# Patient Record
Sex: FEMALE | Race: WHITE | Employment: OTHER | URBAN - METROPOLITAN AREA
[De-identification: names, ages, dates, MRNs, and addresses within clinical notes are randomized per-mention and may not be internally consistent; named-entity substitution may affect disease eponyms.]

---

## 2017-04-21 ENCOUNTER — HOSPITAL ENCOUNTER (OUTPATIENT)
Dept: MRI IMAGING | Age: 67
Discharge: HOME | End: 2017-04-21

## 2017-04-21 DIAGNOSIS — M47.22 CERVICAL SPONDYLOSIS WITH RADICULOPATHY: ICD-10-CM

## 2017-04-25 ENCOUNTER — HOSPITAL ENCOUNTER (OUTPATIENT)
Dept: MRI IMAGING | Age: 67
Discharge: HOME | End: 2017-04-25
Payer: MEDICARE

## 2017-04-25 PROCEDURE — 72141 MRI NECK SPINE W/O DYE: CPT

## 2017-08-15 RX ORDER — METOPROLOL SUCCINATE 25 MG/1
TABLET, EXTENDED RELEASE ORAL
Qty: 90 TAB | Refills: 0 | Status: SHIPPED | OUTPATIENT
Start: 2017-08-15 | End: 2018-01-24 | Stop reason: SDUPTHER

## 2018-02-14 ENCOUNTER — HOSPITAL ENCOUNTER (OUTPATIENT)
Dept: MRI IMAGING | Age: 68
Discharge: HOME | End: 2018-02-14
Payer: MEDICARE

## 2018-02-14 DIAGNOSIS — M46.1 SACROILIITIS, NOT ELSEWHERE CLASSIFIED (HCC): ICD-10-CM

## 2018-02-14 PROCEDURE — 72148 MRI LUMBAR SPINE W/O DYE: CPT

## 2018-02-27 ENCOUNTER — OFFICE VISIT (OUTPATIENT)
Dept: NEUROLOGY | Age: 68
End: 2018-02-27

## 2018-02-27 VITALS
DIASTOLIC BLOOD PRESSURE: 76 MMHG | HEIGHT: 67 IN | BODY MASS INDEX: 33.13 KG/M2 | SYSTOLIC BLOOD PRESSURE: 136 MMHG | RESPIRATION RATE: 18 BRPM | TEMPERATURE: 98.4 F | HEART RATE: 69 BPM | WEIGHT: 211.1 LBS | OXYGEN SATURATION: 98 %

## 2018-02-27 DIAGNOSIS — M48.061 SPINAL STENOSIS OF LUMBAR REGION WITHOUT NEUROGENIC CLAUDICATION: Primary | ICD-10-CM

## 2018-02-27 DIAGNOSIS — R53.82 CHRONIC FATIGUE: ICD-10-CM

## 2018-02-27 DIAGNOSIS — M79.7 FIBROMYALGIA: ICD-10-CM

## 2018-02-27 DIAGNOSIS — R79.89 LOW VITAMIN D LEVEL: ICD-10-CM

## 2018-02-27 RX ORDER — ROSUVASTATIN CALCIUM 20 MG/1
20 TABLET, COATED ORAL
COMMUNITY
End: 2020-08-20 | Stop reason: SDUPTHER

## 2018-02-27 RX ORDER — METFORMIN HYDROCHLORIDE 500 MG/1
500 TABLET, FILM COATED, EXTENDED RELEASE ORAL 2 TIMES DAILY
COMMUNITY

## 2018-02-27 RX ORDER — PREGABALIN 150 MG/1
150 CAPSULE ORAL 2 TIMES DAILY
Qty: 60 CAP | Refills: 5 | Status: SHIPPED | OUTPATIENT
Start: 2018-02-27 | End: 2018-09-11 | Stop reason: SDUPTHER

## 2018-02-27 RX ORDER — PREGABALIN 75 MG/1
75 CAPSULE ORAL 2 TIMES DAILY
Qty: 14 CAP | Refills: 0 | Status: SHIPPED | COMMUNITY
Start: 2018-02-27 | End: 2018-09-27

## 2018-02-27 NOTE — PATIENT INSTRUCTIONS
10 [facility]   Statement to Patients  April 1, 2014      In an effort to ensure the large volume of patient prescription refills is processed in the most efficient and expeditious manner, we are asking our patients to assist us by calling your Pharmacy for all prescription refills, this will include also your  Mail Order Pharmacy. The pharmacy will contact our office electronically to continue the refill process. Please do not wait until the last minute to call your pharmacy. We need at least 48 hours (2days) to fill prescriptions. We also encourage you to call your pharmacy before going to  your prescription to make sure it is ready. With regard to controlled substance prescription refill requests (narcotic refills) that need to be picked up at our office, we ask your cooperation by providing us with at least 72 hours (3days) notice that you will need a refill. We will not refill narcotic prescription refill requests after 4:00pm on any weekday, Monday through Thursday, or after 2:00pm on Fridays, or on the weekends. We encourage everyone to explore another way of getting your prescription refill request processed using Tapstream, our patient web portal through our electronic medical record system. Tapstream is an efficient and effective way to communicate your medication request directly to the office and  downloadable as an tavares on your smart phone . Tapstream also features a review functionality that allows you to view your medication list as well as leave messages for your physician. Are you ready to get connected? If so please review the attatched instructions or speak to any of our staff to get you set up right away! Thank you so much for your cooperation. Should you have any questions please contact our Practice Administrator.     The Physicians and Staff,  [facility] [address]  What is a living will?    A living will is a legal form you use to write down the kind of care you want at the end of your life. It is used by the health professionals who will treat you if you aren't able to decide for yourself. If you put your wishes in writing, your loved ones and others will know what kind of care you want. They won't need to guess. This can ease your mind and be helpful to others. A living will is not the same as an estate or property will. An estate will explains what you want to happen with your money and property after you die. Is a living will a legal document? A living will is a legal document. Each state has its own laws about living arteaga. If you move to another state, make sure that your living will is legal in the state where you now live. Or you might use a universal form that has been approved by many states. This kind of form can sometimes be completed and stored online. Your electronic copy will then be available wherever you have a connection to the Internet. In most cases, doctors will respect your wishes even if you have a form from a different state. · You don't need an  to complete a living will. But legal advice can be helpful if your state's laws are unclear, your health history is complicated, or your family can't agree on what should be in your living will. · You can change your living will at any time. Some people find that their wishes about end-of-life care change as their health changes. · In addition to making a living will, think about completing a medical power of  form. This form lets you name the person you want to make end-of-life treatment decisions for you (your \"health care agent\") if you're not able to. Many hospitals and nursing homes will give you the forms you need to complete a living will and a medical power of . · Your living will is used only if you can't make or communicate decisions for yourself anymore.  If you become able to make decisions again, you can accept or refuse any treatment, no matter what you wrote in your living will. · Your state may offer an online registry. This is a place where you can store your living will online so the doctors and nurses who need to treat you can find it right away. What should you think about when creating a living will? Talk about your end-of-life wishes with your family members and your doctor. Let them know what you want. That way the people making decisions for you won't be surprised by your choices. Think about these questions as you make your living will:  · Do you know enough about life support methods that might be used? If not, talk to your doctor so you know what might be done if you can't breathe on your own, your heart stops, or you're unable to swallow. · What things would you still want to be able to do after you receive life-support methods? Would you want to be able to walk? To speak? To eat on your own? To live without the help of machines? · If you have a choice, where do you want to be cared for? In your home? At a hospital or nursing home? · Do you want certain Sikhism practices performed if you become very ill? · If you have a choice at the end of your life, where would you prefer to die? At home? In a hospital or nursing home? Somewhere else? · Would you prefer to be buried or cremated? · Do you want your organs to be donated after you die? What should you do with your living will? · Make sure that your family members and your health care agent have copies of your living will. · Give your doctor a copy of your living will to keep in your medical record. If you have more than one doctor, make sure that each one has a copy. · You may want to put a copy of your living will where it can be easily found. Where can you learn more? Go to http://adarsh-catrachita.info/. Enter E590 in the search box to learn more about \"Learning About Living Jyoti Mates. \"  Current as of: September 24, 2016  Content Version: 11.4  © 6996-7041 EPS. Care instructions adapted under license by [employer] (which disclaims liability or warranty for this information). If you have questions about a medical condition or this instruction, always ask your healthcare professional. [address] any warranty or liability for your use of this information. Patient Instructions/Plans: For Lyrica 75mg tabs: Take 1 tab twice a day for one week then    Then Start Lyrica 150mg tabs: Take 1 tab twice a day     Pregabalin (Lyrica) - (By mouth)   Why this medicine is used:   Treats nerve and muscle pain, including fibromyalgia. Also treats seizures. Contact a nurse or doctor right away if you have:  · Thoughts of hurting yourself  · Muscle pain, tenderness, weakness     Common side effects:  · Rapid weight gain; swelling in your hands, ankles, or feet  · Confusion, trouble concentrating, tiredness  · Constipation, dry mouth  · Headache  © 2017 [address] Information is for End User's use only and may not be sold, redistributed or otherwise used for commercial purposes.

## 2018-02-27 NOTE — MR AVS SNAPSHOT
Shailesh Beyer 
 
 
 [place] [address] [place] [address] [phone] Patient: Devan Liu MRN:  [id number] Visit Information Date & Time Provider Department Dept. Phone Encounter #  
 2018  1:00 PM MD [facility] [phone] [id number] Your Appointments 3/1/2018 11:15 AM  
ESTABLISHED PATIENT with [facility] Appt Note: shannon'd w/Dr HIRAL padilla [facility]  
 [address] [facility]  
[phone] [address] [facility]  
  
    
 2018  1:40 PM  
Follow Up with Ruy Mueller MD  
[facility]) Appt Note: follow up [place] [address] [place] [id number] [phone]  
  
   
 [place] [address] [facility] [address] [id number] [address] [place] Upcoming Health Maintenance Date Due Hepatitis C Screening 1948 DTaP/Tdap/Td series (1 - Tdap) 1969 BREAST CANCER SCRN MAMMOGRAM 1998 FOBT Q 1 YEAR AGE 50-75 1998 ZOSTER VACCINE AGE 60> 2008 GLAUCOMA SCREENING Q2Y 2013 OSTEOPOROSIS SCREENING (DEXA) 2013 Pneumococcal 65+ Low/Medium Risk (1 of 2 - PCV13) 2013 MEDICARE YEARLY EXAM 2013 Influenza Age 5 to Adult 2017 Allergies as of 2018  Review Complete On: 2018 By: Herminio Roche LPN No Known Allergies Current Immunizations  Never Reviewed No immunizations on file. Not reviewed this visit You Were Diagnosed With   
  
 Codes Comments Spinal stenosis of lumbar region without neurogenic claudication    -  Primary ICD-10-CM: M48.061 
ICD-9-CM: 724.02 Chronic fatigue     ICD-10-CM: R53.82 
ICD-9-CM: 780.79 Fibromyalgia     ICD-10-CM: M79.7 ICD-9-CM: 729.1  Low vitamin D level     ICD-10-CM: E55.9 ICD-9-CM: 268.9 Vitals BP Pulse Temp Resp Height(growth percentile) Weight(growth percentile) 136/76 69 98.4 °F (36.9 °C) (Oral) 18 5' 7\" (1.702 m) 211 lb 1.6 oz (95.8 kg) SpO2 BMI OB Status Smoking Status 98% 33.06 kg/m2 Menopause Former Smoker Vitals History BMI and BSA Data Body Mass Index Body Surface Area 33.06 kg/m 2 2.13 m 2 Preferred Pharmacy Pharmacy Name Phone [facility] [address] [place], [place] [address], [facility] [address] [phone] Your Updated Medication List  
  
   
This list is accurate as of 2/27/18  2:16 PM.  Always use your most recent med list.  
  
  
  
  
 aspirin, buffered 81 mg Tab Take  by mouth. cholecalciferol (VITAMIN D3) 5,000 unit Tab tablet Commonly known as:  VITAMIN D3 Take  by mouth daily. cpap machine kit  
by Does Not Apply route. CRESTOR 20 mg tablet Generic drug:  rosuvastatin Take 20 mg by mouth nightly. cyclobenzaprine 10 mg tablet Commonly known as:  FLEXERIL Take 10 mg by mouth as needed. diazePAM 10 mg tablet Commonly known as:  VALIUM Take 10 mg by mouth as needed. diclofenac EC 75 mg EC tablet Commonly known as:  VOLTAREN Take 75 mg by mouth as needed. DULoxetine 60 mg capsule Commonly known as:  CYMBALTA  
  
 metFORMIN 500 mg Tg24 24 hour tablet Commonly known asPryor Mercury ER Take  by mouth.  
  
 metoprolol succinate 25 mg XL tablet Commonly known as:  TOPROL-XL Take 1 Tab by mouth daily. omeprazole 20 mg capsule Commonly known as:  PRILOSEC Take 1 Cap by mouth two (2) times a day. Indications: EROSIVE ESOPHAGITIS * pregabalin 150 mg capsule Commonly known as:  Tiki Daft Take 1 Cap by mouth two (2) times a day. Max Daily Amount: 300 mg. Indications: FIBROMYALGIA * pregabalin 75 mg capsule Commonly known as:  Tiki Daft Take 1 Cap by mouth two (2) times a day. Max Daily Amount: 150 mg. ROSANGELA-E PO Take  by mouth.  
  
 zolpidem 10 mg tablet Commonly known as:  AMBIEN Take  by mouth nightly as needed for Sleep. * Notice: This list has 2 medication(s) that are the same as other medications prescribed for you. Read the directions carefully, and ask your doctor or other care provider to review them with you. Prescriptions Printed Refills  
 pregabalin (LYRICA) 150 mg capsule 5 Sig: Take 1 Cap by mouth two (2) times a day. Max Daily Amount: 300 mg. Indications: FIBROMYALGIA Class: Print Route: Oral  
  
We Performed the Following TSH 3RD GENERATION [28222 CPT(R)] VITAMIN B12 & FOLATE [67323 CPT(R)] To-Do List   
 02/28/2018 Lab:  VITAMIN D, 1, 25 DIHYDROXY Patient Instructions PRESCRIPTION REFILL POLICY [facility] Statement to Patients April 1, 2014 In an effort to ensure the large volume of patient prescription refills is processed in the most efficient and expeditious manner, we are asking our patients to assist us by calling your Pharmacy for all prescription refills, this will include also your  Mail Order Pharmacy. The pharmacy will contact our office electronically to continue the refill process. Please do not wait until the last minute to call your pharmacy. We need at least 48 hours (2days) to fill prescriptions. We also encourage you to call your pharmacy before going to  your prescription to make sure it is ready. With regard to controlled substance prescription refill requests (narcotic refills) that need to be picked up at our office, we ask your cooperation by providing us with at least 72 hours (3days) notice that you will need a refill. We will not refill narcotic prescription refill requests after 4:00pm on any weekday, Monday through Thursday, or after 2:00pm on Fridays, or on the weekends.   
  
We encourage everyone to explore another way of getting your prescription refill request processed using [employer], our patient web portal through our electronic medical record system. [employer] is an efficient and effective way to communicate your medication request directly to the office and  downloadable as an tavares on your smart phone . [employer] also features a review functionality that allows you to view your medication list as well as leave messages for your physician. Are you ready to get connected? If so please review the attatched instructions or speak to any of our staff to get you set up right away! Thank you so much for your cooperation. Should you have any questions please contact our Practice Administrator. The Physicians and Staff,  [facility] [address] What is a living will? A living will is a legal form you use to write down the kind of care you want at the end of your life. It is used by the health professionals who will treat you if you aren't able to decide for yourself. If you put your wishes in writing, your loved ones and others will know what kind of care you want. They won't need to guess. This can ease your mind and be helpful to others. A living will is not the same as an estate or property will. An estate will explains what you want to happen with your money and property after you die. Is a living will a legal document? A living will is a legal document. Each state has its own laws about living arteaga. If you move to another state, make sure that your living will is legal in the state where you now live. Or you might use a universal form that has been approved by many states. This kind of form can sometimes be completed and stored online. Your electronic copy will then be available wherever you have a connection to the Internet. In most cases, doctors will respect your wishes even if you have a form from a different state.  
· You don't need an  to complete a living will. But legal advice can be helpful if your state's laws are unclear, your health history is complicated, or your family can't agree on what should be in your living will. · You can change your living will at any time. Some people find that their wishes about end-of-life care change as their health changes. · In addition to making a living will, think about completing a medical power of  form. This form lets you name the person you want to make end-of-life treatment decisions for you (your \"health care agent\") if you're not able to. Many hospitals and nursing homes will give you the forms you need to complete a living will and a medical power of . · Your living will is used only if you can't make or communicate decisions for yourself anymore. If you become able to make decisions again, you can accept or refuse any treatment, no matter what you wrote in your living will. · Your state may offer an online registry. This is a place where you can store your living will online so the doctors and nurses who need to treat you can find it right away. What should you think about when creating a living will? Talk about your end-of-life wishes with your family members and your doctor. Let them know what you want. That way the people making decisions for you won't be surprised by your choices. Think about these questions as you make your living will: · Do you know enough about life support methods that might be used? If not, talk to your doctor so you know what might be done if you can't breathe on your own, your heart stops, or you're unable to swallow. · What things would you still want to be able to do after you receive life-support methods? Would you want to be able to walk? To speak? To eat on your own? To live without the help of machines? · If you have a choice, where do you want to be cared for? In your home? At a hospital or nursing home?  
· Do you want certain Mandaeism practices performed if you become very ill? · If you have a choice at the end of your life, where would you prefer to die? At home? In a hospital or nursing home? Somewhere else? · Would you prefer to be buried or cremated? · Do you want your organs to be donated after you die? What should you do with your living will? · Make sure that your family members and your health care agent have copies of your living will. · Give your doctor a copy of your living will to keep in your medical record. If you have more than one doctor, make sure that each one has a copy. · You may want to put a copy of your living will where it can be easily found. Where can you learn more? Go to http://adarsh-catrachita.info/. Enter N489 in the search box to learn more about \"Learning About Living Perroy. \" Current as of: September 24, 2016 Content Version: 11.4 © 2949-6201 Rezee. Care instructions adapted under license by [employer] (which disclaims liability or warranty for this information). If you have questions about a medical condition or this instruction, always ask your healthcare professional. [place] [address] any warranty or liability for your use of this information. Patient Instructions/Plans: For Lyrica 75mg tabs: Take 1 tab twice a day for one week then Then Start Lyrica 150mg tabs: Take 1 tab twice a day Pregabalin (Lyrica) - (By mouth) Why this medicine is used:  
Treats nerve and muscle pain, including fibromyalgia. Also treats seizures. Contact a nurse or doctor right away if you have: · Thoughts of hurting yourself · Muscle pain, tenderness, weakness Common side effects: 
· Rapid weight gain; swelling in your hands, ankles, or feet · Confusion, trouble concentrating, tiredness · Constipation, dry mouth 
· Headache © 2017 [address] Information is for End User's use only and may not be sold, redistributed or otherwise used for commercial purposes. Introducing [facility]! Andi Hsu introduces Brandfolder patient portal. Now you can access parts of your medical record, email your doctor's office, and request medication refills online. 1. In your internet browser, go to [email or web address] 2. Click on the First Time User? Click Here link in the Sign In box. You will see the New Member Sign Up page. 3. Enter your Brandfolder Access Code exactly as it appears below. You will not need to use this code after youve completed the sign-up process. If you do not sign up before the expiration date, you must request a new code. · Brandfolder Access Code: [email or web address] Expires: 5/28/2018 12:42 PM 
 
4. Enter the last four digits of your Social Security Number (xxxx) and Date of Birth (mm/dd/yyyy) as indicated and click Submit. You will be taken to the next sign-up page. 5. Create a Brandfolder ID. This will be your Brandfolder login ID and cannot be changed, so think of one that is secure and easy to remember. 6. Create a Brandfolder password. You can change your password at any time. 7. Enter your Password Reset Question and Answer. This can be used at a later time if you forget your password. 8. Enter your e-mail address. You will receive e-mail notification when new information is available in [address] 9. Click Sign Up. You can now view and download portions of your medical record. 10. Click the Download Summary menu link to download a portable copy of your medical information. If you have questions, please visit the Frequently Asked Questions section of the Brandfolder website. Remember, Brandfolder is NOT to be used for urgent needs. For medical emergencies, dial 911. Now available from your iPhone and Android! Please provide this summary of care documentation to your next provider. Your primary care clinician is listed as Cleve Dinh.  If you have any questions after today's visit, please call [phone].

## 2018-02-27 NOTE — PROGRESS NOTES
NEUROLOGY NEW PATIENT CONSULTATION    REFERRED BY:  Beronica Delcid MD    CHIEF COMPLAINT:  Back pain    HISTORY OF PRESENT ILLNESS    HISTORY PROVIDED BY:  Patient      Sarah Pemberton is a 69 y.o. female who I am asked to see in consultation for chronic back pain, fatigue, and fibromyalgia. Patient reports that she has had chronic back pain, muscle spasms, joint pain and fatigue for many years. Chronic fatigue started over 15 years ago. Patient follows with sleep medicine for her fatigue. She is on a CPAP machine which is currently being adjusted. She does have neck pain and low back pain. She has had cervical stenosis. She did have an injection in her cervical spine and physical therapy. She also has had carpal tunnel surgery bilaterally. She was having some numbness on her left arm but this is improved. She now follows with Dr. Sharon Vizcaino for her low back pain. She has sharp pain radiating down her buttocks. She had an injection her sacroiliac joint recently about a month ago and this does seem to help. She also do stretching exercises. She does have an MRI of the lumbar spine done on 2/14 which showed a disc at L4/5. She follows up with Dr. Sharon Vizcaino in a week for further injections for this. She has never seen neurosurgery. She did follow with Dr. Timothy Smith with [facility] in the past but does not any longer. Patient also feels like she has some depression and lack of motivation. She is to take Adderall for this but no longer takes this. She does feel like the Cymbalta she is taking at 60 mg is helping her. She also has diazepam to take as needed from her OB gynecologist.  She does take zolpidem or Unisom or Benadryl or herbal medications at night to help with her sleep. She reports that her memory is okay. She has previously tried gabapentin and amitriptyline in the past that she did not do well with. She has never been on Lyrica. Currently she is taking Cymbalta 60 mg daily.       PMH  Past Medical History:   Diagnosis Date    Anxiety     Arrhythmia     CAD (coronary artery disease)     irregular heartrate    Depression     Diabetes (HCC)     Fibromyalgia     Hypercholesterolemia     Musculoskeletal disorder     Other ill-defined conditions(799.89)     high cholesterol    Psychiatric disorder     depression    Sleep disorder        SH  Social History     Social History    Marital status:      Spouse name: N/A    Number of children: N/A    Years of education: N/A     Social History Main Topics    Smoking status: Former Smoker     Packs/day: 0.50     Years: 25.00     Types: Cigarettes     Quit date: 12/29/2011    Smokeless tobacco: Never Used    Alcohol use 5.0 oz/week     10 Shots of liquor per week      Comment: 7 drinks a week    Drug use: No    Sexual activity: Not on file     Other Topics Concern    Not on file     Social History Narrative       FH  Family History   Problem Relation Age of Onset    Hypertension Father     Diabetes Father     Cancer Father     Heart Disease Mother     Parkinson's Disease Mother        ALLERGIES  No Known Allergies    CURRENT MEDS  Current Outpatient Prescriptions   Medication Sig Dispense Refill    metFORMIN (GLUMETZA ER) 500 mg TG24 24 hour tablet Take  by mouth.  rosuvastatin (CRESTOR) 20 mg tablet Take 20 mg by mouth nightly.  cpap machine kit by Does Not Apply route.  metoprolol succinate (TOPROL-XL) 25 mg XL tablet Take 1 Tab by mouth daily. 60 Tab 0    DULoxetine (CYMBALTA) 60 mg capsule       diazepam (VALIUM) 10 mg tablet Take 10 mg by mouth as needed.  omeprazole (PRILOSEC) 20 mg capsule Take 1 Cap by mouth two (2) times a day. Indications: EROSIVE ESOPHAGITIS (Patient taking differently: Take 20 mg by mouth daily. Indications: EROSIVE ESOPHAGITIS) 60 Cap 5    Aspirin, Buffered 81 mg tab Take  by mouth.  zolpidem (AMBIEN) 10 mg tablet Take  by mouth nightly as needed for Sleep.       diclofenac EC (VOLTAREN) 75 mg EC tablet Take 75 mg by mouth as needed.  cyclobenzaprine (FLEXERIL) 10 mg tablet Take 10 mg by mouth as needed.  S-ADENOSYLMETHIONINE SUL TOSYL (ROSANGELA-E PO) Take  by mouth.  cholecalciferol, VITAMIN D3, (VITAMIN D3) 5,000 unit tab tablet Take  by mouth daily. REVIEW OF SYSTEMS:     Y  N       Y  N  Y  N   Y  N  [] [x] AIDS          [] [x] Falls  [] [x] Memory Loss  [] [x]  Shortness of breath  [x] [x] Anxiety          [x] [] Fatigue [x] [] Muscle Pain        [x] []  Skipped beats  [] [x] Chest Pain   [] [x] Frequent HA [] [x] Ms Weakness     [x] []  Snoring  [x] [] Constipation [] [x]Hearing loss [] [x] Nause/Vomiting  [x] []  Stomach Pain  [] [x] Cough          [] [x]Hepatitis [] [x] Neuropathy         [] [x]  Swallowing difficulty  [x] [] Depression  [] [x]Incontinence [] [x] Poor appetite      [] [x]  Vertigo  [] [x] Diarrhea       [x] [] Joint Pain [] [x] Rash                   [] [x]  Visual disturbances  [] [x] Fainting        [] [x] Leg Swelling [] [x] Ringing ears       [] [x]  Weight changes      []Unable to obtain  ROS due to  []mental status change  []sedated   []intubated          PREVIOUS WORKUP  IMAGING: MRI C spine with degenerative disc disease    MRI L spine: Multilevel degenerative disc disease and degenerative changes. Central/left  paracentral disc protrusion at L4-L5 effacing the left lateral recess. Multilevel neuroforaminal narrowing ranging from mild to severe, worst on the  left at L4-L5. Severe spinal canal stenosis also noted at L4-L5. Please see  above report.   (I personally reviewed these images in PACS and this is my impression)    LABS  Results for orders placed or performed during the hospital encounter of 10/23/15   CBC WITH AUTOMATED DIFF   Result Value Ref Range    WBC 7.6 3.6 - 11.0 K/uL    RBC 4.22 3.80 - 5.20 M/uL    HGB 13.4 11.5 - 16.0 g/dL    HCT 42.4 35.0 - 47.0 %    .5 (H) 80.0 - 99.0 FL    MCH 31.8 26.0 - 34.0 PG    MCHC 31.6 30.0 - 36.5 g/dL    RDW 13.8 11.5 - 14.5 %    PLATELET 065 807 - 490 K/uL    NEUTROPHILS 72 32 - 75 %    LYMPHOCYTES 21 12 - 49 %    MONOCYTES 6 5 - 13 %    EOSINOPHILS 1 0 - 7 %    BASOPHILS 0 0 - 1 %    ABS. NEUTROPHILS 5.4 1.8 - 8.0 K/UL    ABS. LYMPHOCYTES 1.6 0.8 - 3.5 K/UL    ABS. MONOCYTES 0.5 0.0 - 1.0 K/UL    ABS. EOSINOPHILS 0.1 0.0 - 0.4 K/UL    ABS. BASOPHILS 0.0 0.0 - 0.1 K/UL   METABOLIC PANEL, COMPREHENSIVE   Result Value Ref Range    Sodium 142 136 - 145 mmol/L    Potassium 4.2 3.5 - 5.1 mmol/L    Chloride 108 97 - 108 mmol/L    CO2 29 21 - 32 mmol/L    Anion gap 5 5 - 15 mmol/L    Glucose 109 (H) 65 - 100 mg/dL    BUN 8 6 - 20 MG/DL    Creatinine 0.69 0.55 - 1.02 MG/DL    BUN/Creatinine ratio 12 12 - 20      GFR est AA >60 >60 ml/min/1.73m2    GFR est non-AA >60 >60 ml/min/1.73m2    Calcium 8.4 (L) 8.5 - 10.1 MG/DL    Bilirubin, total 0.5 0.2 - 1.0 MG/DL    ALT (SGPT) 23 12 - 78 U/L    AST (SGOT) 12 (L) 15 - 37 U/L    Alk.  phosphatase 60 45 - 117 U/L    Protein, total 7.0 6.4 - 8.2 g/dL    Albumin 3.8 3.5 - 5.0 g/dL    Globulin 3.2 2.0 - 4.0 g/dL    A-G Ratio 1.2 1.1 - 2.2     CK W/ CKMB & INDEX   Result Value Ref Range    CK 41 26 - 192 U/L    CK - MB 0.7 0.5 - 3.6 NG/ML    CK-MB Index 1.7 0 - 2.5     TROPONIN I   Result Value Ref Range    Troponin-I, Qt. <0.04 <0.05 ng/mL   TROPONIN I   Result Value Ref Range    Troponin-I, Qt. <0.04 <0.05 ng/mL   CK W/ REFLX CKMB   Result Value Ref Range    CK 41 26 - 192 U/L   POC CREATININE   Result Value Ref Range    Creatinine (POC) 0.7 0.6 - 1.3 MG/DL    GFRAA, POC >60 >60 ml/min/1.73m2    GFRNA, POC >60 >60 ml/min/1.73m2   EKG, 12 LEAD, INITIAL   Result Value Ref Range    Ventricular Rate 70 BPM    Atrial Rate 70 BPM    P-R Interval 122 ms    QRS Duration 72 ms    Q-T Interval 388 ms    QTC Calculation (Bezet) 419 ms    Calculated P Axis 53 degrees    Calculated R Axis 15 degrees    Calculated T Axis 43 degrees    Diagnosis       Sinus rhythm with premature supraventricular complexes  Possible Left atrial enlargement  Septal infarct , age undetermined  Abnormal ECG  No previous ECGs available  Confirmed by Kat Meierpool ([id number]) on 10/23/2015 5:11:46 PM         PHYSICAL EXAM  Visit Vitals    /76    Pulse 69    Temp 98.4 °F (36.9 °C) (Oral)    Resp 18    Ht 5' 7\" (1.702 m)    Wt 95.8 kg (211 lb 1.6 oz)    SpO2 98%    BMI 33.06 kg/m2     General:  Alert, cooperative, no distress. Head:  Normocephalic, without obvious abnormality, atraumatic. Eyes:  Conjunctivae/corneas clear. Pupils equal, round, reactive to light. Extraocular movements intact, VFF, NO papilledema   Lungs:  Heart:   Non labored breathing  Regular rate and rhythm, no carotid bruits   Abdomen:   Soft, non-distended   Extremities: Extremities normal, atraumatic, no cyanosis or edema. Pulses: 2+ and symmetric all extremities. Skin: Skin color, texture, turgor normal. No rashes or lesions.    Neurologic:  Gen: Attention normal             Language: naming, repetition, fluency normal             Memory: intact recent and remote memory  Cranial Nerves:  I: smell Not tested   II: visual fields Full to confrontation   II: pupils Equal, round, reactive to light   II: optic disc No papilledema   III,VII: ptosis none   III,IV,VI: extraocular muscles  Full ROM   V: mastication normal   V: facial light touch sensation  normal   VII: facial muscle function   symmetric   VIII: hearing symmetric   IX: soft palate elevation  normal   XI: trapezius strength  5/5   XI: sternocleidomastoid strength 5/5   XI: neck flexion strength  5/5   XII: tongue  midline     Motor: normal bulk and tone, no tremor              Strength: 5/5 all four extremities  Sensory:dec PP patchy distribution on left arm  Coordination: FTN intact, Rhomberg negative  Gait: normal gait including tandem   Reflexes: 2+ RUE, 1+ otherwise       IMPRESSION  Abdullahi James is a 69 y.o. female who presents for evaluation of chronic back pain and fibromyalgia. Patient is currently on Cymbalta but still having issues. She has a large disc at L4-L5. She does not have any neurogenic claudication secondary to this. I agree with trying spinal injections prior to any type of surgical referral.  We will also try patient on Lyrica to see if this helps with some of her symptoms. RECOMMENDATIONS  1. Start Lyrica 75 mg twice daily. Side effects discussed. Information given to patient  2. We will do a prescription for Lyrica at 150 mg twice daily  3. Continue Cymbalta 60 mg daily  4. Continue with Dr. Jayla Irizarry for spinal injections  5. May need EMG/NCS  6.  May need referral to neurosurgery in the future  7. Encourage patient if she feels like she has anxiety/depression/needs Adderall that she should establish with psychiatry    FU 3 months    Gopi Powers MD    CC: Amy Scott MD  Fax: [phone]    This note was created using voice recognition software. Despite editing, there may be syntax errors. This note will not be viewable in [address]

## 2018-02-27 NOTE — LETTER
Dear Marlee Donovan MD, Thank you for allowing me to see your patient, Yue Holbrook for a neurological consultation. Please see my impression and recommendations as outlined in my note. Sincerely, Linda Vaughan MD 
[facility] at [address] [place] [address] BY: 
Marlee Donovan MD 
 
CHIEF COMPLAINT: 
Back pain HISTORY OF PRESENT ILLNESS HISTORY PROVIDED BY: 
Patient Yue Holbrook is a 69 y.o. female who I am asked to see in consultation for chronic back pain, fatigue, and fibromyalgia. Patient reports that she has had chronic back pain, muscle spasms, joint pain and fatigue for many years. Chronic fatigue started over 15 years ago. Patient follows with sleep medicine for her fatigue. She is on a CPAP machine which is currently being adjusted. She does have neck pain and low back pain. She has had cervical stenosis. She did have an injection in her cervical spine and physical therapy. She also has had carpal tunnel surgery bilaterally. She was having some numbness on her left arm but this is improved. She now follows with Dr. Fernando Tavarez for her low back pain. She has sharp pain radiating down her buttocks. She had an injection her sacroiliac joint recently about a month ago and this does seem to help. She also do stretching exercises. She does have an MRI of the lumbar spine done on 2/14 which showed a disc at L4/5. She follows up with Dr. Fernando Tavarez in a week for further injections for this. She has never seen neurosurgery. She did follow with Dr. Kami Bell with [facility] in the past but does not any longer. Patient also feels like she has some depression and lack of motivation. She is to take Adderall for this but no longer takes this. She does feel like the Cymbalta she is taking at 60 mg is helping her.   She also has diazepam to take as needed from her OB gynecologist. 
She does take zolpidem or Unisom or Benadryl or herbal medications at night to help with her sleep. She reports that her memory is okay. She has previously tried gabapentin and amitriptyline in the past that she did not do well with. She has never been on Lyrica. Currently she is taking Cymbalta 60 mg daily. PMH Past Medical History:  
Diagnosis Date  Anxiety  Arrhythmia  CAD (coronary artery disease) irregular heartrate  Depression  Diabetes ([address]  Fibromyalgia  Hypercholesterolemia  Musculoskeletal disorder  Other ill-defined conditions(799.89)   
 high cholesterol  Psychiatric disorder   
 depression  Sleep disorder [address] Social History Social History  Marital status:  Spouse name: N/A  
 Number of children: N/A  
 Years of education: N/A Social History Main Topics  Smoking status: Former Smoker Packs/day: 0.50 Years: 25.00 Types: Cigarettes Quit date: 12/29/2011  Smokeless tobacco: Never Used  Alcohol use 5.0 oz/week 10 Shots of liquor per week Comment: 7 drinks a week  Drug use: No  
 Sexual activity: Not on file Other Topics Concern  Not on file Social History Narrative [facility] Family History Problem Relation Age of Onset  Hypertension Father  Diabetes Father  Cancer Father  Heart Disease Mother  Parkinson's Disease Mother ALLERGIES No Known Allergies CURRENT MEDS Current Outpatient Prescriptions Medication Sig Dispense Refill  metFORMIN (GLUMETZA ER) 500 mg TG24 24 hour tablet Take  by mouth.  rosuvastatin (CRESTOR) 20 mg tablet Take 20 mg by mouth nightly.  cpap machine kit by Does Not Apply route.  metoprolol succinate (TOPROL-XL) 25 mg XL tablet Take 1 Tab by mouth daily. 60 Tab 0  
 DULoxetine (CYMBALTA) 60 mg capsule  diazepam (VALIUM) 10 mg tablet Take 10 mg by mouth as needed.  omeprazole (PRILOSEC) 20 mg capsule Take 1 Cap by mouth two (2) times a day. Indications: EROSIVE ESOPHAGITIS (Patient taking differently: Take 20 mg by mouth daily. Indications: EROSIVE ESOPHAGITIS) 60 Cap 5  Aspirin, Buffered 81 mg tab Take  by mouth.  zolpidem (AMBIEN) 10 mg tablet Take  by mouth nightly as needed for Sleep.  diclofenac EC (VOLTAREN) 75 mg EC tablet Take 75 mg by mouth as needed.  cyclobenzaprine (FLEXERIL) 10 mg tablet Take 10 mg by mouth as needed.  S-ADENOSYLMETHIONINE SUL TOSYL (ROSANGELA-E PO) Take  by mouth.  cholecalciferol, VITAMIN D3, (VITAMIN D3) 5,000 unit tab tablet Take  by mouth daily. REVIEW OF SYSTEMS:  
 
Y  N       Y  N  Y  N   Y  N 
[] [x] AIDS          [] [x] Falls  [] [x] Memory Loss  [] [x]  Shortness of breath [x] [x] Anxiety          [x] [] Fatigue [x] [] Muscle Pain        [x] []  Skipped beats 
[] [x] Chest Pain   [] [x] Frequent HA [] [x] Ms Weakness     [x] []  Snoring 
[x] [] Constipation [] [x]Hearing loss [] [x] Nause/Vomiting  [x] []  Stomach Pain 
[] [x] Cough          [] [x]Hepatitis [] [x] Neuropathy         [] [x]  Swallowing difficulty 
[x] [] Depression  [] [x]Incontinence [] [x] Poor appetite      [] [x]  Vertigo 
[] [x] Diarrhea       [x] [] Joint Pain [] [x] Rash                   [] [x]  Visual disturbances 
[] [x] Fainting        [] [x] Leg Swelling [] [x] Ringing ears       [] [x]  Weight changes []Unable to obtain  ROS due to  []mental status change  []sedated   []intubated PREVIOUS WORKUP IMAGING: MRI C spine with degenerative disc disease MRI L spine: Multilevel degenerative disc disease and degenerative changes. Central/left 
paracentral disc protrusion at L4-L5 effacing the left lateral recess. Multilevel neuroforaminal narrowing ranging from mild to severe, worst on the 
left at L4-L5. Severe spinal canal stenosis also noted at L4-L5. Please see 
above report. (I personally reviewed these images in PACS and this is my impression) LABS Results for orders placed or performed during the hospital encounter of 10/23/15 CBC WITH AUTOMATED DIFF Result Value Ref Range WBC 7.6 3.6 - 11.0 K/uL  
 RBC 4.22 3.80 - 5.20 M/uL  
 HGB 13.4 11.5 - 16.0 g/dL HCT 42.4 35.0 - 47.0 % .5 (H) 80.0 - 99.0 FL  
 MCH 31.8 26.0 - 34.0 PG  
 MCHC 31.6 30.0 - 36.5 g/dL  
 RDW 13.8 11.5 - 14.5 % PLATELET 183 640 - 096 K/uL NEUTROPHILS 72 32 - 75 % LYMPHOCYTES 21 12 - 49 % MONOCYTES 6 5 - 13 % EOSINOPHILS 1 0 - 7 % BASOPHILS 0 0 - 1 %  
 ABS. NEUTROPHILS 5.4 1.8 - 8.0 K/UL  
 ABS. LYMPHOCYTES 1.6 0.8 - 3.5 K/UL  
 ABS. MONOCYTES 0.5 0.0 - 1.0 K/UL  
 ABS. EOSINOPHILS 0.1 0.0 - 0.4 K/UL  
 ABS. BASOPHILS 0.0 0.0 - 0.1 K/UL METABOLIC PANEL, COMPREHENSIVE Result Value Ref Range Sodium 142 136 - 145 mmol/L Potassium 4.2 3.5 - 5.1 mmol/L Chloride 108 97 - 108 mmol/L  
 CO2 29 21 - 32 mmol/L Anion gap 5 5 - 15 mmol/L Glucose 109 (H) 65 - 100 mg/dL BUN 8 6 - 20 MG/DL Creatinine 0.69 0.55 - 1.02 MG/DL  
 BUN/Creatinine ratio 12 12 - 20 GFR est AA >60 >60 ml/min/1.73m2 GFR est non-AA >60 >60 ml/min/1.73m2 Calcium 8.4 (L) 8.5 - 10.1 MG/DL Bilirubin, total 0.5 0.2 - 1.0 MG/DL  
 ALT (SGPT) 23 12 - 78 U/L  
 AST (SGOT) 12 (L) 15 - 37 U/L Alk. phosphatase 60 45 - 117 U/L Protein, total 7.0 6.4 - 8.2 g/dL Albumin 3.8 3.5 - 5.0 g/dL Globulin 3.2 2.0 - 4.0 g/dL A-G Ratio 1.2 1.1 - 2.2 CK W/ CKMB & INDEX Result Value Ref Range CK 41 26 - 192 U/L  
 CK - MB 0.7 0.5 - 3.6 NG/ML  
 CK-MB Index 1.7 0 - 2.5    
TROPONIN I Result Value Ref Range Troponin-I, Qt. <0.04 <0.05 ng/mL TROPONIN I Result Value Ref Range Troponin-I, Qt. <0.04 <0.05 ng/mL CK W/ REFLX CKMB Result Value Ref Range CK 41 26 - 192 U/L  
POC CREATININE Result Value Ref Range Creatinine (POC) 0.7 0.6 - 1.3 MG/DL  
 GFRAA, POC >60 >60 ml/min/1.73m2 GFRNA, POC >60 >60 ml/min/1.73m2 EKG, 12 LEAD, INITIAL Result Value Ref Range Ventricular Rate 70 BPM  
 Atrial Rate 70 BPM  
 P-R Interval 122 ms QRS Duration 72 ms Q-T Interval 388 ms QTC Calculation (Bezet) 419 ms Calculated P Axis 53 degrees Calculated R Axis 15 degrees Calculated T Axis 43 degrees Diagnosis Sinus rhythm with premature supraventricular complexes Possible Left atrial enlargement Septal infarct , age undetermined Abnormal ECG No previous ECGs available Confirmed by Almeda Councilman ([id number]) on 10/23/2015 5:11:46 PM 
  
 
 
PHYSICAL EXAM 
Visit Vitals  /76  Pulse 69  Temp 98.4 °F (36.9 °C) (Oral)  Resp 18  Ht 5' 7\" (1.702 m)  Wt 95.8 kg (211 lb 1.6 oz)  SpO2 98%  BMI 33.06 kg/m2 General:  Alert, cooperative, no distress. Head:  Normocephalic, without obvious abnormality, atraumatic. Eyes:  Conjunctivae/corneas clear. Pupils equal, round, reactive to light. Extraocular movements intact, VFF, NO papilledema Lungs: 
Heart:   Non labored breathing Regular rate and rhythm, no carotid bruits Abdomen:   Soft, non-distended Extremities: Extremities normal, atraumatic, no cyanosis or edema. Pulses: 2+ and symmetric all extremities. Skin: Skin color, texture, turgor normal. No rashes or lesions. Neurologic:  Gen: Attention normal 
           Language: naming, repetition, fluency normal 
           Memory: intact recent and remote memory Cranial Nerves: 
I: smell Not tested II: visual fields Full to confrontation II: pupils Equal, round, reactive to light II: optic disc No papilledema III,VII: ptosis none III,IV,VI: extraocular muscles  Full ROM V: mastication normal  
V: facial light touch sensation  normal  
VII: facial muscle function   symmetric VIII: hearing symmetric IX: soft palate elevation  normal  
XI: trapezius strength  5/5 XI: sternocleidomastoid strength 5/5 XI: neck flexion strength  5/5 XII: tongue  midline Motor: normal bulk and tone, no tremor Strength: 5/5 all four extremities Sensory:dec PP patchy distribution on left arm Coordination: FTN intact, Rhomberg negative Gait: normal gait including tandem Reflexes: 2+ RUE, 1+ otherwise IMPRESSION Theresa Gibbons is a 69 y.o. female who presents for evaluation of chronic back pain and fibromyalgia. Patient is currently on Cymbalta but still having issues. She has a large disc at L4-L5. She does not have any neurogenic claudication secondary to this. I agree with trying spinal injections prior to any type of surgical referral.  We will also try patient on Lyrica to see if this helps with some of her symptoms. RECOMMENDATIONS 1. Start Lyrica 75 mg twice daily. Side effects discussed. Information given to patient 2. We will do a prescription for Lyrica at 150 mg twice daily 3. Continue Cymbalta 60 mg daily 4. Continue with Dr. Alena Servin for spinal injections 5. May need EMG/NCS 6.  May need referral to neurosurgery in the future 7. Encourage patient if she feels like she has anxiety/depression/needs Adderall that she should establish with psychiatry FU 3 months Tracey Reyes MD 
 
CC: Alla White MD 
Fax: [phone] This note was created using voice recognition software. Despite editing, there may be syntax errors. This note will not be viewable in [address] Reviewed record in preparation for visit and have necessary documentation Pt did not bring medication to office visit for review Information was given to pt on Advanced Directives, Living Will 
opportunity was given for questions

## 2018-02-28 DIAGNOSIS — R79.89 LOW VITAMIN D LEVEL: ICD-10-CM

## 2018-02-28 LAB
1,25(OH)2D3 SERPL-MCNC: 63.1 PG/ML (ref 19.9–79.3)
FOLATE SERPL-MCNC: 15.1 NG/ML
TSH SERPL DL<=0.005 MIU/L-ACNC: 2.26 UIU/ML (ref 0.45–4.5)
VIT B12 SERPL-MCNC: 590 PG/ML (ref 232–1245)

## 2018-03-01 ENCOUNTER — OFFICE VISIT (OUTPATIENT)
Dept: CARDIOLOGY CLINIC | Age: 68
End: 2018-03-01

## 2018-03-01 VITALS
SYSTOLIC BLOOD PRESSURE: 128 MMHG | HEIGHT: 67 IN | RESPIRATION RATE: 20 BRPM | WEIGHT: 211 LBS | BODY MASS INDEX: 33.12 KG/M2 | OXYGEN SATURATION: 97 % | DIASTOLIC BLOOD PRESSURE: 88 MMHG | HEART RATE: 70 BPM

## 2018-03-01 DIAGNOSIS — E78.2 MIXED HYPERLIPIDEMIA: ICD-10-CM

## 2018-03-01 DIAGNOSIS — I25.10 ASHD (ARTERIOSCLEROTIC HEART DISEASE): Primary | ICD-10-CM

## 2018-03-01 DIAGNOSIS — R00.2 PALPITATIONS: ICD-10-CM

## 2018-03-01 RX ORDER — METOPROLOL SUCCINATE 25 MG/1
25 TABLET, EXTENDED RELEASE ORAL DAILY
Qty: 90 TAB | Refills: 3 | Status: SHIPPED | OUTPATIENT
Start: 2018-03-01 | End: 2019-03-25

## 2018-03-01 NOTE — PROGRESS NOTES
1. Have you been to the ER, urgent care clinic since your last visit? Hospitalized since your last visit? NO 
 
2. Have you seen or consulted any other health care providers outside of the [address] [place] [address] since your last visit? Include any pap smears or colon screening. YES, PAIN DOCTOR, PCP. [facility]. ANNUAL. NO CARDIAC C/O.

## 2018-03-01 NOTE — PROGRESS NOTES
Ryanne Morel DNP, ANP-BC Subjective/HPI:  
 
Theresa Gibbons is a 69 y.o. female is here for routine f/u. The patient denies chest pain/ shortness of breath, orthopnea, PND, LE edema, palpitations, syncope, presyncope or fatigue. Yearly follow-up, history of coronary calcification on CT, negative stress test.  Has been  tolerating generic Crestor 20 mg daily prescribed by primary care, patient  reports her LDL is now less than 100. She denies exertional chest pain dyspnea on exertion. She reports intermittent palpitations or elevations in heart rate particularly after eating. PCP Provider Alla White MD 
Past Medical History:  
Diagnosis Date  Anxiety  Arrhythmia  CAD (coronary artery disease) irregular heartrate  Depression  Diabetes ([address]  Fibromyalgia  Hypercholesterolemia  Musculoskeletal disorder  Other ill-defined conditions(799.89)   
 high cholesterol  Psychiatric disorder   
 depression  Sleep disorder Past Surgical History:  
Procedure Laterality Date  ABDOMEN SURGERY PROC UNLISTED    
 gastric banding  HX CARPAL TUNNEL RELEASE Bilateral   
 HX CHOLECYSTECTOMY  HX GI  Jan 2008 Lap Band  HX MENISCUS REPAIR Left  [place] COLSC FLX W/RMVL OF TUMOR POLYP LESION SNARE TQ  12/22/2010  [place] EGD TRANSORAL BIOPSY SINGLE/MULTIPLE  12/22/2010 No Known Allergies Family History Problem Relation Age of Onset  Hypertension Father  Diabetes Father  Cancer Father  Heart Disease Mother  Parkinson's Disease Mother Current Outpatient Prescriptions Medication Sig  GLYCERIN/PROPYLENE GLYCOL (LUBRICANT EYE DROPS, GLYC-PG, OP) Apply 1 Drop to eye as needed.  PHENYLEPHRINE HCL (NASAL DECONGESTANT, PE, NA) 1 [place] by Nasal route as needed.  metoprolol succinate (TOPROL-XL) 25 mg XL tablet Take 1 Tab by mouth daily.  metFORMIN (GLUMETZA ER) 500 mg TG24 24 hour tablet Take  by mouth.   
 rosuvastatin (CRESTOR) 20 mg tablet Take 20 mg by mouth nightly.  cpap machine kit by Does Not Apply route.  pregabalin (LYRICA) 75 mg capsule Take 1 Cap by mouth two (2) times a day. Max Daily Amount: 150 mg.  
 DULoxetine (CYMBALTA) 60 mg capsule  diazepam (VALIUM) 10 mg tablet Take 10 mg by mouth as needed.  omeprazole (PRILOSEC) 20 mg capsule Take 1 Cap by mouth two (2) times a day. Indications: EROSIVE ESOPHAGITIS (Patient taking differently: Take 20 mg by mouth daily. Indications: EROSIVE ESOPHAGITIS)  Aspirin, Buffered 81 mg tab Take  by mouth.  zolpidem (AMBIEN) 10 mg tablet Take  by mouth nightly as needed for Sleep.  pregabalin (LYRICA) 150 mg capsule Take 1 Cap by mouth two (2) times a day. Max Daily Amount: 300 mg. Indications: FIBROMYALGIA  diclofenac EC (VOLTAREN) 75 mg EC tablet Take 75 mg by mouth as needed.  cyclobenzaprine (FLEXERIL) 10 mg tablet Take 10 mg by mouth as needed.  S-ADENOSYLMETHIONINE SUL TOSYL (ROSANGELA-E PO) Take  by mouth.  cholecalciferol, VITAMIN D3, (VITAMIN D3) 5,000 unit tab tablet Take  by mouth daily. No current facility-administered medications for this visit. Vitals:  
 03/01/18 1132 03/01/18 1145 BP: 130/90 128/88 Pulse: 70 Resp: 20 SpO2: 97% Weight: 211 lb (95.7 kg) Height: 5' 7\" (1.702 m) Social History Social History  Marital status:  Spouse name: N/A  
 Number of children: N/A  
 Years of education: N/A Occupational History  Not on file. Social History Main Topics  Smoking status: Former Smoker Packs/day: 0.50 Years: 25.00 Types: Cigarettes Quit date: 12/29/2011  Smokeless tobacco: Never Used  Alcohol use 5.0 oz/week 10 Shots of liquor per week Comment: 7 drinks a week  Drug use: No  
 Sexual activity: Not on file Other Topics Concern  Not on file Social History Narrative I have reviewed the nurses notes, vitals, problem list, allergy list, medical history, family, social history and medications. Review of Symptoms: 
 
General: Pt denies excessive weight gain or loss. Pt is able to conduct ADL's HEENT: Denies blurred vision, headaches, epistaxis and difficulty swallowing. Respiratory: Denies shortness of breath, BIRD, wheezing or stridor. Cardiovascular: Denies precordial pain, + palpitations, no edema or PND Gastrointestinal: Denies poor appetite, indigestion, abdominal pain or blood in stool Musculoskeletal: Denies pain or swelling from muscles or joints Neurologic: Denies tremor, paresthesias, or sensory motor disturbance Skin: Denies rash, itching or texture change. Physical Exam:   
 
General: Well developed, in no acute distress, cooperative and alert HEENT: No carotid bruits, no JVD, trach is midline. Neck Supple, PEERL, EOM intact. Heart:  Normal S1/S2 negative S3 or S4. Regular, no murmur, gallop or rub.  
Respiratory: Clear bilaterally x 4, no wheezing or rales Abdomen:   Soft, non-tender, no masses, bowel sounds are active.  
Extremities:  No edema, normal cap refill, no cyanosis, atraumatic. Neuro: A&Ox3, speech clear, gait stable. Skin: Skin color is normal. No rashes or lesions. Non diaphoretic Vascular: 2+ pulses symmetric in all extremities Cardiographics Sinus rhythm ECG:  
Results for orders placed or performed during the hospital encounter of 10/23/15 EKG, 12 LEAD, INITIAL Result Value Ref Range Ventricular Rate 70 BPM  
 Atrial Rate 70 BPM  
 P-R Interval 122 ms QRS Duration 72 ms Q-T Interval 388 ms QTC Calculation (Bezet) 419 ms Calculated P Axis 53 degrees Calculated R Axis 15 degrees Calculated T Axis 43 degrees Diagnosis Sinus rhythm with premature supraventricular complexes Possible Left atrial enlargement Septal infarct , age undetermined Abnormal ECG No previous ECGs available Confirmed by Ayad Olivera ([id number]) on 10/23/2015 5:11:46 PM 
 Cardiology Labs: 
No results found for: CHOL, CHOLX, [address] [facility], [address], [address], HDL, LDL, LDLC, DLDLP, TGLX, TRIGL, TRIGP, CHHD, CHHDX Lab Results Component Value Date/Time Sodium 142 10/23/2015 01:23 PM  
 Potassium 4.2 10/23/2015 01:23 PM  
 Chloride 108 10/23/2015 01:23 PM  
 CO2 29 10/23/2015 01:23 PM  
 Anion gap 5 10/23/2015 01:23 PM  
 Glucose 109 (H) 10/23/2015 01:23 PM  
 BUN 8 10/23/2015 01:23 PM  
 Creatinine 0.69 10/23/2015 01:23 PM  
 BUN/Creatinine ratio 12 10/23/2015 01:23 PM  
 GFR est AA >60 10/23/2015 01:23 PM  
 GFR est non-AA >60 10/23/2015 01:23 PM  
 Calcium 8.4 (L) 10/23/2015 01:23 PM  
 Bilirubin, total 0.5 10/23/2015 01:23 PM  
 AST (SGOT) 12 (L) 10/23/2015 01:23 PM  
 Alk. phosphatase 60 10/23/2015 01:23 PM  
 Protein, total 7.0 10/23/2015 01:23 PM  
 Albumin 3.8 10/23/2015 01:23 PM  
 Globulin 3.2 10/23/2015 01:23 PM  
 A-G Ratio 1.2 10/23/2015 01:23 PM  
 ALT (SGPT) 23 10/23/2015 01:23 PM  
  
 
 
 Assessment: 
 
 Assessment:  
 
Diagnoses and all orders for this visit: 
 
1. ASHD (arteriosclerotic heart disease) Comments: 
Coronary Artery Calcification Orders: -     AMB POC EKG ROUTINE W/ 12 LEADS, INTER & REP 2. Mixed hyperlipidemia 3. Palpitations Other orders 
-     metoprolol succinate (TOPROL-XL) 25 mg XL tablet; Take 1 Tab by mouth daily. ICD-10-CM ICD-9-CM 1. ASHD (arteriosclerotic heart disease) I25.10 414.00 AMB POC EKG ROUTINE W/ 12 LEADS, INTER & REP Coronary Artery Calcification 2. Mixed hyperlipidemia E78.2 272.2 3. Palpitations R00.2 785.1 Orders Placed This Encounter  AMB POC EKG ROUTINE W/ 12 LEADS, INTER & REP Order Specific Question:   Reason for Exam: Answer:   ROUTINE  
 GLYCERIN/PROPYLENE GLYCOL (LUBRICANT EYE DROPS, GLYC-PG, OP) Sig: Apply 1 Drop to eye as needed.  PHENYLEPHRINE HCL (NASAL DECONGESTANT, PE, NA) [place] by Nasal route as needed.   
 metoprolol succinate (TOPROL-XL) 25 mg XL tablet Sig: Take 1 Tab by mouth daily. Dispense:  90 Tab Refill:  3 Plan:  
 
Patient presents doing well and is stable from cardiac stand point. Nonobstructive CAD (abn Ca score and normal stress echo): Yearly follow-up, history of coronary calcification on CT, negative stress test.  
 
Has been  tolerating generic Crestor 20 mg daily prescribed by primary care, patient  reports her LDL is now less than 100. She denies exertional chest pain dyspnea on exertion. She reports intermittent mild palpitations or elevations in heart rate particularly after eating. Event monitor showed sinus tachycardia. Refilled metoprolol. Will call if worsening. Counseled on diet and exercise- eventual goal of 30-60 minutes 5-7 times a week as per AHA guidelines. Continue current care and f/u in 12 months. Luis Daniel Menezes MD 
 
This note was created using voice recognition software. Despite editing, there may be syntax errors.

## 2018-03-01 NOTE — MR AVS SNAPSHOT
[address] [facility] 
[phone] Patient: Thurmond Mcardle MRN:  [id number] Visit Information Date & Time Provider Department Dept. Phone Encounter #  
 3/1/2018 11:15 AM Darlyn Valerio, [address] [facility] [phone] [id number] Follow-up Instructions Return in about 1 year (around 3/1/2019). Follow-up and Disposition History Your Appointments 2018  1:40 PM  
Follow Up with Bruno Pal MD  
[facility]) Appt Note: follow up [place] [address] [facility] 99 [id number] [phone]  
  
   
 [facility]  
  
    
 2019  1:15 PM  
ESTABLISHED PATIENT with Darlyn Valerio MD  
[facility]) Appt Note: Dr. Ravinder Beckett, [facility]  
[phone] [address] [facility] Upcoming Health Maintenance Date Due Hepatitis C Screening 1950 DTaP/Tdap/Td series (1 - Tdap) 1969 BREAST CANCER SCRN MAMMOGRAM 1998 FOBT Q 1 YEAR AGE 50-75 1998 ZOSTER VACCINE AGE 60> 2008 GLAUCOMA SCREENING Q2Y 2013 OSTEOPOROSIS SCREENING (DEXA) 2013 Pneumococcal 65+ Low/Medium Risk (1 of 2 - PCV13) 2013 MEDICARE YEARLY EXAM 2013 Influenza Age 5 to Adult 2017 Allergies as of 3/1/2018  Review Complete On: 3/1/2018 By: Darlyn Valerio MD  
 No Known Allergies Current Immunizations  Never Reviewed No immunizations on file. Not reviewed this visit You Were Diagnosed With   
  
 Codes Comments ASHD (arteriosclerotic heart disease)    -  Primary ICD-10-CM: I25.10 ICD-9-CM: 414.00 Coronary Artery Calcification  Mixed hyperlipidemia     ICD-10-CM: E78.2 ICD-9-CM: 272.2 Palpitations     ICD-10-CM: R00.2 ICD-9-CM: 785.1 Vitals BP Pulse Resp Height(growth percentile) Weight(growth percentile) SpO2  
 128/88 (BP 1 Location: Right arm, BP Patient Position: Sitting) 70 20 5' 7\" (1.702 m) 211 lb (95.7 kg) 97% BMI OB Status Smoking Status 33.05 kg/m2 Menopause Former Smoker Vitals History BMI and BSA Data Body Mass Index Body Surface Area 33.05 kg/m 2 2.13 m 2 Preferred Pharmacy Pharmacy Name Phone [facility] [address] [place] [address] [address] [phone] Your Updated Medication List  
  
   
This list is accurate as of 3/1/18 12:26 PM.  Always use your most recent med list.  
  
  
  
  
 aspirin, buffered 81 mg Tab Take  by mouth. cholecalciferol (VITAMIN D3) 5,000 unit Tab tablet Commonly known as:  VITAMIN D3 Take  by mouth daily. cpap machine kit  
by Does Not Apply route. CRESTOR 20 mg tablet Generic drug:  rosuvastatin Take 20 mg by mouth nightly. cyclobenzaprine 10 mg tablet Commonly known as:  FLEXERIL Take 10 mg by mouth as needed. diazePAM 10 mg tablet Commonly known as:  VALIUM Take 10 mg by mouth as needed. diclofenac EC 75 mg EC tablet Commonly known as:  VOLTAREN Take 75 mg by mouth as needed. DULoxetine 60 mg capsule Commonly known as:  CYMBALTA LUBRICANT EYE DROPS (GLYC-PG) OP Apply 1 Drop to eye as needed. metFORMIN 500 mg Tg24 24 hour tablet Commonly known asTito Sieve ER Take  by mouth.  
  
 metoprolol succinate 25 mg XL tablet Commonly known as:  TOPROL-XL Take 1 Tab by mouth daily. NASAL DECONGESTANT (PE) NA  
1 [place] by Nasal route as needed. omeprazole 20 mg capsule Commonly known as:  PRILOSEC Take 1 Cap by mouth two (2) times a day. Indications: EROSIVE ESOPHAGITIS * pregabalin 150 mg capsule Commonly known as:  Michelle Dhillonch Take 1 Cap by mouth two (2) times a day. Max Daily Amount: 300 mg. Indications: FIBROMYALGIA * pregabalin 75 mg capsule Commonly known as:  Murriel Cindy Take 1 Cap by mouth two (2) times a day. Max Daily Amount: 150 mg. ROSANGELA-E PO Take  by mouth.  
  
 zolpidem 10 mg tablet Commonly known as:  AMBIEN Take  by mouth nightly as needed for Sleep. * Notice: This list has 2 medication(s) that are the same as other medications prescribed for you. Read the directions carefully, and ask your doctor or other care provider to review them with you. Prescriptions Sent to Pharmacy Refills  
 metoprolol succinate (TOPROL-XL) 25 mg XL tablet 3 Sig: Take 1 Tab by mouth daily. Class: Normal  
 Pharmacy: [address] [place] [address], [address] [place] [address] Ph #: [phone] Route: Oral  
  
We Performed the Following AMB POC EKG ROUTINE W/ 12 LEADS, INTER & REP [29135 CPT(R)] Follow-up Instructions Return in about 1 year (around 3/1/2019). Introducing [facility] & HEALTH SERVICES! Ammon Marshall introduces [employer] patient portal. Now you can access parts of your medical record, email your doctor's office, and request medication refills online. 1. In your internet browser, go to [email or web address] 2. Click on the First Time User? Click Here link in the Sign In box. You will see the New Member Sign Up page. 3. Enter your [employer] Access Code exactly as it appears below. You will not need to use this code after youve completed the sign-up process. If you do not sign up before the expiration date, you must request a new code. · [employer] Access Code: [email or web address] Expires: 5/28/2018 12:42 PM 
 
4. Enter the last four digits of your Social Security Number (xxxx) and Date of Birth (mm/dd/yyyy) as indicated and click Submit. You will be taken to the next sign-up page. 5. Create a [employer] ID.  This will be your [employer] login ID and cannot be changed, so think of one that is secure and easy to remember. 6. Create a [employer] password. You can change your password at any time. 7. Enter your Password Reset Question and Answer. This can be used at a later time if you forget your password. 8. Enter your e-mail address. You will receive e-mail notification when new information is available in [address] 9. Click Sign Up. You can now view and download portions of your medical record. 10. Click the Download Summary menu link to download a portable copy of your medical information. If you have questions, please visit the Frequently Asked Questions section of the [employer] website. Remember, [employer] is NOT to be used for urgent needs. For medical emergencies, dial 911. Now available from your iPhone and Android! Please provide this summary of care documentation to your next provider. Your primary care clinician is listed as Syl Baird. If you have any questions after today's visit, please call [phone].

## 2018-03-15 ENCOUNTER — TELEPHONE (OUTPATIENT)
Dept: NEUROLOGY | Age: 68
End: 2018-03-15

## 2018-03-15 NOTE — TELEPHONE ENCOUNTER
----- Message from [facility] sent at 3/15/2018 12:11 PM EDT -----  Regarding: Dr. Jaci Kaplan  Pt [phone] needs her results.

## 2018-03-22 ENCOUNTER — TELEPHONE (OUTPATIENT)
Dept: CARDIOLOGY CLINIC | Age: 68
End: 2018-03-22

## 2018-03-22 NOTE — TELEPHONE ENCOUNTER
Mildly abnormal coronary calcium score noted consistent with a little bit of cholesterol in the arteries of the heart. Continue aspirin, statin, healthy diet and exercise.

## 2018-03-23 NOTE — TELEPHONE ENCOUNTER
Verified patient with two identifiers. Pt informed of calcium score results and to continue aspirin, statin, diet and exercise. She states she has started walking. Pt verbalized understanding.

## 2018-05-01 ENCOUNTER — HOSPITAL ENCOUNTER (OUTPATIENT)
Dept: CT IMAGING | Age: 68
Discharge: HOME | End: 2018-05-01
Payer: MEDICARE

## 2018-05-01 DIAGNOSIS — R31.0 GROSS HEMATURIA: ICD-10-CM

## 2018-05-01 PROCEDURE — 74178 CT ABD&PLV WO CNTR FLWD CNTR: CPT

## 2018-05-01 RX ORDER — SODIUM CHLORIDE 0.9 % (FLUSH) 0.9 %
10 SYRINGE (ML) INJECTION
Status: COMPLETED | OUTPATIENT
Start: 2018-05-01 | End: 2018-05-01

## 2018-05-01 RX ADMIN — Medication 10 ML: at 15:19

## 2018-05-25 ENCOUNTER — TELEPHONE (OUTPATIENT)
Dept: CARDIOLOGY CLINIC | Age: 68
End: 2018-05-25

## 2018-05-25 NOTE — TELEPHONE ENCOUNTER
Patient scheduled for DV right pyelolithotomy with cyst right RPG with ureteral stent placement  6-25-18 requesting cardiac clearance and advise on holding blood thinners please advise last visit 3-1-18

## 2018-08-08 ENCOUNTER — TELEPHONE (OUTPATIENT)
Dept: CARDIOLOGY CLINIC | Age: 68
End: 2018-08-08

## 2018-09-11 DIAGNOSIS — M79.7 FIBROMYALGIA: ICD-10-CM

## 2018-09-11 DIAGNOSIS — M48.061 SPINAL STENOSIS OF LUMBAR REGION WITHOUT NEUROGENIC CLAUDICATION: ICD-10-CM

## 2018-09-11 RX ORDER — PREGABALIN 150 MG/1
150 CAPSULE ORAL 2 TIMES DAILY
Qty: 60 CAP | Refills: 5 | Status: SHIPPED | OUTPATIENT
Start: 2018-09-11 | End: 2018-09-27 | Stop reason: SDUPTHER

## 2018-09-11 NOTE — TELEPHONE ENCOUNTER
Order placed for Lyrica, PO, per Verbal Order from Dr. Rosemary Garcia on 9/11/2018 due to [facility].

## 2018-09-27 ENCOUNTER — OFFICE VISIT (OUTPATIENT)
Dept: NEUROLOGY | Age: 68
End: 2018-09-27

## 2018-09-27 VITALS
SYSTOLIC BLOOD PRESSURE: 130 MMHG | BODY MASS INDEX: 34.06 KG/M2 | HEIGHT: 67 IN | HEART RATE: 70 BPM | DIASTOLIC BLOOD PRESSURE: 82 MMHG | WEIGHT: 217 LBS

## 2018-09-27 DIAGNOSIS — R41.3 MEMORY LOSS: ICD-10-CM

## 2018-09-27 DIAGNOSIS — M79.7 FIBROMYALGIA: ICD-10-CM

## 2018-09-27 DIAGNOSIS — M48.061 SPINAL STENOSIS OF LUMBAR REGION WITHOUT NEUROGENIC CLAUDICATION: Primary | ICD-10-CM

## 2018-09-27 RX ORDER — EZETIMIBE 10 MG/1
TABLET ORAL
COMMUNITY

## 2018-09-27 RX ORDER — PREGABALIN 150 MG/1
150 CAPSULE ORAL 2 TIMES DAILY
Qty: 180 CAP | Refills: 1 | Status: SHIPPED | OUTPATIENT
Start: 2018-09-27 | End: 2019-04-12 | Stop reason: SDUPTHER

## 2018-09-27 NOTE — MR AVS SNAPSHOT
[facility] [address] [facility] 99 [id number] [phone] Patient: Dedrick Eldridge MRN:  :[id number] Visit Information Date & Time Provider Department Dept. Phone Encounter #  
 2018  3:20 PM MD Erasmo Fredericka Chapis Neurology [facility] [phone] [id number] Follow-up Instructions Return in about 6 months (around 3/27/2019). Your Appointments 2019  1:15 PM  
ESTABLISHED PATIENT with Genia Metcalf MD  
[facility] [address] [facility]) Appt Note: Dr. Bill Camilo, [facility]  
[phone] [address] [facility] Upcoming Health Maintenance Date Due Hepatitis C Screening 1950 DTaP/Tdap/Td series (1 - Tdap) 1969 Shingrix Vaccine Age 50> (1 of 2) 1998 BREAST CANCER SCRN MAMMOGRAM 1998 FOBT Q 1 YEAR AGE 50-75 1998 GLAUCOMA SCREENING Q2Y 2013 Bone Densitometry (Dexa) Screening 2013 Pneumococcal 65+ Low/Medium Risk (1 of 2 - PCV13) 2013 MEDICARE YEARLY EXAM 3/14/2018 Influenza Age 5 to Adult 2018 Allergies as of 2018  Review Complete On: 3/1/2018 By: Genia Metcalf MD  
 No Known Allergies Current Immunizations  Never Reviewed No immunizations on file. Not reviewed this visit You Were Diagnosed With   
  
 Codes Comments Spinal stenosis of lumbar region without neurogenic claudication     ICD-10-CM: M48.061 
ICD-9-CM: 724.02 Fibromyalgia     ICD-10-CM: M79.7 ICD-9-CM: 729.1 Vitals BP Pulse Height(growth percentile) Weight(growth percentile) BMI OB Status 130/82 (BP 1 Location: Right arm, BP Patient Position: Sitting) 70 5' 7\" (1.702 m) 217 lb (98.4 kg) 33.99 kg/m2 Menopause Smoking Status Former Smoker Vitals History BMI and BSA Data Body Mass Index Body Surface Area  
 33.99 kg/m 2 2.16 m 2 Preferred Pharmacy Pharmacy Name Phone Giulia Jimenez [address] [place], [place] [address] [address] [phone] Your Updated Medication List  
  
   
This list is accurate as of 9/27/18  4:10 PM.  Always use your most recent med list.  
  
  
  
  
 aspirin, buffered 81 mg Tab Take  by mouth. cpap machine kit  
by Does Not Apply route. CRESTOR 20 mg tablet Generic drug:  rosuvastatin Take 20 mg by mouth nightly. diazePAM 10 mg tablet Commonly known as:  VALIUM Take 10 mg by mouth as needed. diclofenac EC 75 mg EC tablet Commonly known as:  VOLTAREN Take 75 mg by mouth as needed. LUBRICANT EYE DROPS (GLYC-PG) OP Apply 1 Drop to eye as needed. metFORMIN 500 mg Tg24 24 hour tablet Commonly known asLonnie Simpler ER Take  by mouth.  
  
 metoprolol succinate 25 mg XL tablet Commonly known as:  TOPROL-XL Take 1 Tab by mouth daily. NASAL DECONGESTANT (PE) NA  
1 [place] by Nasal route as needed. omeprazole 20 mg capsule Commonly known as:  PRILOSEC Take 1 Cap by mouth two (2) times a day. Indications: EROSIVE ESOPHAGITIS  
  
 pregabalin 150 mg capsule Commonly known as:  Jean Chaudhari Take 1 Cap by mouth two (2) times a day. Max Daily Amount: 300 mg. Indications: FIBROMYALGIA  
  
 ROSANGELA-E PO Take  by mouth. ZETIA 10 mg tablet Generic drug:  ezetimibe Take  by mouth.  
  
 zolpidem 10 mg tablet Commonly known as:  AMBIEN Take  by mouth nightly as needed for Sleep. Prescriptions Printed Refills  
 pregabalin (LYRICA) 150 mg capsule 1 Sig: Take 1 Cap by mouth two (2) times a day. Max Daily Amount: 300 mg. Indications: FIBROMYALGIA Class: Print Route: Oral  
  
Follow-up Instructions Return in about 6 months (around 3/27/2019). Patient Instructions Patient Instructions/Plans: 
· Please try to stop Ambien. If needed we can try amitriptyline Introducing [facility] & HEALTH SERVICES! [employer] introduces [employer] patient portal. Now you can access parts of your medical record, email your doctor's office, and request medication refills online. 1. In your internet browser, go to [email or web address] 2. Click on the First Time User? Click Here link in the Sign In box. You will see the New Member Sign Up page. 3. Enter your [employer] Access Code exactly as it appears below. You will not need to use this code after youve completed the sign-up process. If you do not sign up before the expiration date, you must request a new code. · [employer] Access Code: [email or web address] Expires: 11/6/2018  1:35 PM 
 
4. Enter the last four digits of your Social Security Number (xxxx) and Date of Birth (mm/dd/yyyy) as indicated and click Submit. You will be taken to the next sign-up page. 5. Create a [employer] ID. This will be your [employer] login ID and cannot be changed, so think of one that is secure and easy to remember. 6. Create a [employer] password. You can change your password at any time. 7. Enter your Password Reset Question and Answer. This can be used at a later time if you forget your password. 8. Enter your e-mail address. You will receive e-mail notification when new information is available in [address] 9. Click Sign Up. You can now view and download portions of your medical record. 10. Click the Download Summary menu link to download a portable copy of your medical information. If you have questions, please visit the Frequently Asked Questions section of the [employer] website. Remember, [employer] is NOT to be used for urgent needs. For medical emergencies, dial 911. Now available from your iPhone and Android! Please provide this summary of care documentation to your next provider. Your primary care clinician is listed as Mohan Barrios.  If you have any questions after today's visit, please call [phone].

## 2018-09-27 NOTE — PROGRESS NOTES
Neurology Progress Note Patient ID: 
Gabe Orleatha [id number] 
77 y.o. 
1950 HISTORY PROVIDED BY: 
Patient Chief Complaint: neck and back pain Subjective:  
 Ms. Charleen Atkinson is here for follow up today of neck and back pain. She has had steroid injections in her neck and back by Dr. Vincent Gibbons. She also had a nerve ablation in the lumbar spine. This helped the low back pain. She does have some pressure on her disc. They are monitoring for possible surgery if the injections stop working. She is also getting injections in her knee and shoulder. She is on Lyrica now. She is on Lyrica 150mg BID. She has had no major side effects. She reports it is a life saving drug. Her motivation and ablitiy to get up is great. 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 This helps her get up and get going. She doesn't need a nap anymore. She uses her CPAP machine prior to bed. She takes ambien to sleep. She will meditate prior to bed as well. She was taking cymbalta for muscle pain and stinging sensation. She went without her meds for a night and she had severe pain without them. She is doing well on Lyrica and tried to go off her cymbalta. She was on 60mg daily but has been off for several weeks. She feels the muscle pain is gone. She will get occasional stinging on her leg but it is not often. She just started on zetia 10mg daily for cholesterol. She is taking her statin at bedtime and thinks this has affected her functioning. She is having some vivid dreams and will feel semi awake. She is starting to be concerned about some memory loss. She left her lights on one time recently. Last night she flooded her kitchen when she plugged her sink. One day she was coming home on [address] and got confused where she was for a second or two and then she was fine. Objective:  
ROS: 
Per HPI- 
Otherwise 12 point ROS was negative Meds: 
Current Outpatient Prescriptions on File Prior to Visit Medication Sig Dispense Refill  pregabalin (LYRICA) 150 mg capsule Take 1 Cap by mouth two (2) times a day. Max Daily Amount: 300 mg. Indications: FIBROMYALGIA 60 Cap 5  GLYCERIN/PROPYLENE GLYCOL (LUBRICANT EYE DROPS, GLYC-PG, OP) Apply 1 Drop to eye as needed.  PHENYLEPHRINE HCL (NASAL DECONGESTANT, PE, NA) 1 [place] by Nasal route as needed.  metoprolol succinate (TOPROL-XL) 25 mg XL tablet Take 1 Tab by mouth daily. 90 Tab 3  
 metFORMIN (GLUMETZA ER) 500 mg TG24 24 hour tablet Take  by mouth.  rosuvastatin (CRESTOR) 20 mg tablet Take 20 mg by mouth nightly.  cpap machine kit by Does Not Apply route.  pregabalin (LYRICA) 75 mg capsule Take 1 Cap by mouth two (2) times a day. Max Daily Amount: 150 mg. 14 Cap 0  
 DULoxetine (CYMBALTA) 60 mg capsule  diazepam (VALIUM) 10 mg tablet Take 10 mg by mouth as needed.  diclofenac EC (VOLTAREN) 75 mg EC tablet Take 75 mg by mouth as needed.  cyclobenzaprine (FLEXERIL) 10 mg tablet Take 10 mg by mouth as needed.  S-ADENOSYLMETHIONINE SUL TOSYL (ROSANGELA-E PO) Take  by mouth.  omeprazole (PRILOSEC) 20 mg capsule Take 1 Cap by mouth two (2) times a day. Indications: EROSIVE ESOPHAGITIS (Patient taking differently: Take 20 mg by mouth daily. Indications: EROSIVE ESOPHAGITIS) 60 Cap 5  Aspirin, Buffered 81 mg tab Take  by mouth.  cholecalciferol, VITAMIN D3, (VITAMIN D3) 5,000 unit tab tablet Take  by mouth daily.  zolpidem (AMBIEN) 10 mg tablet Take  by mouth nightly as needed for Sleep. No current facility-administered medications on file prior to visit. Imaging: No new imaging Reviewed records in [facility] and [facility] tab today Lab Review Results for orders placed or performed in visit on 02/27/18 VITAMIN B12 & FOLATE Result Value Ref Range Vitamin B12 590 232 - 1245 pg/mL Folate 15.1 >3.0 ng/mL TSH 3RD GENERATION Result Value Ref Range TSH 2.260 0.450 - 4.500 uIU/mL VITAMIN D, 1, 25 DIHYDROXY Result Value Ref Range Calcitriol (Vit D 1, 25 di-OH) 63.1 19.9 - 79.3 pg/mL Exam: 
Visit Vitals  /82 (BP 1 Location: Right arm, BP Patient Position: Sitting)  Pulse 70  
 Ht 5' 7\" (1.702 m)  Wt 98.4 kg (217 lb)  BMI 33.99 kg/m2 Gen: Well developed CV: RRR Lungs: non labored breathing Abd: non distending Neuro: A&O x 3, no dysarthria or aphasia CN II-XII: PERRL, EOMI, face symmetric, tongue/palate midline Motor: strength 5/5 all four ext Sensory: intact to LT Gait: normal 
 
Assessment:  
Malathi Kaur is a 77 y.o. female who presents for follow up of chronic back pain, fibromyalgia, and memory changes. The memory concerning issue for her. She is on Ambien. We discussed trying to wean off of this and see how she did. She also recently started on Zetia and thinks this may be contributing. Her chronic back pain is being managed by pain management with epidural injections and nerve ablation. This is stable. Her fibromyalgia is much improved on Lyrica. She has stopped Cymbalta and is doing well with Lyrica. She would like to continue on this now. Plan: 1. Continue Lyrica 150 mg twice daily. Refill given today 2. Encouraged patient to stop Ambien. Will try amitriptyline if needed. 3.  Off Cymbalta. If she starts to have residual symptoms will start Cymbalta at 20 mg daily 4. Continue with Dr. Raymond Rebolledo for spinal injections 5. We will hold off on EMG/NCS at this time 6. Will monitor memory loss and may do neuropsych if needed FU 6 months Signed: 
Chikis Rosales MD 
9/27/2018 Medications and side effects discussed with patient in detail. With any new medications prescribed, patient was given instructions on administration and side effects. Written medication information was provided to the patient as well. This note was created using voice recognition software. Despite editing, there may be syntax errors.   
This note will not be viewable in [address]

## 2018-09-27 NOTE — LETTER
Neurology Progress Note Patient ID: 
Amalia Greene [id number] 
77 y.o. 
1950 HISTORY PROVIDED BY: 
Patient Chief Complaint: neck and back pain Subjective:  
 Ms. Josefa Bolden is here for follow up today of neck and back pain. She has had steroid injections in her neck and back by Dr. Gray Barrow. She also had a nerve ablation in the lumbar spine. This helped the low back pain. She does have some pressure on her disc. They are monitoring for possible surgery if the injections stop working. She is also getting injections in her knee and shoulder. She is on Lyrica now. She is on Lyrica 150mg BID. She has had no major side effects. She reports it is a life saving drug. Her motivation and ablitiy to get up is great. 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 This helps her get up and get going. She doesn't need a nap anymore. She uses her CPAP machine prior to bed. She takes ambien to sleep. She will meditate prior to bed as well. She was taking cymbalta for muscle pain and stinging sensation. She went without her meds for a night and she had severe pain without them. She is doing well on Lyrica and tried to go off her cymbalta. She was on 60mg daily but has been off for several weeks. She feels the muscle pain is gone. She will get occasional stinging on her leg but it is not often. She just started on zetia 10mg daily for cholesterol. She is taking her statin at bedtime and thinks this has affected her functioning. She is having some vivid dreams and will feel semi awake. She is starting to be concerned about some memory loss. She left her lights on one time recently. Last night she flooded her kitchen when she plugged her sink. One day she was coming home on [address] and got confused where she was for a second or two and then she was fine. Objective:  
ROS: 
Per HPI- 
Otherwise 12 point ROS was negative Meds: 
Current Outpatient Prescriptions on File Prior to Visit Medication Sig Dispense Refill  pregabalin (LYRICA) 150 mg capsule Take 1 Cap by mouth two (2) times a day. Max Daily Amount: 300 mg. Indications: FIBROMYALGIA 60 Cap 5  GLYCERIN/PROPYLENE GLYCOL (LUBRICANT EYE DROPS, GLYC-PG, OP) Apply 1 Drop to eye as needed.  PHENYLEPHRINE HCL (NASAL DECONGESTANT, PE, NA) 1 [place] by Nasal route as needed.  metoprolol succinate (TOPROL-XL) 25 mg XL tablet Take 1 Tab by mouth daily. 90 Tab 3  
 metFORMIN (GLUMETZA ER) 500 mg TG24 24 hour tablet Take  by mouth.  rosuvastatin (CRESTOR) 20 mg tablet Take 20 mg by mouth nightly.  cpap machine kit by Does Not Apply route.  pregabalin (LYRICA) 75 mg capsule Take 1 Cap by mouth two (2) times a day. Max Daily Amount: 150 mg. 14 Cap 0  
 DULoxetine (CYMBALTA) 60 mg capsule  diazepam (VALIUM) 10 mg tablet Take 10 mg by mouth as needed.  diclofenac EC (VOLTAREN) 75 mg EC tablet Take 75 mg by mouth as needed.  cyclobenzaprine (FLEXERIL) 10 mg tablet Take 10 mg by mouth as needed.  S-ADENOSYLMETHIONINE SUL TOSYL (ROSANGELA-E PO) Take  by mouth.  omeprazole (PRILOSEC) 20 mg capsule Take 1 Cap by mouth two (2) times a day. Indications: EROSIVE ESOPHAGITIS (Patient taking differently: Take 20 mg by mouth daily. Indications: EROSIVE ESOPHAGITIS) 60 Cap 5  Aspirin, Buffered 81 mg tab Take  by mouth.  cholecalciferol, VITAMIN D3, (VITAMIN D3) 5,000 unit tab tablet Take  by mouth daily.  zolpidem (AMBIEN) 10 mg tablet Take  by mouth nightly as needed for Sleep. No current facility-administered medications on file prior to visit. Imaging: No new imaging Reviewed records in [facility] and [facility] tab today Lab Review Results for orders placed or performed in visit on 02/27/18 VITAMIN B12 & FOLATE Result Value Ref Range Vitamin B12 590 232 - 1245 pg/mL Folate 15.1 >3.0 ng/mL TSH 3RD GENERATION Result Value Ref Range TSH 2.260 0.450 - 4.500 uIU/mL VITAMIN D, 1, 25 DIHYDROXY Result Value Ref Range Calcitriol (Vit D 1, 25 di-OH) 63.1 19.9 - 79.3 pg/mL Exam: 
Visit Vitals  /82 (BP 1 Location: Right arm, BP Patient Position: Sitting)  Pulse 70  
 Ht 5' 7\" (1.702 m)  Wt 98.4 kg (217 lb)  BMI 33.99 kg/m2 Gen: Well developed CV: RRR Lungs: non labored breathing Abd: non distending Neuro: A&O x 3, no dysarthria or aphasia CN II-XII: PERRL, EOMI, face symmetric, tongue/palate midline Motor: strength 5/5 all four ext Sensory: intact to LT Gait: normal 
 
Assessment:  
Noah Cristina is a 77 y.o. female who presents for follow up of chronic back pain, fibromyalgia, and memory changes. The memory concerning issue for her. She is on Ambien. We discussed trying to wean off of this and see how she did. She also recently started on Zetia and thinks this may be contributing. Her chronic back pain is being managed by pain management with epidural injections and nerve ablation. This is stable. Her fibromyalgia is much improved on Lyrica. She has stopped Cymbalta and is doing well with Lyrica. She would like to continue on this now. Plan: 1. Continue Lyrica 150 mg twice daily. Refill given today 2. Encouraged patient to stop Ambien. Will try amitriptyline if needed. 3.  Off Cymbalta. If she starts to have residual symptoms will start Cymbalta at 20 mg daily 4. Continue with Dr. Brooke Reeves for spinal injections 5. We will hold off on EMG/NCS at this time 6. Will monitor memory loss and may do neuropsych if needed FU 6 months Signed: 
Arash Stanton MD 
9/27/2018 Medications and side effects discussed with patient in detail. With any new medications prescribed, patient was given instructions on administration and side effects. Written medication information was provided to the patient as well. This note was created using voice recognition software. Despite editing, there may be syntax errors.   
This note will not be viewable in [address]

## 2018-10-02 ENCOUNTER — HOSPITAL ENCOUNTER (OUTPATIENT)
Dept: GENERAL RADIOLOGY | Age: 68
Discharge: HOME | End: 2018-10-02
Payer: MEDICARE

## 2018-10-02 DIAGNOSIS — N20.0 STONE IN KIDNEY: ICD-10-CM

## 2018-10-02 PROCEDURE — 74018 RADEX ABDOMEN 1 VIEW: CPT

## 2019-03-25 ENCOUNTER — OFFICE VISIT (OUTPATIENT)
Dept: CARDIOLOGY CLINIC | Age: 69
End: 2019-03-25

## 2019-03-25 ENCOUNTER — CLINICAL SUPPORT (OUTPATIENT)
Dept: CARDIOLOGY CLINIC | Age: 69
End: 2019-03-25

## 2019-03-25 VITALS
DIASTOLIC BLOOD PRESSURE: 84 MMHG | WEIGHT: 206.3 LBS | BODY MASS INDEX: 32.38 KG/M2 | OXYGEN SATURATION: 96 % | RESPIRATION RATE: 16 BRPM | HEART RATE: 76 BPM | SYSTOLIC BLOOD PRESSURE: 154 MMHG | HEIGHT: 67 IN

## 2019-03-25 DIAGNOSIS — R00.2 INTERMITTENT PALPITATIONS: ICD-10-CM

## 2019-03-25 DIAGNOSIS — R93.1 AGATSTON CAC SCORE, <100: ICD-10-CM

## 2019-03-25 DIAGNOSIS — E78.2 MIXED HYPERLIPIDEMIA: Primary | ICD-10-CM

## 2019-03-25 DIAGNOSIS — R00.2 PALPITATIONS: Primary | ICD-10-CM

## 2019-03-25 DIAGNOSIS — R06.02 SOB (SHORTNESS OF BREATH): ICD-10-CM

## 2019-03-25 DIAGNOSIS — R93.1 ELEVATED CORONARY ARTERY CALCIUM SCORE: ICD-10-CM

## 2019-03-25 RX ORDER — METOPROLOL SUCCINATE 50 MG/1
50 TABLET, EXTENDED RELEASE ORAL DAILY
Qty: 90 TAB | Refills: 1 | Status: SHIPPED | OUTPATIENT
Start: 2019-03-25 | End: 2019-10-03 | Stop reason: SDUPTHER

## 2019-03-25 RX ORDER — DULOXETIN HYDROCHLORIDE 60 MG/1
60 CAPSULE, DELAYED RELEASE ORAL DAILY
COMMUNITY

## 2019-03-25 NOTE — LETTER
3/25/19 Patient: Ousmane Stein YOB: 1950 Date of Visit: 3/25/2019 Adolfo Gonsalves MD 
[address] [address] VIA Facsimile: [phone] Dear Adolfo Gonsalves MD, Thank you for referring Ms. Loyd Alcaraz to [facility] for evaluation. My notes for this consultation are attached. If you have questions, please do not hesitate to call me. I look forward to following your patient along with you.  
 
 
Sincerely, 
 
Tremaine Stokes MD

## 2019-03-25 NOTE — PROGRESS NOTES
Miladis Gutierrez DNP, ANP-BC Subjective/HPI:  
 
Spencer Ormond is a 77 y.o. female is here for yearly follow-up. 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 She reports on a daily basis having onset of fluttering palpitations at times with or without exertional activities most recent while driving. She feels that it may be attributable to increased stress. She denies syncope or presyncopal feelings. The patient reports primary care recently checked lipid panel and added zetia addition to her standard dose of Crestor. Patient has a history of atherosclerotic heart disease, she has had a elevated coronary calcium score. PCP Provider Tyler Little MD 
Past Medical History:  
Diagnosis Date  Anxiety  Arrhythmia  CAD (coronary artery disease) irregular heartrate  Depression  Diabetes ([facility] [address]  Fibromyalgia  Hypercholesterolemia  Musculoskeletal disorder  Other ill-defined conditions(799.89)   
 high cholesterol  Psychiatric disorder   
 depression  Sleep disorder Past Surgical History:  
Procedure Laterality Date  ABDOMEN SURGERY PROC UNLISTED    
 gastric banding  HX CARPAL TUNNEL RELEASE Bilateral   
 HX CHOLECYSTECTOMY  HX GI  Jan 2008 Lap Band  HX MENISCUS REPAIR Left  DE COLSC FLX W/RMVL OF TUMOR POLYP LESION SNARE TQ  12/22/2010  DE EGD TRANSORAL BIOPSY SINGLE/MULTIPLE  12/22/2010 No Known Allergies Family History Problem Relation Age of Onset  Hypertension Father  Diabetes Father  Cancer Father  Heart Disease Mother  Parkinson's Disease Mother Current Outpatient Medications Medication Sig  DULoxetine (CYMBALTA) 60 mg capsule Take 60 mg by mouth daily.  metoprolol succinate (TOPROL-XL) 50 mg XL tablet Take 1 Tab by mouth daily.   
 ezetimibe (ZETIA) 10 mg tablet Take  by mouth.  pregabalin (LYRICA) 150 mg capsule Take 1 Cap by mouth two (2) times a day. Max Daily Amount: 300 mg. Indications: FIBROMYALGIA  
 GLYCERIN/PROPYLENE GLYCOL (LUBRICANT EYE DROPS, GLYC-PG, OP) Apply 1 Drop to eye as needed.  PHENYLEPHRINE HCL (NASAL DECONGESTANT, PE, NA) 1 [place] by Nasal route as needed.  metFORMIN (GLUMETZA ER) 500 mg TG24 24 hour tablet Take 500 mg by mouth two (2) times a day.  rosuvastatin (CRESTOR) 20 mg tablet Take 20 mg by mouth nightly.  cpap machine kit by Does Not Apply route.  diazepam (VALIUM) 10 mg tablet Take 10 mg by mouth as needed.  diclofenac EC (VOLTAREN) 75 mg EC tablet Take 75 mg by mouth as needed.  omeprazole (PRILOSEC) 20 mg capsule Take 1 Cap by mouth two (2) times a day. Indications: EROSIVE ESOPHAGITIS (Patient taking differently: Take 20 mg by mouth daily. Indications: EROSIVE ESOPHAGITIS)  Aspirin, Buffered 81 mg tab Take  by mouth.  zolpidem (AMBIEN) 10 mg tablet Take  by mouth nightly as needed for Sleep.  S-ADENOSYLMETHIONINE SUL TOSYL (ROSANGELA-E PO) Take  by mouth. No current facility-administered medications for this visit. Vitals:  
 03/25/19 1355 03/25/19 1410 BP: 158/88 154/84 Pulse: 76 Resp: 16 SpO2: 96% Weight: 206 lb 4.8 oz (93.6 kg) Height: 5' 7\" (1.702 m) Social History Socioeconomic History  Marital status:  Spouse name: Not on file  Number of children: Not on file  Years of education: Not on file  Highest education level: Not on file Occupational History  Not on file Social Needs  Financial resource strain: Not on file  Food insecurity:  
  Worry: Not on file Inability: Not on file  Transportation needs:  
  Medical: Not on file Non-medical: Not on file Tobacco Use  Smoking status: Former Smoker Packs/day: 0.50 Years: 25.00 Pack years: 12.50 Types: Cigarettes   Last attempt to quit: 2011 Years since quittin.2  Smokeless tobacco: Never Used Substance and Sexual Activity  Alcohol use: Yes Alcohol/week: 5.0 oz Types: 10 Shots of liquor per week Comment: 7 drinks a week  Drug use: No  
 Sexual activity: Not on file Lifestyle  Physical activity:  
  Days per week: Not on file Minutes per session: Not on file  Stress: Not on file Relationships  Social connections:  
  Talks on phone: Not on file Gets together: Not on file Attends Muslim service: Not on file Active member of club or organization: Not on file Attends meetings of clubs or organizations: Not on file Relationship status: Not on file  Intimate partner violence:  
  Fear of current or ex partner: Not on file Emotionally abused: Not on file Physically abused: Not on file Forced sexual activity: Not on file Other Topics Concern  Not on file Social History Narrative  Not on file I have reviewed the nurses notes, vitals, problem list, allergy list, medical history, family, social history and medications. Review of Symptoms: 
 
General: Pt denies excessive weight gain or loss. Pt is able to conduct ADL's HEENT: Denies blurred vision, headaches, epistaxis and difficulty swallowing. Respiratory: Denies shortness of breath, BIRD, wheezing or stridor. Cardiovascular: Denies precordial pain, + palpitations, denies edema or PND Gastrointestinal: Denies poor appetite, indigestion, abdominal pain or blood in stool Musculoskeletal: Denies pain or swelling from muscles or joints Neurologic: Denies tremor, paresthesias, or sensory motor disturbance Skin: Denies rash, itching or texture change. Physical Exam:   
 
General: Well developed, in no acute distress, cooperative and alert HEENT: No carotid bruits, no JVD, trach is midline. Neck Supple, PEERL, EOM intact. Heart:  Normal S1/S2 negative S3 or S4.  Regular, no murmur, gallop or rub.  
Respiratory: Clear bilaterally x 4, no wheezing or rales Abdomen:   Soft, non-tender, no masses, bowel sounds are active.  
Extremities:  No edema, normal cap refill, no cyanosis, atraumatic. Neuro: A&Ox3, speech clear, gait stable. Skin: Skin color is normal. No rashes or lesions. Non diaphoretic Vascular: 2+ pulses symmetric in all extremities Cardiographics ECG: Sinus rhythm Results for orders placed or performed during the hospital encounter of 10/23/15 EKG, 12 LEAD, INITIAL Result Value Ref Range Ventricular Rate 70 BPM  
 Atrial Rate 70 BPM  
 P-R Interval 122 ms QRS Duration 72 ms Q-T Interval 388 ms QTC Calculation (Bezet) 419 ms Calculated P Axis 53 degrees Calculated R Axis 15 degrees Calculated T Axis 43 degrees Diagnosis Sinus rhythm with premature supraventricular complexes Possible Left atrial enlargement Septal infarct , age undetermined Abnormal ECG No previous ECGs available Confirmed by Austin Gutiérrez ([id number]) on 10/23/2015 5:11:46 PM 
  
 
 
 
Cardiology Labs: 
No results found for: CHOL, [address], [address] [facility], [address], [address], HDL, LDL, LDLC, DLDLP, TGLX, TRIGL, TRIGP, CHHD, CHHDX Lab Results Component Value Date/Time Sodium 142 10/23/2015 01:23 PM  
 Potassium 4.2 10/23/2015 01:23 PM  
 Chloride 108 10/23/2015 01:23 PM  
 CO2 29 10/23/2015 01:23 PM  
 Anion gap 5 10/23/2015 01:23 PM  
 Glucose 109 (H) 10/23/2015 01:23 PM  
 BUN 8 10/23/2015 01:23 PM  
 Creatinine 0.69 10/23/2015 01:23 PM  
 BUN/Creatinine ratio 12 10/23/2015 01:23 PM  
 GFR est AA >60 10/23/2015 01:23 PM  
 GFR est non-AA >60 10/23/2015 01:23 PM  
 Calcium 8.4 (L) 10/23/2015 01:23 PM  
 Bilirubin, total 0.5 10/23/2015 01:23 PM  
 AST (SGOT) 12 (L) 10/23/2015 01:23 PM  
 Alk.  phosphatase 60 10/23/2015 01:23 PM  
 Protein, total 7.0 10/23/2015 01:23 PM  
 Albumin 3.8 10/23/2015 01:23 PM  
 Globulin 3.2 10/23/2015 01:23 PM  
 A-G Ratio 1.2 10/23/2015 01:23 PM  
 ALT (SGPT) 23 10/23/2015 01:23 PM  
  
 
 
 Assessment: 
 
 Assessment:  
 
Diagnoses and all orders for this visit: 
 
1. Mixed hyperlipidemia -     AMB POC EKG ROUTINE W/ 12 LEADS, INTER & REP 
-     CT HEART W/O CONT WITH CALCIUM; Future 2. Intermittent palpitations -     CT HEART W/O CONT WITH CALCIUM; Future 3. Elevated coronary artery calcium score -     CT HEART W/O CONT WITH CALCIUM; Future 4. Agatston CAC score, <100 Other orders 
-     metoprolol succinate (TOPROL-XL) 50 mg XL tablet; Take 1 Tab by mouth daily. ICD-10-CM ICD-9-CM 1. Mixed hyperlipidemia E78.2 272.2 AMB POC EKG ROUTINE W/ 12 LEADS, INTER & REP  
   CT HEART W/O CONT WITH CALCIUM 2. Intermittent palpitations R00.2 785.1 CT HEART W/O CONT WITH CALCIUM 3. Elevated coronary artery calcium score R93.1 414.00 CT HEART W/O CONT WITH CALCIUM 4. Agatston CAC score, <100 R93.1 793.2 Orders Placed This Encounter  CT HEART W/O CONT WITH CALCIUM Standing Status:   Future Standing Expiration Date:   4/25/2020 Order Specific Question:   Is Patient Allergic to Contrast Dye? Answer:   No  
 AMB POC EKG ROUTINE W/ 12 LEADS, INTER & REP Order Specific Question:   Reason for Exam: Answer:   ROUTINE  DULoxetine (CYMBALTA) 60 mg capsule Sig: Take 60 mg by mouth daily.  metoprolol succinate (TOPROL-XL) 50 mg XL tablet Sig: Take 1 Tab by mouth daily. Dispense:  90 Tab Refill:  1 Plan:  
 
1. Intermittent palpitations occurring daily: Placing Holter monitor today. 2.  Elevated blood pressure: Currently on metoprolol XL 25 mg will increase to 50 mg starting tomorrow once Holter is returned. 3.  History of elevated coronary calcium score  (2013 59) : Asymptomatic of CAD at this time it has been several years since her last screening will send for updated coronary calcium score.  
4.  Hyperlipidemia: Labs recently performed by primary care will obtain copy patient will call to have been faxed, remain on Crestor and Zetia. 5.  Type 2 diabetes: Recent increase in metformin from primary care. Consider ACE inhibitor if blood pressure not controlled. Follow up in 12 months if testing normal and no progressive symptoms after gradual increase exercise and improved diet. Jake Stanton MD 
 
This note was created using voice recognition software. Despite editing, there may be syntax errors.

## 2019-03-25 NOTE — PROGRESS NOTES
1. Have you been to the ER, urgent care clinic since your last visit? Hospitalized since your last visit? YES, KIDNEY STONE REMOVAL LAST YEAR. 2. Have you seen or consulted any other health care providers outside of the [address] [place] [address] since your last visit? Include any pap smears or colon screening. YES PCP. ANNUAL. C/O RAPID HEART BEAT OFF AND.

## 2019-03-28 NOTE — PROGRESS NOTES
Please advise Holter monitor shows relatively frequent early heartbeats from the bottom chambers of the heart. I recommend we add diltiazem 120 mg extended release p.o. daily, follow-up in 2-3 months to reassess symptoms. Proceed with coronary calcium scoring as planned to make sure that there has not been any significant progression in the amount of cholesterol plaque in the arteries of her heart. If she is willing, please call in 90 days, with 3 refills to the pharmacy of her choice.

## 2019-03-29 ENCOUNTER — TELEPHONE (OUTPATIENT)
Dept: CARDIOLOGY CLINIC | Age: 69
End: 2019-03-29

## 2019-03-29 RX ORDER — DILTIAZEM HYDROCHLORIDE 300 MG/1
300 CAPSULE, COATED, EXTENDED RELEASE ORAL DAILY
COMMUNITY
End: 2019-03-29

## 2019-03-29 RX ORDER — DILTIAZEM HYDROCHLORIDE 120 MG/1
CAPSULE, EXTENDED RELEASE ORAL DAILY
COMMUNITY
End: 2019-03-29

## 2019-03-29 NOTE — TELEPHONE ENCOUNTER
----- Message from Eladia Esteban MD sent at 3/28/2019  5:15 PM EDT -----  Please advise Holter monitor shows relatively frequent early heartbeats from the bottom chambers of the heart. I recommend we add diltiazem 120 mg extended release p.o. daily, follow-up in 2-3 months to reassess symptoms. Proceed with coronary calcium scoring as planned to make sure that there has not been any significant progression in the amount of cholesterol plaque in the arteries of her heart. If she is willing, please call in 90 days, with 3 refills to the pharmacy of her choice.

## 2019-03-29 NOTE — TELEPHONE ENCOUNTER
[facility] to call me. Chart already shows pt on Diltiazem  mg every day. IS PT TAKING THIS?    per Dr.M. Graff, see charted note. 27-Jul-2018 27-Jul-2018

## 2019-03-29 NOTE — TELEPHONE ENCOUNTER
Spoke with pt. Verified patient with two patient identifiers. States she does not every remember being on Diltiazem 300 mg every day, and is NOT on it now. Will send Rx for Diltiazem SR   120 mg po every day. Wants local for one month to make sure no side effects. Patient verbalized understanding.

## 2019-03-29 NOTE — TELEPHONE ENCOUNTER
Verified patient with two identifiers. Pt informed of holter results. Informed to start diltiazem 120 mg once daily. Will e-scribe to [facility] mail order. She states she is scheduled for the coronary calcium score in May. Pt verbalized understanding. Forwarding to nurse to send rx.

## 2019-04-01 RX ORDER — DILTIAZEM HYDROCHLORIDE 120 MG/1
120 CAPSULE, EXTENDED RELEASE ORAL DAILY
Qty: 90 CAP | Refills: 4 | Status: SHIPPED | OUTPATIENT
Start: 2019-04-01 | End: 2019-06-20 | Stop reason: SDUPTHER

## 2019-04-12 DIAGNOSIS — M48.061 SPINAL STENOSIS OF LUMBAR REGION WITHOUT NEUROGENIC CLAUDICATION: ICD-10-CM

## 2019-04-12 DIAGNOSIS — M79.7 FIBROMYALGIA: ICD-10-CM

## 2019-04-12 RX ORDER — PREGABALIN 150 MG/1
150 CAPSULE ORAL 2 TIMES DAILY
Qty: 60 CAP | Refills: 0 | Status: SHIPPED | OUTPATIENT
Start: 2019-04-12 | End: 2019-05-12

## 2019-04-12 NOTE — TELEPHONE ENCOUNTER
Left VM for patient to let her know that Brian Pool has approved a 30day supply of lyrica for her and is welcomed to take it to a local pharmacy. I stated to her also that she can give me a call back so that I can schedule her for a follow up with  now that  has left the practice.

## 2019-04-12 NOTE — TELEPHONE ENCOUNTER
Former pt of Dr Eleanor Rivero. Last seen in Sept 2018. Was supposed to f/u with her in 3 months. Only approved 1 month Rx for Lyrica 150 mg BID. Schedule 1 month follow up visit to renew Rx.

## 2019-05-02 ENCOUNTER — HOSPITAL ENCOUNTER (OUTPATIENT)
Dept: CT IMAGING | Age: 69
Discharge: HOME | End: 2019-05-02
Attending: NURSE PRACTITIONER
Payer: SELF-PAY

## 2019-05-02 DIAGNOSIS — R00.2 INTERMITTENT PALPITATIONS: ICD-10-CM

## 2019-05-02 DIAGNOSIS — R93.1 ELEVATED CORONARY ARTERY CALCIUM SCORE: ICD-10-CM

## 2019-05-02 DIAGNOSIS — E78.2 MIXED HYPERLIPIDEMIA: ICD-10-CM

## 2019-05-02 PROCEDURE — 75571 CT HRT W/O DYE W/CA TEST: CPT

## 2019-05-05 NOTE — PROGRESS NOTES
Please call patient has mild to moderate evidence of calcium build up, has up coming appt with Dr William Smart

## 2019-05-06 ENCOUNTER — TELEPHONE (OUTPATIENT)
Dept: CARDIOLOGY CLINIC | Age: 69
End: 2019-05-06

## 2019-05-06 NOTE — TELEPHONE ENCOUNTER
----- Message from Tiffany Hernandez NP sent at 5/5/2019  7:47 PM EDT -----  Please call patient has mild to moderate evidence of calcium build up, has up coming appt with Dr Valentino Greenspan

## 2019-06-20 ENCOUNTER — OFFICE VISIT (OUTPATIENT)
Dept: CARDIOLOGY CLINIC | Age: 69
End: 2019-06-20

## 2019-06-20 VITALS
BODY MASS INDEX: 32.75 KG/M2 | SYSTOLIC BLOOD PRESSURE: 130 MMHG | HEIGHT: 67 IN | DIASTOLIC BLOOD PRESSURE: 70 MMHG | OXYGEN SATURATION: 97 % | WEIGHT: 208.7 LBS | RESPIRATION RATE: 16 BRPM | HEART RATE: 50 BPM

## 2019-06-20 DIAGNOSIS — R93.1 AGATSTON CAC SCORE 100-199: ICD-10-CM

## 2019-06-20 DIAGNOSIS — I65.29 CAROTID ATHEROSCLEROSIS, UNSPECIFIED LATERALITY: ICD-10-CM

## 2019-06-20 DIAGNOSIS — R00.2 INTERMITTENT PALPITATIONS: Primary | ICD-10-CM

## 2019-06-20 DIAGNOSIS — R26.9 ABNORMAL GAIT: ICD-10-CM

## 2019-06-20 DIAGNOSIS — E78.2 MIXED HYPERLIPIDEMIA: ICD-10-CM

## 2019-06-20 DIAGNOSIS — I25.10 CORONARY ARTERY DISEASE INVOLVING NATIVE CORONARY ARTERY OF NATIVE HEART WITHOUT ANGINA PECTORIS: ICD-10-CM

## 2019-06-20 RX ORDER — PREGABALIN 150 MG/1
150 CAPSULE ORAL 2 TIMES DAILY
COMMUNITY

## 2019-06-20 RX ORDER — DILTIAZEM HYDROCHLORIDE 120 MG/1
120 CAPSULE, EXTENDED RELEASE ORAL DAILY
Qty: 90 CAP | Refills: 4 | Status: SHIPPED | OUTPATIENT
Start: 2019-06-20 | End: 2020-04-07

## 2019-06-20 NOTE — PROGRESS NOTES
[address], [place], [address] [facility]  [phone]     Subjective:      Joyce Wallace is a 77 y.o. female is here for routine f/u. States palpitation has greatly improved, very rare episodes. The patient denies chest pain/ shortness of breath, orthopnea, PND, LE edema, syncope, or presyncope. At times in the morning she feels a little bit out of balance.     Patient Active Problem List    Diagnosis Date Noted    Agatston CAC score, <100 03/25/2019    Erosive esophagitis 07/14/2016    Colon polyps 07/14/2016    Diverticulosis 07/14/2016    ASHD (arteriosclerotic heart disease) 12/29/2015    Chest pain 10/23/2015    Status following surgery for weight loss 09/14/2015    Palpitations 04/10/2015    Mixed hyperlipidemia 04/10/2015    Coronary artery disease 04/10/2015    SOB (shortness of breath) 07/29/2014    Mitral valve disorder 07/29/2014      Antwan Arellano MD  Past Medical History:   Diagnosis Date    Anxiety     Arrhythmia     CAD (coronary artery disease)     irregular heartrate    Depression     Diabetes ([address]     Fibromyalgia     Hypercholesterolemia     Musculoskeletal disorder     Other ill-defined conditions(799.89)     high cholesterol    Psychiatric disorder     depression    Sleep disorder       Past Surgical History:   Procedure Laterality Date    ABDOMEN SURGERY PROC UNLISTED      gastric banding    HX CARPAL TUNNEL RELEASE Bilateral     HX CHOLECYSTECTOMY      HX GI  Jan 2008    Lap Band     HX MENISCUS REPAIR Left     [place] COLSC FLX W/RMVL OF TUMOR POLYP LESION SNARE TQ  12/22/2010         [place] EGD TRANSORAL BIOPSY SINGLE/MULTIPLE  12/22/2010          Allergies   Allergen Reactions    Mobic [Meloxicam] Anxiety     Jittery      Family History   Problem Relation Age of Onset    Hypertension Father     Diabetes Father     Cancer Father     Heart Disease Mother     Parkinson's Disease Mother       Social History     Socioeconomic History  Marital status:      Spouse name: Not on file    Number of children: Not on file    Years of education: Not on file    Highest education level: Not on file   Occupational History    Not on file   Social Needs    Financial resource strain: Not on file    Food insecurity:     Worry: Not on file     Inability: Not on file    Transportation needs:     Medical: Not on file     Non-medical: Not on file   Tobacco Use    Smoking status: Former Smoker     Packs/day: 0.50     Years: 25.00     Pack years: 12.50     Types: Cigarettes     Last attempt to quit: 2011     Years since quittin.4    Smokeless tobacco: Never Used   Substance and Sexual Activity    Alcohol use: Yes     Alcohol/week: 5.0 oz     Types: 10 Shots of liquor per week     Comment: 7 drinks a week    Drug use: No    Sexual activity: Not on file   Lifestyle    Physical activity:     Days per week: Not on file     Minutes per session: Not on file    Stress: Not on file   Relationships    Social connections:     Talks on phone: Not on file     Gets together: Not on file     Attends Latter day service: Not on file     Active member of club or organization: Not on file     Attends meetings of clubs or organizations: Not on file     Relationship status: Not on file    Intimate partner violence:     Fear of current or ex partner: Not on file     Emotionally abused: Not on file     Physically abused: Not on file     Forced sexual activity: Not on file   Other Topics Concern    Not on file   Social History Narrative    Not on file      Current Outpatient Medications   Medication Sig    pregabalin (LYRICA) 150 mg capsule Take 150 mg by mouth two (2) times a day.  dilTIAZem XR (DILACOR XR) 120 mg XR capsule Take 1 Cap by mouth daily.  DULoxetine (CYMBALTA) 60 mg capsule Take 60 mg by mouth daily.  metoprolol succinate (TOPROL-XL) 50 mg XL tablet Take 1 Tab by mouth daily.  ezetimibe (ZETIA) 10 mg tablet Take  by mouth.  GLYCERIN/PROPYLENE GLYCOL (LUBRICANT EYE DROPS, GLYC-PG, OP) Apply 1 Drop to eye as needed.  metFORMIN (GLUMETZA ER) 500 mg TG24 24 hour tablet Take 500 mg by mouth two (2) times a day.  rosuvastatin (CRESTOR) 20 mg tablet Take 20 mg by mouth nightly.  cpap machine kit by Does Not Apply route.  diazepam (VALIUM) 10 mg tablet Take 10 mg by mouth as needed.  diclofenac EC (VOLTAREN) 75 mg EC tablet Take 75 mg by mouth as needed.  omeprazole (PRILOSEC) 20 mg capsule Take 1 Cap by mouth two (2) times a day. Indications: EROSIVE ESOPHAGITIS (Patient taking differently: Take 20 mg by mouth as needed. Indications: inflammation of the esophagus with erosion)    Aspirin, Buffered 81 mg tab Take  by mouth.  zolpidem (AMBIEN) 10 mg tablet Take  by mouth nightly as needed for Sleep. No current facility-administered medications for this visit. Review of Symptoms:  11 systems reviewed, negative other than as stated in the HPI    Physical ExamPhysical Exam:    Vitals:    06/20/19 1113 06/20/19 1114 06/20/19 1127   BP: 130/70 140/70 130/70   Pulse: (!) 50     Resp: 16     SpO2: 97%     Weight: 208 lb 11.2 oz (94.7 kg)     Height: 5' 7\" (1.702 m)       Body mass index is 32.69 kg/m². General PE   Gen:  NAD  Mental Status - Alert. General Appearance - Not in acute distress. Chest and Lung Exam   Inspection: Accessory muscles - No use of accessory muscles in breathing. Auscultation:   Breath sounds: - Normal.   Cardiovascular   Inspection: Jugular vein - Bilateral - Inspection Normal.   Palpation/Percussion:   Apical Impulse: - Normal.   Auscultation: Rhythm - Regular. Heart Sounds - S1 WNL and S2 WNL. No S3 or S4. Murmurs & Other Heart Sounds: Auscultation of the heart reveals - No Murmurs. Peripheral Vascular   Upper Extremity: Inspection - Bilateral - No Cyanotic nailbeds or Digital clubbing. Lower Extremity:   Palpation: Edema - Bilateral - No edema.   Abdomen:   Soft, non-tender, bowel sounds are active. Neuro: A&O times 3, CN and motor grossly WNL    Labs:   No results found for: CHOL, CHOLX, CHLST, CHOLV, [id number], HDL, LDL, LDLC, DLDLP, Manohar Ko, [facility], [facility]  Lab Results   Component Value Date/Time    CK 41 10/23/2015 01:23 PM     Lab Results   Component Value Date/Time    Sodium 142 10/23/2015 01:23 PM    Potassium 4.2 10/23/2015 01:23 PM    Chloride 108 10/23/2015 01:23 PM    CO2 29 10/23/2015 01:23 PM    Anion gap 5 10/23/2015 01:23 PM    Glucose 109 (H) 10/23/2015 01:23 PM    BUN 8 10/23/2015 01:23 PM    Creatinine 0.69 10/23/2015 01:23 PM    BUN/Creatinine ratio 12 10/23/2015 01:23 PM    GFR est AA >60 10/23/2015 01:23 PM    GFR est non-AA >60 10/23/2015 01:23 PM    Calcium 8.4 (L) 10/23/2015 01:23 PM    Bilirubin, total 0.5 10/23/2015 01:23 PM    AST (SGOT) 12 (L) 10/23/2015 01:23 PM    Alk. phosphatase 60 10/23/2015 01:23 PM    Protein, total 7.0 10/23/2015 01:23 PM    Albumin 3.8 10/23/2015 01:23 PM    Globulin 3.2 10/23/2015 01:23 PM    A-G Ratio 1.2 10/23/2015 01:23 PM    ALT (SGPT) 23 10/23/2015 01:23 PM       EKG:  SB     Assessment:     Assessment:      1. Intermittent palpitations    2. Coronary artery disease involving native coronary artery of native heart without angina pectoris    3. Mixed hyperlipidemia    4. Agatston CAC score 100-199    5. Abnormal gait    6. Carotid atherosclerosis, unspecified laterality        Orders Placed This Encounter    AMB POC EKG ROUTINE W/ 12 LEADS, INTER & REP     Order Specific Question:   Reason for Exam:     Answer:   routine    pregabalin (LYRICA) 150 mg capsule     Sig: Take 150 mg by mouth two (2) times a day. Plan:     Patient presents for 3 mos follow up. States palpitation has greatly improved, very rare episodes. Intermittent palpitations occurring daily:   3/19 Holter showed  PVCs. Started on Diltiazem 120 mg, continue. Normal stress echo in 2016.     HTN  Controlled with current therapy    Elevated coronary calcium score  (2019 112, 2013 was at 61)  Asymptomatic of CAD  Continue ASA, statin    Hyperlipidemia:   3/19 LDL at 102 while on Crestor 20 mg daily. PCP then added Zetia. She has f/u with PCP in September. Type 2 diabetes: On Metformin. Consider ACE inhibitor if blood pressure not controlled.     Tobacco abuse disorder:  Counseled on smoking cessation and provided literature - 5 minutes spent. Abnormal gait:  Along with family history of carotid atherosclerosis- check carotid duplex. Continue current care and f/u in 1 year.       Lyle Monroe MD

## 2019-06-20 NOTE — PROGRESS NOTES
1. Have you been to the ER, urgent care clinic since your last visit? Hospitalized since your last visit? No    2. Have you seen or consulted any other health care providers outside of the [facility] since your last visit? Include any pap smears or colon screening. Pain management  , GYN & Pulmonary since last visit. Chief Complaint   Patient presents with    Results    Irregular Heart Beat    Coronary Artery Disease       Patient has noted some improvement with heart palpitations since staring the Diltiazem her to discuss calcium scoring and questions carotids.

## 2019-06-20 NOTE — LETTER
6/20/19 Patient: Omar Esquivel YOB: 1950 Date of Visit: 6/20/2019 Eric Edwards MD 
[facility] [address] [facility] [address] [id number] VIA Facsimile: [phone] Dear Eric Edwards MD, Thank you for referring Ms. Sabas Torre to [facility] for evaluation. My notes for this consultation are attached. If you have questions, please do not hesitate to call me. I look forward to following your patient along with you.  
 
 
Sincerely, 
 
Kailee Kimbrough MD

## 2019-06-28 LAB
LEFT CCA DIST DIAS: 11 CM/S
LEFT CCA DIST SYS: 54 CM/S
LEFT CCA PROX DIAS: 14 CM/S
LEFT CCA PROX SYS: 65 CM/S
LEFT ECA DIAS: 10 CM/S
LEFT ECA SYS: 47 CM/S
LEFT ICA DIST DIAS: 15 CM/S
LEFT ICA DIST SYS: 46 CM/S
LEFT ICA MID DIAS: 14 CM/S
LEFT ICA MID SYS: 52 CM/S
LEFT ICA PROX DIAS: 14 CM/S
LEFT ICA PROX SYS: 50 CM/S
LEFT ICA/CCA SYS: 0.8
LEFT SUBCLAVIAN SYS: 81 CM/S
LEFT VERTEBRAL DIAS: 10 CM/S
LEFT VERTEBRAL SYS: 41 CM/S
RIGHT CCA DIST DIAS: 10 CM/S
RIGHT CCA DIST SYS: 56 CM/S
RIGHT CCA PROX DIAS: 16 CM/S
RIGHT CCA PROX SYS: 65 CM/S
RIGHT ECA DIAS: 8 CM/S
RIGHT ECA SYS: 45 CM/S
RIGHT ICA DIST DIAS: 14 CM/S
RIGHT ICA DIST SYS: 47 CM/S
RIGHT ICA MID DIAS: 14 CM/S
RIGHT ICA MID SYS: 40 CM/S
RIGHT ICA PROX DIAS: 8 CM/S
RIGHT ICA PROX SYS: 37 CM/S
RIGHT ICA/CCA SYS: 0.72
RIGHT SUBCLAVIAN SYS: 91 CM/S
RIGHT VERTEBRAL DIAS: 11 CM/S
RIGHT VERTEBRAL SYS: 35 CM/S

## 2019-07-01 ENCOUNTER — TELEPHONE (OUTPATIENT)
Dept: CARDIOLOGY CLINIC | Age: 69
End: 2019-07-01

## 2019-07-01 NOTE — TELEPHONE ENCOUNTER
----- Message from Batsheva Ayala NP sent at 6/28/2019  4:55 PM EDT -----  NO significant plaque build up in her carotid arteries. Continue ASA, and statin.

## 2019-10-03 RX ORDER — METOPROLOL SUCCINATE 50 MG/1
TABLET, EXTENDED RELEASE ORAL
Qty: 90 TAB | Refills: 1 | Status: SHIPPED | OUTPATIENT
Start: 2019-10-03 | End: 2020-04-07

## 2019-12-17 ENCOUNTER — HOSPITAL ENCOUNTER (OUTPATIENT)
Dept: MRI IMAGING | Age: 69
Discharge: HOME | End: 2019-12-17
Payer: MEDICARE

## 2019-12-17 DIAGNOSIS — M54.50 LOW BACK PAIN: ICD-10-CM

## 2019-12-17 DIAGNOSIS — M48.061 SPINAL STENOSIS, LUMBAR REGION, WITHOUT NEUROGENIC CLAUDICATION: ICD-10-CM

## 2019-12-17 PROCEDURE — 72148 MRI LUMBAR SPINE W/O DYE: CPT

## 2020-02-21 ENCOUNTER — TELEPHONE (OUTPATIENT)
Dept: SURGERY | Age: 70
End: 2020-02-21

## 2020-02-21 NOTE — TELEPHONE ENCOUNTER
Patient having endoscopy next month with Dr. Libia Villafana   Needs band deflation prior to EGD and last seen in 2016  Will contact patient

## 2020-03-30 ENCOUNTER — TELEPHONE (OUTPATIENT)
Dept: SURGERY | Age: 70
End: 2020-03-30

## 2020-03-30 NOTE — TELEPHONE ENCOUNTER
Spoke with patient and does not have EGD date yet due to COVID-19  Has regurgitation few times week and when drinks alcohol   Was worried about cost and that is why she has not been back   Now has Medicare and is covered as is medical necessity   Once she has an endoscopy date, can see her few days before and deflate band   Will need EGD results prior to adjusting band again (putting fluid back) and future adjustments depend on EGD results   She agreed and verbalized understanding

## 2020-04-07 RX ORDER — METOPROLOL SUCCINATE 50 MG/1
TABLET, EXTENDED RELEASE ORAL
Qty: 90 TAB | Refills: 1 | Status: SHIPPED | OUTPATIENT
Start: 2020-04-07 | End: 2020-08-25

## 2020-04-07 RX ORDER — DILTIAZEM HYDROCHLORIDE 120 MG/1
CAPSULE, EXTENDED RELEASE ORAL
Qty: 90 CAP | Refills: 4 | Status: SHIPPED | OUTPATIENT
Start: 2020-04-07 | End: 2021-01-21

## 2020-07-02 ENCOUNTER — TELEPHONE (OUTPATIENT)
Dept: SURGERY | Age: 70
End: 2020-07-02

## 2020-07-02 NOTE — TELEPHONE ENCOUNTER
Patient identified with two patient identifiers. Pt stated that she was told that Hood Bravo NP was on vacation by the [facility]. She stated she will just wait and call back when Hood Bravo is back because she is not sched for the endoscopy until 8/9/20.

## 2020-08-10 ENCOUNTER — HOSPITAL ENCOUNTER (OUTPATIENT)
Dept: PREADMISSION TESTING | Age: 70
Discharge: HOME | End: 2020-08-10
Payer: MEDICARE

## 2020-08-10 PROCEDURE — 87635 SARS-COV-2 COVID-19 AMP PRB: CPT

## 2020-08-11 ENCOUNTER — OFFICE VISIT (OUTPATIENT)
Dept: SURGERY | Age: 70
End: 2020-08-11
Payer: MEDICARE

## 2020-08-11 VITALS
TEMPERATURE: 98.1 F | RESPIRATION RATE: 17 BRPM | DIASTOLIC BLOOD PRESSURE: 68 MMHG | SYSTOLIC BLOOD PRESSURE: 110 MMHG | OXYGEN SATURATION: 93 % | HEIGHT: 67 IN | BODY MASS INDEX: 34.31 KG/M2 | HEART RATE: 78 BPM | WEIGHT: 218.6 LBS

## 2020-08-11 DIAGNOSIS — Z46.51 ADMISSION FOR ADJUSTMENT OF GASTRIC LAP BAND: ICD-10-CM

## 2020-08-11 DIAGNOSIS — E66.9 OBESITY (BMI 30.0-34.9): Primary | ICD-10-CM

## 2020-08-11 DIAGNOSIS — R63.5 WEIGHT GAIN: ICD-10-CM

## 2020-08-11 DIAGNOSIS — R11.10 REGURGITATION OF FOOD: ICD-10-CM

## 2020-08-11 DIAGNOSIS — K21.9 GASTROESOPHAGEAL REFLUX DISEASE, ESOPHAGITIS PRESENCE NOT SPECIFIED: ICD-10-CM

## 2020-08-11 LAB
HEALTH STATUS, XMCV2T: NORMAL
SARS-COV-2, COV2NT: NOT DETECTED
SOURCE, COVRS: NORMAL
SPECIMEN SOURCE, FCOV2M: NORMAL
SPECIMEN TYPE, XMCV1T: NORMAL

## 2020-08-11 PROCEDURE — 43999 UNLISTED PROCEDURE STOMACH: CPT | Performed by: NURSE PRACTITIONER

## 2020-08-11 PROCEDURE — G8432 DEP SCR NOT DOC, RNG: HCPCS | Performed by: NURSE PRACTITIONER

## 2020-08-11 PROCEDURE — G8400 PT W/DXA NO RESULTS DOC: HCPCS | Performed by: NURSE PRACTITIONER

## 2020-08-11 PROCEDURE — 3017F COLORECTAL CA SCREEN DOC REV: CPT | Performed by: NURSE PRACTITIONER

## 2020-08-11 PROCEDURE — 1090F PRES/ABSN URINE INCON ASSESS: CPT | Performed by: NURSE PRACTITIONER

## 2020-08-11 PROCEDURE — G8417 CALC BMI ABV UP PARAM F/U: HCPCS | Performed by: NURSE PRACTITIONER

## 2020-08-11 PROCEDURE — G8427 DOCREV CUR MEDS BY ELIG CLIN: HCPCS | Performed by: NURSE PRACTITIONER

## 2020-08-11 PROCEDURE — 1101F PT FALLS ASSESS-DOCD LE1/YR: CPT | Performed by: NURSE PRACTITIONER

## 2020-08-11 PROCEDURE — G8536 NO DOC ELDER MAL SCRN: HCPCS | Performed by: NURSE PRACTITIONER

## 2020-08-11 NOTE — PROGRESS NOTES
Chief Complaint   Patient presents with    New Patient    Weight Loss       Jose Cruz Barron is 10 years  status post adjustable gastric band for treatment of morbid obesity. Presents today for obesity management and to have band decompressed in preparation for an EGD and colonoscopy. She has not been in follow up in > 5 years     Reports 20 lbs weight gain after being put on Lyrica for fibromyalgia and chronic fatigue   Weight was \"ok and I looked good in my clothes at 195 lbs\"  Asking about revision to RNY   Has BILL, GERD, chronic pain, CAD, hyperlipidemia     Reports \"don't eat very much and I would love to be able to eat what I want and still lose weight\", referring to french fries   She admits to drinking several mixed drinks a night while preparing dinner and \"that could be some of my weight gain\"   Struggles with anxiety and some depression and lots of stressors at home     Regurgitation  4x/ week and usually dinner 'I got to get it all up before I can eat again\"  Night cough Yes \"if I don't use my CPAP\"  Port pain No   Portions  \"small\"; no seconds   \"I try to fill up on vegetables and salads\"     AP std band last fill volume = 6.7 ml     Physical Exam  Visit Vitals  /68 (BP 1 Location: Left arm, BP Patient Position: Sitting)   Pulse 78   Temp 98.1 °F (36.7 °C) (Oral)   Resp 17   Ht 5' 7\" (1.702 m)   Wt 218 lb 9.6 oz (99.2 kg)   SpO2 93%   BMI 34.24 kg/m²     A + O x 3  Chest  CTA, unlabored   COR  RRR  ABD Soft, obese, port easily palpated left upper abdomen; NT  EXT No edema; ambulating independently       ICD-10-CM ICD-9-CM    1. Admission for adjustment of gastric lap band  Z46.51 V53.51    2. Regurgitation of food  R11.10 787.03    3. Gastroesophageal reflux disease, esophagitis presence not specified  K21.9 530.81    4. Weight gain  R63.5 783.1    5. Obesity (BMI 30.0-34. 9)  E66.9 278.00        Jose Cruz Barron is 10 years s/p adjustable gastric band for treatment of morbid obesity  Needs band deflation prior to EGD   Lap Band adjustment  Procedure  Verbal consent was obtained. The patient was placed in the standing  position. The port area was prepped using sterile technique and a Ramsey needle was inserted. The port was accessed with ease. Previous Fill Volume:  6.7 ml (recorded volume)  8 ml removed and Band deflated    Patient tolerated the procedure well. Diet protein and produce   At this time she is not a candidate for revision with BMI < 35, daily alcohol use   She desires fluid back in band after EGD and \"can't you just do it all at once so I don't have to keep coming back\"   Reviewed band protocol and need EGD results prior to further band adjustments   Would consider band clinic IF EGD results not contraindicated    Follow-up in after EGD  Tristan Da Silva verbalized understanding and questions were answered to the best of my knowledge and ability. Diet, activity and mindfulness educational materials were provided. 22 minutes spent in face to face with Tristan Da Silva > 50% counseling.

## 2020-08-11 NOTE — PROGRESS NOTES
1. Have you been to the ER, urgent care clinic since your last visit? Hospitalized since your last visit? No    2. Have you seen or consulted any other health care providers outside of the [address] [place] [address] since your last visit? Include any pap smears or colon screening.  No

## 2020-08-13 ENCOUNTER — ANESTHESIA EVENT (OUTPATIENT)
Dept: ENDOSCOPY | Age: 70
End: 2020-08-13
Payer: MEDICARE

## 2020-08-14 ENCOUNTER — ANESTHESIA (OUTPATIENT)
Dept: ENDOSCOPY | Age: 70
End: 2020-08-14
Payer: MEDICARE

## 2020-08-14 ENCOUNTER — HOSPITAL ENCOUNTER (OUTPATIENT)
Age: 70
Setting detail: OUTPATIENT SURGERY
Discharge: HOME | End: 2020-08-14
Attending: SPECIALIST | Admitting: SPECIALIST
Payer: MEDICARE

## 2020-08-14 VITALS
HEIGHT: 67 IN | BODY MASS INDEX: 34.06 KG/M2 | WEIGHT: 217 LBS | RESPIRATION RATE: 16 BRPM | OXYGEN SATURATION: 97 % | HEART RATE: 57 BPM | SYSTOLIC BLOOD PRESSURE: 124 MMHG | DIASTOLIC BLOOD PRESSURE: 48 MMHG | TEMPERATURE: 97.7 F

## 2020-08-14 PROCEDURE — 88305 TISSUE EXAM BY PATHOLOGIST: CPT

## 2020-08-14 PROCEDURE — 76040000007: Performed by: SPECIALIST

## 2020-08-14 PROCEDURE — 74011250636 HC RX REV CODE- 250/636: Performed by: NURSE ANESTHETIST, CERTIFIED REGISTERED

## 2020-08-14 PROCEDURE — 74011000250 HC RX REV CODE- 250: Performed by: NURSE ANESTHETIST, CERTIFIED REGISTERED

## 2020-08-14 PROCEDURE — 74011250636 HC RX REV CODE- 250/636: Performed by: SPECIALIST

## 2020-08-14 PROCEDURE — 77030013992 HC SNR POLYP ENDOSC BSC -B: Performed by: SPECIALIST

## 2020-08-14 PROCEDURE — 77030019988 HC FCPS ENDOSC DISP BSC -B: Performed by: SPECIALIST

## 2020-08-14 PROCEDURE — 76060000032 HC ANESTHESIA 0.5 TO 1 HR: Performed by: SPECIALIST

## 2020-08-14 RX ORDER — LIDOCAINE HYDROCHLORIDE 20 MG/ML
INJECTION, SOLUTION EPIDURAL; INFILTRATION; INTRACAUDAL; PERINEURAL AS NEEDED
Status: DISCONTINUED | OUTPATIENT
Start: 2020-08-14 | End: 2020-08-14 | Stop reason: HOSPADM

## 2020-08-14 RX ORDER — PROPOFOL 10 MG/ML
INJECTION, EMULSION INTRAVENOUS AS NEEDED
Status: DISCONTINUED | OUTPATIENT
Start: 2020-08-14 | End: 2020-08-14 | Stop reason: HOSPADM

## 2020-08-14 RX ORDER — SODIUM CHLORIDE 9 MG/ML
50 INJECTION, SOLUTION INTRAVENOUS CONTINUOUS
Status: DISCONTINUED | OUTPATIENT
Start: 2020-08-14 | End: 2020-08-14 | Stop reason: HOSPADM

## 2020-08-14 RX ORDER — SODIUM CHLORIDE 0.9 % (FLUSH) 0.9 %
5-40 SYRINGE (ML) INJECTION EVERY 8 HOURS
Status: DISCONTINUED | OUTPATIENT
Start: 2020-08-14 | End: 2020-08-14 | Stop reason: HOSPADM

## 2020-08-14 RX ORDER — DEXTROMETHORPHAN/PSEUDOEPHED 2.5-7.5/.8
1.2 DROPS ORAL
Status: DISCONTINUED | OUTPATIENT
Start: 2020-08-14 | End: 2020-08-14 | Stop reason: HOSPADM

## 2020-08-14 RX ORDER — SODIUM CHLORIDE 0.9 % (FLUSH) 0.9 %
5-40 SYRINGE (ML) INJECTION AS NEEDED
Status: DISCONTINUED | OUTPATIENT
Start: 2020-08-14 | End: 2020-08-14 | Stop reason: HOSPADM

## 2020-08-14 RX ADMIN — PROPOFOL 50 MG: 10 INJECTION, EMULSION INTRAVENOUS at 10:07

## 2020-08-14 RX ADMIN — SODIUM CHLORIDE 50 ML/HR: 900 INJECTION, SOLUTION INTRAVENOUS at 09:13

## 2020-08-14 RX ADMIN — PROPOFOL 50 MG: 10 INJECTION, EMULSION INTRAVENOUS at 10:15

## 2020-08-14 RX ADMIN — PROPOFOL 50 MG: 10 INJECTION, EMULSION INTRAVENOUS at 10:03

## 2020-08-14 RX ADMIN — PROPOFOL 50 MG: 10 INJECTION, EMULSION INTRAVENOUS at 10:18

## 2020-08-14 RX ADMIN — PROPOFOL 50 MG: 10 INJECTION, EMULSION INTRAVENOUS at 09:56

## 2020-08-14 RX ADMIN — PROPOFOL 50 MG: 10 INJECTION, EMULSION INTRAVENOUS at 10:12

## 2020-08-14 RX ADMIN — PROPOFOL 50 MG: 10 INJECTION, EMULSION INTRAVENOUS at 09:52

## 2020-08-14 RX ADMIN — LIDOCAINE HYDROCHLORIDE 100 MG: 20 INJECTION, SOLUTION EPIDURAL; INFILTRATION; INTRACAUDAL; PERINEURAL at 09:52

## 2020-08-14 RX ADMIN — PROPOFOL 50 MG: 10 INJECTION, EMULSION INTRAVENOUS at 09:59

## 2020-08-14 RX ADMIN — PROPOFOL 50 MG: 10 INJECTION, EMULSION INTRAVENOUS at 09:54

## 2020-08-14 NOTE — ANESTHESIA POSTPROCEDURE EVALUATION
Procedure(s):  COLONOSCOPY, ESOPHAGOGASTRODUODENOSCOPY (EGD)  ESOPHAGOGASTRODUODENOSCOPY (EGD)  ESOPHAGOGASTRODUODENAL (EGD) BIOPSY  ENDOSCOPIC POLYPECTOMY. total IV anesthesia    Anesthesia Post Evaluation        Patient location during evaluation: PACU  Note status: Adequate. Level of consciousness: responsive to verbal stimuli and sleepy but conscious  Pain management: satisfactory to patient  Airway patency: patent  Anesthetic complications: no  Cardiovascular status: acceptable  Respiratory status: acceptable  Hydration status: acceptable  Comments: +Post-Anesthesia Evaluation and Assessment    Patient: Manny Jacques MRN: [id number]  SSN: [id number]   YOB: 1950  Age: 65 y.o. Sex: female      Cardiovascular Function/Vital Signs    /53   Pulse (!) 55   Temp 36.7 °C (98 °F)   Resp 18   Ht 5' 7\" (1.702 m)   Wt 98.4 kg (217 lb)   SpO2 96%   Breastfeeding No   BMI 33.99 kg/m²     Patient is status post Procedure(s):  COLONOSCOPY, ESOPHAGOGASTRODUODENOSCOPY (EGD)  ESOPHAGOGASTRODUODENOSCOPY (EGD)  ESOPHAGOGASTRODUODENAL (EGD) BIOPSY  ENDOSCOPIC POLYPECTOMY. Nausea/Vomiting: Controlled. Postoperative hydration reviewed and adequate. Pain:  Pain Scale 1: Visual (08/14/20 1041)  Pain Intensity 1: 0 (08/14/20 1041)   Managed. Neurological Status: At baseline. Mental Status and Level of Consciousness: Arousable. Pulmonary Status:   O2 Device: Room air (08/14/20 1041)   Adequate oxygenation and airway patent. Complications related to anesthesia: None    Post-anesthesia assessment completed. No concerns. Signed By: Robin Smith MD    8/14/2020  Post anesthesia nausea and vomiting:  controlled      INITIAL Post-op Vital signs:   Vitals Value Taken Time   BP     Temp     Pulse 52 8/14/2020 10:49 AM   Resp 15 8/14/2020 10:49 AM   SpO2 96 % 8/14/2020 10:49 AM   Vitals shown include unvalidated device data.

## 2020-08-14 NOTE — PERIOP NOTES
Endoscope was pre-cleaned at bedside immediately following procedure by Dontrell Baraajs ET    Anesthesia reports 450mg Propofol, 100mg Lidocaine and 450mL NS given during procedure. Received report from anesthesia staff on vital signs and status of patient.

## 2020-08-14 NOTE — PROCEDURES
Colonoscopy Procedure Note    Indications:   Personal history of colon polyps (screening only)    Referring Physician: Benji Morgan MD  Anesthesia/Sedation: MAC anesthesia Propofol  Endoscopist:  Dr. Radha Katz    Procedure in Detail:  Informed consent was obtained for the procedure, including sedation. Risks of perforation, hemorrhage, adverse drug reaction, and aspiration were discussed. The patient was placed in the left lateral decubitus position. Based on the pre-procedure assessment, including review of the patient's medical history, medications, allergies, and review of systems, she had been deemed to be an appropriate candidate for moderate sedation; she was therefore sedated with the medications listed above. The patient was monitored continuously with ECG tracing, pulse oximetry, blood pressure monitoring, and direct observations. A rectal examination was performed. The [id number] was inserted into the rectum and advanced under direct vision to the cecum, which was identified by the ileocecal valve and appendiceal orifice. The quality of the colonic preparation was adequate. A careful inspection was made as the colonoscope was withdrawn, including a retroflexed view of the rectum; findings and interventions are described below. Appropriate photodocumentation was obtained. Findings:   1. Scope advanced to the cecum. 2.  (2) sessile 5 mm polyps in the transverse colon s/p cold snare removal       (3) sessile polyps in ascending colon : 2 of which were 5 mm s/p cold snare removal and 1 was 8 mm s/p hot snare removal       (1) sessile 4 mm polyp at the splenic flexure s/p cold snare removal       (1) sessile 4 mm polyp in sigmoid s/p cold snare removal       (1) sessile 5 mm polyp in rectum s/p cold snare removal    3. Diffuse mild diverticulosis in sigmoid and descending colon. 4.  Small internal hemorrhoids.     Therapies:  As above    Specimen: Specimens were collected as described above and sent to pathology. Complications: None were encountered during the procedure. EBL: < 10 ml.     Recommendations:   -f/u path  -repeat colonoscopy in 3 years    Signed By: Gabe Kwan MD                        August 14, 2020

## 2020-08-14 NOTE — PERIOP NOTES
Don Amador  1950  [id number]    Situation:  Verbal report received from: Enid Ramirez RN  Procedure: Procedure(s):  COLONOSCOPY, ESOPHAGOGASTRODUODENOSCOPY (EGD)  ESOPHAGOGASTRODUODENOSCOPY (EGD)  ESOPHAGOGASTRODUODENAL (EGD) BIOPSY  ENDOSCOPIC POLYPECTOMY    Background:    Preoperative diagnosis: Gastroesophageal reflux disease, esophagitis presence not specified [K21.9]  Ulcer of esophagus without bleeding [K22.10]  Bariatric surgery status [Z98.84]  Personal history of colonic polyps [Z86.010]  Postoperative diagnosis: EGD:  Esophagitis and Bile Gastritis  Colon: Polyps, Diverticulosis    :  Dr. Jocelyn Agudelo  Assistant(s): Endoscopy Technician-1: Olamide Holman  Endoscopy RN-1: Roxana BE    Specimens:   ID Type Source Tests Collected by Time Destination   1 : Duodenum BX Preservative Duodenum  Fernando Ignacio MD 8/14/2020 1004 Pathology   2 : Stomach Bx Preservative Stomach  Fernando Ignacio MD 8/14/2020 1005 Pathology   3 : Distal Espohagus BX Preservative Esophagus, Distal  Fernando Ignacio MD 8/14/2020 1007 Pathology   4 : Mid Esophagus BX Preservative Esophagus, Mid  Fernando Ignacio MD 8/14/2020 1008 Pathology   5 : Transverse Colon Polyps Preservative Colon, Transverse  Fernando Ignacio MD 8/14/2020 1012 Pathology   6 : Ascending Colon Polyp Preservative Colon, Ascending  Fernando Ignacio MD 8/14/2020 1016 Pathology   7 : Splenic Flexure Polyp Preservative Splenic Flexure  Fernando Ignacio MD 8/14/2020 1024 Pathology   8 : Sigmoid Colon Polyp Preservative Sigmoid  Fernando Ignacio MD 8/14/2020 1028 Pathology   9 : Rectum Colon Polyp Preservative Rectum  Fernando Ignacio MD 8/14/2020 1029 Pathology     H. Pylori  no    Assessment:  Intra-procedure medications   Anesthesia gave intra-procedure sedation and medications, see anesthesia flow sheet yes    Intravenous fluids: NS@ KVO     Vital signs stable     Abdominal assessment: round and soft Recommendation:  Discharge patient per MD order.   Family or Friend   Permission to share finding with family or friend yes

## 2020-08-14 NOTE — PROCEDURES
Esophagogastroduodenoscopy Procedure Note      Vitaliy Koehler  1950  [id number]    Indication:  Persistent GERD     Endoscopist: Marion Reyes MD    Referring Provider:  Nadia Franco MD    Sedation:  MAC anesthesia Propofol    Procedure Details:  After infomed consent was obtained for the procedure, with all risks and benefits of procedure explained the patient was taken to the endoscopy suite and placed in the left lateral decubitus position. Following sequential administration of sedation as per above, the endoscope was inserted into the mouth and advanced under direct vision to second portion of the duodenum. A careful inspection was made as the gastroscope was withdrawn, including a retroflexed view of the proximal stomach; findings and interventions are described below. Findings:     Esophagus:   + Irregular Z line borders located at 37 cm with focal erosion c/w LA Grade A erosive esophagitis s/p Bx.  - Normal mucosa in mid esophagus s/p Bx. Stomach:   + Diffuse erythema in the stomach with increased bile c/w Bile Acid Gastritis    Duodenum:   - Normal duodenum to second portion s/p Bx. Therapies:  As above    Specimen: Specimens were collected as described and send to the laboratory. Complications:   None were encountered during the procedure. EBL: < 10 ml.           Recommendations:   -f/u path  -PPI daily    Marion Reyes MD  8/14/2020  10:30 AM

## 2020-08-14 NOTE — DISCHARGE INSTRUCTIONS
Emy Vu  [id number]  1950    COLON / EGD DISCHARGE INSTRUCTIONS  Discomfort:  Sore throat- throat lozenges or warm salt water gargle  Redness at IV site- apply warm compress to area; if redness or soreness persist- contact your physician  There may be a slight amount of blood passed from the rectum  Gaseous discomfort- walking, belching will help relieve any discomfort  You may not operate a vehicle for 12 hours  You may not engage in an occupation involving machinery or appliances for rest of today  You may not drink alcoholic beverages for at least 12 hours  Avoid making any critical decisions for at least 24 hour  DIET:   Regular diet. - however -  remember your colon is empty and a heavy meal will produce gas. Avoid these foods:  vegetables, fried / greasy foods, carbonated drinks for today     ACTIVITY:  You may resume your normal daily activities it is recommended that you spend the remainder of the day resting -  avoid any strenuous activity. CALL M.D. ANY SIGN OF:   Increasing pain, nausea, vomiting  Abdominal distension (swelling)  New increased bleeding (oral or rectal)  Fever (chills)  Pain in chest area  Bloody discharge from nose or mouth  Shortness of breath    You may not  take any Advil, Aspirin, Ibuprofen, Motrin, Aleve, or Goodys for 10 days, ONLY  Tylenol as needed for pain. Follow-up Instructions:   Call Dr. Mark Donald for results of procedure / biopsy in 7 days at telephone #  [phone]  Additional instructions:  Take Pantoprazole 40 mg daily for acid reflux                                       Repeat Colonoscopy in 3 years                    Emy Vu  [id number]  1950        DISCHARGE SUMMARY from Nurse    The following personal items collected during your admission are returned to you:   Dental Appliance: Dental Appliances: None  Vision: Visual Aid: None  Hearing Aid:    Jewelry:    Clothing:    Other Valuables:    Valuables sent to safe:      PATIENT INSTRUCTIONS:    Take Home Medications:  {Medication reconciliation information is now added to the patient's AVS automatically when it is printed. There is no need to use this SmartLink in discharge instructions.   Highlight this text and delete it to clear this message}          b

## 2020-08-14 NOTE — H&P
Gastroenterology Outpatient History and Physical    Patient: Azra Osorio    Physician: Susan Trivedi MD    Vital Signs: Blood pressure 111/57, pulse 61, temperature 98 °F (36.7 °C), resp. rate 20, height 5' 7\" (1.702 m), weight 98.4 kg (217 lb), SpO2 96 %, not currently breastfeeding. Allergies: Allergies   Allergen Reactions    Mobic [Meloxicam] Anxiety     Jittery       Chief Complaint: GERD, H/O Polyps    History of Present Illness: 66 yo WF for EGD for persistent GERD and h/o erosive reflux. Also for colonoscopy for h/o polyps.     Justification for Procedure: above    History:  Past Medical History:   Diagnosis Date    Anxiety     Arrhythmia     irregular HR    Depression     Diabetes ([address]     Fibromyalgia     Hypercholesterolemia     Musculoskeletal disorder     Psychiatric disorder     depression    Sleep apnea     CPAP    Sleep disorder       Past Surgical History:   Procedure Laterality Date    ABDOMEN SURGERY PROC UNLISTED      gastric banding    HX CARPAL TUNNEL RELEASE Bilateral     HX CHOLECYSTECTOMY      HX GI  2008    Lap Band     HX MENISCUS REPAIR Left     HX OTHER SURGICAL Right     kideny stone removed    HX OTHER SURGICAL Left     laser blast of kidney stone    ID COLSC FLX W/RMVL OF TUMOR POLYP LESION SNARE TQ  2010         ID EGD TRANSORAL BIOPSY SINGLE/MULTIPLE  2010           Social History     Socioeconomic History    Marital status:      Spouse name: Not on file    Number of children: Not on file    Years of education: Not on file    Highest education level: Not on file   Tobacco Use    Smoking status: Former Smoker     Packs/day: 0.50     Years: 25.00     Pack years: 12.50     Types: Cigarettes     Last attempt to quit: 2011     Years since quittin.6    Smokeless tobacco: Never Used   Substance and Sexual Activity    Alcohol use: Yes     Comment: 7-10 drinks a week wine and liquor    Drug use: No      Family History Problem Relation Age of Onset    Hypertension Father     Diabetes Father     Cancer Father     Heart Disease Mother     Parkinson's Disease Mother        Medications:   Prior to Admission medications    Medication Sig Start Date End Date Taking? Authorizing Provider   DILT- mg XR capsule TAKE 1 CAPSULE EVERY DAY 4/7/20  Yes Cameron THEODORE NP   metoprolol succinate (TOPROL-XL) 50 mg XL tablet TAKE 1 TABLET EVERY DAY 4/7/20  Yes Desi Parmar MD   pregabalin (LYRICA) 150 mg capsule Take 150 mg by mouth two (2) times a day. Yes Provider, Historical   DULoxetine (CYMBALTA) 60 mg capsule Take 60 mg by mouth daily. Yes Provider, Historical   ezetimibe (ZETIA) 10 mg tablet Take  by mouth. Yes Provider, Historical   metFORMIN (GLUMETZA ER) 500 mg TG24 24 hour tablet Take 500 mg by mouth two (2) times a day. Yes Provider, Historical   rosuvastatin (CRESTOR) 20 mg tablet Take 20 mg by mouth nightly. Yes Provider, Historical   cpap machine kit by Does Not Apply route. Yes Provider, Historical   diclofenac EC (VOLTAREN) 75 mg EC tablet Take 75 mg by mouth as needed. 7/1/16  Yes Provider, Historical   omeprazole (PRILOSEC) 20 mg capsule Take 1 Cap by mouth two (2) times a day. Indications: EROSIVE ESOPHAGITIS  Patient taking differently: Take 20 mg by mouth as needed. Indications: inflammation of the esophagus with erosion 2/10/16  Yes Patricia Campbell MD   GLYCERIN/PROPYLENE GLYCOL (LUBRICANT EYE DROPS, GLYC-PG, OP) Apply 1 Drop to eye as needed. 12/5/17   Provider, Historical   diazepam (VALIUM) 10 mg tablet Take 10 mg by mouth as needed. 6/30/16   Provider, Historical   Aspirin, Buffered 81 mg tab Take  by mouth. Provider, Historical   zolpidem (AMBIEN) 10 mg tablet Take  by mouth nightly as needed for Sleep. Provider, Historical       Physical Exam:   General: alert, no distress   HEENT: Head: Normocephalic, no lesions, without obvious abnormality.    Heart: regular rate and rhythm, S1, S2 normal, no murmur, click, rub or gallop   Lungs: chest clear, no wheezing, rales, normal symmetric air entry   Abdominal: soft, NT/ND + BS   Neurological: Grossly normal   Extremities: extremities normal, atraumatic, no cyanosis or edema     Findings/Diagnosis: GERD, H/O polyps    Plan of Care/Planned Procedure: EGD and Colonoscopy

## 2020-08-14 NOTE — ANESTHESIA PREPROCEDURE EVALUATION
Anesthetic History   No history of anesthetic complications            Review of Systems / Medical History  Patient summary reviewed, nursing notes reviewed and pertinent labs reviewed    Pulmonary        Sleep apnea: CPAP  Shortness of breath and smoker      Comments: Former Smoker - 12.5 pack years, quit in 2011   Neuro/Psych         Psychiatric history    Comments: Depression  Anxiety Cardiovascular            Dysrhythmias   CAD and hyperlipidemia    Exercise tolerance: >4 METS  Comments: 60-65% EF on 2016 ECHO with no valve issues  Palpitations  ASHD (arteriosclerotic heart disease)   GI/Hepatic/Renal     GERD: well controlled      PUD    Comments: NAUSEA  Diverticular dz Endo/Other    Diabetes    Obesity     Other Findings   Comments: Hx of colon polyps         Physical Exam    Airway  Mallampati: I  TM Distance: > 6 cm  Neck ROM: normal range of motion   Mouth opening: Normal     Cardiovascular  Regular rate and rhythm,  S1 and S2 normal,  no murmur, click, rub, or gallop  Rhythm: regular  Rate: normal         Dental    Dentition: Caps/crowns  Comments: Loose right upper molar   Pulmonary  Breath sounds clear to auscultation               Abdominal  GI exam deferred       Other Findings            Anesthetic Plan    ASA: 3  Anesthesia type: total IV anesthesia and MAC          Induction: Intravenous  Anesthetic plan and risks discussed with: Patient

## 2020-08-20 ENCOUNTER — OFFICE VISIT (OUTPATIENT)
Dept: CARDIOLOGY CLINIC | Age: 70
End: 2020-08-20
Payer: MEDICARE

## 2020-08-20 VITALS
HEIGHT: 67 IN | HEART RATE: 56 BPM | DIASTOLIC BLOOD PRESSURE: 80 MMHG | OXYGEN SATURATION: 95 % | BODY MASS INDEX: 34.62 KG/M2 | WEIGHT: 220.6 LBS | RESPIRATION RATE: 16 BRPM | SYSTOLIC BLOOD PRESSURE: 140 MMHG

## 2020-08-20 DIAGNOSIS — E78.2 MIXED HYPERLIPIDEMIA: ICD-10-CM

## 2020-08-20 DIAGNOSIS — R00.2 PALPITATIONS: ICD-10-CM

## 2020-08-20 DIAGNOSIS — R93.1 AGATSTON CAC SCORE 100-199: Primary | ICD-10-CM

## 2020-08-20 PROCEDURE — 1101F PT FALLS ASSESS-DOCD LE1/YR: CPT | Performed by: INTERNAL MEDICINE

## 2020-08-20 PROCEDURE — G8432 DEP SCR NOT DOC, RNG: HCPCS | Performed by: INTERNAL MEDICINE

## 2020-08-20 PROCEDURE — G8536 NO DOC ELDER MAL SCRN: HCPCS | Performed by: INTERNAL MEDICINE

## 2020-08-20 PROCEDURE — 99214 OFFICE O/P EST MOD 30 MIN: CPT | Performed by: INTERNAL MEDICINE

## 2020-08-20 PROCEDURE — G8417 CALC BMI ABV UP PARAM F/U: HCPCS | Performed by: INTERNAL MEDICINE

## 2020-08-20 PROCEDURE — 93000 ELECTROCARDIOGRAM COMPLETE: CPT | Performed by: INTERNAL MEDICINE

## 2020-08-20 PROCEDURE — 3017F COLORECTAL CA SCREEN DOC REV: CPT | Performed by: INTERNAL MEDICINE

## 2020-08-20 PROCEDURE — G8427 DOCREV CUR MEDS BY ELIG CLIN: HCPCS | Performed by: INTERNAL MEDICINE

## 2020-08-20 PROCEDURE — 1090F PRES/ABSN URINE INCON ASSESS: CPT | Performed by: INTERNAL MEDICINE

## 2020-08-20 PROCEDURE — G8400 PT W/DXA NO RESULTS DOC: HCPCS | Performed by: INTERNAL MEDICINE

## 2020-08-20 RX ORDER — ROSUVASTATIN CALCIUM 40 MG/1
40 TABLET, COATED ORAL
Qty: 90 TAB | Refills: 3
Start: 2020-08-20

## 2020-08-20 RX ORDER — PANTOPRAZOLE SODIUM 40 MG/1
TABLET, DELAYED RELEASE ORAL
COMMUNITY
Start: 2020-08-14 | End: 2021-01-12 | Stop reason: ALTCHOICE

## 2020-08-20 NOTE — PROGRESS NOTES
Chief Complaint   Patient presents with    Follow-up    Hypertension    Cholesterol Problem    Palpitations       1. Have you been to the ER, urgent care clinic since your last visit? Hospitalized since your last visit? Yes 8/2020 endoscopy & colonoscopy. 2. Have you seen or consulted any other health care providers outside of the [address] [place] [address] since your last visit? Include any pap smears or colon screening. Yes PCP , Eye exam , pain management ,ortho ,chiropractor & GI. Patient C/O lower extremities discoloration and nervousness with medications.

## 2020-08-20 NOTE — LETTER
8/20/20 Patient: Vitaliy Koehler YOB: 1950 Date of Visit: 8/20/2020 Cesar Amador MD 
[address] [place] [address] [address] [facility] [phone] VIA Facsimile: [phone] Dear Cesar Amador MD, Thank you for referring Ms. Alicia Coffey to [facility] for evaluation. My notes for this consultation are attached. If you have questions, please do not hesitate to call me. I look forward to following your patient along with you.  
 
 
Sincerely, 
 
Sangita Noel MD

## 2020-08-20 NOTE — PROGRESS NOTES
Guru Garcia, [facility]    Subjective/HPI:     Ms. Kiley Tracy is a 65 y.o. female is here for routine follow-up. She has a PMHx of elevated Agatston CAC score, HTN, HLD and obesity. She feels good. She has cut back on her exercise due to COVID-19 as her  \"quit on her\". She is going to look into calling him again to resume training. She denies complaints of chest pains, dizziness, orthopnea, PND or edema. She denies palpitation symptoms or shortness of breath. She inquires about use of PCSK9-inhibitor therapy. She was told she has Familial hyperlipidemia due to her LP (a) levels. PCP Provider  Oj Russell MD    Patient Active Problem List   Diagnosis Code    SOB (shortness of breath) R06.02    Mitral valve disorder I05.9    Palpitations R00.2    Mixed hyperlipidemia E78.2    Coronary artery disease I25.10    Status following surgery for weight loss Z98.84    Chest pain R07.9    ASHD (arteriosclerotic heart disease) I25.10    Erosive esophagitis K22.10    Colon polyps K63.5    Diverticulosis K57.90    Agatston CAC score, <100 R93.1       Social History     Tobacco Use    Smoking status: Former Smoker     Packs/day: 0.50     Years: 25.00     Pack years: 12.50     Types: Cigarettes     Last attempt to quit: 2011     Years since quittin.6    Smokeless tobacco: Never Used   Substance Use Topics    Alcohol use: Yes     Comment: 7-10 drinks a week wine and liquor       Current Outpatient Medications   Medication Sig    pantoprazole (PROTONIX) 40 mg tablet TK 1 T PO QD BEFORE A MEAL    rosuvastatin (CRESTOR) 40 mg tablet Take 1 Tab by mouth nightly.  DILT- mg XR capsule TAKE 1 CAPSULE EVERY DAY    metoprolol succinate (TOPROL-XL) 50 mg XL tablet TAKE 1 TABLET EVERY DAY    pregabalin (LYRICA) 150 mg capsule Take 150 mg by mouth two (2) times a day.  DULoxetine (CYMBALTA) 60 mg capsule Take 60 mg by mouth daily.     ezetimibe (ZETIA) 10 mg tablet Take  by mouth.  metFORMIN (GLUMETZA ER) 500 mg TG24 24 hour tablet Take 500 mg by mouth two (2) times a day.  cpap machine kit by Does Not Apply route as needed.  omeprazole (PRILOSEC) 20 mg capsule Take 1 Cap by mouth two (2) times a day. Indications: EROSIVE ESOPHAGITIS (Patient taking differently: Take 20 mg by mouth as needed. Indications: inflammation of the esophagus with erosion)    Aspirin, Buffered 81 mg tab Take  by mouth.  zolpidem (AMBIEN) 10 mg tablet Take  by mouth nightly as needed for Sleep. No current facility-administered medications for this visit. Allergies   Allergen Reactions    Mobic [Meloxicam] Anxiety     Jittery        I have reviewed the problem list, allergy list, medical history, family, social history and medications. Review of Symptoms:    Review of Systems   Constitutional: Negative for chills, fever and weight loss. HENT: Negative for nosebleeds. Eyes: Negative for blurred vision and double vision. Respiratory: Negative for cough, shortness of breath and wheezing. Cardiovascular: Negative for chest pain, palpitations, orthopnea, leg swelling and PND. Gastrointestinal: Negative for abdominal pain, blood in stool, diarrhea, nausea and vomiting. Musculoskeletal: Negative for joint pain. Skin: Negative for rash. Neurological: Negative for dizziness, tingling and loss of consciousness. Endo/Heme/Allergies: Does not bruise/bleed easily. Physical Exam:      General: Well developed, in no acute distress, cooperative and alert  HEENT: No carotid bruits, no JVD, trach is midline. Neck Supple, PEERL, EOM intact. Heart:  reg rate and rhythm; normal S1/S2; no murmurs, no gallops or rubs. Respiratory: Clear bilaterally x 4, no wheezing or rales  Abdomen:   Soft, non-tender, no distention, no masses. + BS. Extremities:  Normal cap refill, no cyanosis, atraumatic. No edema. Neuro: A&Ox3, speech clear, gait stable.    Skin: Skin color is normal. No rashes or lesions. Non diaphoretic  Vascular: 2+ pulses symmetric in all extremities    Vitals:    08/20/20 1120   BP: 140/80   Pulse: (!) 56   Resp: 16   SpO2: 95%   Weight: 220 lb 9.6 oz (100.1 kg)   Height: 5' 7\" (1.702 m)       ECG: sinus bradycardia    Cardiology Labs:    No results found for: FLP, CHOL, HDL, LDLC, VLDL, CHHD    No results found for: HBA1C, VXF6HRIZ, ZEK5UCZC, TBR6LOGX    Lab Results   Component Value Date/Time    Sodium 142 10/23/2015 01:23 PM    Potassium 4.2 10/23/2015 01:23 PM    Chloride 108 10/23/2015 01:23 PM    CO2 29 10/23/2015 01:23 PM    Glucose 109 (H) 10/23/2015 01:23 PM    BUN 8 10/23/2015 01:23 PM    Creatinine 0.69 10/23/2015 01:23 PM    BUN/Creatinine ratio 12 10/23/2015 01:23 PM    GFR est AA >60 10/23/2015 01:23 PM    GFR est non-AA >60 10/23/2015 01:23 PM    Calcium 8.4 (L) 10/23/2015 01:23 PM    Anion gap 5 10/23/2015 01:23 PM    Bilirubin, total 0.5 10/23/2015 01:23 PM    ALT (SGPT) 23 10/23/2015 01:23 PM    Alk. phosphatase 60 10/23/2015 01:23 PM    Protein, total 7.0 10/23/2015 01:23 PM    Albumin 3.8 10/23/2015 01:23 PM    Globulin 3.2 10/23/2015 01:23 PM    A-G Ratio 1.2 10/23/2015 01:23 PM       Orders Placed This Encounter    AMB POC EKG ROUTINE W/ 12 LEADS, INTER & REP     Order Specific Question:   Reason for Exam:     Answer:   routine    pantoprazole (PROTONIX) 40 mg tablet     Sig: TK 1 T PO QD BEFORE A MEAL    rosuvastatin (CRESTOR) 40 mg tablet     Sig: Take 1 Tab by mouth nightly. Dispense:  90 Tab     Refill:  3        Assessment:     Assessment:       ICD-10-CM ICD-9-CM    1. Agatston CAC score 100-199  R93.1 793.2 AMB POC EKG ROUTINE W/ 12 LEADS, INTER & REP   2. Mixed hyperlipidemia  E78.2 272.2 AMB POC EKG ROUTINE W/ 12 LEADS, INTER & REP   3. Palpitations  R00.2 785.1 AMB POC EKG ROUTINE W/ 12 LEADS, INTER & REP        Plan:     1.  Agatston CAC score 100-199  Coronary calcium score 112 in 5/2019  Previous stress echo 1/2016 without evidence of ischemia  Without anginal or anginal equivalent symptoms  Continue BB, CCB, ASA and statin therapy    2. Mixed hyperlipidemia   in 10/2019(  Continue Zetia; Increase rosuvastatin 40 mg daily  -- she will obtain rx by PCP as he is checking labs  Discussed PCSK9-inhibitor; if LDL is not at goal < 70 with increase in rosuvastatin, then can consider PCSK9-inhibitor    3. Palpitations  Event montior with frequent PVCs, 4% ectopic burden  Continue diltiazem and metoprolol  Advised to switch Toprol to bedtime given sleepiness side effect    4. Quit smoking 2020- congratulated. Counseled on diet and exercise- eventual goal of 30-60 minutes 5-7 times a week as per AHA guidelines. Follow up in 1 year, sooner as needed.      Delia Douglas MD

## 2020-10-05 ENCOUNTER — OFFICE VISIT (OUTPATIENT)
Dept: CARDIOLOGY CLINIC | Age: 70
End: 2020-10-05
Payer: MEDICARE

## 2020-10-05 VITALS
SYSTOLIC BLOOD PRESSURE: 130 MMHG | DIASTOLIC BLOOD PRESSURE: 64 MMHG | OXYGEN SATURATION: 96 % | WEIGHT: 229.7 LBS | HEART RATE: 55 BPM | HEIGHT: 67 IN | RESPIRATION RATE: 18 BRPM | BODY MASS INDEX: 36.05 KG/M2

## 2020-10-05 DIAGNOSIS — I83.893 VARICOSE VEINS OF BOTH LEGS WITH EDEMA: Primary | ICD-10-CM

## 2020-10-05 DIAGNOSIS — R93.1 AGATSTON CAC SCORE 100-199: ICD-10-CM

## 2020-10-05 DIAGNOSIS — I83.893 VARICOSE VEINS OF BOTH LEGS WITH EDEMA: ICD-10-CM

## 2020-10-05 DIAGNOSIS — E78.2 MIXED HYPERLIPIDEMIA: ICD-10-CM

## 2020-10-05 PROCEDURE — G8427 DOCREV CUR MEDS BY ELIG CLIN: HCPCS | Performed by: INTERNAL MEDICINE

## 2020-10-05 PROCEDURE — G8400 PT W/DXA NO RESULTS DOC: HCPCS | Performed by: INTERNAL MEDICINE

## 2020-10-05 PROCEDURE — 3017F COLORECTAL CA SCREEN DOC REV: CPT | Performed by: INTERNAL MEDICINE

## 2020-10-05 PROCEDURE — G8536 NO DOC ELDER MAL SCRN: HCPCS | Performed by: INTERNAL MEDICINE

## 2020-10-05 PROCEDURE — 1090F PRES/ABSN URINE INCON ASSESS: CPT | Performed by: INTERNAL MEDICINE

## 2020-10-05 PROCEDURE — 99214 OFFICE O/P EST MOD 30 MIN: CPT | Performed by: INTERNAL MEDICINE

## 2020-10-05 PROCEDURE — G8417 CALC BMI ABV UP PARAM F/U: HCPCS | Performed by: INTERNAL MEDICINE

## 2020-10-05 PROCEDURE — G8510 SCR DEP NEG, NO PLAN REQD: HCPCS | Performed by: INTERNAL MEDICINE

## 2020-10-05 PROCEDURE — 1101F PT FALLS ASSESS-DOCD LE1/YR: CPT | Performed by: INTERNAL MEDICINE

## 2020-10-05 RX ORDER — TRAZODONE HYDROCHLORIDE 50 MG/1
100 TABLET ORAL AS NEEDED
COMMUNITY
Start: 2020-09-24

## 2020-10-05 NOTE — PROGRESS NOTES
Mya Franco, [facility]    10/5/2020 11:35 AM      Subjective:     Ms. Dirk Betancur is a 65 y.o. female who is here for evaluation of leg cramps. She has a PMHx of  elevated Agatston CAC score, HTN, HLD and obesity. She complains of leg cramps and leg swelling. Has some varicose veins she would like to get rid of.     Past Medical History:   Diagnosis Date    Anxiety     Arrhythmia     irregular HR    Depression     Diabetes ([address]     Fibromyalgia     Hypercholesterolemia     Musculoskeletal disorder     Psychiatric disorder     depression    Sleep apnea     CPAP    Sleep disorder         Past Surgical History:   Procedure Laterality Date    ABDOMEN SURGERY PROC UNLISTED      gastric banding    COLONOSCOPY N/A 2020    COLONOSCOPY, ESOPHAGOGASTRODUODENOSCOPY (EGD) performed by Han Jason [facility] at [facility]    HX CARPAL TUNNEL RELEASE Bilateral     HX CHOLECYSTECTOMY      HX GI  2008    Lap Band     HX MENISCUS REPAIR Left     HX OTHER SURGICAL Right     kideny stone removed    HX OTHER SURGICAL Left     laser blast of kidney stone    MT COLSC FLX W/RMVL OF TUMOR POLYP LESION SNARE TQ  2010         MT EGD TRANSORAL BIOPSY SINGLE/MULTIPLE  2010            Family History   Problem Relation Age of Onset    Hypertension Father     Diabetes Father     Cancer Father     Heart Disease Mother     Parkinson's Disease Mother         Social History     Tobacco Use    Smoking status: Former Smoker     Packs/day: 0.50     Years: 25.00     Pack years: 12.50     Types: Cigarettes     Last attempt to quit: 2011     Years since quittin.7    Smokeless tobacco: Never Used   Substance Use Topics    Alcohol use: Yes     Comment: 7-10 drinks a week wine and liquor       Visit Vitals  /64 (BP 1 Location: Left arm, BP Patient Position: Sitting)   Pulse (!) 55   Resp 18   Ht 5' 7\" (1.702 m)   Wt 229 lb 11.2 oz (104.2 kg)   SpO2 96%   BMI 35.98 kg/m² Current Outpatient Medications   Medication Sig    traZODone (DESYREL) 50 mg tablet TK 1/2 - 1 T PO QHS AS NEEDED    metoprolol succinate (TOPROL-XL) 50 mg XL tablet TAKE 1 TABLET EVERY DAY    pantoprazole (PROTONIX) 40 mg tablet TK 1 T PO QD BEFORE A MEAL    rosuvastatin (CRESTOR) 40 mg tablet Take 1 Tab by mouth nightly.  DILT- mg XR capsule TAKE 1 CAPSULE EVERY DAY    pregabalin (LYRICA) 150 mg capsule Take 150 mg by mouth two (2) times a day.  DULoxetine (CYMBALTA) 60 mg capsule Take 60 mg by mouth daily.  ezetimibe (ZETIA) 10 mg tablet Take  by mouth.  metFORMIN (GLUMETZA ER) 500 mg TG24 24 hour tablet Take 500 mg by mouth two (2) times a day.  cpap machine kit by Does Not Apply route as needed.  omeprazole (PRILOSEC) 20 mg capsule Take 1 Cap by mouth two (2) times a day. Indications: EROSIVE ESOPHAGITIS (Patient taking differently: Take 20 mg by mouth as needed. Indications: inflammation of the esophagus with erosion)    Aspirin, Buffered 81 mg tab Take  by mouth. No current facility-administered medications for this visit. Allergies   Allergen Reactions    Mobic [Meloxicam] Anxiety     Jittery       Objective:        Data Review:   No results found for: FLP, CHOL, HDL, LDLC, VLDL, CHHD    No results found for: HBA1C, ZYB7UBWP, ATJ5BSFV, ASS5MFHN    Lab Results   Component Value Date/Time    Sodium 142 10/23/2015 01:23 PM    Potassium 4.2 10/23/2015 01:23 PM    Chloride 108 10/23/2015 01:23 PM    CO2 29 10/23/2015 01:23 PM    Glucose 109 (H) 10/23/2015 01:23 PM    BUN 8 10/23/2015 01:23 PM    Creatinine 0.69 10/23/2015 01:23 PM    BUN/Creatinine ratio 12 10/23/2015 01:23 PM    GFR est AA >60 10/23/2015 01:23 PM    GFR est non-AA >60 10/23/2015 01:23 PM    Calcium 8.4 (L) 10/23/2015 01:23 PM    Anion gap 5 10/23/2015 01:23 PM    Bilirubin, total 0.5 10/23/2015 01:23 PM    ALT (SGPT) 23 10/23/2015 01:23 PM    Alk.  phosphatase 60 10/23/2015 01:23 PM    Protein, total 7.0 10/23/2015 01:23 PM    Albumin 3.8 10/23/2015 01:23 PM    Globulin 3.2 10/23/2015 01:23 PM    A-G Ratio 1.2 10/23/2015 01:23 PM       1. Have you ever had vein stripping surgery NO       2. Have you ever had vein injections? NO        3. Have you ever had a blood clot? NO       4. Have you ever had phlebitis? NO                                                                        Does anyone in your family have (or used to have) varicose veins, spider veins, leg ulcers or swollen legs? Father  NO  Mother YES  Brother(s) NO  Sister(s) NO  Other NO           1. Do you experience any of the following in your legs? Aching/pain? YES  [] One leg [x] Both legs  Heaviness? YES  [] One leg [x] Both legs  Tiredness/fatigue? YES  [] One leg [x] Both legs  Itching/burning? NO  [] One leg [] Both legs  Swollen ankles? YES  [] One leg [x] Both legs  Leg cramps? YES  [] One leg [x] Both legs  Restless legs? YES  [] One leg [x] Both legs  Throbbing? NO  [] One leg [] Both legs  Other? 2.  Have your veins gotten worse in recent months? YES    Describe: More swelling, more aching    3. Do you take any medication for pain (i.e., Advil, Motrin)  NO    4. Do you elevate your legs to relieve discomfort? YES    If yes, how long per day do you elevate and does it provide relief? A few hours in the evening    5. Do you exercise? NO     6. Do you wear prescription compression stockings? NO       7. Do you wear light support hose (i.e., [employer])? NO      8. Do you have any problem walking? NO                    9.   What type of work do you do? [employer]           How long do you stand (hours per day) at work? n/a At home? 8 hours        10. Have you ever had any test(s) done on your veins? NO          11. Were you diagnosed with saphenous vein reflux?   NO    Review of Symptoms:    Review of Systems   Constitutional: Negative for chills, fever and weight loss. HENT: Negative for nosebleeds. Eyes: Negative for blurred vision and double vision. Respiratory: Negative for cough, shortness of breath and wheezing. Cardiovascular: Positive for leg swelling. Negative for chest pain, palpitations, orthopnea and PND. Gastrointestinal: Negative for abdominal pain, blood in stool, diarrhea, nausea and vomiting. Musculoskeletal: Negative for joint pain. Skin: Negative for rash. Neurological: Negative for dizziness, tingling and loss of consciousness. Endo/Heme/Allergies: Does not bruise/bleed easily. Physical Exam:      General: Well developed, in no acute distress, cooperative and alert. Overweight. HEENT: No carotid bruits, no JVD, trach is midline. Neck Supple, PEERL, EOM intact. Heart:  reg rate and rhythm; normal S1/S2; no murmurs, no gallops or rubs. Respiratory: Clear bilaterally x 4, no wheezing or rales  Abdomen:   Soft, non-tender, no distention, no masses. + BS. Extremities:  Normal cap refill, no cyanosis, atraumatic. Scant varicosities along the left inner calf. Multiple spider veins along both ankles, medially, mild edema  Neuro: A&Ox3, speech clear, gait stable. Skin: Skin color is normal. No rashes or lesions. Non diaphoretic  Vascular: 2+ pulses symmetric in all extremities      Assessment:       ICD-10-CM ICD-9-CM    1. Varicose veins of both legs with edema  I83.893 454.8    2. Mixed hyperlipidemia  E78.2 272.2    3. Agatston CAC score 100-199  R93.1 793.2        Plan:     1. Varicose veins of both legs with edema  [facility] valuate with venous duplex bilateral LE  Check CHAITANYA given other risk factors  1.  - Instruction given on daily leg elevation   2.  - Instruction given for mild exercise  3.  - Instruction given for weight reduction    2. Mixed hyperlipidemia  On statin therapy    3.  Agatston CAC score 100-199  Asymtomatic  Keep f/u with Dr. Rosaline Babinski annually    Zaki Ramirez NP  10/5/2020      Patient seen and examined by me with nurse practitioner in vascular clinic. I personally performed all components of the history, physical, and medical decision making and agree with the assessment and plan as noted. Further recommendations based upon test results.      Gabrielle Altman MD

## 2020-10-05 NOTE — PROGRESS NOTES
Chief Complaint   Patient presents with    Ankle swelling     PHILL ankle and foot swelling with some discolor    Varicose Veins     does get some pain and cramps      1. Have you been to the ER, urgent care clinic since your last visit? Hospitalized since your last visit? No     2. Have you seen or consulted any other health care providers outside of the [address] [place] [address] since your last visit? Include any pap smears or colon screening.   No

## 2020-10-12 ENCOUNTER — APPOINTMENT (OUTPATIENT)
Dept: GENERAL RADIOLOGY | Age: 70
End: 2020-10-12
Attending: STUDENT IN AN ORGANIZED HEALTH CARE EDUCATION/TRAINING PROGRAM
Payer: MEDICARE

## 2020-10-12 ENCOUNTER — APPOINTMENT (OUTPATIENT)
Dept: CT IMAGING | Age: 70
End: 2020-10-12
Attending: STUDENT IN AN ORGANIZED HEALTH CARE EDUCATION/TRAINING PROGRAM
Payer: MEDICARE

## 2020-10-12 ENCOUNTER — HOSPITAL ENCOUNTER (EMERGENCY)
Age: 70
Discharge: HOME | End: 2020-10-12
Attending: STUDENT IN AN ORGANIZED HEALTH CARE EDUCATION/TRAINING PROGRAM | Admitting: STUDENT IN AN ORGANIZED HEALTH CARE EDUCATION/TRAINING PROGRAM
Payer: MEDICARE

## 2020-10-12 VITALS
HEIGHT: 67 IN | WEIGHT: 235 LBS | HEART RATE: 60 BPM | SYSTOLIC BLOOD PRESSURE: 133 MMHG | OXYGEN SATURATION: 97 % | RESPIRATION RATE: 18 BRPM | BODY MASS INDEX: 36.88 KG/M2 | TEMPERATURE: 96.9 F | DIASTOLIC BLOOD PRESSURE: 79 MMHG

## 2020-10-12 DIAGNOSIS — T07.XXXA ABRASIONS OF MULTIPLE SITES: ICD-10-CM
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
## 2020-10-12 DIAGNOSIS — S16.1XXA ACUTE STRAIN OF NECK MUSCLE, INITIAL ENCOUNTER: ICD-10-CM

## 2020-10-12 DIAGNOSIS — R91.8 LEFT UPPER LOBE PULMONARY INFILTRATE: ICD-10-CM

## 2020-10-12 DIAGNOSIS — S00.03XA HEMATOMA OF FRONTAL SCALP, INITIAL ENCOUNTER: ICD-10-CM

## 2020-10-12 LAB
ATRIAL RATE: 55 BPM
CALCULATED P AXIS, ECG09: 42 DEGREES
CALCULATED R AXIS, ECG10: 21 DEGREES
CALCULATED T AXIS, ECG11: 52 DEGREES
DIAGNOSIS, 93000: NORMAL
P-R INTERVAL, ECG05: 138 MS
Q-T INTERVAL, ECG07: 430 MS
QRS DURATION, ECG06: 78 MS
QTC CALCULATION (BEZET), ECG08: 411 MS
VENTRICULAR RATE, ECG03: 55 BPM

## 2020-10-12 PROCEDURE — 90471 IMMUNIZATION ADMIN: CPT

## 2020-10-12 PROCEDURE — 90715 TDAP VACCINE 7 YRS/> IM: CPT | Performed by: STUDENT IN AN ORGANIZED HEALTH CARE EDUCATION/TRAINING PROGRAM

## 2020-10-12 PROCEDURE — 93005 ELECTROCARDIOGRAM TRACING: CPT

## 2020-10-12 PROCEDURE — 74011250636 HC RX REV CODE- 250/636: Performed by: STUDENT IN AN ORGANIZED HEALTH CARE EDUCATION/TRAINING PROGRAM

## 2020-10-12 PROCEDURE — L0172 CERV COL SR FOAM 2PC PRE OTS: HCPCS

## 2020-10-12 PROCEDURE — 74011250637 HC RX REV CODE- 250/637: Performed by: STUDENT IN AN ORGANIZED HEALTH CARE EDUCATION/TRAINING PROGRAM

## 2020-10-12 PROCEDURE — 73562 X-RAY EXAM OF KNEE 3: CPT

## 2020-10-12 PROCEDURE — 71250 CT THORAX DX C-: CPT

## 2020-10-12 PROCEDURE — 72125 CT NECK SPINE W/O DYE: CPT

## 2020-10-12 PROCEDURE — 70450 CT HEAD/BRAIN W/O DYE: CPT

## 2020-10-12 PROCEDURE — 99284 EMERGENCY DEPT VISIT MOD MDM: CPT

## 2020-10-12 RX ORDER — IBUPROFEN 600 MG/1
600 TABLET ORAL ONCE
Status: COMPLETED | OUTPATIENT
Start: 2020-10-12 | End: 2020-10-12

## 2020-10-12 RX ORDER — ACETAMINOPHEN 325 MG/1
650 TABLET ORAL ONCE
Status: COMPLETED | OUTPATIENT
Start: 2020-10-12 | End: 2020-10-12

## 2020-10-12 RX ORDER — TRAMADOL HYDROCHLORIDE 50 MG/1
50 TABLET ORAL
Qty: 20 TAB | Refills: 0 | Status: SHIPPED | OUTPATIENT
Start: 2020-10-12 | End: 2020-10-17

## 2020-10-12 RX ORDER — CYCLOBENZAPRINE HCL 10 MG
10 TABLET ORAL
Qty: 12 TAB | Refills: 0 | Status: SHIPPED | OUTPATIENT
Start: 2020-10-12 | End: 2021-01-12 | Stop reason: ALTCHOICE

## 2020-10-12 RX ADMIN — ACETAMINOPHEN 650 MG: 325 TABLET ORAL at 14:11

## 2020-10-12 RX ADMIN — IBUPROFEN 600 MG: 600 TABLET, FILM COATED ORAL at 14:11

## 2020-10-12 RX ADMIN — TETANUS TOXOID, REDUCED DIPHTHERIA TOXOID AND ACELLULAR PERTUSSIS VACCINE, ADSORBED 0.5 ML: 5; 2.5; 8; 8; 2.5 SUSPENSION INTRAMUSCULAR at 14:11
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
## 2020-10-13 ENCOUNTER — TELEPHONE (OUTPATIENT)
Dept: CARDIOLOGY CLINIC | Age: 70
End: 2020-10-13

## 2020-10-15 ENCOUNTER — ANCILLARY PROCEDURE (OUTPATIENT)
Dept: CARDIOLOGY CLINIC | Age: 70
End: 2020-10-15
Payer: MEDICARE

## 2020-10-15 LAB
LEFT GSV AT KNEE DIAM: 0.42 CM
LEFT GSV AT KNEE RFX: 920 S
LEFT GSV BK MID DIAM: 0.35 CM
LEFT GSV BK MID RFX: 2872 S
LEFT GSV JUNC DIAM: 0.94 CM
LEFT GSV JUNC RFX: 0 S
LEFT GSV THIGH PROX DIAM: 0.81 CM
LEFT GSV THIGH PROX RFX: 709 S
LEFT SSV PROX DIAM: 0.48 CM
LEFT SSV PROX RFX: 0 S
RIGHT GSV AK RFX: 1319 S
RIGHT GSV AT KNEE DIAM: 0.41 CM
RIGHT GSV BK MID DIAM: 0.47 CM
RIGHT GSV BK MID RFX: 2950 S
RIGHT GSV JUNC DIAM: 0.77 CM
RIGHT GSV JUNC RFX: 654 S
RIGHT GSV THIGH PROX DIAM: 0.73 CM
RIGHT GSV THIGH PROX RFX: 0 S
RIGHT PERFORATOR DIAM: 0.32 CM
RIGHT PERFORATOR RFX: 0 S
RIGHT SSV PROX DIAM: 0.41 CM
RIGHT SSV PROX RFX: 0 S

## 2020-10-15 PROCEDURE — 93970 EXTREMITY STUDY: CPT | Performed by: INTERNAL MEDICINE

## 2020-10-16 ENCOUNTER — TELEPHONE (OUTPATIENT)
Dept: CARDIOLOGY CLINIC | Age: 70
End: 2020-10-16

## 2020-10-16 NOTE — PROGRESS NOTES
Bettye,    Please call patient and inform that venous ultrasound does show leaky veins in both legs. Can make an appt to see Dr. Shelly Soriano to discuss further treatment.     Thanks,  [employer]

## 2020-10-21 ENCOUNTER — ANCILLARY PROCEDURE (OUTPATIENT)
Dept: CARDIOLOGY CLINIC | Age: 70
End: 2020-10-21
Payer: MEDICARE

## 2020-10-21 VITALS — HEIGHT: 67 IN | WEIGHT: 225 LBS | BODY MASS INDEX: 35.31 KG/M2

## 2020-10-21 DIAGNOSIS — I83.893 VARICOSE VEINS OF BOTH LEGS WITH EDEMA: ICD-10-CM

## 2020-10-21 PROCEDURE — 93923 UPR/LXTR ART STDY 3+ LVLS: CPT | Performed by: INTERNAL MEDICINE

## 2020-10-23 LAB
IMMEDIATE ARM BP: 186 MMHG
IMMEDIATE LEFT ABI: 0.95
IMMEDIATE LEFT TIBIAL: 177 MMHG
IMMEDIATE RIGHT ABI: 0.89
IMMEDIATE RIGHT TIBIAL: 166 MMHG
LEFT ABI: 1.2
LEFT ARM BP: 147 MMHG
LEFT POSTERIOR TIBIAL: 178 MMHG
LEFT TBI: 0.6
LEFT TOE PRESSURE: 89 MMHG
RIGHT ABI: 1.22
RIGHT ARM BP: 148 MMHG
RIGHT POSTERIOR TIBIAL: 180 MMHG
RIGHT TBI: 0.58
RIGHT TOE PRESSURE: 86 MMHG

## 2020-10-23 NOTE — PROGRESS NOTES
Good afternoon Ms. Issac Marinelli,    Your ABIs are normal.  Low concern for reduced blood flow through in the leg arteries. Keep your appointment with Dr. Pedro Sgeura to further discuss results of your other tests done on your legs.     Thanks,  FLY Mehta-BC

## 2020-10-30 ENCOUNTER — TRANSCRIBE ORDER (OUTPATIENT)
Dept: GENERAL RADIOLOGY | Age: 70
End: 2020-10-30

## 2020-10-30 DIAGNOSIS — R05.9 COUGH: Primary | ICD-10-CM

## 2020-12-07 ENCOUNTER — TRANSCRIBE ORDER (OUTPATIENT)
Dept: GENERAL RADIOLOGY | Age: 70
End: 2020-12-07

## 2020-12-07 DIAGNOSIS — R05.9 COUGH: Primary | ICD-10-CM

## 2020-12-29 ENCOUNTER — HOSPITAL ENCOUNTER (OUTPATIENT)
Dept: GENERAL RADIOLOGY | Age: 70
Discharge: HOME | End: 2020-12-29
Attending: INTERNAL MEDICINE
Payer: MEDICARE

## 2020-12-29 DIAGNOSIS — R05.9 COUGH: ICD-10-CM

## 2020-12-29 PROCEDURE — 71046 X-RAY EXAM CHEST 2 VIEWS: CPT

## 2021-01-12 ENCOUNTER — OFFICE VISIT (OUTPATIENT)
Dept: CARDIOLOGY CLINIC | Age: 71
End: 2021-01-12
Payer: MEDICARE

## 2021-01-12 VITALS
HEIGHT: 67 IN | HEART RATE: 66 BPM | BODY MASS INDEX: 35.9 KG/M2 | OXYGEN SATURATION: 97 % | SYSTOLIC BLOOD PRESSURE: 132 MMHG | RESPIRATION RATE: 16 BRPM | WEIGHT: 228.7 LBS | DIASTOLIC BLOOD PRESSURE: 84 MMHG

## 2021-01-12 DIAGNOSIS — I83.893 VARICOSE VEINS OF BOTH LEGS WITH EDEMA: ICD-10-CM

## 2021-01-12 DIAGNOSIS — E78.2 MIXED HYPERLIPIDEMIA: ICD-10-CM

## 2021-01-12 DIAGNOSIS — I25.10 ASHD (ARTERIOSCLEROTIC HEART DISEASE): ICD-10-CM

## 2021-01-12 DIAGNOSIS — I87.2 VENOUS INSUFFICIENCY: Primary | ICD-10-CM

## 2021-01-12 PROCEDURE — 3017F COLORECTAL CA SCREEN DOC REV: CPT | Performed by: INTERNAL MEDICINE

## 2021-01-12 PROCEDURE — G8510 SCR DEP NEG, NO PLAN REQD: HCPCS | Performed by: INTERNAL MEDICINE

## 2021-01-12 PROCEDURE — G8427 DOCREV CUR MEDS BY ELIG CLIN: HCPCS | Performed by: INTERNAL MEDICINE

## 2021-01-12 PROCEDURE — 99214 OFFICE O/P EST MOD 30 MIN: CPT | Performed by: INTERNAL MEDICINE

## 2021-01-12 PROCEDURE — G8417 CALC BMI ABV UP PARAM F/U: HCPCS | Performed by: INTERNAL MEDICINE

## 2021-01-12 PROCEDURE — 1090F PRES/ABSN URINE INCON ASSESS: CPT | Performed by: INTERNAL MEDICINE

## 2021-01-12 PROCEDURE — 1101F PT FALLS ASSESS-DOCD LE1/YR: CPT | Performed by: INTERNAL MEDICINE

## 2021-01-12 PROCEDURE — G8536 NO DOC ELDER MAL SCRN: HCPCS | Performed by: INTERNAL MEDICINE

## 2021-01-12 PROCEDURE — G8400 PT W/DXA NO RESULTS DOC: HCPCS | Performed by: INTERNAL MEDICINE

## 2021-01-12 NOTE — PROGRESS NOTES
2021 11:35 AM      Subjective:     Ms. Rahel Robb is a 65 y.o. female with  PMHx of  elevated Agatston CAC score, HTN, HLD and obesity who is here to discuss  test results  Initially seen 10/5/2020 for evaluation of leg cramps, leg swelling, varicose vein. LE venous dopplers showed bilateral GSV reflux. Normal CHAITANYA.     Past Medical History:   Diagnosis Date    Anxiety     Arrhythmia     irregular HR    Depression     Diabetes ([address]     Fibromyalgia     Hypercholesterolemia     Musculoskeletal disorder     Psychiatric disorder     depression    Sleep apnea     CPAP    Sleep disorder         Past Surgical History:   Procedure Laterality Date    COLONOSCOPY N/A 2020    COLONOSCOPY, ESOPHAGOGASTRODUODENOSCOPY (EGD) performed by [facility] at [facility] ENDOSCOPY    HX CARPAL TUNNEL RELEASE Bilateral     HX CHOLECYSTECTOMY      HX GI  2008    Lap Band     HX MENISCUS REPAIR Left     HX OTHER SURGICAL Right     kideny stone removed    HX OTHER SURGICAL Left     laser blast of kidney stone    CA ABDOMEN SURGERY PROC UNLISTED      gastric banding    CA COLSC FLX W/RMVL OF TUMOR POLYP LESION SNARE TQ  2010         CA EGD TRANSORAL BIOPSY SINGLE/MULTIPLE  2010            Family History   Problem Relation Age of Onset    Hypertension Father     Diabetes Father     Cancer Father     Heart Disease Mother     Parkinson's Disease Mother         Social History     Tobacco Use    Smoking status: Former Smoker     Packs/day: 0.50     Years: 25.00     Pack years: 12.50     Types: Cigarettes     Quit date: 2011     Years since quittin.0    Smokeless tobacco: Never Used   Substance Use Topics    Alcohol use: Yes     Comment: 7-10 drinks a week wine and liquor       Visit Vitals  /84 (BP 1 Location: Left arm, BP Patient Position: Sitting)   Pulse 66   Resp 16   Ht 5' 7\" (1.702 m)   Wt 228 lb 11.2 oz (103.7 kg)   SpO2 97%   BMI 35.82 kg/m² Current Outpatient Medications   Medication Sig    traZODone (DESYREL) 50 mg tablet TK 1/2 - 1 T PO QHS AS NEEDED    metoprolol succinate (TOPROL-XL) 50 mg XL tablet TAKE 1 TABLET EVERY DAY    rosuvastatin (CRESTOR) 40 mg tablet Take 1 Tab by mouth nightly.  DILT- mg XR capsule TAKE 1 CAPSULE EVERY DAY    pregabalin (LYRICA) 150 mg capsule Take 150 mg by mouth two (2) times a day.  DULoxetine (CYMBALTA) 60 mg capsule Take 60 mg by mouth daily.  ezetimibe (ZETIA) 10 mg tablet Take  by mouth.  metFORMIN (GLUMETZA ER) 500 mg TG24 24 hour tablet Take 500 mg by mouth two (2) times a day.  cpap machine kit by Does Not Apply route as needed.  omeprazole (PRILOSEC) 20 mg capsule Take 1 Cap by mouth two (2) times a day. Indications: EROSIVE ESOPHAGITIS (Patient taking differently: Take 20 mg by mouth as needed. Indications: inflammation of the esophagus with erosion)    Aspirin, Buffered 81 mg tab Take  by mouth. No current facility-administered medications for this visit. Allergies   Allergen Reactions    Mobic [Meloxicam] Anxiety     Jittery       Objective:        Data Review:   No results found for: FLP, CHOL, HDL, LDLC, VLDL, CHHD    No results found for: HBA1C, GKU0AHOD, XJT0XDMO, ERL3MPFI    Lab Results   Component Value Date/Time    Sodium 142 10/23/2015 01:23 PM    Potassium 4.2 10/23/2015 01:23 PM    Chloride 108 10/23/2015 01:23 PM    CO2 29 10/23/2015 01:23 PM    Glucose 109 (H) 10/23/2015 01:23 PM    BUN 8 10/23/2015 01:23 PM    Creatinine 0.69 10/23/2015 01:23 PM    BUN/Creatinine ratio 12 10/23/2015 01:23 PM    GFR est AA >60 10/23/2015 01:23 PM    GFR est non-AA >60 10/23/2015 01:23 PM    Calcium 8.4 (L) 10/23/2015 01:23 PM    Anion gap 5 10/23/2015 01:23 PM    Bilirubin, total 0.5 10/23/2015 01:23 PM    ALT (SGPT) 23 10/23/2015 01:23 PM    Alk.  phosphatase 60 10/23/2015 01:23 PM    Protein, total 7.0 10/23/2015 01:23 PM    Albumin 3.8 10/23/2015 01:23 PM Globulin 3.2 10/23/2015 01:23 PM    A-G Ratio 1.2 10/23/2015 01:23 PM           Review of Symptoms:    Review of Systems   Constitutional: Negative for chills, fever and weight loss. HENT: Negative for nosebleeds. Eyes: Negative for blurred vision and double vision. Respiratory: Negative for cough, shortness of breath and wheezing. Cardiovascular: Positive for leg swelling. Negative for chest pain, palpitations, orthopnea and PND. Gastrointestinal: Negative for abdominal pain, blood in stool, diarrhea, nausea and vomiting. Musculoskeletal: Negative for joint pain. Skin: Negative for rash. Neurological: Negative for dizziness, tingling and loss of consciousness. Endo/Heme/Allergies: Does not bruise/bleed easily. Physical Exam:      General: Well developed, in no acute distress, cooperative and alert. Overweight. HEENT: No carotid bruits, no JVD, trach is midline. Neck Supple, PEERL, EOM intact. Heart:  reg rate and rhythm; normal S1/S2; no murmurs, no gallops or rubs. Respiratory: Clear bilaterally x 4, no wheezing or rales  Abdomen:   Soft, non-tender, no distention, no masses. + BS. Extremities:  Normal cap refill, no cyanosis, atraumatic. Scant varicosities along the left inner calf. Multiple spider veins along both ankles, medially, mild edema  Neuro: A&Ox3, speech clear, gait stable. Skin: Skin color is normal. No rashes or lesions. Non diaphoretic  Vascular: 2+ pulses symmetric in all extremities      Assessment:       ICD-10-CM ICD-9-CM    1. Venous insufficiency  I87.2 459.81    2. Varicose veins of both legs with edema  I83.893 454.8    3. Mixed hyperlipidemia  E78.2 272.2    4. ASHD (arteriosclerotic heart disease)  I25.10 414.00        Plan:     1. Venous insufficiency, Varicose veins of both legs with edema  Bilateral GSV reflux per LE dopplers 10/2020  Will prescribe thigh high compression stockings, 20-30 mmHg, to wear daily and off at night.     Continue   - Instruction given on medication dosage   - Instruction given on daily leg elevation    - Instruction given for mild exercise   - Instruction given for weight reduction    2. Normal CHAITANYA 10/2020    3. BP controlled    4. Mixed hyperlipidemia  On Zetia and statin. Labs and lipids per PCP    5. Agatston CAC score 100-199  Asymptomatic: Keep f/u with Dr. Lay Fraser annually    F/u in 3 months to reassess      Patient seen and examined by me with nurse practitioner in vascular clinic. I personally performed all components of the history, physical, and medical decision making and agree with the assessment and plan as noted.     Annette Darling MD

## 2021-01-12 NOTE — PROGRESS NOTES
Identified pt with two pt identifiers(name and ). Reviewed record in preparation for visit and have obtained necessary documentation. Chief Complaint   Patient presents with    Follow-up    Results      Vitals:    21 1115   BP: 132/84   Pulse: 66   Resp: 16   SpO2: 97%   Weight: 228 lb 11.2 oz (103.7 kg)   Height: 5' 7\" (1.702 m)   PainSc:   6   PainLoc: Shoulder       Health Maintenance Review: Patient reminded of \"due or due soon\" health maintenance. I have asked the patient to contact his/her primary care provider (PCP) for follow-up on his/her health maintenance. Coordination of Care Questionnaire:  :   1) Have you been to an emergency room, urgent care, or hospitalized since your last visit? If yes, where when, and reason for visit? no       2. Have seen or consulted any other health care provider since your last visit? If yes, where when, and reason for visit? NO      Patient is accompanied by self I have received verbal consent from Arelis Woodall to discuss any/all medical information while they are present in the room.

## 2021-01-21 ENCOUNTER — OFFICE VISIT (OUTPATIENT)
Dept: SURGERY | Age: 71
End: 2021-01-21
Payer: MEDICARE

## 2021-01-21 VITALS
DIASTOLIC BLOOD PRESSURE: 75 MMHG | BODY MASS INDEX: 36.6 KG/M2 | WEIGHT: 233.2 LBS | TEMPERATURE: 98.5 F | RESPIRATION RATE: 18 BRPM | SYSTOLIC BLOOD PRESSURE: 169 MMHG | HEIGHT: 67 IN | HEART RATE: 69 BPM | OXYGEN SATURATION: 94 %

## 2021-01-21 DIAGNOSIS — K21.00 GASTROESOPHAGEAL REFLUX DISEASE WITH ESOPHAGITIS WITHOUT HEMORRHAGE: ICD-10-CM

## 2021-01-21 DIAGNOSIS — I10 ESSENTIAL HYPERTENSION: ICD-10-CM

## 2021-01-21 DIAGNOSIS — E66.01 SEVERE OBESITY (BMI 35.0-39.9) WITH COMORBIDITY (HCC): Primary | ICD-10-CM

## 2021-01-21 DIAGNOSIS — G47.33 OSA (OBSTRUCTIVE SLEEP APNEA): ICD-10-CM

## 2021-01-21 PROCEDURE — G8536 NO DOC ELDER MAL SCRN: HCPCS | Performed by: NURSE PRACTITIONER

## 2021-01-21 PROCEDURE — G8417 CALC BMI ABV UP PARAM F/U: HCPCS | Performed by: NURSE PRACTITIONER

## 2021-01-21 PROCEDURE — 1090F PRES/ABSN URINE INCON ASSESS: CPT | Performed by: NURSE PRACTITIONER

## 2021-01-21 PROCEDURE — G8754 DIAS BP LESS 90: HCPCS | Performed by: NURSE PRACTITIONER

## 2021-01-21 PROCEDURE — 3017F COLORECTAL CA SCREEN DOC REV: CPT | Performed by: NURSE PRACTITIONER

## 2021-01-21 PROCEDURE — G8432 DEP SCR NOT DOC, RNG: HCPCS | Performed by: NURSE PRACTITIONER

## 2021-01-21 PROCEDURE — G8753 SYS BP > OR = 140: HCPCS | Performed by: NURSE PRACTITIONER

## 2021-01-21 PROCEDURE — 1101F PT FALLS ASSESS-DOCD LE1/YR: CPT | Performed by: NURSE PRACTITIONER

## 2021-01-21 PROCEDURE — G8400 PT W/DXA NO RESULTS DOC: HCPCS | Performed by: NURSE PRACTITIONER

## 2021-01-21 PROCEDURE — 99213 OFFICE O/P EST LOW 20 MIN: CPT | Performed by: NURSE PRACTITIONER

## 2021-01-21 PROCEDURE — G8427 DOCREV CUR MEDS BY ELIG CLIN: HCPCS | Performed by: NURSE PRACTITIONER

## 2021-01-21 NOTE — PATIENT INSTRUCTIONS
View the on line seminar for gastric bypass and you can access by going to [email or web address] NEXT STEPS: 
  
Surgery type: Possible revision lap band to gastric bypass Surgeon: Giselle Lopez MD  
  
 
1. Contact Pamela Elmore RD, the bariatric dietician to arrange an evaluation and get started with your monthly nutrition visits [email or web address]  
     [phone] [employer] requires a certain number of months of supervised counseling for weight loss, exercise and nutrition before approval for surgery. ** See below for your specific insurance: 
  
2 months: [address] [facility] 16[facility], [address] [place], [facility], [employer], [employer] 3 months (90 days): [employer], [address] [facility], [facility], [address] [facility] 4 months: 
Medicare 6 months (180 days): [address] [place] [address] , [facility], [address], [address] [facility], [address] [facility], [address] [facility], [facility] [address], [facility], [address] [facility], [address] [facility], [address] [facility], [address] [facility] [facility] [address] ([address] [facility], [address] [place], [place] [address] 1 year (12 months): [address] [facility], [address] [facility] 2. Call to set up your psychological evaluation with any of the providers below: 
  
[address] at [phone] [facility] at [phone] Maynard Pallas [phone] [facility] at [phone] Dr. Monet Gallagher.  [phone] **after you have a dietician appt, plan on a consultation with Giselle Lopez MD   
  
  
 
For caregiver support, go to the [facility] on Aging  
    [phone] 
[email or web address]

## 2021-01-22 NOTE — PROGRESS NOTES
Chief Complaint   Patient presents with    Follow-up     adjustable gastric band for treatment      Gina Whitmore presents today more than 10 years status post laparoscopic adjustable gastric band for treatment of morbid obesity. She is here today expecting a band adjustment. Her band is currently decompressed as she was having severe GERD symptoms. August 2020 she had an upper endoscopy with Dr. Yana Camara  Procedure Details:  After infomed consent was obtained for the procedure, with all risks and benefits of procedure explained the patient was taken to the endoscopy suite and placed in the left lateral decubitus position. Following sequential administration of sedation as per above, the endoscope was inserted into the mouth and advanced under direct vision to second portion of the duodenum. A careful inspection was made as the gastroscope was withdrawn, including a retroflexed view of the proximal stomach; findings and interventions are described below.       Findings:      Esophagus:   +          Irregular Z line borders located at 37 cm with focal erosion c/w LA Grade A erosive esophagitis s/p Bx.  -           Normal mucosa in mid esophagus s/p Bx.        Stomach:   +          Diffuse erythema in the stomach with increased bile c/w Bile Acid Gastritis     Duodenum:   -           Normal duodenum to second portion s/p Bx. Pathology she had some focal erosions in the stomach and distal esophagus with chronic esophagitis  Negative for Harley's or other pathology    She has struggled with her band for years with frequent regurgitation. She is either too tight or feels absolutely no restriction. When she had reached her maximum weight loss she was regurgitating most days. She says her reflux has improved and she is on pantoprazole. She is not regurgitating every day. She does have chronic asthma  She has obstructive sleep apnea and hypertension.   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 He has severe COPD and has had a stroke. She does have a history of daily alcohol use. She says if she would drink she would not eat. She was hoping that would also help her lose weight. She is inquiring about having a revision to a gastric bypass and I have expressed my concern about her alcohol use. She said she is not drinking like she was and it is certainly something she can eliminate. She is confident that this is not an issue. She said that [facility] [employer] has a program that she can do for alcohol use. She said she is willing to do anything if it will help her. Visit Vitals  BP (!) 169/75 (BP 1 Location: Left arm, BP Patient Position: Sitting)   Pulse 69   Temp 98.5 °F (36.9 °C) (Oral)   Resp 18   Ht 5' 7\" (1.702 m)   Wt 233 lb 3.2 oz (105.8 kg)   SpO2 94%   BMI 36.52 kg/m²     She appears well  Chest is essentially clear  Heart is regular  Abdomen is soft, nontender and port is easily palpated  Extremities are without edema and she is ambulating independently      ICD-10-CM ICD-9-CM    1. Severe obesity (BMI 35.0-39. 9) with comorbidity ([address]  E66.01 278.01    2. BMI 36.0-36.9,adult  Z68.36 V85.36    3. Gastroesophageal reflux disease with esophagitis without hemorrhage  K21.00 530.81      530.10    4. Essential hypertension  I10 401.9    5. BILL (obstructive sleep apnea)  G47.33 327.23      Remote laparoscopic adjustable gastric band for treatment of morbid obesity  Band is currently decompressed due to worsening GERD with esophagitis  It is my opinion that any band adjustment at this point is futile. I have expressed my concerns about her chronic reflux with concern for nocturnal aspiration and her reactive airway disease  I have reviewed the process of revision from a gastric band to a gastric bypass. She is alcohol use and dependence we definitely need to be addressed.   She again is quite adamant that alcohol is not a problem and she can live without it. She is confident that weight loss will help with her chronic pain, mobility, hypertension and sleep apnea  I have referred her to our online seminar to review the procedure of gastric bypass  She has had upper endoscopy as above and I will leave her band decompressed at this time  She should continue acid suppression  Refer to the dietitian for evaluation  She can then consult with Dr. Patrick Mcarthur with regard to band to bypass revision  She will need to go through the insurance process of a 6-month diet and during that time of encouraged her to work with any resources that she has been managing her stress and avoiding daily alcohol use.   Given her contact information for senior connections to give her some caregiver support in managing her   Questions were answered to the best of my ability  Information was provided with regard to the bariatric surgery process  24 minutes were spent face-to-face with patient greater than 50% in counseling

## 2021-02-05 ENCOUNTER — TRANSCRIBE ORDER (OUTPATIENT)
Dept: SCHEDULING | Age: 71
End: 2021-02-05

## 2021-02-05 DIAGNOSIS — M54.12 CERVICAL RADICULITIS: Primary | ICD-10-CM

## 2021-02-05 DIAGNOSIS — M54.13 RADICULOPATHY OF CERVICOTHORACIC REGION: ICD-10-CM

## 2021-02-19 ENCOUNTER — HOSPITAL ENCOUNTER (OUTPATIENT)
Dept: MRI IMAGING | Age: 71
Discharge: HOME | End: 2021-02-19
Attending: PHYSICAL MEDICINE & REHABILITATION
Payer: MEDICARE

## 2021-02-19 ENCOUNTER — CLINICAL SUPPORT (OUTPATIENT)
Dept: SURGERY | Age: 71
End: 2021-02-19

## 2021-02-19 DIAGNOSIS — E66.9 OBESITY (BMI 30-39.9): Primary | ICD-10-CM

## 2021-02-19 DIAGNOSIS — M54.12 CERVICAL RADICULITIS: ICD-10-CM

## 2021-02-19 DIAGNOSIS — M54.13 RADICULOPATHY OF CERVICOTHORACIC REGION: ICD-10-CM

## 2021-02-19 PROCEDURE — 72141 MRI NECK SPINE W/O DYE: CPT

## 2021-02-19 NOTE — PROGRESS NOTES
Pre-operative Bariatric Nutrition Evaluation (1 of 6)     Date: 2021   Physician/Surgeon:Jermaine Cordero M.D.   Name: Tanisha David  :  1950  Age:  70  Gender: Female   Type of Surgery: []           Gastric Bypass   [x]           Sleeve Gastrectomy    ASSESSMENT:     Medications/Supplements:   Prior to Admission medications    Medication Sig Start Date End Date Taking? Authorizing Provider   traZODone (DESYREL) 50 mg tablet TK 1/2 - 1 T PO QHS AS NEEDED 20   Provider, Historical   metoprolol succinate (TOPROL-XL) 50 mg XL tablet TAKE 1 TABLET EVERY DAY 20   Prudencio Graff MD   rosuvastatin (CRESTOR) 40 mg tablet Take 1 Tab by mouth nightly. 20   Hali Jones NP   pregabalin (LYRICA) 150 mg capsule Take 150 mg by mouth two (2) times a day.    Provider, Historical   DULoxetine (CYMBALTA) 60 mg capsule Take 60 mg by mouth daily.    Provider, Historical   ezetimibe (ZETIA) 10 mg tablet Take  by mouth.    Provider, Historical   metFORMIN (GLUMETZA ER) 500 mg TG24 24 hour tablet Take 500 mg by mouth two (2) times a day.    Provider, Historical   cpap machine kit by Does Not Apply route as needed.    Provider, Historical   Aspirin, Buffered 81 mg tab Take  by mouth.    Provider, Historical       Anthropometrics:    Ht:67\"   Recent Office Wt: 233#    IBW: 135#    %IBW:  172%    BMI:36    Category: obesity III     Reported wt history: Pt completing pre-op nutrition evaluation for wt loss surgery over the phone d/t social distancing guidelines d/t COVID-19. Pt with previous LAGB in  and reports initially losing down to 175#. States her goal wt was 155#. Regained back to 195#. Attributes wt re-gain r/t overeating, poor food choices, medications and stress. Has attempted wt loss through various methods with minimal wt loss on her own.  Has been unable to maintain long term or significant wt loss and is now seeking approval for weight loss surgery revision. Pt will need to  complete 6 months of supervised weight loss for insurance requirements. Exercise/Physical Activity:none at present; has walked in the past for exercise      Reported Diet History:h/o LAGB in 2008; self-directed diets otherwise     24 Hour Diet Recall  Breakfast  Skips    Lunch  \"light lunch/brunch\" - protein and fruit or occasional [employer] biscuit     Dinner  Meat and vegetables, occasional starch or slice of bread/roll    Snacks  At night - a couple of \"snack size\" chocolate or something sweet     Beverages  Mostly water, occasional Pepsi or tea         NUTRITION DIAGNOSIS:  1. Physical inactivity r/t multiple etiologies evidenced by pt with no current exercise/physical activity. 2. Food and nutrition related knowledge deficit r/t lack of prior exposure to information evidenced by pt demonstrates need for nutrition education for gastric bypass. NUTRITION INTERVENTION:  Pt educated on nutrition recommendations for weight loss surgery, specifically gastric bypass. Instructed on consuming 3 meals per day starting now. Use the balanced plate method to plan meals, include 3 oz of lean source of protein, 1/2 cup whole grains, unlimited non-starchy vegetables, 1/2 cup fruit and 1 serving of low fat dairy. Utilize handouts listing healthy snack and meal ideas to limit restaurant meals. After surgery measure all meals to 1/2 cup. Each meal will contain a 1/4 cup lean protein and 1/4 cup fruit, non-starchy vegetable or starch (limiting to once per day). Aim for 60 g protein per day. Sip on 48-64 oz of sugar free, calorie free, non-carbonated beverages each day. Do not use a straw. Do not consume beverages 30 minutes before, during or 30 minutes after meals. Read all nutrition labels. Demonstrated and emphasized identifying serving size, total fat, sugar and protein content. Defined low fat as </= 3 g per serving. Discussed lean and extra lean sources of protein.  Provided list of low fat cooking methods. Avoid foods with sugar listed in the first 3 ingredients and >/15 g sugar per serving. Excess sugar/fat intake may lead to dumping syndrome. Discussed signs and symptoms of dumping syndrome. Practice mindful eating habits; take small bites, chew thoroughly, avoid distractions, utilize hunger/fullness scale. Consume meals over 20-30 minutes. Attend Bariatric Support Group and increase physical activity (approved per MD) for long term weight maintenance. NUTRITION MONITORING AND EVALUATION:    The following goals were established with patient;  1. Implement a walking regimen as tolerated. We discussed the option of starting with short segments and increasing as tolerated as a more realistic approach to exercise. 2. Eat 3 meals a day. Use protein shakes PRN. Do not skip meals as this can make it difficult to achieve protein intake. 3. Review nutrition education materials provided. Follow up next month for supervised weight loss and continue nutrition education       Specific tips and techniques to facilitate compliance with above recommendations were provided and discussed. Nutrition evaluation reveals important lifestyle changes are indicated. Goals set and recommendations made. Will continue to assess. If further details are desired please feel free to contact me at [phone]. This phone number was also provided to the patient for any further questions or concerns.            Yvonne Hu RD

## 2021-03-19 ENCOUNTER — CLINICAL SUPPORT (OUTPATIENT)
Dept: SURGERY | Age: 71
End: 2021-03-19

## 2021-03-19 DIAGNOSIS — E66.9 OBESITY (BMI 30-39.9): Primary | ICD-10-CM

## 2021-03-19 NOTE — PROGRESS NOTES
[facility]  Supervised Weight Loss     Date:   3/19/2021    Patient's Name: Tanisha David  : 1950    Insurance:  [employer]             Session: 2 of  6  Surgery: Gastric Bypass   Surgeon:  Jermaine Cordero M.D.     Height: 67\"  Reported Weight:    236#      Lbs.   BMI: 36   Pounds Lost since last month: 0               Pounds Gained since last month: 3    Starting Weight: 233#   Previous Month’s Weight: 233#  Overall Pounds Lost: 0  Overall Pounds Gained: 3    Other Pertinent Information: Today's appointment was completed in a virtual setting d/t COVID-19. Today's wt was self-reported.     Smoking Status:  none  Alcohol Intake: none    I have reviewed with pt the guidelines of the supervised wt loss program.  Pt understands the expectations of some wt loss during the program and that wt gain could delay the process. I have also explained that appointments need to be consecutive and missing an appointment may result in starting over. Pt has received this information in writing.          Changes that patient has made since last month include:  Trying to slow down eating pace.      Eating Habits and Behaviors  General healthy eating guidelines were discussed. A nutrition lesson was presented on portion control. Patients were instructed implement portion control now using the balanced plate method (1/2 plate non-starchy vegetables, 1/4 plate lean meat, and 1/4 plate whole grains and to include fruit and/or milk at meals or snack). We discussed measuring meals to 1/2 cup total per meal after surgery and appropriate portion progression long term.                       Patient's current diet habits include: Pt is eating 2-3 meals per day. Has reviewed the protein shake list but has not yet started using the shakes as recommended. Snack choices include chocolate and sweets. Pt is eating refined carbohydrate foods (bread, pasta, rice, potatoes) a few times per week Pt is  eating sweets/desserts occasionally. Pt is using baked, grilled, broiled cooking methods. Pt is eating meals prepared outside of the home a few times per week. Pt is drinking water and occasional soda. Pt reports yes to emotional eating. Physical Activity/Exercise  We talked about the importance of increasing daily physical activity and beginning to develop an exercise regimen/routine. We talked about exercise as being an important part of long term weight loss after surgery. Comments:  During class, I discussed with patient the importance of getting into an exercise routine. Pt is currently going to physical therapy 2 times per week for activity. Pt has been encouraged to maintain and increase as tolerated. Behavior Modification       We talked about how to eat more mindfully. Tips and recommendations for how to make these changes were provided. Pt was encouraged to keep a food journal and record what they were taking in daily. Overall Assessment: Pt demonstrates some small changes this past month evidenced by reported changes. Will continue to assess. Patient-Set Goals:   1. Nutrition - work on 3 meals a day   2. Exercise - physical therapy   3.  Behavior -continue to practice mindful eating and slowing down, use non-dominant hand to eat     Yamel Khan, RD  3/19/2021

## 2021-04-23 ENCOUNTER — CLINICAL SUPPORT (OUTPATIENT)
Dept: SURGERY | Age: 71
End: 2021-04-23

## 2021-04-23 DIAGNOSIS — E66.9 OBESITY (BMI 30-39.9): Primary | ICD-10-CM

## 2021-04-23 NOTE — PROGRESS NOTES
[employer] [facility]  Supervised Weight Loss     Date:   2021    Patient's Name: Amina Tobin  : 1950    Insurance:  Ciarra Vasquez                                      Session: 3 of  6  Surgery: Gastric Bypass (h/o LAGB)          Surgeon:  Cher Goodell, M.D.      Height: 67\"                 Reported Weight:    240#      Lbs. BMI: 37             Pounds Lost since last month: 0               Pounds Gained since last month: 4     Starting Weight: 233#                       Previous Months Weight: 236#  Overall Pounds Lost: 0                    Overall Pounds Gained: 7     Other Pertinent Information: Today's appointment was completed in a virtual setting d/t COVID-iOpener. Today's wt was self-reported. Pt reports being \"swamped\" with various events and projects (ex. remodeling her home, planning daughter's birthday) resulting in limited time to focus on healthy lifestyle changes and as a results has gained wt. Smoking Status:  none  Alcohol Intake: none    I have reviewed with pt the guidelines of the supervised wt loss program.  Pt understands the expectations of some wt loss during the program and that wt gain could delay the process. I have also explained that appointments need to be consecutive and missing an appointment may result in starting over. Pt has received this information in writing. Changes that patient has made since last month include:  Cut out bread. Eating Habits and Behaviors  General healthy eating guidelines were also discussed. Pts were instructed that their plate should be made up 1/2 plate coming from non-starchy vegetables, 1/4 coming from lean meat, and 1/4 of their plate coming from carbohydrates, including fruits, starches, or milk. We discussed measuring meals to 1/2 cup total per meal after surgery. Drinking only calorie-free, sugar-free and non-carbonated beverages.  We discussed the importance of drinking 64 ounces of fluid per day to prevent dehydration post-operatively. Patient's current diet habits include: Pt is eating 3 meals per day. Protein shake for breakfast. Snack choices include none. Pt is eating refined carbohydrate foods (bread, pasta, rice, potatoes) a few times per week. Pt is using baked, grilled, broiled cooking methods. Pt is eating meals prepared outside of the home n/a. Pt is drinking coffee, water. Pt reports sometimes emotional eating. Physical Activity/Exercise  An exercise presentation was provided including information about exercise programs available both before and after surgery. We talked about the importance of increasing daily physical activity and beginning to develop an exercise regimen/routine. We talked about exercise as being an important part of long term weight loss after surgery. Comments:  During class, I discussed with patient the importance of getting into an exercise routine. Pt states she stopped going to physical therapy for her shoulder d/t lack of time and is not walking d/t lack of time for exercise this past month. Behavior Modification       We talked about how to eat more mindfully. Tips and recommendations for how to make these changes were provided. Pt was encouraged to keep a food journal and record what they were taking in daily. Overall Assessment: Pt demonstrates minimal lifestyle changes this past month d/t multiple barriers and time constraints. Pt reports being \"swamped\" with various events and projects (ex. remodeling her home, planning daughter's birthday, recent surgeries and 's health issues) resulting in limited time to focus on healthy lifestyle changes. Pt reports limited compliance with physical therapy appointments stating time constraints as main barrier. Have encouraged pt to evaluate readiness for making. Will continue to assess. Patient-Set Goals:   1.  Nutrition - continue to eat 3 meals a day   2. Exercise - walking as tolerated, exercise at home in short segments as tolerated   3.  Behavior -time management, evaluate readiness for revision surgery     Ike Rabago, RD  4/23/2021

## 2021-05-03 ENCOUNTER — TELEPHONE (OUTPATIENT)
Dept: SURGERY | Age: 71
End: 2021-05-03

## 2021-05-21 ENCOUNTER — CLINICAL SUPPORT (OUTPATIENT)
Dept: SURGERY | Age: 71
End: 2021-05-21

## 2021-05-21 DIAGNOSIS — E66.9 OBESITY (BMI 30-39.9): Primary | ICD-10-CM

## 2021-05-21 NOTE — PROGRESS NOTES
[facility] at [facility]  Supervised Weight Loss     Date:   2021    Patient's Name: Jeanne Carpio  : 1950    Insurance:  [employer]                                      Session: 4 of  6  Surgery: Gastric Bypass (h/o LAGB)          Surgeon: Leny Bautista M.D.      Height: 67\"                 Reported QJMTTP:    201#      UVZ.                             BMI: 81             Pounds Lost since last month: 7               Pounds Gained since last month: 0     Starting Weight: 233#                       Previous Months Weight: 240#  Overall Pounds Lost: 0                    Overall Pounds Gained: 0     Other Pertinent Information: Today's appointment was completed in a virtual setting d/t COVID-19. Today's wt was self-reported. Smoking Status:  Not reported  Alcohol Intake: not reported    I have reviewed with pt the guidelines of the supervised wt loss program.  Pt understands the expectations of some wt loss during the program and that wt gain could delay the process. I have also explained that appointments need to be consecutive and missing an appointment may result in starting over. Pt has received this information in writing. Changes that patient has made since last month include:  Eating 3 meals a day, more exercise. Eating Habits and Behaviors  A nutrition lesson was presented on label reading with specific guidelines provided for limiting added sugars. This information will help increase healthy food choices, promote weight loss and prevent dumping syndrome after gastric bypass. We also reviewed the general nutrition guidelines for bariatric surgery. Patient's current diet habits include: Pt is eating 3 meals per day. Using meal replacement shakes for breakfast. Eating 1/2 ham, roast beef or tuna sandwich at lunch. Dinner is roasted chicken with broccoli. Is using healthy frozen meals and salads.  Pt is eating refined carbohydrate foods (bread, pasta, rice, potatoes) a few times per week and \"has cut down tremendously\". Pt is eating sweets/desserts minimal. Pt is using baked, grilled, broiled cooking methods. Pt is eating meals prepared outside of the home rarely at this time. Pt is considering using Meals on Wheels. Pt is drinking water and occasional soda. Pt reports sometimes emotional eating. Physical Activity/Exercise  We talked about the importance of increasing daily physical activity and beginning to develop an exercise regimen/routine. We talked about exercise as being an important part of long term weight loss after surgery. Comments:  During class, I discussed with patient the importance of getting into an exercise routine. Pt is currently walking every other day for activity. Pt has been encouraged to maintain and increase as tolerated. Behavior Modification       We talked about how to eat more mindfully. Tips and recommendations for how to make these changes were provided. Pt was encouraged to keep a food journal and record what they were taking in daily. Overall Assessment: Pt demonstrates small/appropriate lifestyle changes evidenced by reported changes and reported wt loss. Will continue to assess. Patient-Set Goals:   1. Nutrition - continue to work on 3 meals a day to ensure adequate protein intake  2. Exercise - maintain walking as tolerated   3.  Behavior -specific a low-carb/low-sugar diet if seeking Meals on Wheels [facility]  5/21/2021

## 2021-05-25 ENCOUNTER — OFFICE VISIT (OUTPATIENT)
Dept: CARDIOLOGY CLINIC | Age: 71
End: 2021-05-25
Payer: MEDICARE

## 2021-05-25 VITALS
OXYGEN SATURATION: 93 % | HEIGHT: 67 IN | HEART RATE: 67 BPM | DIASTOLIC BLOOD PRESSURE: 68 MMHG | WEIGHT: 238.1 LBS | RESPIRATION RATE: 18 BRPM | BODY MASS INDEX: 37.37 KG/M2 | SYSTOLIC BLOOD PRESSURE: 98 MMHG

## 2021-05-25 DIAGNOSIS — I87.2 VENOUS INSUFFICIENCY: Primary | ICD-10-CM

## 2021-05-25 DIAGNOSIS — E78.2 MIXED HYPERLIPIDEMIA: ICD-10-CM

## 2021-05-25 DIAGNOSIS — I83.893 VARICOSE VEINS OF BOTH LEGS WITH EDEMA: ICD-10-CM

## 2021-05-25 DIAGNOSIS — I10 ESSENTIAL HYPERTENSION: ICD-10-CM

## 2021-05-25 DIAGNOSIS — I25.10 ASHD (ARTERIOSCLEROTIC HEART DISEASE): ICD-10-CM

## 2021-05-25 PROCEDURE — 3017F COLORECTAL CA SCREEN DOC REV: CPT | Performed by: INTERNAL MEDICINE

## 2021-05-25 PROCEDURE — G8417 CALC BMI ABV UP PARAM F/U: HCPCS | Performed by: INTERNAL MEDICINE

## 2021-05-25 PROCEDURE — G8427 DOCREV CUR MEDS BY ELIG CLIN: HCPCS | Performed by: INTERNAL MEDICINE

## 2021-05-25 PROCEDURE — 1101F PT FALLS ASSESS-DOCD LE1/YR: CPT | Performed by: INTERNAL MEDICINE

## 2021-05-25 PROCEDURE — G8400 PT W/DXA NO RESULTS DOC: HCPCS | Performed by: INTERNAL MEDICINE

## 2021-05-25 PROCEDURE — G8752 SYS BP LESS 140: HCPCS | Performed by: INTERNAL MEDICINE

## 2021-05-25 PROCEDURE — G8754 DIAS BP LESS 90: HCPCS | Performed by: INTERNAL MEDICINE

## 2021-05-25 PROCEDURE — G8510 SCR DEP NEG, NO PLAN REQD: HCPCS | Performed by: INTERNAL MEDICINE

## 2021-05-25 PROCEDURE — G8536 NO DOC ELDER MAL SCRN: HCPCS | Performed by: INTERNAL MEDICINE

## 2021-05-25 PROCEDURE — 1090F PRES/ABSN URINE INCON ASSESS: CPT | Performed by: INTERNAL MEDICINE

## 2021-05-25 PROCEDURE — 99214 OFFICE O/P EST MOD 30 MIN: CPT | Performed by: INTERNAL MEDICINE

## 2021-05-25 RX ORDER — HYDROCHLOROTHIAZIDE 12.5 MG/1
TABLET ORAL
COMMUNITY
Start: 2021-05-06

## 2021-05-25 RX ORDER — DILTIAZEM HYDROCHLORIDE 120 MG/1
CAPSULE, EXTENDED RELEASE ORAL
COMMUNITY
Start: 2021-03-26 | End: 2021-06-21 | Stop reason: SDUPTHER

## 2021-05-25 RX ORDER — DIAZEPAM 10 MG/1
TABLET ORAL
COMMUNITY
Start: 2021-05-03

## 2021-05-25 RX ORDER — PANTOPRAZOLE SODIUM 40 MG/1
TABLET, DELAYED RELEASE ORAL
COMMUNITY
Start: 2021-02-23

## 2021-05-25 RX ORDER — OMEPRAZOLE 20 MG/1
20 CAPSULE, DELAYED RELEASE ORAL AS NEEDED
COMMUNITY
Start: 2020-11-10

## 2021-05-25 NOTE — PROGRESS NOTES
5/25/2021 12:06 PM      Subjective:     Mary Brewster   PMHx of  elevated Agatston CAC score, HTN, HLD and obesity who is here for follow up. Last OV 1/12/2021. Started compression stockings. Today, she report some improvement but still symptoms recur once off stockings. Initially seen 10/5/2020 for evaluation of leg cramps, leg swelling, varicose vein. LE venous dopplers showed bilateral GSV reflux. Normal CHAITANYA    Although conservative therapy does improve the symptoms intermittently, they do not alleviate them to the point that she is impaired in the daily activities of living. During prolonged periods of standing at, the patient must sit or take a break due to aching, cramping, burning, itching, or swelling in the lower extremities. To compensate for this functional impairment, the patient has tried conservative therapies; however, these conservative measures have failed to resolve these symptoms. The conservative treatment measures include the following:   Compression Stocking Therapy  o Period of Time:   - []   Three Weeks  - [x]  Three Months  - []   Six Months         o Strength of Compression:   - []  10  20 mmHg  - [x]  20  30 mmHg  - [] 30  40 mmHg   Use of Over-The-Counter Analgesics   Exercise   Elevation of Lower Extremity Above Heart Level    denies chest pain, chest pressure/discomfort, dyspnea, palpitations, irregular heart beats, near-syncope, syncope, fatigue, orthopnea, paroxysmal nocturnal dyspnea, exertional chest pressure/discomfort, tachypnea, dyspnea on exertion, dizziness.     Visit Vitals  BP 98/68 (BP 1 Location: Right arm, BP Patient Position: Sitting, BP Cuff Size: Large adult)   Pulse 67   Resp 18   Ht 5' 7\" (1.702 m)   Wt 238 lb 1.6 oz (108 kg)   SpO2 93%   BMI 37.29 kg/m²       Current Outpatient Medications   Medication Sig    diazePAM (VALIUM) 10 mg tablet TAKE 1 TABLET BY MOUTH EVERY DAY AS NEEDED    DILT- mg capsule     hydroCHLOROthiazide (HYDRODIURIL) 12.5 mg tablet TAKE 1 TABLET BY MOUTH EVERY DAY IN THE MORNING    omeprazole (PRILOSEC) 20 mg capsule Take  by mouth. PRN    pantoprazole (PROTONIX) 40 mg tablet TAKE 1 TABLET BY MOUTH EVERY DAY BEFORE A MEAL    traZODone (DESYREL) 50 mg tablet TK 1/2 - 1 T PO QHS AS NEEDED    metoprolol succinate (TOPROL-XL) 50 mg XL tablet TAKE 1 TABLET EVERY DAY    rosuvastatin (CRESTOR) 40 mg tablet Take 1 Tab by mouth nightly.  pregabalin (LYRICA) 150 mg capsule Take 150 mg by mouth two (2) times a day.  DULoxetine (CYMBALTA) 60 mg capsule Take 60 mg by mouth daily.  ezetimibe (ZETIA) 10 mg tablet Take  by mouth.  metFORMIN (GLUMETZA ER) 500 mg TG24 24 hour tablet Take 500 mg by mouth two (2) times a day.  cpap machine kit by Does Not Apply route as needed.  Aspirin, Buffered 81 mg tab Take  by mouth. No current facility-administered medications for this visit.          Objective:      Visit Vitals  BP 98/68 (BP 1 Location: Right arm, BP Patient Position: Sitting, BP Cuff Size: Large adult)   Pulse 67   Resp 18   Ht 5' 7\" (1.702 m)   Wt 238 lb 1.6 oz (108 kg)   SpO2 93%   BMI 37.29 kg/m²       Past Medical History:   Diagnosis Date    Anxiety     Arrhythmia     irregular HR    CAD (coronary artery disease)     Depression     Diabetes (HCC)     Fibromyalgia     Hypercholesterolemia     Musculoskeletal disorder     Psychiatric disorder     depression    Sleep apnea     CPAP    Sleep disorder     Valvular heart disease       Past Surgical History:   Procedure Laterality Date    COLONOSCOPY N/A 8/14/2020    COLONOSCOPY, ESOPHAGOGASTRODUODENOSCOPY (EGD) performed by Crispin Borges [facility] at [facility]    HX CARPAL TUNNEL RELEASE Bilateral     HX CHOLECYSTECTOMY      HX GI  Jan 2008    Lap Band     HX MENISCUS REPAIR Left     HX OTHER SURGICAL Right     kideny stone removed    HX OTHER SURGICAL Left     laser blast of kidney stone    VT ABDOMEN SURGERY PROC UNLISTED      gastric banding    DE COLSC FLX W/RMVL OF TUMOR POLYP LESION SNARE TQ  2010         DE EGD TRANSORAL BIOPSY SINGLE/MULTIPLE  2010          Allergies   Allergen Reactions    Mobic [Meloxicam] Anxiety     Jittery      Family History   Problem Relation Age of Onset    Hypertension Father     Diabetes Father     Cancer Father     Heart Disease Mother     Parkinson's Disease Mother       Social History     Socioeconomic History    Marital status:      Spouse name: Not on file    Number of children: Not on file    Years of education: Not on file    Highest education level: Not on file   Occupational History    Not on file   Tobacco Use    Smoking status: Former Smoker     Packs/day: 0.50     Years: 25.00     Pack years: 12.50     Types: Cigarettes     Quit date: 2011     Years since quittin.4    Smokeless tobacco: Never Used    Tobacco comment: Restarted 2020   Vaping Use    Vaping Use: Never used   Substance and Sexual Activity    Alcohol use: Yes     Comment: 7-10 drinks a week wine and liquor    Drug use: No    Sexual activity: Not on file   Other Topics Concern    Not on file   Social History Narrative    Not on file     Social Determinants of Health     Financial Resource Strain:     Difficulty of Paying Living Expenses:    Food Insecurity:     Worried About Running Out of Food in the Last Year:     [address] in the Last Year:    Transportation Needs:     Lack of Transportation (Medical):      Lack of Transportation (Non-Medical):    Physical Activity:     Days of Exercise per Week:     Minutes of Exercise per Session:    Stress:     Feeling of Stress :    Social Connections:     Frequency of Communication with Friends and Family:     Frequency of Social Gatherings with Friends and Family:     Attends Druze Services:     Active Member of Clubs or Organizations:     Attends Club or Organization Meetings:    [employer] Marital Status:    Intimate Partner Violence:     Fear of Current or Ex-Partner:     Emotionally Abused:     Physically Abused:     Sexually Abused:           Review of Systems     General: Not Present- Anorexia, Chills, Dietary Changes, Fatigue, Fever, Medication Changes, Night Sweats, Weight Gain > 10lbs. and Weight Loss > 10lbs. .  Skin: Not Present- Bruising and Excessive Sweating. HEENT: Not Present- Headache, Visual Loss and Vertigo. Respiratory: Not Present- Cough, Decreased Exercise Tolerance, Difficulty Breathing, Snoring and Wheezing. Cardiovascular: Not Present- Abnormal Blood Pressure, Chest Pain, Difficulty Breathing On Exertion, Fainting / Blacking Out, Irregular Heart Beat, Orthopnea, Palpitations, Paroxysmal Nocturnal Dyspnea, Rapid Heart Rate, Shortness of Breath. Gastrointestinal: Not Present- Black, Tarry Stool, Bloody Stool, Diarrhea, Hematemesis, Rectal Bleeding and Vomiting. Musculoskeletal: Not Present- Muscle Pain and Muscle Weakness. Neurological: Not Present- Dizziness. Psychiatric: Not Present- Depression. Endocrine: Not Present- Cold Intolerance, Heat Intolerance and Thyroid Problems. Hematology: Not Present- Abnormal Bleeding, Anemia, Blood Clots and Easy Bruising. Physical Exam   The physical exam findings are as follows:     General   Mental Status - Alert. General Appearance - Cooperative and Well groomed. Not in acute distress. Orientation - Oriented to time, Oriented to place and Oriented to person. Build & Nutrition - Well developed. Skin   General: - Normal.      HEENT  Head - Normal.  Eye - Normal.  Mouth & Throat - Normal.      Neck   Carotid Arteries - normal upstroke. No Bruits. Thyroid: Gland - Normal size and consistency. Chest and Lung Exam   Inspection:   Chest Wall: - Normal. Accessory muscles - No use of accessory muscles in breathing.   Auscultation:   Breath sounds: - Normal.      Cardiovascular   Inspection: Jugular vein - Bilateral - Inspection Normal.  Palpation/Percussion:   Apical Impulse: - Normal.  Auscultation: Rhythm - Regular. Heart Sounds - S1 WNL and S2 WNL. No S3 or S4. Murmurs & Other Heart Sounds: Auscultation of the heart reveals - No Murmurs. Abdomen   Palpation/Percussion: Palpation and Percussion of the abdomen reveal - No Palpable abdominal masses. Liver: - Normal.  Spleen: - Normal.  Auscultation: Auscultation of the abdomen reveals - Bowel sounds normal.      Neurologic   Mental Status: Affect - normal.  Motor: - Normal. Gait - Normal.      PHYSICIAN TO COMPLETE    Date of Physician Reevaluation:______5/25/2021_________________________  (To review results of trial of conservative therapy-lasting at least 3-6 months):  Patient is symptomatic with varicosities despite compliance with conservative therapy. Has failed conservative treatment. Check all that apply:  [x] Other causes of patients leg(s) symptoms have been ruled out  [x] Completed conservative treatment to include: compression stockings, medication, leg elevation, mild exercise & weight         reduction (as appropriate). Time length of conservative treatment[de-identified] ________3 mos___________    Patient is symptomatic with varicosities causing the following: (check all that apply):  [x] Has persistent aching, cramping, burning, pain, itching, and/or swelling during activity or after prolonged standing.   [] Significant, recurrent superficial phlebitis  [] Hemorrhage from a ruptured varix  [] Non-healing skin ulceration of the leg  [] Other complications associated:  ___________________      Duplex or Doppler Ultrasound of the venous system demonstrate:  [] Absence of deep venous thrombosis  [] Greater and/or lesser saphenous vein or  valvular incompetence/reflux that correlates with        patients symptoms  []   valvular incompetence/reflux that correlates with patients symptoms         Assessment:       ICD-10-CM ICD-9-CM    1. Venous insufficiency  I87.2 459.81    2. Varicose veins of both legs with edema  I83.893 454.8    3. ASHD (arteriosclerotic heart disease)  I25.10 414.00    4. Essential hypertension  I10 401.9    5. Mixed hyperlipidemia  E78.2 272.2        Plan:     1. Venous insufficiency, Varicose veins of both legs with edema  Bilateral GSV reflux per LE dopplers 10/202. Completed 3 mos conservative mgmt with compression stockings-20-30 mmHg with some improvement. Left leg bother her more. Will proceed with RF ablation     Continue   - Instruction given on daily leg elevation    - Instruction given for mild exercise   - Instruction given for weight reduction     2. Normal CHAITANYA 10/2020     3. BP controlled     4. Mixed hyperlipidemia  On Zetia and statin. Will check labs     5. Agatston CAC score 100-199  Asymptomatic: Keep f/u with Dr. Kateri Cowden annually     Avtar Rosario NP  5/25/2021  12:06 PM      Patient seen and examined by me with nurse practitioner in vascular clinic. I personally performed all components of the history, physical, and medical decision making and agree with the assessment and plan as noted. I have recommend RF ablation of bilateral GSV RF ablation. The procedure(s) will be performed in my office using local tumescent anesthesia. she is anxious to move forward in finding relief. BP controlled. On statin .     Sandra Sharif MD

## 2021-05-25 NOTE — PROGRESS NOTES
1. Have you been to the ER, urgent care clinic since your last visit? Hospitalized since your last visit? No    2. Have you seen or consulted any other health care providers outside of the [address] [place] [address] since your last visit? Include any pap smears or colon screening.  No    Chief Complaint   Patient presents with    Follow-up     3mo f/u; c/o leg discoloration around ankles, bilat feet swelling

## 2021-05-28 ENCOUNTER — TELEPHONE (OUTPATIENT)
Dept: CARDIOLOGY CLINIC | Age: 71
End: 2021-05-28

## 2021-06-21 RX ORDER — DILTIAZEM HYDROCHLORIDE 120 MG/1
120 CAPSULE, EXTENDED RELEASE ORAL DAILY
Qty: 90 CAPSULE | Refills: 0 | Status: SHIPPED | OUTPATIENT
Start: 2021-06-21 | End: 2021-06-24

## 2021-06-22 ENCOUNTER — TELEPHONE (OUTPATIENT)
Dept: SURGERY | Age: 71
End: 2021-06-22

## 2021-06-22 NOTE — TELEPHONE ENCOUNTER
Cld pt to schedule NP appt since she was not able to be seen today 6/22. No answer from pt twice. Sent Bantr message. Appt available 7/20/21.

## 2021-06-28 ENCOUNTER — CLINICAL SUPPORT (OUTPATIENT)
Dept: CARDIOLOGY CLINIC | Age: 71
End: 2021-06-28
Payer: MEDICARE

## 2021-06-28 ENCOUNTER — CLINICAL SUPPORT (OUTPATIENT)
Dept: SURGERY | Age: 71
End: 2021-06-28

## 2021-06-28 VITALS
OXYGEN SATURATION: 95 % | SYSTOLIC BLOOD PRESSURE: 140 MMHG | WEIGHT: 230 LBS | HEIGHT: 67 IN | DIASTOLIC BLOOD PRESSURE: 80 MMHG | BODY MASS INDEX: 36.1 KG/M2 | HEART RATE: 67 BPM

## 2021-06-28 DIAGNOSIS — I83.893 VARICOSE VEINS OF BOTH LEGS WITH EDEMA: Primary | ICD-10-CM

## 2021-06-28 DIAGNOSIS — E66.9 OBESITY (BMI 30-39.9): Primary | ICD-10-CM

## 2021-06-28 DIAGNOSIS — I87.2 VENOUS INSUFFICIENCY: ICD-10-CM

## 2021-06-28 PROCEDURE — 36475 ENDOVENOUS RF 1ST VEIN: CPT | Performed by: INTERNAL MEDICINE

## 2021-06-28 NOTE — PATIENT INSTRUCTIONS
RADIOFREQUENCY ABLATION      Post procedure instructions:      Make sure you walk for 20 minutes before you leave the facility and at least 10 minutes per hour for the remainder of the day of your procedure. 1. You will be asked to return to the office 3 days after your procedure for a follow up ultrasound unless told otherwise. 2. You are to wear your compression stocking for 72 hours following your procedure. After the 72 hours you are to wear your compression stockings only during the day for at least 2 weeks. You do not sleep with your stockings on except for the initial 72 hours. 3. If you are sent home with a bandage wrap, do not get it wet. If you are to take a shower after the procedure, tie a plastic bag over the bandage in order to keep it dry. 4. Please do not lift anything over 20 pounds for 7 days following your procedure. If you are going to rest after your procedure please place pillows under your leg and elevate that leg so your feet are at the level of your heart. 5. Notify our staff if your employer requires an excused doctors note for any reason pertaining to the day of your procedure and follow ups. 6. After your procedure you may return to your normal routine/activities unless told otherwise. 7. Please notify Dr. Carlee Flannery. Hu if you experience any discoloration of your toes or discomfort of the bandage wrap, inability to move your toes, bleeding, or pain you feel is not tolerable. Our office number is [phone].

## 2021-06-28 NOTE — PROGRESS NOTES
[facility]    Patient: Jere Horowitz  : 1950  Chart no: [id number]  DATE OF SERVICE: 2021    Procedure: Endovenous radiofrequency ablation of the right greater saphenous vein (s) of the lower extremity  Anesthesia: Local infiltration  Estimated blood loss: minimal  Specimen: none. Tumescent vol: 600 ml    OPERATIVE NOTE     The patient was transferred to the procedure suite and the insufficient saphenous vein was mapped by ultrasound and diagrammed on the overlying skin. The depth and diameter of the vein(s) to be treated was documented. The varicose tributary veins and suitable access sites were identified and mapped as well. The patient was then positioned supine on the procedure table. The affected limb was prepped and draped in the usual sterile fashion. The RF catheter was placed on the sterile field, flushed and wiped down, prepared, and connected by a sterile cable. The patient was placed in reverse-Trendelenburg position and local anesthesia was instilled in the skin overlying the access site. The vein was accessed using ultrasound guidance and the Seldinger technique, a guide wire was introduced through the needle, which was then exchanged over the guide wire for a 7F sheath, which was secured in place. The guide wire was removed and the sheath was flushed. The RF catheter was placed into the vein through the sheath and Preferentially, imaging was used to place the catheter tip just inferior to the superficial epigastric vein to preserve normal physiological flow in that vein. Additionally, it was confirmed by ultrasound guidance that the catheter tip was also placed a minimum of 2cm distal to the saphenofemoral junction.     After the RF catheter position was verified by ultrasound, tumescent anesthesia was infiltrated, under ultrasound guidance, precisely into the perivenous compartment along the entire length of vein from the entry site to the saphenofemoral junction until a \"halo\" of fluid was noted around the vein. The patient was then placed in Trendelenburg position to further exsanguinate the superficial venous system. After RF catheter position was again confirmed with ultrasound imaging, and under direct external compression along the length of the heating element, RF energy was applied. The vein was segmentally ablated by heating a 7 cm segment and then indexing the catheter forward by 6.5 cm until the treatment length is completed. Device temperature was maintained at 120±5 ºC with an initial power level of 40W dropping to below 20W for each treatment. Total vein length treated 49 cm Total cycles of RF 12 for 4 minutes. Repeat ultrasound of the saphenous vein was performed, confirming successful treatment. The catheter and sheath were withdrawn and hemostasis established with direct pressure. After assuring hemostasis, the skin incision over the saphenous vein was closed with a bandage and a compression wrap, and/or graduated compression stocking was applied from the level of the foot to the most proximal level of the thigh. Patient tolerated procedure very well.        Cary Salmeron MD, Nettie Parker, [id number] [address] [facility]   [address] [place] [address] [facility]  [phone]

## 2021-06-28 NOTE — PROGRESS NOTES
[facility] at [facility]  Supervised Weight Loss     Date:   2021    Patient's Name: Kaela Medeiros  : 1950    Insurance:  [employer]                                      Session: 5 of  6  Surgery: Gastric Bypass (h/o LAGB)          Surgeon: Ceasar Fragoso M.D.      Height: 67\"                 Reported FMVGPQ:    694#      SNN.                             BMI: 08             Pounds Lost since last month: 3#               Pounds Gained since last month: 0     Starting Weight: 233#                       Previous Months Weight: 233#  Overall Pounds Lost: 3#                    Overall Pounds Gained: 0     Other Pertinent Information: Today's appointment was completed in a virtual setting d/t Biz[employer]. Pt is currently receiving Meals on Wheels d/t her home being remodeled. Smoking Status:  none  Alcohol Intake: none    I have reviewed with pt the guidelines of the supervised wt loss program.  Pt understands the expectations of some wt loss during the program and that wt gain could delay the process. I have also explained that classes need to be consecutive. Missing a class may result in starting over. Pt has received this information in writing. Changes that patient has made since last month include:  Drinking a protein shake for 1 meal per day. Eating Habits and Behaviors  General healthy eating guidelines were discussed. A nutrition lesson specific to the importance of protein intake after surgery was provided. We discussed food sources of protein, protein supplements and multiple reasons as to why protein is important after bariatric surgery. Pts were instructed to focus on including protein at every meal and practice eating protein first at the meal. Pts were encouraged to sample a protein shake for tolerance. Patients were also instructed to use the balanced plate method for help with portion control and general healthy eating prior to surgery.  We discussed measuring meals to 1/2 cup total per meal after surgery. Drinking only calorie-free, sugar-free and non-carbonated beverages. We discussed the importance of drinking 64 ounces of fluid per day to prevent dehydration post-operatively. Patient's current diet habits include: Pt is eating 1 meals per day. Drinking a protein shake for 1 meal per day. 1 meal per day is food provided by Meals on Wheels. That meal includes a protein and vegetable. Pt reports she is eliminating the carbohydrate food at the meal. Snack choices include nuts, peanut butter crackers, fruit. Pt is eating sweets/desserts none to moderation. Pt is currently not cooking at home d/t home remodeled. Pt is drinking water. Pt reports no to emotional eating. Physical Activity/Exercise  We talked about the importance of increasing daily physical activity and beginning to develop an exercise regimen/routine. We talked about exercise as being an important part of long term weight loss after surgery. Comments:  During class, I discussed with patient the importance of getting into an exercise routine. Pt is currently walking for activity. Pt has been encouraged to maintain and increase as tolerated. Behavior Modification       We talked about how to eat more mindfully. Tips and recommendations for how to make these changes were provided. Pt was encouraged to keep a food journal and record what they were taking in daily. Overall Assessment: Pt demonstrates small changes evidenced by reported changes and wt loss. Patient-Set Goals:   1. Nutrition - choose a shake from our list and/or meets our criteria  2. Exercise - walking as tolerated   3.  Behavior -protein foods at each meal or snack    Cipriano Smith, KUNAL  6/28/2021

## 2021-07-01 ENCOUNTER — ANCILLARY PROCEDURE (OUTPATIENT)
Dept: CARDIOLOGY CLINIC | Age: 71
End: 2021-07-01
Payer: MEDICARE

## 2021-07-01 DIAGNOSIS — Z98.890 STATUS POST ENDOVENOUS RADIOFREQUENCY ABLATION (RFA) OF SAPHENOUS VEIN: ICD-10-CM

## 2021-07-01 PROCEDURE — 93971 EXTREMITY STUDY: CPT | Performed by: INTERNAL MEDICINE

## 2021-07-28 ENCOUNTER — CLINICAL SUPPORT (OUTPATIENT)
Dept: SURGERY | Age: 71
End: 2021-07-28

## 2021-07-28 DIAGNOSIS — E66.9 OBESITY (BMI 30-39.9): Primary | ICD-10-CM

## 2021-07-28 NOTE — PROGRESS NOTES
[facility]  Supervised Weight Loss     Date:   2021    Patient's Name: Marcello Quiroz  : 1950    Insurance:  [employer]                                      Session: 6 of  6  Surgery: Gastric Bypass (h/o LAGB)          Surgeon: Esteban Arora M.D.      Height: 67\"                 Reported BRHTGO:    526#      JFW.                             BMI: 06             Pounds Lost since last month: 3#               Pounds Gained since last month: 0     Starting Weight: 233#                       Previous Months Weight: 230#  Overall Pounds Lost: 3#                    Overall Pounds Gained: 0      Other Pertinent Information: Today's appointment was completed in a virtual setting d/t [employer]. Pt is currently receiving Meals on Wheels d/t her kitchen/home being remodeled. Smoking Status:  none  Alcohol Intake: none    I have reviewed with pt the guidelines of the supervised wt loss program.  Pt understands the expectations of some wt loss during the program and that wt gain could delay the process. I have also explained that appointments need to be consecutive and missing an appointment may result in starting over. Pt has received this information in writing. Changes that patient has made since last month include:  Reports mostly maintenance of previously made changes. Pt reports minimal exercise d/t \"having a lot on my plate\". Eating Habits and Behaviors  A nutrition lesson specific to vitamins was provided. We discussed the various reasons for needing vitamins and different types and doses. General healthy eating guidelines were also discussed. Pts were instructed that their plate should be made up 1/2 plate coming from non-starchy vegetables, 1/4 coming from lean meat, and 1/4 of their plate coming from carbohydrates, including fruits, starches, or milk. We discussed measuring meals to 1/2 cup total per meal after surgery.  Drinking only calorie-free, sugar-free and non-carbonated beverages. We discussed the importance of drinking 64 ounces of fluid per day to prevent dehydration post-operatively. Patient's current diet habits include: Pt is eating 3 meals per day. Drinking a protein shake for breakfast (Orgain). Lunch is sardines/crackers, cheese/crackers or tomato sandwiches. Dinner is Meals on Wheels and is eating only the protein and vegetables included with those meals. Sometimes added a salad to the meal. Snack choices include fruit. Pt is eating refined carbohydrate foods (bread, pasta, rice, potatoes) in moderation. Pt is eating sweets/desserts in moderation. Pt with minimal cooking lately d/t kitchen being remodeled. Pt is drinking water, unsweetened tea (sometimes with Stevia), coffee with half and half. Physical Activity/Exercise  We talked about the importance of increasing daily physical activity and beginning to develop an exercise regimen/routine. We talked about exercise as being an important part of long term weight loss after surgery. Comments:  During class, I discussed with patient the importance of getting into an exercise routine. Pt is currently not exercising d/t \"havinng a lot on my plate\". Pt has been encouraged to follow through with [employer] resources including at home exercise. Behavior Modification       We talked about how to eat more mindfully and identify emotional eating triggers. Tips and recommendations for how to make these changes were provided. Pt was encouraged to keep a food journal and record what they were taking in daily. Overall Assessment: Pt demonstrates small lifestyle changes over the past few months. Has previously reported various barriers to making more consistent changes including a kitchen renovation and financial constraints. Otherwise demonstrates understanding of basic nutrition guidelines.  Appears to be an appropriate candidate at this time.     Patient-Set Goals:   1. Nutrition - maintain current eating habits with protein shakes and 2 meals of protein/vegetables   2. Exercise - [employer] at home exercise  3.  Behavior -review nutrition guidelines provided    Kevin North RD  7/28/2021

## 2021-08-03 ENCOUNTER — HOSPITAL ENCOUNTER (OUTPATIENT)
Dept: GENERAL RADIOLOGY | Age: 71
Discharge: HOME | End: 2021-08-03
Payer: MEDICARE

## 2021-08-03 ENCOUNTER — TRANSCRIBE ORDER (OUTPATIENT)
Dept: REGISTRATION | Age: 71
End: 2021-08-03

## 2021-08-03 ENCOUNTER — OFFICE VISIT (OUTPATIENT)
Dept: SURGERY | Age: 71
End: 2021-08-03
Payer: MEDICARE

## 2021-08-03 VITALS
WEIGHT: 230 LBS | OXYGEN SATURATION: 94 % | TEMPERATURE: 98 F | BODY MASS INDEX: 36.1 KG/M2 | DIASTOLIC BLOOD PRESSURE: 64 MMHG | RESPIRATION RATE: 20 BRPM | HEART RATE: 63 BPM | HEIGHT: 67 IN | SYSTOLIC BLOOD PRESSURE: 136 MMHG

## 2021-08-03 DIAGNOSIS — I25.118 CORONARY ARTERY DISEASE OF NATIVE HEART WITH STABLE ANGINA PECTORIS, UNSPECIFIED VESSEL OR LESION TYPE (HCC): ICD-10-CM

## 2021-08-03 DIAGNOSIS — M47.894 OTHER SPONDYLOSIS, THORACIC REGION: Primary | ICD-10-CM

## 2021-08-03 DIAGNOSIS — E78.2 MIXED HYPERLIPIDEMIA: ICD-10-CM

## 2021-08-03 DIAGNOSIS — E66.01 MORBID OBESITY (HCC): ICD-10-CM

## 2021-08-03 DIAGNOSIS — M47.894 OTHER SPONDYLOSIS, THORACIC REGION: ICD-10-CM

## 2021-08-03 DIAGNOSIS — G47.33 OSA ON CPAP: ICD-10-CM

## 2021-08-03 DIAGNOSIS — Z99.89 OSA ON CPAP: ICD-10-CM

## 2021-08-03 DIAGNOSIS — K21.9 GASTROESOPHAGEAL REFLUX DISEASE WITHOUT ESOPHAGITIS: Primary | ICD-10-CM

## 2021-08-03 PROCEDURE — G8752 SYS BP LESS 140: HCPCS | Performed by: SURGERY

## 2021-08-03 PROCEDURE — G8432 DEP SCR NOT DOC, RNG: HCPCS | Performed by: SURGERY

## 2021-08-03 PROCEDURE — 1090F PRES/ABSN URINE INCON ASSESS: CPT | Performed by: SURGERY

## 2021-08-03 PROCEDURE — G8754 DIAS BP LESS 90: HCPCS | Performed by: SURGERY

## 2021-08-03 PROCEDURE — 99214 OFFICE O/P EST MOD 30 MIN: CPT | Performed by: SURGERY

## 2021-08-03 PROCEDURE — G8536 NO DOC ELDER MAL SCRN: HCPCS | Performed by: SURGERY

## 2021-08-03 PROCEDURE — G8400 PT W/DXA NO RESULTS DOC: HCPCS | Performed by: SURGERY

## 2021-08-03 PROCEDURE — G8427 DOCREV CUR MEDS BY ELIG CLIN: HCPCS | Performed by: SURGERY

## 2021-08-03 PROCEDURE — 72110 X-RAY EXAM L-2 SPINE 4/>VWS: CPT

## 2021-08-03 PROCEDURE — 1101F PT FALLS ASSESS-DOCD LE1/YR: CPT | Performed by: SURGERY

## 2021-08-03 PROCEDURE — 72072 X-RAY EXAM THORAC SPINE 3VWS: CPT

## 2021-08-03 PROCEDURE — G8417 CALC BMI ABV UP PARAM F/U: HCPCS | Performed by: SURGERY

## 2021-08-03 PROCEDURE — 3017F COLORECTAL CA SCREEN DOC REV: CPT | Performed by: SURGERY

## 2021-08-03 NOTE — PROGRESS NOTES
1. Have you been to the ER, urgent care clinic since your last visit? Hospitalized since your last visit? No    2. Have you seen or consulted any other health care providers outside of the [address] [place] [address] since your last visit? Include any pap smears or colon screening.  [facility] and sciatica pain

## 2021-08-05 ENCOUNTER — PATIENT MESSAGE (OUTPATIENT)
Dept: SURGERY | Age: 71
End: 2021-08-05

## 2021-08-11 NOTE — PATIENT INSTRUCTIONS
Learning About Weight-Loss (Bariatric) Surgery  What is weight-loss surgery? Bariatric surgery is surgery to help you lose weight. This type of surgery is only used for people who are very overweight and have not been able to lose weight with diet and exercise. This surgery makes the stomach smaller. Some types of surgery also change the connection between your stomach and intestines. Having weight-loss surgery is a big step. After surgery, you'll need to make new, lifelong changes in how you eat and drink. How is weight-loss surgery done? Bariatric surgery may be either \"open\" or \"laparoscopic. \" Open surgery is done through a large cut (incision) in the belly. Laparoscopic surgery is done through several small cuts. The doctor puts a lighted tube, or scope, and other surgical tools through small cuts in your belly. The doctor is able to see your organs with the scope. There are different types of bariatric surgery. Gastric sleeve surgery  The surgery is usually done through several small incisions in the belly. The doctor removes more than half of your stomach. This leaves a thin sleeve, or tube, that is about the size of a banana. Because part of your stomach has been removed, this can't be reversed. Froy-en-Y gastric bypass surgery  Froy-en-Y (say \"patricia-en-why\") surgery changes the connection between the stomach and the intestines. The doctor separates a section of your stomach from the rest of your stomach. This makes a small pouch. The new pouch will hold the food you eat. The doctor connects the stomach pouch to the middle part of the small intestine. Gastric banding surgery  The surgery is usually done through several small incisions in the belly. The doctor wraps a band around the upper part of the stomach. This creates a small pouch. The small size of the pouch means that you will get full after you eat just a small amount of food.  The doctor can inflate or deflate the band to adjust the size. This lets the doctor adjust how quickly food passes from the new pouch into the stomach. It does not change the connection between the stomach and the intestines. What can you expect after the surgery? You may stay in the hospital for one or more days after the surgery. How long you stay depends on the type of surgery you had. Most people need 2 to 4 weeks before they are ready to get back to their usual routine. For the first 2 to 6 weeks after surgery, you probably will need to follow a liquid or soft diet. Bit by bit, you will be able to eat more solid foods. Your doctor may advise you to work with a dietitian. This way you'll be sure to get enough protein, vitamins, and minerals while you are losing weight. Even with a healthy diet, you may need to take vitamin and mineral supplements. After surgery, you will not be able to eat very much at one time. You will get full quickly. Try not to eat too much at one time or eat foods that are high in fat or sugar. If you do, you may vomit, get stomach pain, or have diarrhea. You probably will lose weight very quickly in the first few months after surgery. As time goes on, your weight loss will slow down. You will have regular doctor visits to check how you are doing. Think of bariatric surgery as a tool to help you lose weight. It isn't an instant fix. You will still need to eat a healthy diet and get regular exercise. This will help you reach your weight goal and avoid regaining the weight you lose. Follow-up care is a key part of your treatment and safety. Be sure to make and go to all appointments, and call your doctor if you are having problems. It's also a good idea to know your test results and keep a list of the medicines you take. Where can you learn more? Go to http://www.gray.com/  Enter G469 in the search box to learn more about \"Learning About Weight-Loss (Bariatric) Surgery. \"  Current as of: September 23, 2020               Content Version: 12.8  © 5885-4370 [employer], [employer]. Care instructions adapted under license by [employer] (which disclaims liability or warranty for this information). If you have questions about a medical condition or this instruction, always ask your healthcare professional. [address] any warranty or liability for your use of this information.

## 2021-08-11 NOTE — PROGRESS NOTES
Subjective: The patient is a 66 y.o. obese  female with a Body mass index is 36.02 kg/m². She has a history of morbid obesity, initially managed through laparoscopic adjustable gastric banding in 2008 [facility]) with fair weight loss results. She has experienced recent weight regain, partially through significant alcohol intake which he is now states has ceased. She has redeveloped several comorbidities to include CPAP dependent BILL. Bariatric comorbidities present are   Patient Active Problem List   Diagnosis Code    SOB (shortness of breath) R06.02    Mitral valve disorder I05.9    Palpitations R00.2    Mixed hyperlipidemia E78.2    Coronary artery disease I25.10    Status following surgery for weight loss Z98.84    Chest pain R07.9    ASHD (arteriosclerotic heart disease) I25.10    Erosive esophagitis K22.10    Colon polyps K63.5    Diverticulosis K57.90    Agatston CAC score, <100 R93.1    Varicose veins of both legs with edema I83.893    Venous insufficiency I87.2     The patient desires laparoscopic removal of adjustable gastric band system with revision to gastric bypass for surgical weight loss. The patients goal weight is 160 lb. The highest acceptable weight is 180 lbs. These goals are consistent with expected outcomes of their desired operation. her Medical goals are BILL resolution;  her qualty of life goals are decreased fatigue.     Patient Active Problem List    Diagnosis Date Noted    Varicose veins of both legs with edema 01/12/2021    Venous insufficiency 01/12/2021    Agatston CAC score, <100 03/25/2019    Erosive esophagitis 07/14/2016    Colon polyps 07/14/2016    Diverticulosis 07/14/2016    ASHD (arteriosclerotic heart disease) 12/29/2015    Chest pain 10/23/2015    Status following surgery for weight loss 09/14/2015    Palpitations 04/10/2015    Mixed hyperlipidemia 04/10/2015    Coronary artery disease 04/10/2015    SOB (shortness of breath) 2014    Mitral valve disorder 2014      Past Surgical History:   Procedure Laterality Date    COLONOSCOPY N/A 2020    COLONOSCOPY, ESOPHAGOGASTRODUODENOSCOPY (EGD) performed by Av [facility] ENDOSCOPY    HX CARPAL TUNNEL RELEASE Bilateral     HX CHOLECYSTECTOMY      HX GI  2008    Lap Band     HX MENISCUS REPAIR Left     HX OTHER SURGICAL Right     kideny stone removed    HX OTHER SURGICAL Left     laser blast of kidney stone    KS ABDOMEN SURGERY PROC UNLISTED      gastric banding    KS COLSC FLX W/RMVL OF TUMOR POLYP LESION SNARE TQ  2010         KS EGD TRANSORAL BIOPSY SINGLE/MULTIPLE  2010           Social History     Tobacco Use    Smoking status: Former Smoker     Packs/day: 0.50     Years: 25.00     Pack years: 12.50     Types: Cigarettes     Quit date: 2011     Years since quittin.6    Smokeless tobacco: Never Used    Tobacco comment: Restarted 2020   Substance Use Topics    Alcohol use: Yes     Comment: 7-10 drinks a week wine and liquor      Family History   Problem Relation Age of Onset    Hypertension Father     Diabetes Father     Cancer Father     Heart Disease Mother     Parkinson's Disease Mother       Prior to Admission medications    Medication Sig Start Date End Date Taking? Authorizing Provider   DILT- mg capsule TAKE 1 CAPSULE EVERY DAY 21  Yes Lenny Byrne MD   metoprolol succinate (TOPROL-XL) 50 mg XL tablet TAKE 1 TABLET EVERY DAY 21  Yes Thuan Graff MD   diazePAM (VALIUM) 10 mg tablet TAKE 1 TABLET BY MOUTH EVERY DAY AS NEEDED 5/3/21  Yes Provider, Historical   hydroCHLOROthiazide (HYDRODIURIL) 12.5 mg tablet TAKE 1 TABLET BY MOUTH EVERY DAY IN THE MORNING 21  Yes Provider, Historical   omeprazole (PRILOSEC) 20 mg capsule Take  by mouth.  PRN 11/10/20  Yes Provider, Historical   pantoprazole (PROTONIX) 40 mg tablet TAKE 1 TABLET BY MOUTH EVERY DAY BEFORE A MEAL 21 Yes Provider, Historical   traZODone (DESYREL) 50 mg tablet TK 1/2 - 1 T PO QHS AS NEEDED 9/24/20  Yes Provider, Historical   rosuvastatin (CRESTOR) 40 mg tablet Take 1 Tab by mouth nightly. 8/20/20  Yes Austin Jones, KANIKA   pregabalin (LYRICA) 150 mg capsule Take 150 mg by mouth two (2) times a day. Yes Provider, Historical   DULoxetine (CYMBALTA) 60 mg capsule Take 60 mg by mouth daily. Yes Provider, Historical   ezetimibe (ZETIA) 10 mg tablet Take  by mouth. Yes Provider, Historical   metFORMIN (GLUMETZA ER) 500 mg TG24 24 hour tablet Take 500 mg by mouth two (2) times a day. Yes Provider, Historical   cpap machine kit by Does Not Apply route as needed. Yes Provider, Historical   Aspirin, Buffered 81 mg tab Take  by mouth. Yes Provider, Historical     Allergies   Allergen Reactions    Mobic [Meloxicam] Anxiety     Jittery         Review of Systems:    Review of Systems   Constitutional: Negative for chills, fever and weight loss. HENT: Negative. Eyes: Negative. Respiratory: Positive for shortness of breath. Negative for cough. Cardiovascular: Positive for palpitations. Negative for chest pain. Gastrointestinal: Positive for heartburn and nausea. Negative for abdominal pain and vomiting. Genitourinary: Negative. Musculoskeletal: Positive for back pain, joint pain and neck pain. Skin: Negative. Neurological: Negative. Endo/Heme/Allergies: Negative. Psychiatric/Behavioral: Negative. Objective:     Visit Vitals  /64   Pulse 63   Temp 98 °F (36.7 °C)   Resp 20   Ht 5' 7\" (1.702 m)   Wt 230 lb (104.3 kg)   SpO2 94%   BMI 36.02 kg/m²       Physical Exam:  GENERAL: alert, cooperative, no distress, appears stated age, morbidly obese, EYE: negative, LYMPHATIC: No cervical or supraclavicular adenopathy. THROAT & NECK: normal, LUNG: clear to auscultation bilaterally, HEART: regular rate and rhythm, S1, S2 normal, no murmur.   ABDOMEN: Normoactive bowel sounds, nondistended, soft. Well-healed laparoscopic scars. Nontender, palpable port. No pain with palpation or hernia. EXTREMITIES:  extremities normal, atraumatic, no cyanosis or edema, SKIN: Normal., NEUROLOGIC: negative. Assessment:     Recurrent morbid obesity with comorbidities; no success with medical management for adjustable gastric band system. Plan:     She desires laparoscopic removal of adjustable gastric band system with revision to gastric bypass. This is a 66 y.o. female with a BMI of Body mass index is 36.02 kg/m². and the weight-related comorbidities listed above. Rachel Lan meets the NIH criteria for bariatric surgery based upon the BMI of Body mass index is 36.02 kg/m². and multiple weight-related co-morbidities. Lisa Mortensen has elected laparoscopic valdemar-en-Y gastric bypass as her intervention of choice for treatment of morbid obesity through surgical means secondary to its long term history of success. In the office today, following Tanisha's history and physical examination, a 30 minute discussion regarding the anatomic alterations for the laparoscopic valdemar-en-Y gastric bypass was undertaken. The dietary expectations and the patient and physician dependent factors for success were thoroughly discussed, to include the need for interval follow-up and long-term dietary changes associated with success. The possible complications of the valdemar-en-Y gastric bypass  were also discussed, to include VTE, staple line leak, bleeding, stricture, infection, internal hernia, open procedure, ulcer, poor weight loss/weight regain and pouch dilation. Specific weight related outcomes for success were also discussed with an emphasis on careful and close follow-up with the first year. The patient expressed an understanding of the above factors, and her questions were answered in their entirety.     In addition, the patient attended a 1.5 hour power point seminar regarding obesity, surgical weight loss including, adjustable gastric band, gastric bypass, and sleeve gastrectomy. This discussion contrasted the different surgical techniques, mechanisms of actions and expected outcomes, and surgical and medical risks associated with each procedure. During this seminar, there was a long question and answer session where each questions was answered until there were no additional questions. Today, the patient had all of her questions answered and desires to proceed with pre-qualification for bariatric surgery initially choosing valdemar-en-Y gastric bypass as her surgical option. She has completed required nutritional assessment and has been found to be a suitable candidate for revisional surgery. She will need to complete psychology evaluation and upper gastrointestinal series to assess for hiatal hernia. Her case will need to be presented at the patient selection committee given her advanced age and subsequent higher risk.     Signed By: Rico Engel MD     August 10, 2021

## 2021-08-19 ENCOUNTER — HOSPITAL ENCOUNTER (OUTPATIENT)
Dept: GENERAL RADIOLOGY | Age: 71
Discharge: HOME | End: 2021-08-19
Attending: SURGERY
Payer: MEDICARE

## 2021-08-19 DIAGNOSIS — K21.9 GASTROESOPHAGEAL REFLUX DISEASE WITHOUT ESOPHAGITIS: ICD-10-CM

## 2021-08-19 PROCEDURE — 74246 X-RAY XM UPR GI TRC 2CNTRST: CPT

## 2021-09-07 ENCOUNTER — OFFICE VISIT (OUTPATIENT)
Dept: CARDIOLOGY CLINIC | Age: 71
End: 2021-09-07
Payer: MEDICARE

## 2021-09-07 VITALS
DIASTOLIC BLOOD PRESSURE: 70 MMHG | BODY MASS INDEX: 36 KG/M2 | WEIGHT: 229.4 LBS | SYSTOLIC BLOOD PRESSURE: 138 MMHG | HEIGHT: 67 IN | OXYGEN SATURATION: 97 % | HEART RATE: 59 BPM | RESPIRATION RATE: 18 BRPM

## 2021-09-07 DIAGNOSIS — Z98.890 STATUS POST ENDOVENOUS RADIOFREQUENCY ABLATION (RFA) OF SAPHENOUS VEIN: ICD-10-CM

## 2021-09-07 DIAGNOSIS — I10 ESSENTIAL HYPERTENSION: ICD-10-CM

## 2021-09-07 DIAGNOSIS — I25.10 ASHD (ARTERIOSCLEROTIC HEART DISEASE): ICD-10-CM

## 2021-09-07 DIAGNOSIS — I87.2 VENOUS INSUFFICIENCY: Primary | ICD-10-CM

## 2021-09-07 DIAGNOSIS — I83.893 VARICOSE VEINS OF BOTH LEGS WITH EDEMA: ICD-10-CM

## 2021-09-07 DIAGNOSIS — E78.2 MIXED HYPERLIPIDEMIA: ICD-10-CM

## 2021-09-07 PROCEDURE — G8427 DOCREV CUR MEDS BY ELIG CLIN: HCPCS | Performed by: INTERNAL MEDICINE

## 2021-09-07 PROCEDURE — 99214 OFFICE O/P EST MOD 30 MIN: CPT | Performed by: INTERNAL MEDICINE

## 2021-09-07 PROCEDURE — G8752 SYS BP LESS 140: HCPCS | Performed by: INTERNAL MEDICINE

## 2021-09-07 PROCEDURE — G8754 DIAS BP LESS 90: HCPCS | Performed by: INTERNAL MEDICINE

## 2021-09-07 PROCEDURE — 3017F COLORECTAL CA SCREEN DOC REV: CPT | Performed by: INTERNAL MEDICINE

## 2021-09-07 PROCEDURE — 1090F PRES/ABSN URINE INCON ASSESS: CPT | Performed by: INTERNAL MEDICINE

## 2021-09-07 PROCEDURE — 1101F PT FALLS ASSESS-DOCD LE1/YR: CPT | Performed by: INTERNAL MEDICINE

## 2021-09-07 PROCEDURE — G8400 PT W/DXA NO RESULTS DOC: HCPCS | Performed by: INTERNAL MEDICINE

## 2021-09-07 PROCEDURE — G8536 NO DOC ELDER MAL SCRN: HCPCS | Performed by: INTERNAL MEDICINE

## 2021-09-07 PROCEDURE — G8417 CALC BMI ABV UP PARAM F/U: HCPCS | Performed by: INTERNAL MEDICINE

## 2021-09-07 PROCEDURE — G8510 SCR DEP NEG, NO PLAN REQD: HCPCS | Performed by: INTERNAL MEDICINE

## 2021-09-07 NOTE — PROGRESS NOTES
9/7/2021 12:06 PM      Subjective:     Quentdoreen Glover   PMHx of  elevated Agatston CAC score, HTN, HLD and obesity who is here for follow up s/p RF ablation right GSV in 6/2021. She is doing well-- no further leg swelling / cramping. She denies chest pain, chest pressure/discomfort, dyspnea, palpitations, irregular heart beats, near-syncope, syncope, fatigue, orthopnea, paroxysmal nocturnal dyspnea, exertional chest pressure/discomfort, tachypnea, dyspnea on exertion, dizziness. Visit Vitals  /70 (BP 1 Location: Right upper arm, BP Patient Position: Sitting, BP Cuff Size: Large adult)   Pulse (!) 59   Resp 18   Ht 5' 7\" (1.702 m)   Wt 229 lb 6.4 oz (104.1 kg)   SpO2 97%   BMI 35.93 kg/m²       Current Outpatient Medications   Medication Sig    DILT- mg capsule TAKE 1 CAPSULE EVERY DAY    metoprolol succinate (TOPROL-XL) 50 mg XL tablet TAKE 1 TABLET EVERY DAY    diazePAM (VALIUM) 10 mg tablet TAKE 1 TABLET BY MOUTH EVERY DAY AS NEEDED    hydroCHLOROthiazide (HYDRODIURIL) 12.5 mg tablet TAKE 1 TABLET BY MOUTH EVERY DAY IN THE MORNING    omeprazole (PRILOSEC) 20 mg capsule Take  by mouth. PRN    pantoprazole (PROTONIX) 40 mg tablet TAKE 1 TABLET BY MOUTH EVERY DAY BEFORE A MEAL    traZODone (DESYREL) 50 mg tablet TK 1/2 - 1 T PO QHS AS NEEDED    rosuvastatin (CRESTOR) 40 mg tablet Take 1 Tab by mouth nightly.  pregabalin (LYRICA) 150 mg capsule Take 150 mg by mouth two (2) times a day.  DULoxetine (CYMBALTA) 60 mg capsule Take 60 mg by mouth daily.  ezetimibe (ZETIA) 10 mg tablet Take  by mouth.  metFORMIN (GLUMETZA ER) 500 mg TG24 24 hour tablet Take 500 mg by mouth two (2) times a day.  cpap machine kit by Does Not Apply route as needed.  Aspirin, Buffered 81 mg tab Take  by mouth. No current facility-administered medications for this visit.          Objective:      Visit Vitals  /70 (BP 1 Location: Right upper arm, BP Patient Position: Sitting, BP Cuff Size: Large adult)   Pulse (!) 59   Resp 18   Ht 5' 7\" (1.702 m)   Wt 229 lb 6.4 oz (104.1 kg)   SpO2 97%   BMI 35.93 kg/m²       Past Medical History:   Diagnosis Date    Anxiety     Arrhythmia     irregular HR    CAD (coronary artery disease)     Depression     Diabetes ([facility] [address]     Fibromyalgia     Hypercholesterolemia     Musculoskeletal disorder     Psychiatric disorder     depression    Sleep apnea     CPAP    Sleep disorder     Valvular heart disease       Past Surgical History:   Procedure Laterality Date    COLONOSCOPY N/A 2020    COLONOSCOPY, ESOPHAGOGASTRODUODENOSCOPY (EGD) performed by Serena Nobles MD at [facility] ENDOSCOPY    HX CARPAL TUNNEL RELEASE Bilateral     HX CHOLECYSTECTOMY      HX GI  2008    Lap Band     HX MENISCUS REPAIR Left     HX OTHER SURGICAL Right     kideny stone removed    HX OTHER SURGICAL Left     laser blast of kidney stone    [place] ABDOMEN SURGERY PROC UNLISTED      gastric banding    [place] COLSC FLX W/RMVL OF TUMOR POLYP LESION SNARE TQ  2010         [place] EGD TRANSORAL BIOPSY SINGLE/MULTIPLE  2010          Allergies   Allergen Reactions    Mobic [Meloxicam] Anxiety     Jittery      Family History   Problem Relation Age of Onset    Hypertension Father     Diabetes Father     Cancer Father     Heart Disease Mother     Parkinson's Disease Mother       Social History     Socioeconomic History    Marital status:      Spouse name: Not on file    Number of children: Not on file    Years of education: Not on file    Highest education level: Not on file   Occupational History    Not on file   Tobacco Use    Smoking status: Former Smoker     Packs/day: 0.50     Years: 25.00     Pack years: 12.50     Types: Cigarettes     Quit date: 2011     Years since quittin.6    Smokeless tobacco: Never Used    Tobacco comment: Restarted 2020   Vaping Use    Vaping Use: Never used   Substance and Sexual Activity    Alcohol use: Not Currently    Drug use: No    Sexual activity: Not on file   Other Topics Concern    Not on file   Social History Narrative    Not on file     Social Determinants of Health     Financial Resource Strain:     Difficulty of Paying Living Expenses:    Food Insecurity:     Worried About Running Out of Food in the Last Year:     [address] in the Last Year:    Transportation Needs:     Lack of Transportation (Medical):  Lack of Transportation (Non-Medical):    Physical Activity:     Days of Exercise per Week:     Minutes of Exercise per Session:    Stress:     Feeling of Stress :    Social Connections:     Frequency of Communication with Friends and Family:     Frequency of Social Gatherings with Friends and Family:     Attends [employer] Services:     Active Member of Clubs or Organizations:     Attends Club or Organization Meetings:     Marital Status:    Intimate Partner Violence:     Fear of Current or Ex-Partner:     Emotionally Abused:     Physically Abused:     Sexually Abused:           Review of Systems     General: Not Present- Anorexia, Chills, Dietary Changes, Fatigue, Fever, Medication Changes, Night Sweats, Weight Gain > 10lbs. and Weight Loss > 10lbs. .  Skin: Not Present- Bruising and Excessive Sweating. HEENT: Not Present- Headache, Visual Loss and Vertigo. Respiratory: Not Present- Cough, Decreased Exercise Tolerance, Difficulty Breathing, Snoring and Wheezing. Cardiovascular: Not Present- Abnormal Blood Pressure, Chest Pain, Difficulty Breathing On Exertion, Fainting / Blacking Out, Irregular Heart Beat, Orthopnea, Palpitations, Paroxysmal Nocturnal Dyspnea, Rapid Heart Rate, Shortness of Breath. Gastrointestinal: Not Present- Black, Tarry Stool, Bloody Stool, Diarrhea, Hematemesis, Rectal Bleeding and Vomiting. Musculoskeletal: Not Present- Muscle Pain and Muscle Weakness. Neurological: Not Present- Dizziness.   Psychiatric: Not Present- Depression. Endocrine: Not Present- Cold Intolerance, Heat Intolerance and Thyroid Problems. Hematology: Not Present- Abnormal Bleeding, Anemia, Blood Clots and Easy Bruising. Physical Exam   The physical exam findings are as follows:     General   Mental Status - Alert. General Appearance - Cooperative and Well groomed. Not in acute distress. Orientation - Oriented to time, Oriented to place and Oriented to person. Build & Nutrition - Well developed. Skin   General: - Normal.      HEENT  Head - Normal.  Eye - Normal.  Mouth & Throat - Normal.      Neck   Carotid Arteries - normal upstroke. No Bruits. Thyroid: Gland - Normal size and consistency. Chest and Lung Exam   Inspection:   Chest Wall: - Normal. Accessory muscles - No use of accessory muscles in breathing. Auscultation:   Breath sounds: - Normal.      Cardiovascular   Inspection: Jugular vein - Bilateral - Inspection Normal.  Palpation/Percussion:   Apical Impulse: - Normal.  Auscultation: Rhythm - Regular. Heart Sounds - S1 WNL and S2 WNL. No S3 or S4. Murmurs & Other Heart Sounds: Auscultation of the heart reveals - No Murmurs. Abdomen   Palpation/Percussion: Palpation and Percussion of the abdomen reveal - No Palpable abdominal masses. Liver: - Normal.  Spleen: - Normal.  Auscultation: Auscultation of the abdomen reveals - Bowel sounds normal.      Neurologic   Mental Status: Affect - normal.  Motor: - Normal. Gait - Normal.        Assessment:       ICD-10-CM ICD-9-CM    1. Venous insufficiency  I87.2 459.81    2. Varicose veins of both legs with edema  I83.893 454.8    3. Status post endovenous radiofrequency ablation (RFA) of saphenous vein  Z98.890 V45.89    4. ASHD (arteriosclerotic heart disease)  I25.10 414.00    5. Essential hypertension  I10 401.9    6. Mixed hyperlipidemia  E78.2 272.2        Plan:     1. Venous insufficiency, Varicose veins of both legs with edema  Bilateral GSV reflux per LE dopplers 10/2020. S/p RF ablation right GSV in 6/28/2021. Clinically better, leg swelling / cramping resolved. Just some mild tenderness right upper thigh. Wants to hold off with procedure on left leg/    Continue   - Instruction given on daily leg elevation    - Instruction given for mild exercise   - Instruction given for weight reduction     2. Normal CHAITANYA 10/2020     3. BP controlled     4. Mixed hyperlipidemia  On Zetia and statin. Will check labs     5. Agatston CAC score 100-199  Asymptomatic: Keep f/u with Dr. Hosie Holstein 10/2021     Robyn Gonzalez NP  9/7/2021      Patient seen and examined by me with nurse practitioner. I personally performed all components of the history, physical, and medical decision making and agree with the assessment and plan as noted. Good response to right GSV ablation. Monitor.      Dawson Otero MD

## 2021-09-07 NOTE — PROGRESS NOTES
1. Have you been to the ER, urgent care clinic since your last visit? Hospitalized since your last visit? No    2. Have you seen or consulted any other health care providers outside of the [address] [place] [address] since your last visit? Include any pap smears or colon screening.  No         Chief Complaint   Patient presents with    Follow-up     Post rf ablation,  C/O Right leg pain

## 2021-09-09 ENCOUNTER — PATIENT MESSAGE (OUTPATIENT)
Dept: CARDIOLOGY CLINIC | Age: 71
End: 2021-09-09

## 2021-09-09 ENCOUNTER — TELEPHONE (OUTPATIENT)
Dept: CARDIOLOGY CLINIC | Age: 71
End: 2021-09-09

## 2021-09-09 DIAGNOSIS — E78.2 MIXED HYPERLIPIDEMIA: ICD-10-CM

## 2021-09-09 DIAGNOSIS — I25.118 CORONARY ARTERY DISEASE OF NATIVE HEART WITH STABLE ANGINA PECTORIS, UNSPECIFIED VESSEL OR LESION TYPE (HCC): Primary | ICD-10-CM

## 2021-09-09 DIAGNOSIS — I05.9 MITRAL VALVE DISORDER: ICD-10-CM

## 2021-09-29 ENCOUNTER — TELEPHONE (OUTPATIENT)
Dept: CARDIOLOGY CLINIC | Age: 71
End: 2021-09-29

## 2021-09-29 NOTE — TELEPHONE ENCOUNTER
----- Message from Juanita Malhotra NP sent at 9/29/2021  8:48 AM EDT -----  LDL at goal, other labs ok.  Keep follow up with dr Wale Burgos next mos

## 2021-10-11 NOTE — PROGRESS NOTES
[address]  [phone]     Subjective:      Aleksandra Shafer is a 66 y.o. female is here for routine f/u. She has a PMHx of elevated Agatston CAC score, HTN, HLD, BILL and obesity. Last OV 8/2020. Today,  Continues to do well from cardiac standpoint. Received her covid vaccine, did fine. The patient denies chest pain/ shortness of breath, orthopnea, PND, LE edema, palpitations, syncope, or presyncope.        Patient Active Problem List    Diagnosis Date Noted    Varicose veins of both legs with edema 01/12/2021    Venous insufficiency 01/12/2021    Agatston CAC score, <100 03/25/2019    Erosive esophagitis 07/14/2016    Colon polyps 07/14/2016    Diverticulosis 07/14/2016    ASHD (arteriosclerotic heart disease) 12/29/2015    Chest pain 10/23/2015    Status following surgery for weight loss 09/14/2015    Palpitations 04/10/2015    Mixed hyperlipidemia 04/10/2015    Coronary artery disease 04/10/2015    SOB (shortness of breath) 07/29/2014    Mitral valve disorder 07/29/2014      Bashir Calderon MD  Past Medical History:   Diagnosis Date    Anxiety     Arrhythmia     irregular HR    CAD (coronary artery disease)     Depression     Diabetes ([facility] [address]     Fibromyalgia     Hypercholesterolemia     Long term current use of anticoagulant therapy     Musculoskeletal disorder     Psychiatric disorder     depression    Sleep apnea     CPAP    Sleep disorder     Valvular heart disease       Past Surgical History:   Procedure Laterality Date    COLONOSCOPY N/A 8/14/2020    COLONOSCOPY, ESOPHAGOGASTRODUODENOSCOPY (EGD) performed by Lety Nassar MD at [facility] ENDOSCOPY    HX CARPAL TUNNEL RELEASE Bilateral     HX CHOLECYSTECTOMY      HX GI  Jan 2008    Lap Band     HX MENISCUS REPAIR Left     HX OTHER SURGICAL Right     kideny stone removed    HX OTHER SURGICAL Left     laser blast of kidney stone    [place] ABDOMEN SURGERY PROC UNLISTED gastric banding    MD COLSC FLX W/RMVL OF TUMOR POLYP LESION SNARE TQ  2010         MD EGD TRANSORAL BIOPSY SINGLE/MULTIPLE  2010          Allergies   Allergen Reactions    Mobic [Meloxicam] Anxiety     Jittery      Family History   Problem Relation Age of Onset    Hypertension Father     Diabetes Father     Cancer Father     Heart Disease Mother     Parkinson's Disease Mother       Social History     Socioeconomic History    Marital status:      Spouse name: Not on file    Number of children: Not on file    Years of education: Not on file    Highest education level: Not on file   Occupational History    Not on file   Tobacco Use    Smoking status: Former Smoker     Packs/day: 0.50     Years: 25.00     Pack years: 12.50     Types: Cigarettes     Quit date: 2011     Years since quittin.7    Smokeless tobacco: Never Used    Tobacco comment: Restarted 2020   Vaping Use    Vaping Use: Never used   Substance and Sexual Activity    Alcohol use: Not Currently    Drug use: No    Sexual activity: Not on file   Other Topics Concern    Not on file   Social History Narrative    Not on file     Social Determinants of Health     Financial Resource Strain:     Difficulty of Paying Living Expenses:    Food Insecurity:     Worried About Running Out of Food in the Last Year:     [address] in the Last Year:    Transportation Needs:     Lack of Transportation (Medical):      Lack of Transportation (Non-Medical):    Physical Activity:     Days of Exercise per Week:     Minutes of Exercise per Session:    Stress:     Feeling of Stress :    Social Connections:     Frequency of Communication with Friends and Family:     Frequency of Social Gatherings with Friends and Family:     Attends Orthodoxy Services:     Active Member of Clubs or Organizations:     Attends Club or Organization Meetings:     Marital Status:    Intimate Partner Violence:     Fear of Current or Ex-Partner:     Emotionally Abused:     Physically Abused:     Sexually Abused:       Current Outpatient Medications   Medication Sig    DILT- mg capsule TAKE 1 CAPSULE EVERY DAY    metoprolol succinate (TOPROL-XL) 50 mg XL tablet TAKE 1 TABLET EVERY DAY    diazePAM (VALIUM) 10 mg tablet TAKE 1 TABLET BY MOUTH EVERY DAY AS NEEDED    hydroCHLOROthiazide (HYDRODIURIL) 12.5 mg tablet TAKE 1 TABLET BY MOUTH EVERY DAY IN THE MORNING    omeprazole (PRILOSEC) 20 mg capsule Take  by mouth. PRN    pantoprazole (PROTONIX) 40 mg tablet TAKE 1 TABLET BY MOUTH EVERY DAY BEFORE A MEAL    traZODone (DESYREL) 50 mg tablet Take 100 mg by mouth as needed.  rosuvastatin (CRESTOR) 40 mg tablet Take 1 Tab by mouth nightly.  pregabalin (LYRICA) 150 mg capsule Take 150 mg by mouth two (2) times a day.  DULoxetine (CYMBALTA) 60 mg capsule Take 60 mg by mouth daily.  ezetimibe (ZETIA) 10 mg tablet Take  by mouth.  metFORMIN (GLUMETZA ER) 500 mg TG24 24 hour tablet Take 500 mg by mouth two (2) times a day.  cpap machine kit by Does Not Apply route as needed.  Aspirin, Buffered 81 mg tab Take  by mouth. No current facility-administered medications for this visit. Review of Symptoms:  11 systems reviewed, negative other than as stated in the HPI    Physical ExamPhysical Exam:    Vitals:    10/12/21 1455   BP: (!) 140/78   Pulse: 66   Resp: 18   SpO2: 96%   Weight: 232 lb 9.6 oz (105.5 kg)   Height: 5' 7\" (1.702 m)     Body mass index is 36.43 kg/m². General PE  Gen:  NAD  Mental Status - Alert. General Appearance - Not in acute distress. HEENT:  PERRL, no carotid bruits or JVD  Chest and Lung Exam   Inspection: Accessory muscles - No use of accessory muscles in breathing. Auscultation:   Breath sounds: - Normal.   Cardiovascular   Inspection: Jugular vein - Bilateral - Inspection Normal.   Palpation/Percussion:   Apical Impulse: - Normal.   Auscultation: Rhythm - Regular.  Heart Sounds - S1 WNL and S2 WNL. No S3 or S4. Murmurs & Other Heart Sounds: Auscultation of the heart reveals - No Murmurs. Peripheral Vascular   Upper Extremity: Inspection - Bilateral - No Cyanotic nailbeds or Digital clubbing. Lower Extremity:   Palpation: Edema - Bilateral - No edema. Abdomen:   Soft, non-tender, bowel sounds are active. Neuro: A&O times 3, CN and motor grossly WNL    Labs:   Lab Results   Component Value Date/Time    Cholesterol, total 109 09/28/2021 12:32 PM    HDL Cholesterol 44 09/28/2021 12:32 PM    LDL, calculated 44.4 09/28/2021 12:32 PM    Triglyceride 103 09/28/2021 12:32 PM    CHOL/HDL Ratio 2.5 09/28/2021 12:32 PM     Lab Results   Component Value Date/Time    CK 41 10/23/2015 01:23 PM     Lab Results   Component Value Date/Time    Sodium 141 09/28/2021 12:32 PM    Potassium 4.6 09/28/2021 12:32 PM    Chloride 108 09/28/2021 12:32 PM    CO2 32 09/28/2021 12:32 PM    Anion gap 1 (L) 09/28/2021 12:32 PM    Glucose 119 (H) 09/28/2021 12:32 PM    BUN 14 09/28/2021 12:32 PM    Creatinine 0.76 09/28/2021 12:32 PM    BUN/Creatinine ratio 18 09/28/2021 12:32 PM    GFR est AA >60 09/28/2021 12:32 PM    GFR est non-AA >60 09/28/2021 12:32 PM    Calcium 9.1 09/28/2021 12:32 PM    Bilirubin, total 0.4 09/28/2021 12:32 PM    Alk. phosphatase 66 09/28/2021 12:32 PM    Protein, total 6.4 09/28/2021 12:32 PM    Albumin 3.4 (L) 09/28/2021 12:32 PM    Globulin 3.0 09/28/2021 12:32 PM    A-G Ratio 1.1 09/28/2021 12:32 PM    ALT (SGPT) 30 09/28/2021 12:32 PM       EKG:       Assessment:     Assessment:        ICD-10-CM ICD-9-CM    1. ASHD (arteriosclerotic heart disease)  I25.10 414.00    2. Coronary artery disease involving native heart without angina pectoris, unspecified vessel or lesion type  I25.10 414.01    3. Mixed hyperlipidemia  E78.2 272.2    4. Palpitations  R00.2 785.1    5. Agatston CAC score, <100  R93.1 793.2    6. Venous insufficiency  I87.2 459.81    7.  Essential hypertension  I10 401.9 AMB POC EKG ROUTINE W/ 12 LEADS, INTER & REP      AMB POC EKG ROUTINE W/ 12 LEADS, INTER & REP       Orders Placed This Encounter    AMB POC EKG ROUTINE W/ 12 LEADS, INTER & REP     Order Specific Question:   Reason for Exam:     Answer:   routine    AMB POC EKG ROUTINE W/ 12 LEADS, INTER & REP     Order Specific Question:   Reason for Exam:     Answer:   ROUTINE        Plan:     CAD  Agatston CAC score 100-199  Coronary calcium score 112 in 5/2019  Previous stress echo 1/2016 without evidence of ischemia  Continue BB, CCB, ASA and statin therapy    HTN  Controlled with current therapy     Mixed hyperlipidemia  9/2021 LDL 44  Continue Rosuva 40 mg daily, Zetia 10 mg daily    Palpitations, controlled  Event montior with frequent PVCs, 4% ectopic burden  Continue diltiazem and metoprolol      Venous insufficiency, Varicose veins of both legs with edema  Bilateral GSV reflux per LE dopplers 10/2020. Normal CHAITANYA 10/2020  S/p RF ablation right GSV in 6/28/2021.   Followed by Dr Clarissa Meier, last OV 9/2021     Quit smoking 2020- congratulated.     Counseled on diet and exercise- eventual goal of 30-60 minutes 5-7 times a week as per AHA guidelines.         Continue current care and f/u in 1 yr      Jovanna Ramos NP

## 2021-10-12 ENCOUNTER — OFFICE VISIT (OUTPATIENT)
Dept: CARDIOLOGY CLINIC | Age: 71
End: 2021-10-12
Payer: MEDICARE

## 2021-10-12 VITALS
BODY MASS INDEX: 36.51 KG/M2 | HEART RATE: 66 BPM | OXYGEN SATURATION: 96 % | DIASTOLIC BLOOD PRESSURE: 78 MMHG | RESPIRATION RATE: 18 BRPM | WEIGHT: 232.6 LBS | SYSTOLIC BLOOD PRESSURE: 140 MMHG | HEIGHT: 67 IN

## 2021-10-12 DIAGNOSIS — E78.2 MIXED HYPERLIPIDEMIA: ICD-10-CM

## 2021-10-12 DIAGNOSIS — I25.10 CORONARY ARTERY DISEASE INVOLVING NATIVE HEART WITHOUT ANGINA PECTORIS, UNSPECIFIED VESSEL OR LESION TYPE: ICD-10-CM

## 2021-10-12 DIAGNOSIS — I25.10 ASHD (ARTERIOSCLEROTIC HEART DISEASE): Primary | ICD-10-CM

## 2021-10-12 DIAGNOSIS — I10 ESSENTIAL HYPERTENSION: ICD-10-CM

## 2021-10-12 DIAGNOSIS — I87.2 VENOUS INSUFFICIENCY: ICD-10-CM

## 2021-10-12 DIAGNOSIS — R00.2 PALPITATIONS: ICD-10-CM

## 2021-10-12 DIAGNOSIS — R93.1 AGATSTON CAC SCORE, <100: ICD-10-CM

## 2021-10-12 PROCEDURE — 93000 ELECTROCARDIOGRAM COMPLETE: CPT | Performed by: NURSE PRACTITIONER

## 2021-10-12 PROCEDURE — 3017F COLORECTAL CA SCREEN DOC REV: CPT | Performed by: NURSE PRACTITIONER

## 2021-10-12 PROCEDURE — G8753 SYS BP > OR = 140: HCPCS | Performed by: NURSE PRACTITIONER

## 2021-10-12 PROCEDURE — G8536 NO DOC ELDER MAL SCRN: HCPCS | Performed by: NURSE PRACTITIONER

## 2021-10-12 PROCEDURE — 1101F PT FALLS ASSESS-DOCD LE1/YR: CPT | Performed by: NURSE PRACTITIONER

## 2021-10-12 PROCEDURE — G8400 PT W/DXA NO RESULTS DOC: HCPCS | Performed by: NURSE PRACTITIONER

## 2021-10-12 PROCEDURE — 99214 OFFICE O/P EST MOD 30 MIN: CPT | Performed by: NURSE PRACTITIONER

## 2021-10-12 PROCEDURE — G8427 DOCREV CUR MEDS BY ELIG CLIN: HCPCS | Performed by: NURSE PRACTITIONER

## 2021-10-12 PROCEDURE — 1090F PRES/ABSN URINE INCON ASSESS: CPT | Performed by: NURSE PRACTITIONER

## 2021-10-12 PROCEDURE — G8432 DEP SCR NOT DOC, RNG: HCPCS | Performed by: NURSE PRACTITIONER

## 2021-10-12 PROCEDURE — G8754 DIAS BP LESS 90: HCPCS | Performed by: NURSE PRACTITIONER

## 2021-10-12 PROCEDURE — G8417 CALC BMI ABV UP PARAM F/U: HCPCS | Performed by: NURSE PRACTITIONER

## 2021-10-12 NOTE — PROGRESS NOTES
1. Have you been to the ER, urgent care clinic since your last visit? Hospitalized since your last visit? No    2. Have you seen or consulted any other health care providers outside of the [address] [place] [address] since your last visit? Include any pap smears or colon screening.  No     Chief Complaint   Patient presents with    Follow-up

## 2021-12-16 ENCOUNTER — TELEPHONE (OUTPATIENT)
Dept: SURGERY | Age: 71
End: 2021-12-16

## 2021-12-16 NOTE — TELEPHONE ENCOUNTER
Returned pts call regarding what her next step may be for her revision. Pt still needs to turn in her psych eval then we can schedule a follow up with Dr Patrick Mcarthur. No answer, lvm.

## 2022-01-03 ENCOUNTER — PATIENT MESSAGE (OUTPATIENT)
Dept: SURGERY | Age: 72
End: 2022-01-03

## 2022-01-06 ENCOUNTER — TELEPHONE (OUTPATIENT)
Dept: SURGERY | Age: 72
End: 2022-01-06

## 2022-01-06 NOTE — TELEPHONE ENCOUNTER
Jose Menjivar from [employer] called and stated that pts surgery will be denied and is currently on hold due to surgery not being medically necessary. Pt already had weight loss surgery in the past and not making life style changes since then. Stated that pt has gained weight and has not lost weight. Due to [facility] guidelines surgery is not considered medically necessary. Jose Menjivar stated that provider can do a peer to peer to get his approved. RF# [id number]    PHONE # [phone] Ext. [phone]    Call can be made until tomorrow 12PM before denial decision.

## 2022-01-20 ENCOUNTER — TELEPHONE (OUTPATIENT)
Dept: SURGERY | Age: 72
End: 2022-01-20

## 2022-01-20 NOTE — TELEPHONE ENCOUNTER
Contacted patient to make her aware that she missed her appointment with Dr. Sharyle Fontana today. Left patient a voicemail to call back to reschedule and will send no show letter.

## 2022-01-24 ENCOUNTER — TRANSCRIBE ORDER (OUTPATIENT)
Dept: GENERAL RADIOLOGY | Age: 72
End: 2022-01-24

## 2022-01-24 ENCOUNTER — HOSPITAL ENCOUNTER (OUTPATIENT)
Dept: GENERAL RADIOLOGY | Age: 72
Discharge: HOME | End: 2022-01-24

## 2022-01-24 DIAGNOSIS — R52 PAIN: ICD-10-CM

## 2022-01-24 DIAGNOSIS — R52 PAIN: Primary | ICD-10-CM

## 2022-02-02 ENCOUNTER — OFFICE VISIT (OUTPATIENT)
Dept: SURGERY | Age: 72
End: 2022-02-02
Payer: MEDICARE

## 2022-02-02 VITALS
WEIGHT: 238.5 LBS | TEMPERATURE: 98.7 F | DIASTOLIC BLOOD PRESSURE: 80 MMHG | HEIGHT: 67 IN | HEART RATE: 88 BPM | OXYGEN SATURATION: 95 % | BODY MASS INDEX: 37.43 KG/M2 | RESPIRATION RATE: 20 BRPM | SYSTOLIC BLOOD PRESSURE: 134 MMHG

## 2022-02-02 DIAGNOSIS — G47.33 OSA ON CPAP: ICD-10-CM

## 2022-02-02 DIAGNOSIS — E66.01 MORBID OBESITY (HCC): ICD-10-CM

## 2022-02-02 DIAGNOSIS — I10 PRIMARY HYPERTENSION: ICD-10-CM

## 2022-02-02 DIAGNOSIS — Z99.89 OSA ON CPAP: ICD-10-CM

## 2022-02-02 DIAGNOSIS — K22.10 EROSIVE ESOPHAGITIS: ICD-10-CM

## 2022-02-02 DIAGNOSIS — R63.5 WEIGHT GAIN, ABNORMAL: Primary | ICD-10-CM

## 2022-02-02 DIAGNOSIS — I25.10 CORONARY ARTERY DISEASE INVOLVING NATIVE HEART WITHOUT ANGINA PECTORIS, UNSPECIFIED VESSEL OR LESION TYPE: ICD-10-CM

## 2022-02-02 DIAGNOSIS — E78.2 MIXED HYPERLIPIDEMIA: ICD-10-CM

## 2022-02-02 PROCEDURE — 1101F PT FALLS ASSESS-DOCD LE1/YR: CPT | Performed by: SURGERY

## 2022-02-02 PROCEDURE — G8432 DEP SCR NOT DOC, RNG: HCPCS | Performed by: SURGERY

## 2022-02-02 PROCEDURE — 3017F COLORECTAL CA SCREEN DOC REV: CPT | Performed by: SURGERY

## 2022-02-02 PROCEDURE — G8754 DIAS BP LESS 90: HCPCS | Performed by: SURGERY

## 2022-02-02 PROCEDURE — G8400 PT W/DXA NO RESULTS DOC: HCPCS | Performed by: SURGERY

## 2022-02-02 PROCEDURE — G8752 SYS BP LESS 140: HCPCS | Performed by: SURGERY

## 2022-02-02 PROCEDURE — G8427 DOCREV CUR MEDS BY ELIG CLIN: HCPCS | Performed by: SURGERY

## 2022-02-02 PROCEDURE — 99213 OFFICE O/P EST LOW 20 MIN: CPT | Performed by: SURGERY

## 2022-02-02 PROCEDURE — G8536 NO DOC ELDER MAL SCRN: HCPCS | Performed by: SURGERY

## 2022-02-02 PROCEDURE — 1090F PRES/ABSN URINE INCON ASSESS: CPT | Performed by: SURGERY

## 2022-02-02 PROCEDURE — G8417 CALC BMI ABV UP PARAM F/U: HCPCS | Performed by: SURGERY

## 2022-02-02 RX ORDER — CYCLOBENZAPRINE HCL 10 MG
TABLET ORAL
COMMUNITY

## 2022-02-02 RX ORDER — HYDROCODONE BITARTRATE AND ACETAMINOPHEN 5; 325 MG/1; MG/1
TABLET ORAL
COMMUNITY
Start: 2022-01-10

## 2022-02-02 NOTE — PROGRESS NOTES
1. Have you been to the ER, urgent care clinic since your last visit? Hospitalized since your last visit? No    2. Have you seen or consulted any other health care providers outside of the [address] [place] [address] since your last visit? Include any pap smears or colon screening.  Pain management for back pain, PCP, Dr Johnson Fore

## 2022-02-04 NOTE — PROGRESS NOTES
Subjective: The patient is a 66 y.o. obese female seeking approval for laparoscopic revisional surgery. Body mass index is 37.35 kg/m². Delonte Stover has tried multiple diets in her  lifetime most recently trying unsupervised diets during which she was able to lose small amounts of weight and then gained it back plus more.        Bariatric comorbidities present are   Past Medical History:   Diagnosis Date    Anxiety     Arrhythmia     irregular HR    CAD (coronary artery disease)     Depression     Diabetes (HCC)     Fibromyalgia     Hypercholesterolemia     Long term current use of anticoagulant therapy     Musculoskeletal disorder     Psychiatric disorder     depression    Sleep apnea     CPAP    Sleep disorder     Valvular heart disease        Patient Active Problem List    Diagnosis Date Noted    Varicose veins of both legs with edema 01/12/2021    Venous insufficiency 01/12/2021    Agatston CAC score, <100 03/25/2019    Erosive esophagitis 07/14/2016    Colon polyps 07/14/2016    Diverticulosis 07/14/2016    ASHD (arteriosclerotic heart disease) 12/29/2015    Chest pain 10/23/2015    Status following surgery for weight loss 09/14/2015    Palpitations 04/10/2015    Mixed hyperlipidemia 04/10/2015    Coronary artery disease 04/10/2015    SOB (shortness of breath) 07/29/2014    Mitral valve disorder 07/29/2014     Past Medical History:   Diagnosis Date    Anxiety     Arrhythmia     irregular HR    CAD (coronary artery disease)     Depression     Diabetes ([address]     Fibromyalgia     Hypercholesterolemia     Long term current use of anticoagulant therapy     Musculoskeletal disorder     Psychiatric disorder     depression    Sleep apnea     CPAP    Sleep disorder     Valvular heart disease       Past Surgical History:   Procedure Laterality Date    COLONOSCOPY N/A 8/14/2020    COLONOSCOPY, ESOPHAGOGASTRODUODENOSCOPY (EGD) performed by Alena Fry MD at [facility]    HX CARPAL TUNNEL RELEASE Bilateral     HX CHOLECYSTECTOMY      HX GI  Jan 2008    Lap Band     HX MENISCUS REPAIR Left     HX OTHER SURGICAL Right     kideny stone removed    HX OTHER SURGICAL Left     laser blast of kidney stone    MI ABDOMEN SURGERY PROC UNLISTED      gastric banding    MI COLSC FLX W/RMVL OF TUMOR POLYP LESION SNARE TQ  12/22/2010         MI EGD TRANSORAL BIOPSY SINGLE/MULTIPLE  12/22/2010           Social History     Tobacco Use    Smoking status: Former Smoker     Packs/day: 0.50     Years: 25.00     Pack years: 12.50     Types: Cigarettes     Quit date: 12/29/2011     Years since quitting: 10.1    Smokeless tobacco: Never Used    Tobacco comment: Restarted December 2020   Substance Use Topics    Alcohol use: Not Currently      Family History   Problem Relation Age of Onset    Hypertension Father     Diabetes Father     Cancer Father     Heart Disease Mother     Parkinson's Disease Mother       Prior to Admission medications    Medication Sig Start Date End Date Taking? Authorizing Provider   HYDROcodone-acetaminophen (NORCO) 5-325 mg per tablet TAKE 1 TABLET BY MOUTH TWICE DAILY AS NEEDED FOR PAIN 1/10/22  Yes Provider, Historical   cyclobenzaprine (FLEXERIL) 10 mg tablet Take  by mouth three (3) times daily as needed for Muscle Spasm(s). Yes Provider, Historical   metoprolol succinate (TOPROL-XL) 50 mg XL tablet TAKE 1 TABLET EVERY DAY 1/2/22  Yes Coco Milner MD   DILT- mg capsule TAKE 1 CAPSULE EVERY DAY  (KEEP  APPOINTMENT  FOR  FURTHER  REFILLS) 11/4/21  Yes Avani THEODORE NP   diazePAM (VALIUM) 10 mg tablet TAKE 1 TABLET BY MOUTH EVERY DAY AS NEEDED 5/3/21  Yes Provider, Historical   hydroCHLOROthiazide (HYDRODIURIL) 12.5 mg tablet TAKE 1 TABLET BY MOUTH EVERY DAY IN THE MORNING 5/6/21  Yes Provider, Historical   omeprazole (PRILOSEC) 20 mg capsule Take  by mouth.  PRN 11/10/20  Yes Provider, Historical   pantoprazole (PROTONIX) 40 mg tablet TAKE 1 TABLET BY MOUTH EVERY DAY BEFORE A MEAL 2/23/21  Yes Provider, Historical   traZODone (DESYREL) 50 mg tablet Take 100 mg by mouth as needed. 9/24/20  Yes Provider, Historical   rosuvastatin (CRESTOR) 40 mg tablet Take 1 Tab by mouth nightly. 8/20/20  Yes Rome Jones, KANIKA   pregabalin (LYRICA) 150 mg capsule Take 150 mg by mouth two (2) times a day. Yes Provider, Historical   DULoxetine (CYMBALTA) 60 mg capsule Take 60 mg by mouth daily. Yes Provider, Historical   ezetimibe (ZETIA) 10 mg tablet Take  by mouth. Yes Provider, Historical   metFORMIN (GLUMETZA ER) 500 mg TG24 24 hour tablet Take 500 mg by mouth two (2) times a day. Yes Provider, Historical   cpap machine kit by Does Not Apply route as needed. Yes Provider, Historical   Aspirin, Buffered 81 mg tab Take  by mouth. Yes Provider, Historical     Allergies   Allergen Reactions    Mobic [Meloxicam] Anxiety     Jittery         Objective:     Visit Vitals  /80 (BP 1 Location: Left upper arm, BP Patient Position: Sitting, BP Cuff Size: Large adult)   Pulse 88   Temp 98.7 °F (37.1 °C)   Resp 20   Ht 5' 7\" (1.702 m)   Wt 238 lb 8 oz (108.2 kg)   SpO2 95%   BMI 37.35 kg/m²       Physical Exam:  GENERAL: alert, cooperative, no distress, appears stated age, morbidly obese      Assessment:     Recurrent morbid obesity with comorbidities; no success with medical management. She has completed pre-operative requirements but [employer] has denied her request for revision given continued weight gain throughout pre-op process. We discussed this situation, along with her high risk nature given advanced age. I reviewed the appeal process, but highlighted the fact that she could not be successful if she did not demonstrate weight loss.   I recommended medical weight loss evaluation to reinitiate weight loss, possibly allow her to move forward with an appeal.  We reviewed that if she were successful with medical weight loss, consideration for revision may become irrelevant. She agrees with this plan. Plan:     Medical weight loss referral.  Increase exercise as tolerated. Avoid liquid calories, especially alcoholic beverages. Follow-up in 3 months for weight check, reassessment. 20 minutes spent with patient (greater than 50% of time in face-face consultation reviewing preoperative work-up, insurance denial, appeal process, recommendations regarding medical weight loss).       Signed By: Matias Hong MD     February 3, 2022

## 2022-02-04 NOTE — PATIENT INSTRUCTIONS
Abnormal Weight Gain: Care Instructions  Your Care Instructions     There are two types of weight gainnormal and abnormal. Normal weight gain is usually caused by eating too much or exercising too little. It can also happen as you get older. But abnormal weight gain has other causes. It can be caused by a problem with your thyroid gland, called hypothyroidism. Or it can be caused by a problem with your adrenal glands, called Cushing's syndrome. Or your body could be holding too much fluid because of kidney, liver, or heart problems. In some cases, a medicine you take can cause you to gain weight. You can work with your doctor to find out the cause of your weight gain. You will probably need tests to do this. Follow-up care is a key part of your treatment and safety. Be sure to make and go to all appointments, and call your doctor if you are having problems. It's also a good idea to know your test results and keep a list of the medicines you take. How can you care for yourself at home? · Weigh yourself at the same time every day. It's best to do it first thing in the morning after you empty your bladder. Be sure to always wear the same amount of clothing. · Write down any changes in your weight and the possible causes. Discuss these with your doctor. · Your doctor may want you to change your diet and exercise habits. A good way to lose weight is to reduce calories and increase exercise. · Walking is an easy way to get exercise. Try to walk a little longer every day. You also may want to swim, bike, or do other activities. · Ask your doctor if you should see a dietitian. This is a person who can help you plan meals that work best for your lifestyle. · If your doctor prescribed medicines, take them exactly as prescribed. Call your doctor if you think you are having a problem with your medicine. You will get more details on the specific medicines your doctor prescribes.   When should you call for help?  Watch closely for changes in your health, and be sure to contact your doctor if:    · You do not get better as expected.     · You continue to gain weight. Where can you learn more? Go to http://www.[employer]el[employer].com/  Enter A175 in the search box to learn more about \"Abnormal Weight Gain: Care Instructions. \"  Current as of: March 17, 2021               Content Version: 13.0  © 8003-4810 [employer], [employer] Care instructions adapted under license by [employer] (which disclaims liability or warranty for this information). If you have questions about a medical condition or this instruction, always ask your healthcare professional. [address] any warranty or liability for your use of this information.

## 2022-02-09 DIAGNOSIS — E66.01 MORBID OBESITY (HCC): ICD-10-CM

## 2022-02-09 DIAGNOSIS — Z76.89 ENCOUNTER FOR WEIGHT MANAGEMENT: Primary | ICD-10-CM

## 2022-03-08 ENCOUNTER — OFFICE VISIT (OUTPATIENT)
Dept: SURGERY | Age: 72
End: 2022-03-08
Payer: MEDICARE

## 2022-03-08 VITALS
RESPIRATION RATE: 18 BRPM | OXYGEN SATURATION: 96 % | TEMPERATURE: 98.4 F | SYSTOLIC BLOOD PRESSURE: 134 MMHG | HEIGHT: 67 IN | BODY MASS INDEX: 36.18 KG/M2 | DIASTOLIC BLOOD PRESSURE: 60 MMHG | WEIGHT: 230.5 LBS | HEART RATE: 60 BPM

## 2022-03-08 DIAGNOSIS — K57.90 DIVERTICULOSIS: ICD-10-CM

## 2022-03-08 DIAGNOSIS — F33.42 RECURRENT MAJOR DEPRESSIVE DISORDER, IN FULL REMISSION (HCC): ICD-10-CM

## 2022-03-08 DIAGNOSIS — E78.2 MIXED HYPERLIPIDEMIA: ICD-10-CM

## 2022-03-08 DIAGNOSIS — Z87.442 PERSONAL HISTORY OF KIDNEY STONES: ICD-10-CM

## 2022-03-08 DIAGNOSIS — I25.10 CORONARY ARTERY DISEASE INVOLVING NATIVE HEART WITHOUT ANGINA PECTORIS, UNSPECIFIED VESSEL OR LESION TYPE: ICD-10-CM

## 2022-03-08 DIAGNOSIS — Z79.899 LONG-TERM CURRENT USE OF PROTON PUMP INHIBITOR THERAPY: ICD-10-CM

## 2022-03-08 DIAGNOSIS — M25.512 CHRONIC LEFT SHOULDER PAIN: ICD-10-CM

## 2022-03-08 DIAGNOSIS — R06.09 DOE (DYSPNEA ON EXERTION): ICD-10-CM

## 2022-03-08 DIAGNOSIS — F51.04 CHRONIC INSOMNIA: ICD-10-CM

## 2022-03-08 DIAGNOSIS — I83.893 VARICOSE VEINS OF BOTH LEGS WITH EDEMA: ICD-10-CM

## 2022-03-08 DIAGNOSIS — R00.2 PALPITATIONS: ICD-10-CM

## 2022-03-08 DIAGNOSIS — Z98.84 BARIATRIC SURGERY STATUS: ICD-10-CM

## 2022-03-08 DIAGNOSIS — Z87.891 HISTORY OF TOBACCO ABUSE: ICD-10-CM

## 2022-03-08 DIAGNOSIS — R63.5 ABNORMAL WEIGHT GAIN: ICD-10-CM

## 2022-03-08 DIAGNOSIS — I25.10 ARTERIOSCLEROTIC HEART DISEASE (ASHD): ICD-10-CM

## 2022-03-08 DIAGNOSIS — R91.8 PULMONARY NODULES: ICD-10-CM

## 2022-03-08 DIAGNOSIS — I65.23 INTERNAL CAROTID ARTERY STENOSIS, BILATERAL: ICD-10-CM

## 2022-03-08 DIAGNOSIS — M79.7 FIBROMYALGIA: ICD-10-CM

## 2022-03-08 DIAGNOSIS — R63.5 WEIGHT GAIN, ABNORMAL: ICD-10-CM

## 2022-03-08 DIAGNOSIS — R53.82 CHRONIC FATIGUE: ICD-10-CM

## 2022-03-08 DIAGNOSIS — E11.9 TYPE 2 DIABETES MELLITUS WITHOUT COMPLICATION, WITHOUT LONG-TERM CURRENT USE OF INSULIN (HCC): ICD-10-CM

## 2022-03-08 DIAGNOSIS — G47.33 OSA ON CPAP: ICD-10-CM

## 2022-03-08 DIAGNOSIS — Z90.49 HISTORY OF LAPAROSCOPIC CHOLECYSTECTOMY: ICD-10-CM

## 2022-03-08 DIAGNOSIS — M54.12 CERVICAL RADICULITIS: ICD-10-CM

## 2022-03-08 DIAGNOSIS — G89.29 CHRONIC LEFT SHOULDER PAIN: ICD-10-CM

## 2022-03-08 DIAGNOSIS — Z99.89 OSA ON CPAP: ICD-10-CM

## 2022-03-08 DIAGNOSIS — E66.01 CLASS 2 SEVERE OBESITY DUE TO EXCESS CALORIES WITH SERIOUS COMORBIDITY AND BODY MASS INDEX (BMI) OF 36.0 TO 36.9 IN ADULT (HCC): Primary | ICD-10-CM

## 2022-03-08 PROCEDURE — 3051F HG A1C>EQUAL 7.0%<8.0%: CPT | Performed by: FAMILY MEDICINE

## 2022-03-08 PROCEDURE — G8427 DOCREV CUR MEDS BY ELIG CLIN: HCPCS | Performed by: FAMILY MEDICINE

## 2022-03-08 PROCEDURE — 2022F DILAT RTA XM EVC RTNOPTHY: CPT | Performed by: FAMILY MEDICINE

## 2022-03-08 PROCEDURE — G8432 DEP SCR NOT DOC, RNG: HCPCS | Performed by: FAMILY MEDICINE

## 2022-03-08 PROCEDURE — 3017F COLORECTAL CA SCREEN DOC REV: CPT | Performed by: FAMILY MEDICINE

## 2022-03-08 PROCEDURE — G8536 NO DOC ELDER MAL SCRN: HCPCS | Performed by: FAMILY MEDICINE

## 2022-03-08 PROCEDURE — G8400 PT W/DXA NO RESULTS DOC: HCPCS | Performed by: FAMILY MEDICINE

## 2022-03-08 PROCEDURE — G8754 DIAS BP LESS 90: HCPCS | Performed by: FAMILY MEDICINE

## 2022-03-08 PROCEDURE — G8752 SYS BP LESS 140: HCPCS | Performed by: FAMILY MEDICINE

## 2022-03-08 PROCEDURE — 99215 OFFICE O/P EST HI 40 MIN: CPT | Performed by: FAMILY MEDICINE

## 2022-03-08 PROCEDURE — 1090F PRES/ABSN URINE INCON ASSESS: CPT | Performed by: FAMILY MEDICINE

## 2022-03-08 PROCEDURE — G8417 CALC BMI ABV UP PARAM F/U: HCPCS | Performed by: FAMILY MEDICINE

## 2022-03-08 PROCEDURE — 1101F PT FALLS ASSESS-DOCD LE1/YR: CPT | Performed by: FAMILY MEDICINE

## 2022-03-08 RX ORDER — MELATONIN 10 MG
CAPSULE ORAL AS NEEDED
COMMUNITY

## 2022-03-08 NOTE — PROGRESS NOTES
1. Have you been to the ER, urgent care clinic since your last visit? Hospitalized since your last visit? No    2. Have you seen or consulted any other health care providers outside of the [address] [place] [address] since your last visit? Include any pap smears or colon screening.  Yes When: 03/01/2022 Dr Baldemar Sheppard for nerve block in neck

## 2022-03-08 NOTE — PROGRESS NOTES
[address] [address], [address] [facility]  Maliha Melendez  22.  [phone] o  [facility] EXAMINATION    Patient:  Collins Duque, 1950  PCP:   Nayeli Willett MD  Patient Status: new    Referred by:  FLY Solorio w Dr. Daniel Goldstein Reason referred: To lose weight. Pt wants Revisional Lap Banding converted to Gastric Bypass. Saw Dr. Daniel Goldstein, bariatric surgeon and Karena Pires, [facility] Told pt this surgery is not recommended at her age and [employer] refused to cover it. Referred pt for medical weight loss. Pt is hopeful but not happy about this. Collins Duque is a 66 y.o. female who is a patient with Obesity Class 2 Body mass index is 36.1 kg/m². and presents today for evaluation and treatment.   How did patient hear about this weight management program:  FLY Solorio  Patient has attended the [facility] Orientation: yes      Patient's weight management approach preference today:  Medication    Patient goals for participation in weight management program:   175 lbs (lose 55 lbs), have energy to get my house together and do more w my     Start weight today 3/8/2022:  230 lbs 8 oz     Weight Metrics 3/8/2022 3/8/2022 2/2/2022 10/12/2021 9/7/2021 8/3/2021 6/28/2021   Weight - 230 lb 8 oz 238 lb 8 oz 232 lb 9.6 oz 229 lb 6.4 oz 230 lb 230 lb   Neck Circ (inches) 14.75 - - - - - -   Waist Measure Inches 47 - - - - - -   Body Fat % 45.1 - - - - - -   BMI - 36.1 kg/m2 37.35 kg/m2 36.43 kg/m2 35.93 kg/m2 36.02 kg/m2 36.02 kg/m2     Neck Circumference:  Acceptable range for M >16 inches, F>15 inches  Waist Circumference:  Acceptable range for M> 40 inches/102 cm, F > 35 inches, 88 cm  Body Fat: Acceptable range for M 18-24%, F 25-31%    When did patient begin gaining excess weight:  03/2019-02/2022  How much excess weight has been gained:  32 lbs in 3 yrs, also in 1984 after childbirth, when caring for my mom and when I was diagnosed w Fibromyalgia (started Lyrica)  Is patient ready to start participating in this weight loss journey and reason(s) why: yes, \" to improve my pain and weakness\"  Any potential unsupportive people in patient's life: 0    WEIGHT LOSS HISTORY  Lowest weight in adulthood: 135 lbs in my mid 35s  Highest weight in lifetime:   247 lbs  Previous weight loss programs: , [employer]  Previous OTC weight loss medications, herbal remedies/supplements: 0  Previous prescription weight loss medication:  Metformin  Negative side effects: 0  Pounds lost with use:  ? lbs  Factors contributing to weight re-gain:  Stressful situations    Previous Weight Loss Surgery:  Lap Gastric Band    Bariatric surgeon: Dr. Lola Molina Date of Surgery:  69/8950  Post-Op Complications:  0  Initial Weight Loss after Bariatric Surgery:  60 lbs  Weight re-gained:  60 lbs, 20 lbs after starting Lyrica  Factors contributing to weight re-gain:  Stress, inconvenient to prepare healthy meals    Past Surgical History:   Procedure Laterality Date    COLONOSCOPY N/A 8/14/2020    Dr. Camille Morales. w colon polypectomy. due q 3 yrs.  HX BARIATRIC SURGERY  01/2008    LAP GASTRIC BANDING. Dr. Lola Molina.  HX CARPAL TUNNEL RELEASE Bilateral     HX CHOLECYSTECTOMY  1984    HX LITHOTRIPSY Left     due to kidney stone. Dr. Bairon Purvis Right     due to [address] Dr. Ysoelin Galloway.  IR ENDOVENOUS ABLATION RF INITIAL VEIN BI Right 2021    Dr. Ketty Obrien. [place]. due to Venous Insufficiency.     VT COLSC FLX W/RMVL OF TUMOR POLYP LESION SNARE TQ  12/22/2010         VT EGD TRANSORAL BIOPSY SINGLE/MULTIPLE  12/22/2010    Dr. Josefina Hoyt who had weight loss surgery/type of surgery: daughter had Lap Band, sister had GBP, cousin had GBP  Family history of:  Obesity- yes, Kidney stones- 0, Gallstones- 0, Diabetes- yes, Heart Disease- yes, Sleep Apnea-  0, Gout- 0  Family History   Problem Relation Age of Onset    Hypertension Father    Aliciashashi Kaiser SKIN CANCER Father     Prostate Cancer Father     Parkinson's Disease Mother     Heart Attack Mother 48    Heart Surgery Mother     Coronary Art Dis Mother     Diabetes Sister     Heart Disease Sister     Other Sister         DJD    Other Sister         gastric bypass    Obesity Paternal Grandmother     Obesity Paternal [address]  Patient denies any contraindications to participation in LCD or VLCD including: history of MI in the last 3 months, Type 1 DM, Liver or Kidney disease requiring protein restriction, Recent treatment for Cancer, Gout, Recent onset of Inflammatory Bowel Disease, severe Food Allergies or Lactose Intolerance. Pt has a hx of Kidney stones and Lap cholecystectomy. Past Medical History:   Diagnosis Date    Acute meniscal tear of left knee     Anxiety     Arteriosclerotic heart disease (ASHD)     Dr. Moriah Diehl CAD (coronary artery disease)     Dr. Galdino Ziegler.  03/25/19 Cor Calcium score 112, Mod CAD.  Carpal tunnel syndrome, bilateral     Cataract of both eyes     Dr. Greg Fontenot    Cervical radiculitis     Dr. Govind Balderrama. Dr. Shane Gonzalez.    Chronic pain     back, neck, shoulder. Dr. Yary Beltran Depression     [employer] [facility]. Molinda Party, therapist    Diabetes mellitus ([facility] [address] 03/21/2019    Diverticulosis     Dry eye syndrome of both eyes     Dr. Jaimie Morin Erosive esophagitis     Dr. Varun [place], rheum.  Foot swelling     BL    Gallstones 1984    Hypercholesterolemia     Hyperglycemia     A1c 6.0    Internal carotid artery stenosis, bilateral 2022    Mild    Long term current use of anticoagulant therapy     Lumbar degenerative disc disease     L4-5. Dr. Chadd Sandoval.  Neuropathy     Personal history of kidney stones     BL. Dr. Mahi Bourne.     PVC's (premature ventricular contractions)     Dr. Yoana Lopez. Txd Diltiazem, Metoprolol    Shoulder pain, left     L>R. Dr. Nicolette Grant    Sleep apnea     CPAP    Valvular heart disease     hx of Mitral valve disorder    Varicose veins of both legs with edema     Dr. Pena Shadow:    3 most recent [facility] 36 Screens 3/8/2022   Little interest or pleasure in doing things Several days   Feeling down, depressed, irritable, or hopeless More than half the days   Total Score PHQ 2 3   Trouble falling or staying asleep, or sleeping too much Several days   Feeling tired or having little energy Not at all   Poor appetite, weight loss, or overeating Not at all   Feeling bad about yourself - or that you are a failure or have let yourself or your family down Several days   Trouble concentrating on things such as school, work, reading, or watching TV Not at all   Moving or speaking so slowly that other people could have noticed; or the opposite being so fidgety that others notice Not at all   Thoughts of being better off dead, or hurting yourself in some way Not at all   PHQ 9 Score 5   How difficult have these problems made it for you to do your work, take care of your home and get along with others Not difficult at all       History of drug abuse or dependence:  0  History of mental health conditions (including Depression, Anxiety, Anorexia, Bulimia or Binge Eating disorder): Anxiety, Depression  Treating provider and medication(s): Rx Trazadone 100 mg q hs for MAD, Cymbalta for MELANY, MAD by pcp and counseling w Rose Ardon  Current major lifestyle changes or stressors:    w COPD needs care. Mom  . Need a Lap fill but can not get it until after having my colonoscopy   Is the mental health condition controlled: yes. No SI/SA. Home Medications    Medication Sig Start Date End Date Taking?  Authorizing Provider   melatonin 10 mg capsule Take  by mouth as needed. Takes one to two caps   Yes Provider, Historical   HYDROcodone-acetaminophen (NORCO) 5-325 mg per tablet TAKE 1 TABLET BY MOUTH TWICE DAILY AS NEEDED FOR PAIN 1/10/22  Yes Provider, Historical   metoprolol succinate (TOPROL-XL) 50 mg XL tablet TAKE 1 TABLET EVERY DAY 1/2/22  Yes Greg Morgan MD   DILT- mg capsule TAKE 1 CAPSULE EVERY DAY  (KEEP  APPOINTMENT  FOR  FURTHER  REFILLS) 11/4/21  Yes Bradford THEODORE NP   diazePAM (VALIUM) 10 mg tablet TAKE 1 TABLET BY MOUTH EVERY DAY AS NEEDED 5/3/21  Yes Provider, Historical   hydroCHLOROthiazide (HYDRODIURIL) 12.5 mg tablet TAKE 1 TABLET BY MOUTH EVERY DAY IN THE MORNING 5/6/21  Yes Provider, Historical   omeprazole (PRILOSEC) 20 mg capsule Take  by mouth. PRN 11/10/20  Yes Provider, Historical   traZODone (DESYREL) 50 mg tablet Take 100 mg by mouth as needed. 9/24/20  Yes Provider, Historical   rosuvastatin (CRESTOR) 40 mg tablet Take 1 Tab by mouth nightly. 8/20/20  Yes Jenifer Jones NP   pregabalin (LYRICA) 150 mg capsule Take 150 mg by mouth two (2) times a day. Yes Provider, Historical   DULoxetine (CYMBALTA) 60 mg capsule Take 60 mg by mouth daily. Yes Provider, Historical   ezetimibe (ZETIA) 10 mg tablet Take  by mouth. Yes Provider, Historical   metFORMIN (GLUMETZA ER) 500 mg TG24 24 hour tablet Take 500 mg by mouth two (2) times a day. Yes Provider, Historical   Aspirin, Buffered 81 mg tab Take  by mouth. Yes Provider, Historical   cyclobenzaprine (FLEXERIL) 10 mg tablet Take  by mouth three (3) times daily as needed for Muscle Spasm(s). Patient not taking: Reported on 3/8/2022    Provider, Historical   pantoprazole (PROTONIX) 40 mg tablet TAKE 1 TABLET BY MOUTH EVERY DAY BEFORE A MEAL 2/23/21   Provider, Historical   cpap machine kit by Does Not Apply route as needed.   Patient not taking: Reported on 3/8/2022    Provider, Historical      Medications that cause weight gain:  Toprol, Trazodone, Protonix, Cymbalta, Flexeril, Lyrica  Medications that cause weight loss:  Metformin  Any changes to medications since last office visit with me:  0  Allergies   Allergen Reactions    Mobic [Meloxicam] Anxiety     Jittery       EATING HABITS  Total calories consumed per day:  ? kcal    Number of meals consumed per day on average: 2  Number of times per week fast food or meals at/from restaurants is consumed: 1  Typical Meals:       Breakfast: skip or fruit      Lunch:  Skinny sandwich w turkey and mustard w cheese or leftovers      Dinner: protein w vegetables or soup       Snacks: crackers, chips w salsa, ice cream, doughnuts, pie  Barriers to eating healthy meals:  Sugar cravings, cost    DRINKING HABITS  How much water do you consume daily: 64+ oz/day    How much caffeine do you drink daily: 0 oz coffee/day  Alcohol use:    Social History     Substance and Sexual Activity   Alcohol Use Not Currently    Comment: QUIT ALCOHOL 05/2021- was 2 g wine, whiskey or vodka     Any other sugar-sweetened beverages daily (sodas, teas, juices, etc.): juice  Barriers to consuming at least 2 L water daily:  busyness     Social History     Tobacco Use    Smoking status: Former Smoker     Packs/day: 0.50     Years: 25.00     Pack years: 12.50     Types: Cigarettes     Quit date: 12/29/2011     Years since quitting: 10.1    Smokeless tobacco: Never Used   Vaping Use    Vaping Use: Never used   Substance Use Topics    Alcohol use: Not Currently    Drug use: No         SLEEP HISTORY  Hours of sleep nightly: 8-10 hours  Rx or OTC Sleep aids used:  Yes, OTC Melatonin 10 mg 2 po q hs prn.   Rxd Trazadone by pcp  Any eating while asleep:  No  Any snoring:  No Daytime naps:  rarely  Daytime Sleepiness:  Yes  Sleep History:   BILL   Any CPAP machine use: Yes and off CPAP while renovating my home  Sleep Medicine Specialist:  Dr. Arpan Hassan  Occupation:  Retired  Barriers to getting proper rest:  Stress    PHYSICAL ACTIVITY HISTORY  Type of physical activity: stretching  Physical Activity is performed 7 days a week for 15 minutes 2 time(s) a day. Average number steps per day:  ?000  Gym membership owned:  No Actively utilizing the gym:  No  Pets owned:  Yes  Is physical activity enjoyable:  ?  Have you ever been told by a physician or anyone else not to exercise: No  Barriers limiting physical activity:  Yes, I have too much to do. BIRD and fatigue. Mobile Apps used for meals, water consumption, sleep, physical activity: 0    OB/GYN HISTORY (For Female Patients)  Social History     Substance and Sexual Activity   Sexual Activity Not on file     OB History    No obstetric history on file. Menopause:  yes   LMP:  No LMP recorded. (Menstrual status: Menopause). Are you pregnant or planning on becoming pregnant within 6 months:  0      Other Medical Care and Concerns  As above    Do you have upcoming travel in the next 6 weeks:  No  If so, contact the dietician for meal plan modification and recommendations. Immunization History   Administered Date(s) Administered    COVID-19, Pfizer [employer], DO NOT Dilute, Miguel Angel-Sucrose, 12+ yrs, PF, 30mcg/0.3 mL dose 02/23/2021, 03/26/2021    COVID-19, Pfizer Purple top, DILUTE for use, 12+ yrs, 30mcg/0.3mL dose 10/08/2021    Tdap 10/12/2020       HEALTH MAINTENANCE  Health Maintenance   Topic Date Due    Hepatitis C Screening  Never done    Shingrix Vaccine Age 50> (1 of 2) Never done    Breast Cancer Screen Mammogram  Never done    Bone Densitometry (Dexa) Screening  Never done    Medicare Yearly Exam  Never done    Lipid Screen  09/28/2022    Depression Screen  02/02/2023    Colorectal Cancer Screening Combo  08/14/2030    DTaP/Tdap/Td series (2 - Td or Tdap) 10/12/2030    Flu Vaccine  Completed    COVID-19 Vaccine  Completed    Pneumococcal 65+ years  Completed       ROS:  Review of Systems negative except as noted above in HPI.     PHYSICAL EXAMINATION    Visit Vitals  /60 (BP 1 Location: Left arm, BP Patient Position: Sitting, BP Cuff Size: Large adult)   Pulse 60   Temp 98.4 °F (36.9 °C)   Resp 18   Ht 5' 7\" (1.702 m)   Wt 230 lb 8 oz (104.6 kg)   SpO2 96%   BMI 36.10 kg/m²         Weight Metrics 3/8/2022 3/8/2022 2/2/2022 10/12/2021 9/7/2021 8/3/2021 6/28/2021   Weight - 230 lb 8 oz 238 lb 8 oz 232 lb 9.6 oz 229 lb 6.4 oz 230 lb 230 lb   Neck Circ (inches) 14.75 - - - - - -   Waist Measure Inches 47 - - - - - -   Body Fat % 45.1 - - - - - -   BMI - 36.1 kg/m2 37.35 kg/m2 36.43 kg/m2 35.93 kg/m2 36.02 kg/m2 36.02 kg/m2        General appearance - Well nourished. Well appearing. Well developed. No acute distress. Obese. Head - Normocephalic. Atraumatic. Eyes -  Extraocular eye movements intact. Sclera anicteric. Ears - Hearing is grossly normal bilaterally. Nose - normal and patent. Neck - supple. Midline trachea. No carotid bruits noted bilaterally. No thyromegaly noted. Chest - clear to auscultation bilaterally anteriorly and posteriorly. No wheezes. No rales or rhonchi. Breath sounds are symmetrical bilaterally. Unlabored respirations. Heart - normal rate. Regular rhythm. Normal S1, S2. No murmur noted. Abdomen - soft and distended. No masses or organomegaly. No rebound, rigidity or guarding. Bowel sounds normal x 4 quadrants. No tenderness noted. Neurological - awake, alert and oriented to person, place, and time and event. Cranial nerves II through XII intact. Clear speech. Muscle strength is +5/5 x 4 extremities. Steady gait. Heme/Lymph - peripheral pulses normal x 4 extremities. No peripheral edema is noted. Musculoskeletal - Intact x 4 extremities. No pain with movement. Back exam - normal range of motion. No CVA tenderness. Negative Straight Leg Test bilaterally. No buffalo hump noted. Skin - no rashes, erythema, ecchymosis, lacerations, abrasions, suspicious moles noted. No skin tags or moles.   No acanthosis nigricans noted in the axilla or neck.  Psychological -   normal behavior, dress and thought processes. Good insight. Good eye contact. Normal affect. Appropriate mood. Normal speech. DATA REVIEWED:    Lab Results   Component Value Date/Time    WBC 7.6 10/23/2015 01:23 PM    HGB 13.4 10/23/2015 01:23 PM    HCT 42.4 10/23/2015 01:23 PM    PLATELET 659 65/76/8820 01:23 PM    .5 (H) 10/23/2015 01:23 PM     Lab Results   Component Value Date/Time    Sodium 141 09/28/2021 12:32 PM    Potassium 4.6 09/28/2021 12:32 PM    Chloride 108 09/28/2021 12:32 PM    CO2 32 09/28/2021 12:32 PM    Anion gap 1 (L) 09/28/2021 12:32 PM    Glucose 119 (H) 09/28/2021 12:32 PM    BUN 14 09/28/2021 12:32 PM    Creatinine 0.76 09/28/2021 12:32 PM    BUN/Creatinine ratio 18 09/28/2021 12:32 PM    GFR est AA >60 09/28/2021 12:32 PM    GFR est non-AA >60 09/28/2021 12:32 PM    Calcium 9.1 09/28/2021 12:32 PM    Bilirubin, total 0.4 09/28/2021 12:32 PM    Alk.  phosphatase 66 09/28/2021 12:32 PM    Protein, total 6.4 09/28/2021 12:32 PM    Albumin 3.4 (L) 09/28/2021 12:32 PM    Globulin 3.0 09/28/2021 12:32 PM    A-G Ratio 1.1 09/28/2021 12:32 PM    ALT (SGPT) 30 09/28/2021 12:32 PM    AST (SGOT) 14 (L) 09/28/2021 12:32 PM     No results found for: HBA1C, NOJ7VAET, IEE7OUOY   Lab Results   Component Value Date/Time    TSH 2.260 02/27/2018 02:32 PM     No results found for: URICA, UAU1  No results found for: MG  Lab Results   Component Value Date/Time    Vitamin B12 590 02/27/2018 02:32 PM    Folate 15.1 02/27/2018 02:32 PM      Lab Results   Component Value Date/Time    VITAMIN D, 25-HYDROXY 27.8 (L) 09/14/2015 01:33 PM        Lab Results   Component Value Date/Time    Iron 89 09/14/2015 01:33 PM     Lab Results   Component Value Date/Time    Cholesterol, total 109 09/28/2021 12:32 PM    HDL Cholesterol 44 09/28/2021 12:32 PM    LDL, calculated 44.4 09/28/2021 12:32 PM    VLDL, calculated 20.6 09/28/2021 12:32 PM    Triglyceride 103 09/28/2021 12:32 PM    CHOL/HDL Ratio 2.5 09/28/2021 12:32 PM      No results found for this or any previous visit. EKG:    Results for orders placed or performed during the hospital encounter of 10/12/20   EKG, 12 LEAD, INITIAL   Result Value Ref Range    Ventricular Rate 55 BPM    Atrial Rate 55 BPM    P-R Interval 138 ms    QRS Duration 78 ms    Q-T Interval 430 ms    QTC Calculation (Bezet) 411 ms    Calculated P Axis 42 degrees    Calculated R Axis 21 degrees    Calculated T Axis 52 degrees    Diagnosis       Sinus bradycardia  Septal infarct (cited on or before 23-OCT-2015)  When compared with ECG of 23-OCT-2015 12:33,  premature supraventricular complexes are no longer present  Confirmed by Lupe Berkowitz MD ([id number]) on 10/12/2020 4:33:45 PM       QTc 411 msec. No prolonged QTc noted. (Upper QTc limit is 440 msec for males, 460 msec for females)    ASSESSMENT     ICD-10-CM ICD-9-CM    1. Class 2 severe obesity due to excess calories with serious comorbidity and body mass index (BMI) of 36.0 to 36.9 in adult ([facility]  E66.01 278.01     Z68.36 V85.36    2. Abnormal weight gain  R63.5 783.1 CBC W/O DIFF      METABOLIC PANEL, COMPREHENSIVE      MAGNESIUM      INSULIN      THYROID PEROXIDASE (TPO) AB      TSH 3RD GENERATION      VITAMIN D, 25 HYDROXY   3. Mixed hyperlipidemia  E78.2 272.2 NMR LIPOPROFILE WITH LIPIDS (WITHOUT GRAPH)      METABOLIC PANEL, COMPREHENSIVE      INSULIN   4. Chronic fatigue  R53.82 780.79 REFERRAL TO PULMONARY DISEASE      CBC W/O DIFF      METABOLIC PANEL, COMPREHENSIVE      MAGNESIUM      IRON      THYROID PEROXIDASE (TPO) AB      VITAMIN D, 25 HYDROXY   5. Type 2 diabetes mellitus without complication, without long-term current use of insulin (HCC)  E11.9 250.00 HEMOGLOBIN A1C WITH EAG   6. Palpitations  R00.2 785.1     due to PVCs   7. Fibromyalgia  M79.7 729.1    8. Pulmonary nodules  R91.8 793.19 REFERRAL TO PULMONARY DISEASE    BL w BIRD and fatigue 2019   9.  Arteriosclerotic heart disease (ASHD)  I25.10 414.00 10. BIRD (dyspnea on exertion)  R06.00 786.09 REFERRAL TO PULMONARY DISEASE      CBC W/O DIFF   11. Weight gain, abnormal  O52.3 722.3 METABOLIC PANEL, COMPREHENSIVE      TSH 3RD GENERATION      URIC ACID   12. Recurrent major depressive disorder, in full remission ([facility] [address]  F33.42 296.36    13. BILL on CPAP  G47.33 327.23     Z99.89 V46.8    14. Internal carotid artery stenosis, bilateral  I65.23 433.10      433.30    15. Coronary artery disease involving native heart without angina pectoris, unspecified vessel or lesion type  I25.10 414.01    16. Long-term current use of proton pump inhibitor therapy  Z79.899 V58.69 VITAMIN B12 & FOLATE   17. Varicose veins of both legs with edema  I83.893 454.8     w    18. Diverticulosis  K57.90 562.10    19. History of tobacco abuse  Z87.891 V15.82 REFERRAL TO PULMONARY DISEASE   20. Personal history of kidney stones  Z87.442 V13.01    21. Chronic left shoulder pain  M25.512 719.41     G89.29 338.29    22. Cervical radiculitis  M54.12 723.4    23. Chronic insomnia  F51.04 780.52 melatonin 10 mg capsule   24. Bariatric surgery status  Z98.84 V45.86    25. History of laparoscopic cholecystectomy  Z90.49 V45.89    26. BMI 36.0-36.9,adult  Z68.36 V85.36         WEIGHT MANAGEMENT PLAN    Welcomed patient to our [employer]. Agree with nurse documentation. Chart was reviewed and updated during the office visit today. Reviewed and discussed notes from other providers. Encouraged patient to follow their recommendations and follow up plans. Emphasized the importance of patient continuing care from current primary care provider and other specialists while participating in this weight management program.  Patient has full access to all our office notes, orders, lab results on [employer] and can provide them copies, if desired. Advised patient to follow up with pcp for any acute symptoms and Health Maintenance.   Referrals given as noted above.    Informed patient about Obesity medicine treatment options available at the [facility] including nutritional counseling, weight loss medication management and behavioral counseling. Based on patient history, diagnostic tests and physical exam performed today in our office, Bao Rosa is a good candidate for the [employer] using the [employer] [facility] meal replacements for Low Calorie Dietary approach (1836-2426 kcal daily.)       During this visit, patient met with Jaspal Ulloa,  today to review and sign [facility] Commitment Form, Attendance Policy, and [facility] Agreement and Consent, further discuss program design, assist patient with ordering [facility] meal replacements and schedule initial 1:1 visit with dietician, mandatory weekly (VLCD 800 kcal/day)or bi-monthly (LCD/8565-4347 kcal/day) nurse visits. SEE SCANNED DOCUMENTS. Referred patient to [facility] Patient Manual to become familiar with potential side effects of this dietary approach and what to do if such symptoms occur. Encouraged patient to notify our office if any new symptoms develop and persist.      Discussed the patient's medications, BMI, anthropometrics and goals. Informed patient that weight loss goal of 5-10% in 6-12 months has shown significant improvement in obesity and its related health conditions including DM, heart disease, asthma. A key study published in Arthritis & Rheumatism of Overweight and Obese Adults with OA found that losing 1 pound of weight resulted in 4 pounds of pressure being removed from the knees. Since patient has had bariatric surgery, Bao Rosa was advised to take the vitamin regimen and follow dietary recommendations as directed by patient's bariatric surgical team.  Get annual labs to re-evaluate vitamin levels, per bariatricians recommendations.   Avoid NSAIDS, concentrated sweets and carbonated beverages. Nutritional Prescription:    Start Low Calorie Diet (low calorie diet) with 6149-6796 calories consumed daily using [facility] [employer] meal replacements. Referred patient to Registered Dietitian for initial 30 minute 1:1 nutritional counseling, include discussions about how to count daily calories and stay within the recommended amount per day as agreed upon today, simple vs complex carbohydrates. Advised patient to avoid skipping meals. Consume meals every 3-4 hours to prevent low blood sugar events. Discussed the option of starting slowly during first couple of weeks of participation by weaning down caffeine first then simple carbohydrates the following weeks (like sweet foods/simple carbohydrates, baked goods) and white foods (like potatoes, rice, pasta, bread.)    Permission granted to utilize LCD when patient is attending important family, business or social events involving \"regular\" foods. If utilizing LCD approach, consume green leafy vegetables and protein (lean meats and legumes) with each meal first then add minimal complex carbohydrates. Keep calories between 9685-8263 kcal/day. Avoid fried foods and limit saturated fats. If blood pressure is elevated, limit processed foods, caffeine and sodium intake including hot sauce and soy sauce. Stressed importance of patient drinking a minimum of 2 L (67 oz) of water daily up to half patient's body size in ounces of water, while utilizing this dietary approach unless instructed otherwise by a provider. This will help increase satiety and prevent dehydration. Avoid sugar-sweetened beverages including diet or regular soda, juice, alcohol, use of water enhancers. Try on-line recipes for water infusion, if desired. Limit caffeine to prevent bladder issues and dehydration.   Ok to drink 1 8 oz cup of black coffee or green tea (to stimulate release of Adiponectin and promote fat burning.)     Physical Activity Prescription: Minimal and as tolerated while initiating this dietary approach. Discussed approaches to increasing physical activity as part of daily routine. Encouraged strength training, resistance or toning exercises daily to prevent muscle mass loss. Count steps daily with a goal of 5,000-10,000 steps or 2.5-5.0 miles daily. Behavior and Lifestyle Prescription:  Counseled patient on stressors, stress management and self-care approaches. Encouraged patient to seek counseling to better address stressors identified today. If already receiving formal counseling please continue these sessions routinely. Go to ER if any thoughts or attempts of suicide are experienced. Sleep Prescription: A goal of 7-9 hours of uninterrupted sleep is recommended to turn off the Grehlin hormone to be released from the stomach and triggers appetite while promoting weight gain. Proper rest turns on Leptin hormone to be released from white adipose tissue and promotes weight loss. Discussed snoring, symptoms of Sleep Apnea and improvements with weight loss. If patient currently has BILL and uses CPAP or BiPAP, encouraged patient to continue its use whenever resting. Consume any intake of caffeine 6-12 hours before bedtime to avoid sleep disturbances. Limit screen time 1-2 hours before bedtime. Avoid exercising 2-3 hours before bedtime. Ok to try drinking OTC Sleepy Time Tea, Chamomile tea or Magnesium 400 mg at bedtime for sleep, if receiving less than 7-9 hours nightly. Continue current medications as directed by prescribers. Identified and discussed weight positive and weight negative medications on patient's medication list.  Advised patient not to stop any use without discussing with the prescribing provider. Will monitor patient's weight and health with use.     Weight loss medication prescribed today and sent electronically to preferred pharmacy on file after discussing benefits, contraindications and potential side effects:  Continue Metformin for DM2 and appetite from pcp. Supplement recommendations:  Start OTC Magnesium 400 mg po at night prn sleep, muscle cramping, constipation. Start OTC vit B12 1000 mcg daily orally if taking Metformin or experiencing numbness and tingling. Start OTC Fish Oil with EPA and DHA 1000 mg daily if experiencing dry skin, hair loss or low HDL. Use with caution if history of heartburn exists. Increase fiber supplements or try Fiber products if experiencing constipation. Take OTC Lactaid and/or Gas-X as needed with meal replacements, if lactose intolerance history exists or symptoms begin. Reviewed and discussed most recent lab and EKG results. If not available today, advised patient to sign a release to provide our program a copy of each from pcp or specialist.    An order form for initial lab panel was given to patient today to be drawn one week prior to next appointment with me. An order form was given to patient today to be performed after the office visit today to be done before starting the program.  Otherwise, will recheck pertinent labs monthly while participating in VLCD or every 3 months while participating in LCD, if using [employer]irection meal replacements, as discussed to identify electrolyte abnormalities and guide therapeutic approach. Advised patient to sign up for [employer] and view labs directly. Notify me by [facility] if any questions or concerns arise. Labs ordered today will be reviewed in detail at the next office visit and time allowed for any questions regarding the results.      Counseled patient on health topics:  Obesity, Insulin Resistance, VLCD and LCD dietary approaches, benefits, contraindications, possible side effects to these diets and how to prevent poor outcomes (including staying hydrated, limit physical exercise while transitioning into this program, avoid skipping meals, etc), Weight loss goals, Sleep hygiene, Barriers to losing weight and keeping weight off, stressors. Immunizations noted. Covid-19 vaccines recommended, if patient has not had it. Informed patient that Obesity may increase risk for severe illness and death from Covid-19 disease. Offered empathy, encouragement, legitimation, prayers, partnership to patient. Praised patient for successes. Patient was offered a choice/choices in the treatment plan today. Patient expresses understanding of the plan and agrees with recommendations. Return in about 1 month (around 4/8/2022) for [place] LCD, results. Total time:  88 mins spent with patient in counseling, coordinating care, reviewing and discussing results, reviewing and completing relevant documentation, and discussing treatment plans in reference to The primary encounter diagnosis was Class 2 severe obesity due to excess calories with serious comorbidity and body mass index (BMI) of 36.0 to 36.9 in adult [facility]). Diagnoses of Abnormal weight gain, Mixed hyperlipidemia, Chronic fatigue, Type 2 diabetes mellitus without complication, without long-term current use of insulin (HCC), Palpitations, Fibromyalgia, Pulmonary nodules, Arteriosclerotic heart disease (ASHD), BIRD (dyspnea on exertion), Weight gain, abnormal, Recurrent major depressive disorder, in full remission ([facility] [place] [address] BILL on CPAP, Internal carotid artery stenosis, bilateral, Coronary artery disease involving native heart without angina pectoris, unspecified vessel or lesion type, Long-term current use of proton pump inhibitor therapy, Varicose veins of both legs with edema, Diverticulosis, History of tobacco abuse, Personal history of kidney stones, Chronic left shoulder pain, Cervical radiculitis, Chronic insomnia, Bariatric surgery status, History of laparoscopic cholecystectomy, and BMI 36.0-36.9,adult were also pertinent to this visit.     [id number] Whitmore MD FOR ALLOWING ME THE PRIVILEGE TO PARTICIPATE IN THE CARE OF OUR MUTUAL PATIENT, Ms. Brandon Nicholas. Kirstie Bruce DO, JERSON Schaffer    Patient Instructions   Congratulations on starting the Low Calorie Diet! Consume 2-3 meal replacements and 1 \"grocery\" meal in your day. The \"grocery\" meal should primarily consist of protein and green vegetables. Please meet with the dietician to learn how to calculate your total calories daily and/or use one of the suggested Mobile Apps listed in your patient instructions today. Your total calories consumed each day should not exceed 1,000-1,200 kcalories. 1. If you experience any new syptoms once you start this dietary approach, refer to your [facility] Program  Patient Manual for a list of all potential side effects of the LCD and recommendations for what to do to improve or resolved the negative side effect. For constipation, do not allow more than 3 days to pass you without having a bowel movement. As mentioned at your provider monthly visit, you can try taking OTC Magnesium 400 mg at bedtime for muscle cramping, difficulty sleeping or constipation or try Milk of Magnesia, Miralax, or Smooth Move Tea for constipation. If you have kidney disease, try OTC Colace instead. Please make sure you are consuming a minimal of 2 liter (67 oz) and up to half your body weight in ounces of water every day to reduce risk experiencing the potential negative side effects. 2. If you experience new dizziness and have consumed your proper water intake, you may drink one 8 oz coffee mug of broth or a bouillon cube in water. 3. Remember to get your labs drawn with your preferred laboratory one week prior to your 3rd monthly visit with your provider. 4. Attend the required nurse triage weigh-ins every other week at the office. Make sure homework sheets are completed prior to arrival or you will not be seen and cannot  meal products.      5. Have a reliable scale and try to use the same scale each time you weigh at home. Best weights are yielded when you weight without clothing or shoes and measure before the first meal of the day. 6. Attend the weekly nutritional meetings on Thursdays with the dietician virtually as scheduled. 7.  If you plan to schedule your next 2 appointments with your provider using a virtual platform, please have a reliable blood pressure cuff and scale available. You will be asked to report your weight, blood pressure and heart rate at the time of checking in with the nurse that day. [address], , at [phone] with any questions or concerns related to the program.       Remember, just 5-10% loss of your total weight in a 6-12 month time period has shown significant improvement in Obesity and its associated medical conditions like Diabetes, Hypertension, High Cholesterol, Asthma, etc.    For every 1 pound of fat that you lose, 4 pounds of pressure is relieved from your joints. Again, WELCOME TO THE [facility] WEIGHT MANAGEMENT EXPERIENCE! We are thrilled you have chosen us to take this weight loss journey with you and honored to do so. We look forward to working very closely with you as you achieve a healthier life. Many blessings!

## 2022-03-08 NOTE — PATIENT INSTRUCTIONS
Congratulations on starting the Low Calorie Diet! Consume 2-3 meal replacements and 1 \"grocery\" meal in your day. The \"grocery\" meal should primarily consist of protein and green vegetables. Please meet with the dietician to learn how to calculate your total calories daily and/or use one of the suggested Mobile Apps listed in your patient instructions today. Your total calories consumed each day should not exceed 1,000-1,200 kcalories. 1. If you experience any new syptoms once you start this dietary approach, refer to your [facility] Program  Patient Manual for a list of all potential side effects of the LCD and recommendations for what to do to improve or resolved the negative side effect. For constipation, do not allow more than 3 days to pass you without having a bowel movement. As mentioned at your provider monthly visit, you can try taking OTC Magnesium 400 mg at bedtime for muscle cramping, difficulty sleeping or constipation or try Milk of Magnesia, Miralax, or Smooth Move Tea for constipation. If you have kidney disease, try OTC Colace instead. Please make sure you are consuming a minimal of 2 liter (67 oz) and up to half your body weight in ounces of water every day to reduce risk experiencing the potential negative side effects. 2. If you experience new dizziness and have consumed your proper water intake, you may drink one 8 oz coffee mug of broth or a bouillon cube in water. 3. Remember to get your labs drawn with your preferred laboratory one week prior to your 3rd monthly visit with your provider. 4. Attend the required nurse triage weigh-ins every other week at the office. Make sure homework sheets are completed prior to arrival or you will not be seen and cannot  meal products. 5. Have a reliable scale and try to use the same scale each time you weigh at home.  Best weights are yielded when you weight without clothing or shoes and measure before the first meal of the day. 6. Attend the weekly nutritional meetings on Thursdays with the dietician virtually as scheduled. 7.  If you plan to schedule your next 2 appointments with your provider using a virtual platform, please have a reliable blood pressure cuff and scale available. You will be asked to report your weight, blood pressure and heart rate at the time of checking in with the nurse that day. [address], , at [phone] with any questions or concerns related to the program.       Remember, just 5-10% loss of your total weight in a 6-12 month time period has shown significant improvement in Obesity and its associated medical conditions like Diabetes, Hypertension, High Cholesterol, Asthma, etc.    For every 1 pound of fat that you lose, 4 pounds of pressure is relieved from your joints. Again, WELCOME TO THE [facility] WEIGHT MANAGEMENT EXPERIENCE! We are thrilled you have chosen us to take this weight loss journey with you and honored to do so. We look forward to working very closely with you as you achieve a healthier life. Many blessings!

## 2022-03-09 LAB
25(OH)D3+25(OH)D2 SERPL-MCNC: 29.4 NG/ML (ref 30–100)
ALBUMIN SERPL-MCNC: 4.3 G/DL (ref 3.7–4.7)
ALBUMIN/GLOB SERPL: 2 {RATIO} (ref 1.2–2.2)
ALP SERPL-CCNC: 57 IU/L (ref 44–121)
ALT SERPL-CCNC: 46 IU/L (ref 0–32)
AST SERPL-CCNC: 30 IU/L (ref 0–40)
BILIRUB SERPL-MCNC: 0.4 MG/DL (ref 0–1.2)
BUN SERPL-MCNC: 16 MG/DL (ref 8–27)
BUN/CREAT SERPL: 22 (ref 12–28)
CALCIUM SERPL-MCNC: 9.7 MG/DL (ref 8.7–10.3)
CHLORIDE SERPL-SCNC: 100 MMOL/L (ref 96–106)
CHOLEST SERPL-MCNC: 154 MG/DL (ref 100–199)
CO2 SERPL-SCNC: 22 MMOL/L (ref 20–29)
CREAT SERPL-MCNC: 0.73 MG/DL (ref 0.57–1)
EGFR: 87 ML/MIN/1.73
ERYTHROCYTE [DISTWIDTH] IN BLOOD BY AUTOMATED COUNT: 14.5 % (ref 11.7–15.4)
EST. AVERAGE GLUCOSE BLD GHB EST-MCNC: 154 MG/DL
FOLATE SERPL-MCNC: 8.8 NG/ML
GLOBULIN SER CALC-MCNC: 2.1 G/DL (ref 1.5–4.5)
GLUCOSE SERPL-MCNC: 105 MG/DL (ref 65–99)
HBA1C MFR BLD: 7 % (ref 4.8–5.6)
HCT VFR BLD AUTO: 41.1 % (ref 34–46.6)
HDL SERPL-SCNC: 50.4 UMOL/L
HDLC SERPL-MCNC: 63 MG/DL
HGB BLD-MCNC: 13.5 G/DL (ref 11.1–15.9)
INSULIN SERPL-ACNC: 8.9 UIU/ML (ref 2.6–24.9)
IRON SERPL-MCNC: 60 UG/DL (ref 27–139)
LDL SERPL QN: 20.3 NM
LDL SERPL-SCNC: 1276 NMOL/L
LDL SMALL SERPL-SCNC: 882 NMOL/L
LDLC SERPL CALC-MCNC: 70 MG/DL (ref 0–99)
LP-IR SCORE SERPL: 47
MAGNESIUM SERPL-MCNC: 2.1 MG/DL (ref 1.6–2.3)
MCH RBC QN AUTO: 30.8 PG (ref 26.6–33)
MCHC RBC AUTO-ENTMCNC: 32.8 G/DL (ref 31.5–35.7)
MCV RBC AUTO: 94 FL (ref 79–97)
PLATELET # BLD AUTO: 288 X10E3/UL (ref 150–450)
POTASSIUM SERPL-SCNC: 4.9 MMOL/L (ref 3.5–5.2)
PROT SERPL-MCNC: 6.4 G/DL (ref 6–8.5)
RBC # BLD AUTO: 4.38 X10E6/UL (ref 3.77–5.28)
SODIUM SERPL-SCNC: 141 MMOL/L (ref 134–144)
THYROPEROXIDASE AB SERPL-ACNC: <8 IU/ML (ref 0–34)
TRIGL SERPL-MCNC: 122 MG/DL (ref 0–149)
TSH SERPL DL<=0.005 MIU/L-ACNC: 2.31 UIU/ML (ref 0.45–4.5)
URATE SERPL-MCNC: 3.8 MG/DL (ref 3.1–7.9)
VIT B12 SERPL-MCNC: 173 PG/ML (ref 232–1245)
WBC # BLD AUTO: 7.3 X10E3/UL (ref 3.4–10.8)

## 2022-03-16 ENCOUNTER — OFFICE VISIT (OUTPATIENT)
Dept: SURGERY | Age: 72
End: 2022-03-16
Payer: MEDICARE

## 2022-03-16 VITALS
HEIGHT: 67 IN | BODY MASS INDEX: 37.35 KG/M2 | RESPIRATION RATE: 20 BRPM | WEIGHT: 238 LBS | SYSTOLIC BLOOD PRESSURE: 167 MMHG | HEART RATE: 73 BPM | OXYGEN SATURATION: 94 % | TEMPERATURE: 98 F | DIASTOLIC BLOOD PRESSURE: 82 MMHG

## 2022-03-16 DIAGNOSIS — R63.5 ABNORMAL WEIGHT GAIN: Primary | ICD-10-CM

## 2022-03-16 PROCEDURE — G8536 NO DOC ELDER MAL SCRN: HCPCS | Performed by: SURGERY

## 2022-03-16 PROCEDURE — G8400 PT W/DXA NO RESULTS DOC: HCPCS | Performed by: SURGERY

## 2022-03-16 PROCEDURE — G9717 DOC PT DX DEP/BP F/U NT REQ: HCPCS | Performed by: SURGERY

## 2022-03-16 PROCEDURE — 99213 OFFICE O/P EST LOW 20 MIN: CPT | Performed by: SURGERY

## 2022-03-16 PROCEDURE — G8417 CALC BMI ABV UP PARAM F/U: HCPCS | Performed by: SURGERY

## 2022-03-16 PROCEDURE — 1101F PT FALLS ASSESS-DOCD LE1/YR: CPT | Performed by: SURGERY

## 2022-03-16 PROCEDURE — 1090F PRES/ABSN URINE INCON ASSESS: CPT | Performed by: SURGERY

## 2022-03-16 PROCEDURE — G8754 DIAS BP LESS 90: HCPCS | Performed by: SURGERY

## 2022-03-16 PROCEDURE — G8427 DOCREV CUR MEDS BY ELIG CLIN: HCPCS | Performed by: SURGERY

## 2022-03-16 PROCEDURE — 3017F COLORECTAL CA SCREEN DOC REV: CPT | Performed by: SURGERY

## 2022-03-16 PROCEDURE — G8753 SYS BP > OR = 140: HCPCS | Performed by: SURGERY

## 2022-03-16 NOTE — PROGRESS NOTES
1. Have you been to the ER, urgent care clinic since your last visit? Hospitalized since your last visit? No    2. Have you seen or consulted any other health care providers outside of the [address] [place] [address] since your last visit? Include any pap smears or colon screening.  Dr SANTOYO for neck injection

## 2022-03-17 ENCOUNTER — OFFICE VISIT (OUTPATIENT)
Dept: SURGERY | Age: 72
End: 2022-03-17

## 2022-03-17 DIAGNOSIS — E66.01 CLASS 2 SEVERE OBESITY DUE TO EXCESS CALORIES WITH SERIOUS COMORBIDITY AND BODY MASS INDEX (BMI) OF 36.0 TO 36.9 IN ADULT (HCC): Primary | ICD-10-CM

## 2022-03-17 NOTE — PROGRESS NOTES
[address] [place] [address] [facility] Management Center  Metabolic Weight Loss Program        Patient's Name: Jake Helton  : 1950    This patient is enrolled in [address] [place] [address] Weight Loss Program and attended the required weekly virtual nutrition class hosted via [address] [place] [address] today.       Alanis Barfield, MS, RD, LDN

## 2022-03-19 PROBLEM — I83.893 VARICOSE VEINS OF BOTH LEGS WITH EDEMA: Status: ACTIVE | Noted: 2021-01-12

## 2022-03-19 PROBLEM — G47.33 OSA ON CPAP: Status: ACTIVE | Noted: 2022-03-08

## 2022-03-19 PROBLEM — Z87.891 HISTORY OF TOBACCO ABUSE: Status: ACTIVE | Noted: 2022-03-08

## 2022-03-19 PROBLEM — Z99.89 OSA ON CPAP: Status: ACTIVE | Noted: 2022-03-08

## 2022-03-19 PROBLEM — M25.512 SHOULDER PAIN, LEFT: Status: ACTIVE | Noted: 2021-01-01

## 2022-03-19 PROBLEM — Z90.49 HISTORY OF LAPAROSCOPIC CHOLECYSTECTOMY: Status: ACTIVE | Noted: 2022-03-08

## 2022-03-19 PROBLEM — R93.1 AGATSTON CAC SCORE, <100: Status: ACTIVE | Noted: 2019-03-25

## 2022-03-19 PROBLEM — F51.04 CHRONIC INSOMNIA: Status: ACTIVE | Noted: 2022-03-08

## 2022-03-19 PROBLEM — E66.01 CLASS 2 SEVERE OBESITY DUE TO EXCESS CALORIES WITH SERIOUS COMORBIDITY AND BODY MASS INDEX (BMI) OF 36.0 TO 36.9 IN ADULT (HCC): Status: ACTIVE | Noted: 2022-03-08

## 2022-03-19 PROBLEM — R63.5 ABNORMAL WEIGHT GAIN: Status: ACTIVE | Noted: 2022-03-08

## 2022-03-19 PROBLEM — Z79.899 LONG-TERM CURRENT USE OF PROTON PUMP INHIBITOR THERAPY: Status: ACTIVE | Noted: 2022-03-08

## 2022-03-19 PROBLEM — I65.23 INTERNAL CAROTID ARTERY STENOSIS, BILATERAL: Status: ACTIVE | Noted: 2022-01-01

## 2022-03-19 PROBLEM — E66.812 CLASS 2 SEVERE OBESITY DUE TO EXCESS CALORIES WITH SERIOUS COMORBIDITY AND BODY MASS INDEX (BMI) OF 36.0 TO 36.9 IN ADULT: Status: ACTIVE | Noted: 2022-03-08

## 2022-03-19 PROBLEM — E11.9 DIABETES MELLITUS (HCC): Status: ACTIVE | Noted: 2019-03-21

## 2022-03-19 NOTE — PATIENT INSTRUCTIONS
Abnormal Weight Gain: Care Instructions  Your Care Instructions     There are two types of weight gainnormal and abnormal. Normal weight gain is usually caused by eating too much or exercising too little. It can also happen as you get older. But abnormal weight gain has other causes. It can be caused by a problem with your thyroid gland, called hypothyroidism. Or it can be caused by a problem with your adrenal glands, called Cushing's syndrome. Or your body could be holding too much fluid because of kidney, liver, or heart problems. In some cases, a medicine you take can cause you to gain weight. You can work with your doctor to find out the cause of your weight gain. You will probably need tests to do this. Follow-up care is a key part of your treatment and safety. Be sure to make and go to all appointments, and call your doctor if you are having problems. It's also a good idea to know your test results and keep a list of the medicines you take. How can you care for yourself at home? · Weigh yourself at the same time every day. It's best to do it first thing in the morning after you empty your bladder. Be sure to always wear the same amount of clothing. · Write down any changes in your weight and the possible causes. Discuss these with your doctor. · Your doctor may want you to change your diet and exercise habits. A good way to lose weight is to reduce calories and increase exercise. · Walking is an easy way to get exercise. Try to walk a little longer every day. You also may want to swim, bike, or do other activities. · Ask your doctor if you should see a dietitian. This is a person who can help you plan meals that work best for your lifestyle. · If your doctor prescribed medicines, take them exactly as prescribed. Call your doctor if you think you are having a problem with your medicine. You will get more details on the specific medicines your doctor prescribes.   When should you call for help?  Watch closely for changes in your health, and be sure to contact your doctor if:    · You do not get better as expected.     · You continue to gain weight. Where can you learn more? Go to http://www.celeste.com/  Enter A175 in the search box to learn more about \"Abnormal Weight Gain: Care Instructions. \"  Current as of: December 27, 2021               Content Version: 13.2  © 2006-2022 [employer], [employer]. Care instructions adapted under license by [facility] (which disclaims liability or warranty for this information). If you have questions about a medical condition or this instruction, always ask your healthcare professional. [address] any warranty or liability for your use of this information.

## 2022-03-19 NOTE — PROGRESS NOTES
Subjective: The patient is a 66 y.o. obese female with a history of laparoscopic adjustable gastric banding, with fair initial weight loss, but subsequent weight regain, development of reflux symptoms partial band decompression. As her weight gain continued, she sought laparoscopic revision of gastric banding to gastric bypass. She completed preoperative prerequisites but was denied by [facility] [employer]. At her last visit, I recommended appointment with our medical weight loss team.  Her first appointment was last week, and she was started on their diet plan. She states she does not like the taste/texture of the diets. She has not changed her activity level or started an exercise program.  She is still on CPAP for BILL. Bariatric comorbidities present are   Past Medical History:   Diagnosis Date    Acute meniscal tear of left knee     Anxiety     Arteriosclerotic heart disease (ASHD)     Dr. Rob Gordon CAD (coronary artery disease)     Dr. Clover Spears.  03/25/19 Cor Calcium score 112, Mod CAD.  Carpal tunnel syndrome, bilateral     Cataract of both eyes     Dr. Norma Stewart    Cervical radiculitis     Dr. Monica Hay. Dr. Yuri Osborne.    Chronic pain     back, neck, shoulder. Dr. Caren Rivas Depression     [facility]. Mary Perry, therapist    Diabetes mellitus ([facility] [address] 03/21/2019    Diverticulosis     Dry eye syndrome of both eyes     Dr. Tyler Mo Erosive esophagitis     Dr. Marlo Higgins [place], rheum.  Foot swelling     BL    Gallstones 1984    Hypercholesterolemia     Hyperglycemia     A1c 6.0    Internal carotid artery stenosis, bilateral 2022    Mild    Long term current use of anticoagulant therapy     Lumbar degenerative disc disease     L4-5. Dr. Marielle Samaniego.  Neuropathy     Personal history of kidney stones     BL. Dr. Adri Wiley.  PVC's (premature ventricular contractions)     Dr. Clover Spears.   Txd Diltiazem, Metoprolol    Shoulder pain, left 2021 L>R.  Dr. Marion Lux    Sleep apnea     CPAP    Valvular heart disease     hx of Mitral valve disorder    Varicose veins of both legs with edema     Dr. Russo End       Patient Active Problem List    Diagnosis Date Noted    BMI 36.0-36.9,adult 03/08/2022    Chronic insomnia 03/08/2022    History of laparoscopic cholecystectomy 03/08/2022    History of tobacco abuse 03/08/2022    Long-term current use of proton pump inhibitor therapy 03/08/2022    BILL on CPAP 03/08/2022    Abnormal weight gain 03/08/2022    Chronic fatigue 03/08/2022    Class 2 severe obesity due to excess calories with serious comorbidity and body mass index (BMI) of 36.0 to 36.9 in adult ([facility] [address] 03/08/2022    Hyperglycemia     Foot swelling     Fibromyalgia     Lumbar degenerative disc disease     Cataract of both eyes     Dry eye syndrome of both eyes     Gastritis     Depression     Personal history of kidney stones     Arteriosclerotic heart disease (ASHD)     Cervical radiculitis     CAD (coronary artery disease)     Internal carotid artery stenosis, bilateral 2022    Varicose veins of both legs with edema 01/12/2021    Venous insufficiency 01/12/2021    Shoulder pain, left 2021    Agatston CAC score, <100 03/25/2019    Diabetes mellitus ([facility] [address] 03/21/2019    Erosive esophagitis 07/14/2016    Colon polyps 07/14/2016    Diverticulosis 07/14/2016    ASHD (arteriosclerotic heart disease) 12/29/2015    Chest pain 10/23/2015    Bariatric surgery status 09/14/2015    Palpitations 04/10/2015    Mixed hyperlipidemia 04/10/2015    Coronary artery disease 04/10/2015    BIRD (dyspnea on exertion) 07/29/2014    Mitral valve disorder 07/29/2014     Past Medical History:   Diagnosis Date    Acute meniscal tear of left knee     Anxiety     Arteriosclerotic heart disease (ASHD)     Dr. Galdino Ziegler    CAD (coronary artery disease)     Dr. Galdino Ziegler.  03/25/19 Cor Calcium score 112, Mod CAD.     Carpal tunnel syndrome, bilateral     Cataract of both eyes     Dr. Comfort Thornton    Cervical radiculitis     Dr. Serena Jernigan. Dr. Laura Reeves.    Chronic pain     back, neck, shoulder. Dr. Ramy Lemus Depression     [facility]. Radha Daily, therapist    Diabetes mellitus ([facility] [address] 03/21/2019    Diverticulosis     Dry eye syndrome of both eyes     Dr. Jackie Olivarez Erosive esophagitis     Dr. Monalisa Arrington [place], rheum.  Foot swelling     BL    Gallstones 1984    Hypercholesterolemia     Hyperglycemia     A1c 6.0    Internal carotid artery stenosis, bilateral 2022    Mild    Long term current use of anticoagulant therapy     Lumbar degenerative disc disease     L4-5. Dr. Ai Lofton.  Neuropathy     Personal history of kidney stones     BL. Dr. Burke Gonzalez.  PVC's (premature ventricular contractions)     Dr. Yana Castillo. Txd Diltiazem, Metoprolol    Shoulder pain, left 2021    L>R. Dr. Serena Jernigan    Sleep apnea     CPAP    Valvular heart disease     hx of Mitral valve disorder    Varicose veins of both legs with edema     Dr. Aba Almeida      Past Surgical History:   Procedure Laterality Date    COLONOSCOPY N/A 8/14/2020    Dr. Kinsey Roche. w colon polypectomy. due q 3 yrs.  HX BARIATRIC SURGERY  01/2008    LAP GASTRIC BANDING. Dr. Edith Hook.  HX CARPAL TUNNEL RELEASE Bilateral     HX CHOLECYSTECTOMY  1984    HX LITHOTRIPSY Left     due to kidney stone. Dr. Dandre York Right     due to [address] Dr. Burke Gonzalez.  IR ENDOVENOUS ABLATION RF INITIAL VEIN BI Right 2021    Dr. Steven Dempsey. [place]. due to Venous Insufficiency.     AK COLSC FLX W/RMVL OF TUMOR POLYP LESION SNARE TQ  12/22/2010         AK EGD TRANSORAL BIOPSY SINGLE/MULTIPLE  12/22/2010    Dr. Ira Vazquez      Social History     Tobacco Use    Smoking status: Former Smoker     Packs/day: 0.50     Years: 20.00     Pack years: 10.00 Types: Cigarettes     Quit date: 12/29/2011     Years since quitting: 10.2    Smokeless tobacco: Never Used    Tobacco comment: QUIT SMOKING 01/2022   Substance Use Topics    Alcohol use: Not Currently     Comment: QUIT ALCOHOL 05/2021      Family History   Problem Relation Age of Onset    Hypertension Father     SKIN CANCER Father     Prostate Cancer Father     Parkinson's Disease Mother     Heart Attack Mother 48    Heart Surgery Mother     Coronary Art Dis Mother     Diabetes Sister     Heart Disease Sister     Other Sister         DJD    Other Sister         gastric bypass    Obesity Paternal Grandmother     Obesity Paternal Aunt       Prior to Admission medications    Medication Sig Start Date End Date Taking? Authorizing Provider   melatonin 10 mg capsule Take  by mouth as needed. Takes one to two caps   Yes Provider, Historical   HYDROcodone-acetaminophen (NORCO) 5-325 mg per tablet TAKE 1 TABLET BY MOUTH TWICE DAILY AS NEEDED FOR PAIN 1/10/22  Yes Provider, Historical   cyclobenzaprine (FLEXERIL) 10 mg tablet Take  by mouth three (3) times daily as needed for Muscle Spasm(s). Yes Provider, Historical   metoprolol succinate (TOPROL-XL) 50 mg XL tablet TAKE 1 TABLET EVERY DAY 1/2/22  Yes Josiane Cabrera MD   DILT- mg capsule TAKE 1 CAPSULE EVERY DAY  (KEEP  APPOINTMENT  FOR  FURTHER  REFILLS) 11/4/21  Yes Kristen THEODORE NP   diazePAM (VALIUM) 10 mg tablet TAKE 1 TABLET BY MOUTH EVERY DAY AS NEEDED 5/3/21  Yes Provider, Historical   hydroCHLOROthiazide (HYDRODIURIL) 12.5 mg tablet TAKE 1 TABLET BY MOUTH EVERY DAY IN THE MORNING 5/6/21  Yes Provider, Historical   omeprazole (PRILOSEC) 20 mg capsule Take  by mouth. PRN 11/10/20  Yes Provider, Historical   pantoprazole (PROTONIX) 40 mg tablet TAKE 1 TABLET BY MOUTH EVERY DAY BEFORE A MEAL 2/23/21  Yes Provider, Historical   traZODone (DESYREL) 50 mg tablet Take 100 mg by mouth as needed.  9/24/20  Yes Provider, Historical rosuvastatin (CRESTOR) 40 mg tablet Take 1 Tab by mouth nightly. 8/20/20  Yes Marcos Jones, KANIKA   pregabalin (LYRICA) 150 mg capsule Take 150 mg by mouth two (2) times a day. Yes Provider, Historical   DULoxetine (CYMBALTA) 60 mg capsule Take 60 mg by mouth daily. Yes Provider, Historical   ezetimibe (ZETIA) 10 mg tablet Take  by mouth. Yes Provider, Historical   metFORMIN (GLUMETZA ER) 500 mg TG24 24 hour tablet Take 500 mg by mouth two (2) times a day. Yes Provider, Historical   cpap machine kit by Does Not Apply route as needed. Yes Provider, Historical   Aspirin, Buffered 81 mg tab Take  by mouth. Yes Provider, Historical     Allergies   Allergen Reactions    Mobic [Meloxicam] Anxiety     Jittery           Objective:     Visit Vitals  BP (!) 167/82 (BP 1 Location: Right lower arm, BP Patient Position: Sitting, BP Cuff Size: Large adult)   Pulse 73   Temp 98 °F (36.7 °C)   Resp 20   Ht 5' 7\" (1.702 m)   Wt 238 lb (108 kg)   SpO2 94%   BMI 37.28 kg/m²       Physical Exam:  GENERAL: alert, cooperative, no distress, appears stated age, morbidly obese      Assessment:     Morbid obesity with a weight regain following laparoscopic adjustable gastric banding. Her request for revision was denied by [facility] [employer] We referred her to medical weight loss program, and thus far she has not lost any significant weight. I declined her request to appeal denial given advanced age, limited benefits of revision, and significant risk given advanced age. She then requested an office band adjustment. I declined this request as well, given last endoscopy revealing esophagitis and previous upper GI series revealing hiatal hernia. We reviewed band adjustment would exacerbate both of these conditions, and increased risk of band slippage. We discussed that surgical weight loss is now no longer an option, and she should focus on medical weight loss if her goal is to lose additional weight.     Plan:     Continue medical weight loss program diet. Next appointment next week. Increase activity, structured exercise as tolerated. I offered her the opportunity to follow-up in 4 weeks for status check. She will consider her options. 18 minutes spent with patient (greater than 50% of time in face-face consultation reviewing recent weight loss efforts, reasons for insurance denial, reasons for provider not proceeding with denial appeal, band adjustment).       Signed By: Melba Jacobson MD     March 19, 2022

## 2022-03-20 PROBLEM — I87.2 VENOUS INSUFFICIENCY: Status: ACTIVE | Noted: 2021-01-12

## 2022-03-20 PROBLEM — R53.82 CHRONIC FATIGUE: Status: ACTIVE | Noted: 2022-03-08

## 2022-03-21 ENCOUNTER — CLINICAL SUPPORT (OUTPATIENT)
Dept: SURGERY | Age: 72
End: 2022-03-21

## 2022-03-21 VITALS
OXYGEN SATURATION: 96 % | WEIGHT: 234.2 LBS | BODY MASS INDEX: 36.76 KG/M2 | HEART RATE: 61 BPM | SYSTOLIC BLOOD PRESSURE: 134 MMHG | HEIGHT: 67 IN | TEMPERATURE: 98.7 F | DIASTOLIC BLOOD PRESSURE: 66 MMHG | RESPIRATION RATE: 18 BRPM

## 2022-03-21 DIAGNOSIS — E66.01 CLASS 2 SEVERE OBESITY DUE TO EXCESS CALORIES WITH SERIOUS COMORBIDITY AND BODY MASS INDEX (BMI) OF 36.0 TO 36.9 IN ADULT (HCC): Primary | ICD-10-CM

## 2022-03-21 DIAGNOSIS — E66.9 OBESITY (BMI 30-39.9): Primary | ICD-10-CM

## 2022-03-21 NOTE — PROGRESS NOTES
1. Have you been to the ER, urgent care clinic since your last visit? Hospitalized since your last visit? No    2. Have you seen or consulted any other health care providers outside of the [address] [place] [address] since your last visit? Include any pap smears or colon screening. No     Patient attended triage but did not bring homework form. Patient instructed to email or fax completed homework form to us. Patient informed that not bringing the homework form can result in not being seen next time.

## 2022-04-04 NOTE — PROGRESS NOTES
Cuong Jansen presents for weekly or bi-monthly evaluation of Obesity Body mass index is 36.68 kg/m². Visit Vitals  /66 (BP 1 Location: Left arm, BP Patient Position: Sitting, BP Cuff Size: Thigh)   Pulse 61   Temp 98.7 °F (37.1 °C)   Resp 18   Ht 5' 7\" (1.702 m)   Wt 234 lb 3.2 oz (106.2 kg)   SpO2 96%   BMI 36.68 kg/m²       Wt Readings from Last 3 Encounters:   03/21/22 234 lb 3.2 oz (106.2 kg)   03/16/22 238 lb (108 kg)   03/08/22 230 lb 8 oz (104.6 kg)       1. Obesity (BMI 30-39. 9)         I have reviewed and agree with nurse documentation of Weekly Education Class nurse progress note for [employer] [facility] [place]). Cuong Jansen is compliant with program requirements and may continue participation utilizing the [employer] meal replacements, as discussed. Patient has been advised to refer to the [facility] Patient Manual and notify us if any side effects to this meal plan are present and/or persist.  Cuong Jansen may continue the current dietary approach, care and follow up at the monthly office visit with the provider, as scheduled. Follow-up and Dispositions    · Return in about 2 weeks (around 4/4/2022) for weight check. Documentation true and accepted by Gayle Orta.  Ana Franco, AOTNEISHAP, Diplomate JERSON TIJERINA

## 2022-04-07 ENCOUNTER — OFFICE VISIT (OUTPATIENT)
Dept: SURGERY | Age: 72
End: 2022-04-07

## 2022-04-07 DIAGNOSIS — E66.01 CLASS 2 SEVERE OBESITY DUE TO EXCESS CALORIES WITH SERIOUS COMORBIDITY AND BODY MASS INDEX (BMI) OF 36.0 TO 36.9 IN ADULT (HCC): Primary | ICD-10-CM

## 2022-04-08 NOTE — PROGRESS NOTES
[employer] [facility]  Metabolic Weight Loss Program        Patient's Name: Moi Harman  : 1950    This patient is enrolled in [address] [place] [address] Weight Loss Program and attended the required weekly virtual nutrition class hosted via [employer].       Kiara Matute, MS, RD, LDN

## 2022-04-11 ENCOUNTER — CLINICAL SUPPORT (OUTPATIENT)
Dept: SURGERY | Age: 72
End: 2022-04-11

## 2022-04-11 VITALS — HEIGHT: 67 IN | WEIGHT: 234.6 LBS | RESPIRATION RATE: 18 BRPM | BODY MASS INDEX: 36.82 KG/M2

## 2022-04-11 DIAGNOSIS — E66.9 OBESITY (BMI 30-39.9): Primary | ICD-10-CM

## 2022-04-11 RX ORDER — ACYCLOVIR 400 MG/1
TABLET ORAL
COMMUNITY
Start: 2022-04-04 | End: 2022-09-09 | Stop reason: ALTCHOICE

## 2022-04-11 NOTE — PROGRESS NOTES
3/8/2022    Progress Note: Weekly Education Class in the [facility] Weight Loss Program         Patient is on Very Low Calorie Diet [] (4 meal replacements per day, 800 kcal/day)  MODIFIED    Low Calorie Diet [x] (2-3 meal replacements per day, 6731-6299 kcal/day)    1) Did patient have any new symptoms or physical problems? Yes []    No [x]    If yes, check & comment: weakness [], fatigue [], lightheadedness [], headache [], cramps [], cold intolerance [], hair loss [], diarrhea [], constipation [],  NA [] other:                                 2) Has patient had any medical attention from other providers, urgent care or the emergency room this week? Yes []  No [x]       NA [], If yes, why:                                     3) Any other sugar sweetened beverages consumed this week? Yes []  No [x]    4) Did patient have any problems adhering to the diet? Yes [x]  No [] NA []    If yes, Vacation [], Celebrations [], Conferences [], Family Reunions [x] other: distractions from family                                               5) How many hours of sleep this week? 8-10    (range)  NA []    Number of meal replacements consumed daily? 1  (range)  NA []    Average ounces of water patient consumed daily this week (not including shakes)? 50     (divide the weekly total by 7)    Did you eat any food outside of the program? Yes [x] No []    Physical Activity Over the Past Week:    Cardio exercise: 0 min  Strength exercise: 0 workouts / week  Number of steps walked per day: 0    How has patient mood overall been this week? Sad [], Happy [], Stressed [], Tired [], Content [], NA [], other  A lot of different moods over  the week (none overall)           Medications reconciled by nurse Yes [x]  No[]    Patient was given therapeutic recommendations for any noted side effects of their dietary approach based upon [facility] patient manual per providers recommendation.      3/15/2022    Progress Note: Weekly Education Class in the [facility] Weight Loss Program         Patient is on Very Low Calorie Diet [] (4 meal replacements per day, 800 kcal/day)  MODIFIED    Low Calorie Diet [] (2-3 meal replacements per day, 2524-5064 kcal/day)    1) Did patient have any new symptoms or physical problems? Yes []    No [x]    If yes, check & comment: weakness [], fatigue [], lightheadedness [], headache [], cramps [], cold intolerance [], hair loss [], diarrhea [], constipation [],  NA [] other:                                 2) Has patient had any medical attention from other providers, urgent care or the emergency room this week? Yes [x]  No []       NA [], If yes, why:   Dr. Carter Sat - therapist                                     3) Any other sugar sweetened beverages consumed this week? Yes [x]  No []    4) Did patient have any problems adhering to the diet? Yes [x]  No [] NA []    If yes, Vacation [], Celebrations [], Conferences [], Family Reunions [] other: cravings                                               5) How many hours of sleep this week? 5-10    (range)  NA []    Number of meal replacements consumed daily? 1-2  (range)  NA []    Average ounces of water patient consumed daily this week (not including shakes)? 27     (divide the weekly total by 7)    Did you eat any food outside of the program? Yes [x] No []    Physical Activity Over the Past Week:    Cardio exercise: 0 min  Strength exercise: 0 workouts / week  Number of steps walked per day: 0    How has patient mood overall been this week? Sad [], Happy [], Stressed [x], Tired [], Content [], NA [], other            Medications reconciled by nurse Yes [x]  No[]    Patient was given therapeutic recommendations for any noted side effects of their dietary approach based upon [facility] patient manual per providers recommendation.      3/22/2022    Progress Note: Weekly Education Class in the [facility] Weight Loss Program         Patient is on Very Low Calorie Diet [] (4 meal replacements per day, 800 kcal/day)  MODIFIED    Low Calorie Diet [x] (2-3 meal replacements per day, 1745-0356 kcal/day)    1) Did patient have any new symptoms or physical problems? Yes []    No [x]    If yes, check & comment: weakness [], fatigue [], lightheadedness [], headache [], cramps [], cold intolerance [], hair loss [], diarrhea [], constipation [],  NA [] other:                                 2) Has patient had any medical attention from other providers, urgent care or the emergency room this week? Yes []  No [x]       NA [], If yes, why:                                       3) Any other sugar sweetened beverages consumed this week? Yes []  No []NOT ANSWERED    4) Did patient have any problems adhering to the diet? Yes []  No [] NA []NOT ANSWERED    If yes, Vacation [], Celebrations [], Conferences [], Family Reunions [] other:                                                5) How many hours of sleep this week?     (range)  NA []NOT ANSWERED      Number of meal replacements consumed daily? 1-2  (range)  NA []    Average ounces of water patient consumed daily this week (not including shakes)? 38     (divide the weekly total by 7)    Did you eat any food outside of the program? Yes [x] No []    Physical Activity Over the Past Week:    Cardio exercise: 10 min  Strength exercise: 0 workouts / week  Number of steps walked per day: 0    How has patient mood overall been this week? Sad [], Happy [], Stressed [], Tired [], Content [], NA [], other ok           Medications reconciled by nurse Yes [x]  No[]    Patient was given therapeutic recommendations for any noted side effects of their dietary approach based upon [facility] patient manual per providers recommendation.      3/29/2022    Progress Note: Weekly Education Class in the [facility] Weight Loss Program         Patient is on Very Low Calorie Diet [] (4 meal replacements per day, 800 kcal/day)  MODIFIED    Low Calorie Diet [] (2-3 meal replacements per day, 0953-8615 kcal/day)    1) Did patient have any new symptoms or physical problems? Yes []    No [x]    If yes, check & comment: weakness [], fatigue [], lightheadedness [], headache [], cramps [], cold intolerance [], hair loss [], diarrhea [], constipation [],  NA [] other:                                 2) Has patient had any medical attention from other providers, urgent care or the emergency room this week? Yes []  No [x]       NA [], If yes, why:                                       3) Any other sugar sweetened beverages consumed this week? Yes []  No []NOT ANSWERED      4) Did patient have any problems adhering to the diet? Yes []  No [] NA []NOT ANSWERED      If yes, Vacation [], Celebrations [], Conferences [], Family Reunions [] other:                                                5) How many hours of sleep this week?     (range)  NA []NOT ANSWERED      Number of meal replacements consumed daily? 1  (range)  NA []    Average ounces of water patient consumed daily this week (not including shakes)? 25     (divide the weekly total by 7)    Did you eat any food outside of the program? Yes [x] No []    Physical Activity Over the Past Week:    Cardio exercise: 10 min  Strength exercise: 0 workouts / week  Number of steps walked per day: 0    How has patient mood overall been this week? Sad [], Happy [], Stressed [], Tired [], Content [], NA [], other  quarter-half a mile           Medications reconciled by nurse Yes [x]  No[]    Patient was given therapeutic recommendations for any noted side effects of their dietary approach based upon [facility] patient manual per providers recommendation.

## 2022-04-11 NOTE — PROGRESS NOTES
Weight Management. 1. Have you been to the ER, urgent care clinic since your last visit? Hospitalized since your last visit? No    2. Have you seen or consulted any other health care providers outside of the [address] [place] [address] since your last visit? Include any pap smears or colon screening.  No

## 2022-04-14 ENCOUNTER — OFFICE VISIT (OUTPATIENT)
Dept: SURGERY | Age: 72
End: 2022-04-14

## 2022-04-14 DIAGNOSIS — E66.01 CLASS 2 SEVERE OBESITY DUE TO EXCESS CALORIES WITH SERIOUS COMORBIDITY AND BODY MASS INDEX (BMI) OF 36.0 TO 36.9 IN ADULT (HCC): Primary | ICD-10-CM

## 2022-04-14 NOTE — PROGRESS NOTES
[facility]  Metabolic Weight Loss Program        Patient's Name: Austin Odell  : 1950    This patient is enrolled in [address] [place] [address] Weight Loss Program and attended the required weekly virtual nutrition class hosted via [employer].       Mc Velazquez, MS, RD, LDN

## 2022-04-18 ENCOUNTER — OFFICE VISIT (OUTPATIENT)
Dept: SURGERY | Age: 72
End: 2022-04-18
Payer: MEDICARE

## 2022-04-18 VITALS
BODY MASS INDEX: 37.28 KG/M2 | HEART RATE: 63 BPM | DIASTOLIC BLOOD PRESSURE: 70 MMHG | TEMPERATURE: 98.9 F | SYSTOLIC BLOOD PRESSURE: 108 MMHG | WEIGHT: 237.5 LBS | HEIGHT: 67 IN | RESPIRATION RATE: 20 BRPM | OXYGEN SATURATION: 93 %

## 2022-04-18 DIAGNOSIS — Z98.84 HX OF LAPAROSCOPIC ADJUSTABLE GASTRIC BANDING: ICD-10-CM

## 2022-04-18 DIAGNOSIS — E66.9 OBESITY, CLASS II, BMI 35-39.9: Primary | ICD-10-CM

## 2022-04-18 DIAGNOSIS — K21.00 GASTROESOPHAGEAL REFLUX DISEASE WITH ESOPHAGITIS WITHOUT HEMORRHAGE: ICD-10-CM

## 2022-04-18 PROCEDURE — G8417 CALC BMI ABV UP PARAM F/U: HCPCS | Performed by: NURSE PRACTITIONER

## 2022-04-18 PROCEDURE — G8400 PT W/DXA NO RESULTS DOC: HCPCS | Performed by: NURSE PRACTITIONER

## 2022-04-18 PROCEDURE — G8536 NO DOC ELDER MAL SCRN: HCPCS | Performed by: NURSE PRACTITIONER

## 2022-04-18 PROCEDURE — 99213 OFFICE O/P EST LOW 20 MIN: CPT | Performed by: NURSE PRACTITIONER

## 2022-04-18 PROCEDURE — 3017F COLORECTAL CA SCREEN DOC REV: CPT | Performed by: NURSE PRACTITIONER

## 2022-04-18 PROCEDURE — 1101F PT FALLS ASSESS-DOCD LE1/YR: CPT | Performed by: NURSE PRACTITIONER

## 2022-04-18 PROCEDURE — G8427 DOCREV CUR MEDS BY ELIG CLIN: HCPCS | Performed by: NURSE PRACTITIONER

## 2022-04-18 PROCEDURE — 1090F PRES/ABSN URINE INCON ASSESS: CPT | Performed by: NURSE PRACTITIONER

## 2022-04-18 PROCEDURE — G9717 DOC PT DX DEP/BP F/U NT REQ: HCPCS | Performed by: NURSE PRACTITIONER

## 2022-04-18 NOTE — PROGRESS NOTES
1. Have you been to the ER, urgent care clinic since your last visit? Hospitalized since your last visit? No    2. Have you seen or consulted any other health care providers outside of the [address] [place] [address] since your last visit? Include any pap smears or colon screening.  No

## 2022-04-18 NOTE — PATIENT INSTRUCTIONS
If you want a 2nd opinion with regard to managing your band/revision, you can reach out to the [facility] Weight Loss program     Dr. Lisa Ayers [phone]

## 2022-04-18 NOTE — Clinical Note
Can you please send a copy of my last office note, Dr. Elliot Waterman note to Dr. Meaghan Mariee  as pt requested a 2nd opinion - thanks JM

## 2022-04-18 NOTE — Clinical Note
MALOU - I told her the same thing you did and refused to adjust her band. She wanted to see someone else, so gave her Brengman's number. I'll give Madonna a heads up about her.   JM

## 2022-04-19 NOTE — PROGRESS NOTES
Chief Complaint   Patient presents with    Surgical Follow-up     14 years s/p lap gastric band    Weight Management     Allison Marie is well known to me with remote history of Laparoscopic adjustable gastric band for treatment of morbid obesity. Fair initial weight loss, but subsequent weight regain, development of reflux symptoms partial band decompression. As her weight gain continued, she sought laparoscopic revision of gastric banding to gastric bypass. She completed preoperative prerequisites but was denied by [facility]. She doesn't understand why we cannot adjust her band and \"something has to be done\" . \"Dr. Beth Gates said I would never get another adjustment here and I was too old for a gastric bypass\"  Says she quit drinking alcohol (has been a problem in the past)     She is participating in the Medical Weight Loss program, but doesn't like it and is complaining about the cost of supplements. Asking if she can go somewhere outside of [facility] to get her band adjusted     8/11/2020 Weight 218 lbs  Band decompressed (8 ml removed) in preparation for EGD         20 lbs weight gain since band decompression     8/14/2020  EGD Findings:    Esophagus:   +          Irregular Z line borders located at 37 cm with focal erosion c/w LA Grade A erosive esophagitis s/p Bx.  -           Normal mucosa in mid esophagus s/p Bx. Stomach:   +          Diffuse erythema in the stomach with increased bile c/w Bile Acid Gastritis  Duodenum:   -           Normal duodenum to second portion s/p Bx.    2/10/2016 EGD Findings:      Esophagus:               + Distal esophagus with linear ulcerations and erythema c/w LA Grade B Erosive Esophagitis s/p Bx.              + Distal esophagus with decreased LES tone             Stomach:   -           The gastric mucosa appeared mildly erythematous s/p Bx.  The fundus was found to be normal with no lesions noted on retroflexion.      Duodenum:   -           The bulb and post bulbar mucosa is normal in appearance to the second portion. The duodenal folds appeared normal.  Cold forceps biopsies to r/o sprue. BMI > 30 past 7 years   Gurjit   176 lbs   Current 237 lbs     Visit Vitals  /70 (BP 1 Location: Right upper arm, BP Patient Position: Sitting, BP Cuff Size: Large adult)   Pulse 63   Temp 98.9 °F (37.2 °C)   Resp 20   Ht 5' 7\" (1.702 m)   Wt 237 lb 8 oz (107.7 kg)   SpO2 93%   BMI 37.20 kg/m²       ICD-10-CM ICD-9-CM    1. Obesity, Class II, BMI 35-39.9  E66.9 278.00    2. Hx of laparoscopic adjustable gastric banding  Z98.84 V45.86    3. Gastroesophageal reflux disease with esophagitis without hemorrhage  K21.00 530.81      530.10      Remote Laparoscopic adjustable gastric band for treatment of morbid obesity  Chronic GERD with esophagitis with localized erosions on acid suppression   Per Radha Callejas MD  \"Morbid obesity with a weight regain following laparoscopic adjustable gastric banding. Her request for revision was denied by [facility]. We referred her to medical weight loss program, and thus far she has not lost any significant weight. I declined her request to appeal denial given advanced age, limited benefits of revision, and significant risk given advanced age. She then requested an office band adjustment. I declined this request as well, given last endoscopy revealing esophagitis and previous upper GI series revealing hiatal hernia. We reviewed band adjustment would exacerbate both of these conditions, and increased risk of band slippage. We discussed that surgical weight loss is now no longer an option, and she should focus on medical weight loss if her goal is to lose additional weight. \"  I told her it was her right to get a second opinion and gave her contact information for Pooja Parmar MD at [facility].   Encouraged her to continue with medical weight management, improving mobility / fitness and behavioral health support   Дмитрий Gomez verbalized understanding and questions were answered to the best of my knowledge and ability. 22 minutes spent in face to face with Sahil Hopkins > 50% counseling.

## 2022-04-20 ENCOUNTER — CLINICAL SUPPORT (OUTPATIENT)
Dept: SURGERY | Age: 72
End: 2022-04-20

## 2022-04-20 DIAGNOSIS — E66.9 OBESITY, CLASS II, BMI 35-39.9: Primary | ICD-10-CM

## 2022-04-20 NOTE — PROGRESS NOTES
[facility]  Metabolic Program Follow-up Nutrition Consult    Date: 2022   Physician: Shelby Farris DO  Name: Kathryn Romero  :  1950    Type of Plan: LCD  Weeks on Plan: 6 weeks  Virtual visit was completed through [address] [place] [address]. ASSESSMENT:    Medications/Supplements:   Prior to Admission medications    Medication Sig Start Date End Date Taking? Authorizing Provider   acyclovir (ZOVIRAX) 400 mg tablet TAKE 1 TABLET BY MOUTH TWICE DAILY FOR 10 DAYS AS NEEDED 22   Provider, Historical   melatonin 10 mg capsule Take  by mouth as needed. Takes one to two caps    Provider, Historical   HYDROcodone-acetaminophen (NORCO) 5-325 mg per tablet TAKE 1 TABLET BY MOUTH TWICE DAILY AS NEEDED FOR PAIN 1/10/22   Provider, Historical   cyclobenzaprine (FLEXERIL) 10 mg tablet Take  by mouth three (3) times daily as needed for Muscle Spasm(s). Provider, Historical   metoprolol succinate (TOPROL-XL) 50 mg XL tablet TAKE 1 TABLET EVERY DAY 22   Ken Leiva MD   DILT- mg capsule TAKE 1 CAPSULE EVERY DAY  (KEEP  APPOINTMENT  FOR  FURTHER  REFILLS) 21   Benny Leong NP   diazePAM (VALIUM) 10 mg tablet TAKE 1 TABLET BY MOUTH EVERY DAY AS NEEDED 5/3/21   Provider, Historical   hydroCHLOROthiazide (HYDRODIURIL) 12.5 mg tablet TAKE 1 TABLET BY MOUTH EVERY DAY IN THE MORNING 21   Provider, Historical   omeprazole (PRILOSEC) 20 mg capsule Take  by mouth. PRN 11/10/20   Provider, Historical   pantoprazole (PROTONIX) 40 mg tablet TAKE 1 TABLET BY MOUTH EVERY DAY BEFORE A MEAL 21   Provider, Historical   traZODone (DESYREL) 50 mg tablet Take 100 mg by mouth as needed. 20   Provider, Historical   rosuvastatin (CRESTOR) 40 mg tablet Take 1 Tab by mouth nightly. 20   Teodoro Jones NP   pregabalin (LYRICA) 150 mg capsule Take 150 mg by mouth two (2) times a day. Provider, Historical   DULoxetine (CYMBALTA) 60 mg capsule Take 60 mg by mouth daily. Provider, Historical   ezetimibe (ZETIA) 10 mg tablet Take  by mouth. Provider, Historical   metFORMIN (GLUMETZA ER) 500 mg TG24 24 hour tablet Take 500 mg by mouth two (2) times a day. Provider, Historical   cpap machine kit by Does Not Apply route as needed. Provider, Historical   Aspirin, Buffered 81 mg tab Take  by mouth. Provider, Historical              Starting Weight: 238#  Current Weight: 237#  Overall Pounds Lost: 1# Overall Pounds Gained: 0    Exercise/Physical Activity: hired a , started last Friday. Significant back pain, taking 1/2 tablet hydrocodone. Is patient using New Directions products: no  If yes, how many per day: n/a    Aversions/side effects of product/program: none reported    Fluids used to mix with products: water    Reported Diet History:  She reports the program is difficult, especially with feeding , who eats high calorie and high fat. He doesn't want to eat salads and low calorie and low carb, and she also feels tempted when having to make his meal.  She is trying to give him frozen more fattening items such as Stouffers and mac and cheese, so she doesn't have to prepare it. She is not using the ND products. Skipping meals, mostly breakfast and sometimes lunch. Having one large evening meal.  She does like salads, kale, improving water intake to 40 ounces per day, and keeping candy out of the house. Barriers/concerns preventing patient from achieving goal(s) since last visit: emotions tied to personal stressors, 's diet, and anger towards [facility] for not clearing her to have a bypass or band refilled [facility] doesn't do these). NUTRITION INTERVENTION:  Pt educated on nutrition recommendations for the New Direction Low Calorie Plan (LCD), with the specific meal pattern of 2 meal replacements every day plus a grocery meal and snack.   Daily recommended totals: 1200 calories, 60 grams carbs, 80+ grams protein, and remaining calories as healthy fats. Use LCD handout for meal and snack suggestions and preparation. Grocery meal:  Use the balanced plate method to plan meals, include 3-6 oz of lean source of protein, unlimited non-starchy vegetables, 1/2 cup whole grains/beans OR 1/2 cup fruit OR 1 serving of low fat dairy. Utilize handouts listing healthy snack and meal ideas. Read all nutrition labels. Demonstrated and emphasized identifying serving size, total fat, sugar and protein content. Defined low fat as </= 3 g per serving. Discussed lean and extra lean sources of protein. Avoid foods with sugar listed in the first 3 ingredients and >/10 g sugar per serving. Consume meal replacements every three to four hours or pattern as discussed with provider. Mix with water. May add herbs/spices for taste. Practice mindful eating habits; take small bites, chew thoroughly, avoid distractions, utilize hunger/fullness scale. Reviewed attendance policy of attending weekly nutrition classes. Metabolic support group recommended, and increase physical activity (approved per MD) for long term weight maintenance. NUTRITION MONITORING AND EVALUATION:  Pt very tearful during visit today. Reports feeling depressed and discouraged. Had lap band deflated for endoscopy due to GERD. Then returned to [facility] where she says \"Dr. Elliot Michel will not fill band, will not do bypass, and I will only lose 20#. \"  She is frustrated because she can't control hunger, and is gaining more than she said would have happened if band were full. She says she regrets having it deflated and would rather die from problems with GERD than uncontrolled DM related to her weight. There are several other barriers that are impacting her ability to achieve weight loss goals. Her  is ill and eats very high carb high fat food, she is in the middle of a remodel with home, and son was placed in residential since last visit.       We discussed a few very small goals: 1) using a visual of the balanced plate method: 1/4 plate protein, 1/4 plate complex carb, 1/2 plate vegetables. She says having a visual helps. 2) Discussed trying the ND shakes (she hasn't up to this point yet) to provide a foundation for better BG and craving controls, and feel less hungry later in the day. She is amicable to trying them one time per day. 3) Great job on Done In :60 Seconds, encouraged her to increase by 20 ounces. She is amicable. Discussed that these small goals will be positively impactful and we can followup at next visit to refine additional goals. Didn't want to set too many to prevent feeling overwhelmed. The following goals were established with patient;  1) use balanced plate method. If choosing a complex carb like pintos, or beans, limit to 1/4 plate or tennis ball sized. Ok to increase to 3-5 ounces protein to assist with fullness. 2) 1 ND shake every day,within 2 hours of rising. Goal is 2 per day. 3) increase to 3 20 ounce bottles of water per day. 4) 1/4-1/2 cup fruit for a snack is ok BUT pair with a lean protein or healthy fat: 2 slices low sodium deli meat, low fat string cheese, 10-12 nuts, 1-2 tsp nut butter. 5) try a ND product in the evening after dinner to assist with cravings      Specific tips and techniques to facilitate compliance with above recommendations were provided and discussed. If further details are desired please contact me at [phone]. This phone number was also provided to the patient for any further questions or concerns.           Binta Hanna, MS, RD, LDN

## 2022-04-22 ENCOUNTER — TRANSCRIBE ORDER (OUTPATIENT)
Dept: SCHEDULING | Age: 72
End: 2022-04-22

## 2022-04-22 DIAGNOSIS — R91.8 PULMONARY NODULES: Primary | ICD-10-CM

## 2022-04-27 ENCOUNTER — VIRTUAL VISIT (OUTPATIENT)
Dept: SURGERY | Age: 72
End: 2022-04-27
Payer: MEDICARE

## 2022-04-27 VITALS
OXYGEN SATURATION: 93 % | HEART RATE: 76 BPM | DIASTOLIC BLOOD PRESSURE: 69 MMHG | SYSTOLIC BLOOD PRESSURE: 128 MMHG | WEIGHT: 235.6 LBS | BODY MASS INDEX: 36.9 KG/M2

## 2022-04-27 DIAGNOSIS — E11.9 TYPE 2 DIABETES MELLITUS WITHOUT COMPLICATION, WITHOUT LONG-TERM CURRENT USE OF INSULIN (HCC): ICD-10-CM

## 2022-04-27 DIAGNOSIS — F41.9 ANXIETY: ICD-10-CM

## 2022-04-27 DIAGNOSIS — I25.10 CORONARY ARTERY DISEASE INVOLVING NATIVE HEART WITHOUT ANGINA PECTORIS, UNSPECIFIED VESSEL OR LESION TYPE: ICD-10-CM

## 2022-04-27 DIAGNOSIS — E66.01 CLASS 2 SEVERE OBESITY DUE TO EXCESS CALORIES WITH SERIOUS COMORBIDITY AND BODY MASS INDEX (BMI) OF 36.0 TO 36.9 IN ADULT (HCC): Primary | ICD-10-CM

## 2022-04-27 DIAGNOSIS — G47.33 OSA ON CPAP: ICD-10-CM

## 2022-04-27 DIAGNOSIS — Z99.89 OSA ON CPAP: ICD-10-CM

## 2022-04-27 DIAGNOSIS — E53.8 VITAMIN B12 DEFICIENCY: ICD-10-CM

## 2022-04-27 DIAGNOSIS — Z98.84 BARIATRIC SURGERY STATUS: ICD-10-CM

## 2022-04-27 DIAGNOSIS — R74.01 ELEVATED ALT MEASUREMENT: ICD-10-CM

## 2022-04-27 DIAGNOSIS — M79.7 FIBROMYALGIA: ICD-10-CM

## 2022-04-27 DIAGNOSIS — I25.10 ARTERIOSCLEROTIC HEART DISEASE (ASHD): ICD-10-CM

## 2022-04-27 DIAGNOSIS — Z90.49 HISTORY OF LAPAROSCOPIC CHOLECYSTECTOMY: ICD-10-CM

## 2022-04-27 DIAGNOSIS — E78.2 MIXED HYPERLIPIDEMIA: ICD-10-CM

## 2022-04-27 DIAGNOSIS — R53.82 CHRONIC FATIGUE: ICD-10-CM

## 2022-04-27 DIAGNOSIS — Z98.84 HX OF LAPAROSCOPIC ADJUSTABLE GASTRIC BANDING: ICD-10-CM

## 2022-04-27 DIAGNOSIS — Z87.442 PERSONAL HISTORY OF KIDNEY STONES: ICD-10-CM

## 2022-04-27 DIAGNOSIS — F33.42 RECURRENT MAJOR DEPRESSIVE DISORDER, IN FULL REMISSION (HCC): ICD-10-CM

## 2022-04-27 DIAGNOSIS — E55.9 VITAMIN D DEFICIENCY: ICD-10-CM

## 2022-04-27 DIAGNOSIS — F51.04 CHRONIC INSOMNIA: ICD-10-CM

## 2022-04-27 PROCEDURE — G9717 DOC PT DX DEP/BP F/U NT REQ: HCPCS | Performed by: FAMILY MEDICINE

## 2022-04-27 PROCEDURE — G8400 PT W/DXA NO RESULTS DOC: HCPCS | Performed by: FAMILY MEDICINE

## 2022-04-27 PROCEDURE — 3017F COLORECTAL CA SCREEN DOC REV: CPT | Performed by: FAMILY MEDICINE

## 2022-04-27 PROCEDURE — 1101F PT FALLS ASSESS-DOCD LE1/YR: CPT | Performed by: FAMILY MEDICINE

## 2022-04-27 PROCEDURE — 99215 OFFICE O/P EST HI 40 MIN: CPT | Performed by: FAMILY MEDICINE

## 2022-04-27 PROCEDURE — 1090F PRES/ABSN URINE INCON ASSESS: CPT | Performed by: FAMILY MEDICINE

## 2022-04-27 PROCEDURE — 2022F DILAT RTA XM EVC RTNOPTHY: CPT | Performed by: FAMILY MEDICINE

## 2022-04-27 PROCEDURE — G8427 DOCREV CUR MEDS BY ELIG CLIN: HCPCS | Performed by: FAMILY MEDICINE

## 2022-04-27 PROCEDURE — 3051F HG A1C>EQUAL 7.0%<8.0%: CPT | Performed by: FAMILY MEDICINE

## 2022-04-27 PROCEDURE — G8752 SYS BP LESS 140: HCPCS | Performed by: FAMILY MEDICINE

## 2022-04-27 PROCEDURE — G8754 DIAS BP LESS 90: HCPCS | Performed by: FAMILY MEDICINE

## 2022-04-27 RX ORDER — PEN NEEDLE, DIABETIC 31 GX3/16"
NEEDLE, DISPOSABLE MISCELLANEOUS
Qty: 100 PEN NEEDLE | Refills: 1 | Status: SHIPPED | OUTPATIENT
Start: 2022-04-27

## 2022-04-27 RX ORDER — SEMAGLUTIDE 1.34 MG/ML
0.5 INJECTION, SOLUTION SUBCUTANEOUS
Qty: 4 PEN | Refills: 0 | Status: SHIPPED | OUTPATIENT
Start: 2022-04-27 | End: 2022-06-14 | Stop reason: SDUPTHER

## 2022-04-27 RX ORDER — ERGOCALCIFEROL 1.25 MG/1
50000 CAPSULE ORAL
Qty: 15 CAPSULE | Refills: 1 | Status: SHIPPED | OUTPATIENT
Start: 2022-04-27 | End: 2022-07-26

## 2022-04-27 RX ORDER — SEMAGLUTIDE 1.34 MG/ML
0.25 INJECTION, SOLUTION SUBCUTANEOUS
Qty: 4 PEN | Refills: 0 | Status: SHIPPED | OUTPATIENT
Start: 2022-04-27 | End: 2022-06-14 | Stop reason: SDUPTHER

## 2022-04-27 NOTE — Clinical Note
LANDY fernandez! I can not see her vitals nor did she have a chief complaint. Can you please enter it? I entered the chief complaint. Thank you.   \

## 2022-04-27 NOTE — PROGRESS NOTES
1. Have you been to the ER, urgent care clinic since your last visit? Hospitalized since your last visit? No    2. Have you seen or consulted any other health care providers outside of the [address] [place] [address] since your last visit? Include any pap smears or colon screening.  No

## 2022-04-27 NOTE — PROGRESS NOTES
[address] [address] [facility], [address] [facility] 22.  [phone] o  350 [facility] VISIT    Patient Name:  Classie Gilford, 1950  PCP:  Bri Moraes MD    Method of Delivery: Synchronous (real-time) audio-video technology  Platform:  [email or web address]  My location:  Home Office                Patient location:  home    Patient's preferred weight management approach:  Low Calorie Diet, LCD (6013-1601 kcal/day). Number meal replacements consumed daily:  0-1 ND w OTC Lean Cuisine at night  Current Weight Loss Medication:  Metformin 500 mg XR I po q am by pcp for high blood sugar x years    Classie Gilford is a 66 y.o. female with Obesity Class 2 Body mass index is 36.9 kg/m². and associated health concerns. Patient presents today for Weight Management  Recall:  Pt had her Lap Band deflated to have an EGD. Followed up w Dr. Etienne Stone to get a fill after the EGD and was told it would not work because she had gained weight and he does not do those any more. Pt wants Revisional Lap Banding converted to Gastric Bypass. Saw Dr. Cal Ramirez, bariatric surgeon and Tejal Arce. Told pt this surgery is not recommended at her age and [employer] refused to cover it bc it was not medically necessary. Referred pt for medical weight loss. Pt was very disappointed and is hopeful but not happy about this. Challenges adhering to the plan over the past month:  A lot going on at home - renovating. I take care of my  who has severe COPD and Dementia. We have good days and sometimes not.       Patient goals for participation in weight management program:   175 lbs (lose 55 lbs), have energy to get my house together and do more w my     Anthropometric Measures Previously    Start Date:  03/08/22  Start Weight:  230 lbs 8 oz    BMI:  36.1 kg/m2    Neck circumference:  14.75 in    Waist circumference:  47 in   Body fat percentage:  45.1 %      Anthropometric MeasuresToday  Today's Self Reported Weight 4/27/2022: 235.6 lbs 0 oz      Weight Metrics 4/27/2022 4/18/2022 4/11/2022 3/21/2022 3/16/2022 3/8/2022 3/8/2022   Weight 235 lb 9.6 oz 237 lb 8 oz 234 lb 9.6 oz 234 lb 3.2 oz 238 lb - 230 lb 8 oz   Neck Circ (inches) - - - - - 14.75 -   Waist Measure Inches - - - - - 47 -   Body Fat % - - - - - 45.1 -   BMI 36.9 kg/m2 37.2 kg/m2 36.74 kg/m2 36.68 kg/m2 37.28 kg/m2 - 36.1 kg/m2     Neck Circumference:  Acceptable range for M >16 inches, F>15 inches  Waist Circumference:  Acceptable range for M> 40 inches/102 cm, F > 35 inches, 88 cm  Body Fat: Acceptable range for M 18-24%, F 25-31%     Pounds loss since the last doctor appointment with our Center a month ago:  0 lbs, 5 lbs  Total pounds loss since starting this therapeutic approach on start date: 0 lbs, up 5 lbs. Is patient satisfied with his/her progress since the last appointment: No  Factors contributing to weight change:  I have a terrible sugar craving. This is new. SURGICAL HISTORY  Previous Weight Loss Surgery:  Lap Gastric Band 01/2008 by Dr. Yolie Armas. Contacted Dr. Roc Rizo for refill and requested a Revision to Lap Gastric Bypass but was denied by her insurance bc it is \"not medically necessary\" and told she was too old. Past Surgical History:   Procedure Laterality Date    COLONOSCOPY N/A 8/14/2020    Dr. Haider Petty. w colon polypectomy. due q 3 yrs.  HX BARIATRIC SURGERY  01/2008    LAP GASTRIC BANDING. Dr. Yolie Armas.  HX CARPAL TUNNEL RELEASE Bilateral     HX CHOLECYSTECTOMY  1984    HX LITHOTRIPSY Left     due to kidney stone. Dr. Sophie Matthews Right     due to [address] Dr. Pina Section.  IR ENDOVENOUS ABLATION RF INITIAL VEIN BI Right 2021    Dr. Carla Bliss. [place]. due to Venous Insufficiency.     MA COLSC FLX W/RMVL OF TUMOR POLYP LESION SNARE TQ  12/22/2010  WI EGD TRANSORAL BIOPSY SINGLE/MULTIPLE  12/22/2010    Dr. Mcdonnell Shows  Weight loss medication prescribed by our office:  Metformin 500 mg XR daily by pcp for high blood sugar  Negative side effects: 0  Are hunger, cravings and appetite controlled with use of the weight loss medication:   No  Is the medication tolerated well:  Yes  Does patient desire refills of any medications previously prescribed by our office:  No, pcp rxs Metformin    Home Medications    Medication Sig Start Date End Date Taking? Authorizing Provider   melatonin 10 mg capsule Take  by mouth as needed. Takes one to two caps   Yes Provider, Historical   HYDROcodone-acetaminophen (NORCO) 5-325 mg per tablet TAKE 1 TABLET BY MOUTH TWICE DAILY AS NEEDED FOR PAIN 1/10/22  Yes Provider, Historical   cyclobenzaprine (FLEXERIL) 10 mg tablet Take  by mouth three (3) times daily as needed for Muscle Spasm(s). Yes Provider, Historical   metoprolol succinate (TOPROL-XL) 50 mg XL tablet TAKE 1 TABLET EVERY DAY 1/2/22  Yes Corine Becker MD   DILT- mg capsule TAKE 1 CAPSULE EVERY DAY  (KEEP  APPOINTMENT  FOR  FURTHER  REFILLS) 11/4/21  Yes Governor Michelle THEODORE NP   diazePAM (VALIUM) 10 mg tablet TAKE 1 TABLET BY MOUTH EVERY DAY AS NEEDED 5/3/21  Yes Provider, Historical   hydroCHLOROthiazide (HYDRODIURIL) 12.5 mg tablet TAKE 1 TABLET BY MOUTH EVERY DAY IN THE MORNING 5/6/21  Yes Provider, Historical   omeprazole (PRILOSEC) 20 mg capsule Take  by mouth. PRN 11/10/20  Yes Provider, Historical   pantoprazole (PROTONIX) 40 mg tablet TAKE 1 TABLET BY MOUTH EVERY DAY BEFORE A MEAL 2/23/21  Yes Provider, Historical   traZODone (DESYREL) 50 mg tablet Take 100 mg by mouth as needed. 9/24/20  Yes Provider, Historical   rosuvastatin (CRESTOR) 40 mg tablet Take 1 Tab by mouth nightly. 8/20/20  Yes Everton Jones NP   pregabalin (LYRICA) 150 mg capsule Take 150 mg by mouth two (2) times a day.    Yes Provider, Historical   DULoxetine (CYMBALTA) 60 mg capsule Take 60 mg by mouth daily. Yes Provider, Historical   ezetimibe (ZETIA) 10 mg tablet Take  by mouth. Yes Provider, Historical   metFORMIN (GLUMETZA ER) 500 mg TG24 24 hour tablet Take 500 mg by mouth two (2) times a day. Yes Provider, Historical   Aspirin, Buffered 81 mg tab Take  by mouth. Yes Provider, Historical   acyclovir (ZOVIRAX) 400 mg tablet TAKE 1 TABLET BY MOUTH TWICE DAILY FOR 10 DAYS AS NEEDED  Patient not taking: Reported on 4/27/2022 4/4/22   Provider, Historical   cpap machine kit by Does Not Apply route as needed. Provider, Historical     Medications that cause weight gain:  Diltiazem, Toprol, Crestor, Valium, Flexeril, Protonix, Cymbalta, Lyrica for Fibromyalgia (immediately gained 20 lbs but felt better). Medications that cause weight loss:  Metformin  Any changes to medications since last office visit with me:  0    Allergies   Allergen Reactions    Mobic [Meloxicam] Anxiety     Jittery       EATING HABITS  Patient met with the RD over the past month:  Yes, [facility]  Nutritional changes made over the last month per recommendation of the RD:  I have been eating more fruit. She had me to add protein w it to avoid the sugar drop. She recommended the pudding instead of the shake and to mix the chocolate shake w coffee instead of w water. Are hunger, cravings and appetite controlled:  Yes  Negative Side Effects from meal replacements:  0- sometimes it did not dissolve well and was not palatable   Does patient want to utilize [employer] meal replacements:  Yes  Barriers to eating meals:  Stress. House renovations causing us to eat late meals. Easier to calculate calories w Lean Cuisine. Wanting a late night snack. Sugar cravings.     DRINKING HABITS  Average amount of water consumed daily: up to 40 oz/day  (Goal 2 L or 67 oz daily, minimally)  Amount of caffeine consumed daily: Coffee w ND shake q am   Sugar-sweetened beverages consumed daily (including alcohol, sodas, teas, juices, etc.): 0  Barriers preventing patient from drinking minimum 2L water/day:  busy     Social History     Tobacco Use    Smoking status: Former Smoker     Packs/day: 0.50     Years: 20.00     Pack years: 10.00     Types: Cigarettes     Quit date: 12/29/2011     Years since quitting: 10.3    Smokeless tobacco: Never Used    Tobacco comment: QUIT SMOKING 01/2022   Vaping Use    Vaping Use: Never used   Substance Use Topics    Alcohol use: Not Currently     Comment: QUIT ALCOHOL 05/2021- was 2 g wine, whiskey or vodka    Drug use: No         SLEEP HABITS  Total hours of sleep nightly: 8-10 hours (Goal 7-9 hours/nightly) w 2h naps during day  Rx or OTC Sleep Aids:  OTC Melatonin 10 mg q hs w Rx Trazodone , rxd by pcp (tried friend's Risperdol)  Sleep Hx:  Chronic insomnia, BILL, off CPAP while renovating home. Working w Dr. Jonathan Reed. Daytime Sleepiness:  Yes  Occupation:   Retired    Barriers to getting proper rest:  0 - feel tired all the time     PHYSICAL ACTIVITY HISTORY  Type of physical activity:  recently restarted working w my  x 2 weeks- 1 days x 45-60 mins/week  Barriers limiting physical activity:  Busy. Tired. Not motivated.     BEHAVIORAL HEALTH HISTORY  DEPRESSION SCREENING:    3 most recent PHQ Screens 4/27/2022   Little interest or pleasure in doing things Not at all   Feeling down, depressed, irritable, or hopeless -   Total Score PHQ 2 -   Trouble falling or staying asleep, or sleeping too much -   Feeling tired or having little energy -   Poor appetite, weight loss, or overeating -   Feeling bad about yourself - or that you are a failure or have let yourself or your family down -   Trouble concentrating on things such as school, work, reading, or watching TV -   Moving or speaking so slowly that other people could have noticed; or the opposite being so fidgety that others notice -   Thoughts of being better off dead, or hurting yourself in some way -   PHQ 9 Score -   How difficult have these problems made it for you to do your work, take care of your home and get along with others -       History of mental health conditions (including Depression, Anxiety, Anorexia, Bulimia or Binge Eating disorder): Anxiety, Depression  Treating provider and medication(s): Rx Trazadone 100 mg q hs for MAD, Cymbalta for MELANY, MAD by pcp and counseling w Camron Paiz  Current major lifestyle changes or stressors:    w COPD needs care. Mom  . Need a Lap fill but can not get it until after having my colonoscopy     Mobile Apps used for meal planning, calorie counting, water consumption, sleep, or physical activity:  0     ASSOCIATED MEDICAL CONDITIONS  Past Medical History:   Diagnosis Date    Acute meniscal tear of left knee     Anxiety     Arteriosclerotic heart disease (ASHD)     Dr. Dusty Nash CAD (coronary artery disease)     Dr. Shane Stone.  19 Cor Calcium score 112, Mod CAD.  Carpal tunnel syndrome, bilateral     Cataract of both eyes     Dr. Jerry Keith    Cervical radiculitis     Dr. Lani Lopez. Dr. Caterina Malhotra.    Chronic pain     back, neck, shoulder. Dr. Lorenzo Izaguirre Depression     [facility]. Camron Izabel, therapist    Diabetes mellitus ([facility] [address] 2019    Diverticulosis     Dry eye syndrome of both eyes     Dr. Ras Sahu Erosive esophagitis     Dr. Rico [place], rheum.  Foot swelling     BL    Gallstones     Hypercholesterolemia     Hyperglycemia     A1c 6.0    Internal carotid artery stenosis, bilateral     Mild    Long term current use of anticoagulant therapy     Lumbar degenerative disc disease     L4-5. Dr. Fernando Casas.  Neuropathy     Personal history of kidney stones     BL. Dr. Gm Borja.  PVC's (premature ventricular contractions)     Dr. Shane Stone. Txd Diltiazem, Metoprolol    Shoulder pain, left     L>R.   Dr. Lani Lopez    Sleep apnea     CPAP    Valvular heart disease     hx of Mitral valve disorder    Varicose veins of both legs with edema     Dr. Johnnie Gagnon (For Female Patients)  Social History     Substance and Sexual Activity   Sexual Activity Not Currently    Partners: Male    Comment: MENOPAUSE     OB History        5    Para        Term                AB        Living   5       SAB        IAB        Ectopic        Molar        Multiple        Live Births   5               Menopause:  Yes  LMP:  No LMP recorded. (Menstrual status: Menopause). Are you pregnant or planning on becoming pregnant within 6 months:  No    Do you have upcoming travel in the next 6 weeks:  No.   Patient will also notify the dietician for recommendations during travel. Immunization History   Administered Date(s) Administered    COVID-19, [employer], DO NOT Dilute, Miguel Angel-Sucrose, 12+ yrs, PF, 30mcg/0.3 mL dose 2021, 2021    COVID-19, Pfizer Purple top, DILUTE for use, 12+ yrs, 30mcg/0.3mL dose 10/08/2021    Tdap 10/12/2020       OTHER MEDICAL CARE SINCE LAST OFFICE VISIT  As above. ROS  Pt denies lack of focus, feeling weak, headaches, dizziness, light headedness, nausea, vomiting, diarrhea, constipation, indigestion, rapid heart rate, SOB, low blood sugar, feeling cold, hair loss, rash, fluid retention, leg aches, irritability, mood swings, or other associated symptoms. Review of Systems negative except as noted above in HPI.      HEALTH MAINTENANCE  Health Maintenance   Topic Date Due    Hepatitis C Screening  Never done    Foot Exam Q1  Never done    MICROALBUMIN Q1  Never done    Eye Exam Retinal or Dilated  Never done    Shingrix Vaccine Age 50> (1 of 2) Never done    Breast Cancer Screen Mammogram  Never done    Bone Densitometry (Dexa) Screening  Never done    Pneumococcal 65+ years (2 - PCV) 2021    Medicare Yearly Exam  Never done    A1C test (Diabetic or Prediabetic)  03/08/2023    Lipid Screen  03/08/2023    Depression Monitoring  04/18/2023    Colorectal Cancer Screening Combo  08/14/2030    DTaP/Tdap/Td series (2 - Td or Tdap) 10/12/2030    Flu Vaccine  Completed    COVID-19 Vaccine  Completed       PHYSICAL EXAMINATION  (Limited due to being a telehealth encounter)  Self-reported Vital Signs:  Patient-Reported Vitals 4/27/2022   Patient-Reported Weight 235 lbs 9.6 oz   Patient-Reported Pulse 76   Patient-Reported SpO2 93% on RA   Patient-Reported Systolic  792   Patient-Reported Diastolic 69   Patient-Reported LMP Menopause        Weight Metrics 4/27/2022 4/18/2022 4/11/2022 3/21/2022 3/16/2022 3/8/2022 3/8/2022   Weight 235 lb 9.6 oz 237 lb 8 oz 234 lb 9.6 oz 234 lb 3.2 oz 238 lb - 230 lb 8 oz   Neck Circ (inches) - - - - - 14.75 -   Waist Measure Inches - - - - - 47 -   Body Fat % - - - - - 45.1 -   BMI 36.9 kg/m2 37.2 kg/m2 36.74 kg/m2 36.68 kg/m2 37.28 kg/m2 - 36.1 kg/m2     Neck Circumference:  Acceptable range for M >16 inches, F>15 inches  Waist Circumference:  Acceptable range for M> 40 inches/102 cm, F > 35 inches, 88 cm  Body Fat: Acceptable range for M 18-24%, F 25-31%    General appearance - Well nourished. Well appearing. Well developed. No acute distress. Obese. Head - Normocephalic. Atraumatic. Eyes - Extraocular eye movements intact. Sclera anicteric. Ears - Hearing is grossly normal bilaterally. Nose - normal and patent. No discharge. No nasal flaring noted. Neck -   Midline trachea. No neck masses noted. No neck retractions noted. Chest - Unlabored respirations. Symmetrical chest.  No conversational dyspnea noted. Heart - normal rate. Neurological - awake, alert and oriented to person, place, and time and event. Cranial nerves II through XII intact. Clear speech. Steady gait. Musculoskeletal - Intact x 4 extremities. No pain with movement. Psychological -   normal behavior, dress and thought processes. Good insight. Good eye contact. Normal affect. Appropriate mood. Normal speech. DATA REVIEWED    Lab Results   Component Value Date/Time    WBC 7.3 03/08/2022 01:55 PM    HGB 13.5 03/08/2022 01:55 PM    HCT 41.1 03/08/2022 01:55 PM    PLATELET 016 52/69/0234 01:55 PM    MCV 94 03/08/2022 01:55 PM     Lab Results   Component Value Date/Time    Sodium 141 03/08/2022 01:55 PM    Potassium 4.9 03/08/2022 01:55 PM    Chloride 100 03/08/2022 01:55 PM    CO2 22 03/08/2022 01:55 PM    Anion gap 1 (L) 09/28/2021 12:32 PM    Glucose 105 (H) 03/08/2022 01:55 PM    BUN 16 03/08/2022 01:55 PM    Creatinine 0.73 03/08/2022 01:55 PM    BUN/Creatinine ratio 22 03/08/2022 01:55 PM    GFR est AA >60 09/28/2021 12:32 PM    GFR est non-AA >60 09/28/2021 12:32 PM    Calcium 9.7 03/08/2022 01:55 PM    Bilirubin, total 0.4 03/08/2022 01:55 PM    Alk.  phosphatase 57 03/08/2022 01:55 PM    Protein, total 6.4 03/08/2022 01:55 PM    Albumin 4.3 03/08/2022 01:55 PM    Globulin 3.0 09/28/2021 12:32 PM    A-G Ratio 2.0 03/08/2022 01:55 PM    ALT (SGPT) 46 (H) 03/08/2022 01:55 PM    AST (SGOT) 30 03/08/2022 01:55 PM     Lab Results   Component Value Date/Time    Hemoglobin A1c 7.0 (H) 03/08/2022 01:55 PM     Lab Results   Component Value Date/Time    TSH 2.310 03/08/2022 01:55 PM     Lab Results   Component Value Date/Time    Uric acid 3.8 03/08/2022 01:55 PM     Magnesium   Date Value Ref Range Status   03/08/2022 2.1 1.6 - 2.3 mg/dL Final     Lab Results   Component Value Date/Time    Vitamin B12 173 (L) 03/08/2022 01:55 PM    Folate 8.8 03/08/2022 01:55 PM     Lab Results   Component Value Date/Time    VITAMIN D, 25-HYDROXY 29.4 (L) 03/08/2022 01:55 PM     Lab Results   Component Value Date/Time    Iron 60 03/08/2022 01:55 PM     Lab Results   Component Value Date/Time    Cholesterol, total 109 09/28/2021 12:32 PM    Cholesterol, Total 154 03/08/2022 01:55 PM    HDL Cholesterol 44 09/28/2021 12:32 PM    LDL, calculated 44.4 09/28/2021 12:32 PM    VLDL, calculated 20.6 09/28/2021 12:32 PM    Triglyceride 103 09/28/2021 12:32 PM    CHOL/HDL Ratio 2.5 09/28/2021 12:32 PM   )  Results for orders placed or performed in visit on 03/08/22   [facility] [address] [facility] (WITHOUT GRAPH)     Status: Abnormal   Result Value Ref Range Status    LDL-P 1,276 (H) <1,000 nmol/L Final     Comment:                           Low                   < 1000                            Moderate         1000 - 1299                            Borderline-High  1300 - 1599                            High             1600 - 2000                            Very High             > 2000      LDL-C(NIH CALC) 70 0 - 99 mg/dL Final     Comment:                           Optimal               <  100                            Above optimal     100 -  129                            Borderline        130 -  159                            High              160 -  189                            Very high             >  189      HDL-C 63 >39 mg/dL Final    Triglycerides 122 0 - 149 mg/dL Final    Cholesterol, Total 154 100 - 199 mg/dL Final    HDL-P (Total) 50.4 >=30.5 umol/L Final    Small LDL-P 882 (H) <=527 nmol/L Final    LDL size 20.3 (L) >20.5 nm Final     Comment:  ----------------------------------------------------------                   ** INTERPRETATIVE INFORMATION**                   PARTICLE CONCENTRATION AND SIZE                      <--Lower CVD Risk   Higher CVD Risk-->    LDL AND HDL PARTICLES   Percentile in Reference Population    HDL-P (total)        High     75th    50th    25th   Low                         >34.9    34.9    30.5    26.7   <26.7    Small LDL-P          Low      25th    50th    75th   High                         <117     117     527     839    >839    LDL Size   <-Large (Pattern A)->    <-Small (Pattern B)->                      23.0    20.6           20.5      19.0   ----------------------------------------------------------  Small LDL-P and LDL Size are associated with CVD risk, but not after  LDL-P is taken into account. LP-IR SCORE 47 (H) <=45 Final     Comment: INSULIN RESISTANCE MARKER      <--Insulin Sensitive    Insulin Resistant-->             Percentile in Reference Population  Insulin Resistance Score  LP-IR Score   Low   25th   50th   75th   High                <27   27     45     63     >63  LP-IR Score is inaccurate if patient is non-fasting. The LP-IR score is a laboratory developed index that has been  associated with insulin resistance and diabetes risk and should be  used as one component of a physician's clinical assessment. Narrative    Test(s) 455056-WAH-V; 653404-QTY-U; 123476-Triglycerides; 047172-  Cholesterol, Total; 414270-PKI-H (Total); 884297-Small LDL-P; 208574-  LDL Size; 811560-OT-ZR Score  was developed and its performance characteristics determined  by [facility]. It has not been cleared or approved by the Food  and Drug Administration. Performed at:  [address]  [id number]  : Ana María Cooper MD, Phone:  [phone]        EKG:   Results for orders placed or performed during the hospital encounter of 10/12/20   EKG, 12 LEAD, INITIAL   Result Value Ref Range    Ventricular Rate 55 BPM    Atrial Rate 55 BPM    P-R Interval 138 ms    QRS Duration 78 ms    Q-T Interval 430 ms    QTC Calculation (Bezet) 411 ms    Calculated P Axis 42 degrees    Calculated R Axis 21 degrees    Calculated T Axis 52 degrees    Diagnosis       Sinus bradycardia  Septal infarct (cited on or before 23-OCT-2015)  When compared with ECG of 23-OCT-2015 12:33,  premature supraventricular complexes are no longer present  Confirmed by Raheem Reese MD ([id number]) on 10/12/2020 4:33:45 PM       QTC interval  411 ms. No prolonged QTc noted.    Acceptable QTC upper limits:  440 ms for Males, 460 ms for Females    ASSESSMENT     ICD-10-CM ICD-9-CM    1. Class 2 severe obesity due to excess calories with serious comorbidity and body mass index (BMI) of 36.0 to 36.9 in adult ([facility]  E66.01 278.01     Z68.36 V85.36    2. Type 2 diabetes mellitus without complication, without long-term current use of insulin (HCC)  E11.9 250.00 semaglutide (Ozempic) 0.25 mg or 0.5 mg/dose (2 mg/1.5 ml) subq pen      semaglutide (Ozempic) 0.25 mg or 0.5 mg/dose (2 mg/1.5 ml) subq pen   3. Chronic fatigue  R53.82 780.79     due to Fibromyalgia vs stress vs vit D vs vit B12 vs uncontrolled DM2 vs other   4. Vitamin B12 deficiency  E53.8 266.2 cyanocobalamin, vitamin B-12, 1,000 mcg/mL kit      Insulin Needles, Disposable, (Taya Pen Needle) 32 gauge x 5/32\" ndle   5. Vitamin D deficiency  E55.9 268.9 ergocalciferol (ERGOCALCIFEROL) 1,250 mcg (50,000 unit) capsule   6. Mixed hyperlipidemia  E78.2 272.2     w elevated LDLP   7. Fibromyalgia  M79.7 729.1     stable on Lyrica   8. Recurrent major depressive disorder, in full remission ([facility]  F33.42 296.36     stable on Cymbalta   9. Hx of laparoscopic adjustable gastric banding  Z98.84 V45.86    10. Arteriosclerotic heart disease (ASHD)  I25.10 414.00    11. Elevated ALT measurement  R74.01 790.4    12. BILL on CPAP  G47.33 327.23     Z99.89 V46.8    13. Anxiety  F41.9 300.00     stable on Cymbalta   14. Coronary artery disease involving native heart without angina pectoris, unspecified vessel or lesion type  I25.10 414.01     w elevated LDLP   15. Personal history of kidney stones  Z87.442 V13.01    16. Chronic insomnia  F51.04 780.52    17. Bariatric surgery status  Z98.84 V45.86    18. History of laparoscopic cholecystectomy  Z90.49 V45.89    19. BMI 36.0-36.9,adult  Z68.36 V85.36        We discussed the expected course, resolution and complications of the diagnosis(es) in detail. WEIGHT MANAGEMENT PLAN  Agree with nurse documentation. Chart was reviewed and updated during the office visit today.      Emphasized the importance of the patient continuing care from current primary care provider and other specialists while participating in this weight management program.  Patient has full access to all our office notes, orders, lab results on [employer] and can provide them copies, if desired. Advised patient to follow up with pcp for any acute symptoms and Health Maintenance. Continue current medications as directed by prescribers. Discussed options for DM2 tx and weight loss medications. Medication(s) prescribed today as noted above and sent electronically to the pharmacy on file after discussing risks, benefits, costs, interactions, alternatives, contraindications and potential side effects:  Start Ozempic 0.25 mg SC weekly x 4 weeks then increase to 0.5 mg SC weekly, if tolerated for DM2 and sugar cravings. Continue Metformin 500 mg daily from pcp. Start Rx vit D 50k po weekly x 3-6 mos for low vit D. Start Rx vit B12 1000 mg SC monthly for low vit B12. [facility]  was reviewed and patient was found to be in compliance before prescribing the weight loss medication today. A Controlled Substance Agreement was reviewed, signed and copy given to patient today. SEE SCANNED DOCUMENT. Identified and discussed medications patient is taking that can cause weight gain or weight loss as recorded on patient's medication list.  Advised patient not to stop any use without discussing with the prescribing provider. Ask prescribing providers to consider more weight neutral or weight negative options. Will monitor patient's weight and health with continued use. Supplement recommendations: Take OTC Magnesium 400 mg po at night prn sleep, muscle cramping, constipation. Take OTC vit B12 1000 mcg daily orally if taking Metformin or experiencing numbness and tingling. Take OTC Fish Oil 1000 mg with EPA and -1,000 mg daily if experiencing dry skin, low HDL. Use with caution if history of heartburn exists.   Increase fiber products (I.e. fiber tea, fiber shakes) or try OTC Fiber products (I.e. Benefiber, Metamucil, psyllium, etc) if experiencing constipation or hunger. Take OTC Lactaid with meal replacements, if lactose intolerance history exists or symptoms begin. Referred patient to [facility] Patient Manual for recommended antidotes, if any new symptoms or side effects have been experienced once dietary approach is initiated. Advised patient to notify our office if this occurs. If patient has had bariatric surgery, Austin Odell was advised to take the vitamin regimen and follow dietary recommendations as directed by patient's bariatric surgical team.  Get annual labs to re-evaluate vitamin levels. Avoid NSAIDS. Avoid concentrated sweets and carbonated beverages. Reviewed and discussed most recent lab results and EKG. Advised patient to sign a release to provide our program a copy of any new lab results, EKG or pertinent tests from other physicians, if it is unavailable today. Informed patient that an EKG will be performed for every 50 lbs of weight loss. Recheck pertinent labs every 3 months while participating in LCD using [facility] meal replacements, as discussed to identify electrolyte abnormalities and guide therapeutic approach. Advised patient to sign up for [employer] and view lab results directly. Notify me by [address] if any questions or concerns arise. Discussed the patient's BMI, anthropometric measures and goals with patient. Informed patient that weight loss goal of 5-10% in 6-12 months has shown significant improvement in obesity and its related health conditions including DM, heart disease, asthma. A key study published in Arthritis & Rheumatism of Overweight and Obese Adults with OA found that losing 1 pound of weight resulted in 4 pounds of pressure being removed from the knees.     Dietary Prescription:    Austin Odell has fulfilled [facility] program requirements this month by completing homework forms, attending educational classes, nurse triage visits and monthly provider appointments as agreed and remains in good standing. Reviewed and appreciate registered dietician note for nutritional counseling. Patient has attended all requirements of the program over the past month and is in good standing. Reviewed and appreciate bimonthly nurse visits and agree with documentation. Followed up on any patient concerns today. Recommended meal plan:  [facility] Low Calorie Dietary approach, 1482-5138 kcal/day, 2-3 meal replacements daily. Make sure proper daily calories are consumed with each meal.  Encouraged patient to stay within the recommended amount of calories per day. Do not skip meals. Meals must be eaten within a 3-4 hour time period in order to prevent potential side effects, including low blood sugar. Schedule 1:1 session with RD. Strongly emphasized that patient drink a minimum of 2 L (67 oz) of water daily up to half your body weight in ounces of water while utilizing this dietary approach to prevent dehydration and potential side effects. Drink the majority of water prior to supper to prevent nocturnal urination. Avoid water enhancers and sugar-sweetened beverages, including sodas, alcohol, juice. Limit caffeine intake to prevent sleep interference, heart palpitations and dehydration from increased urination. Will allow 1 8 oz cup of black coffee or green tea (to stimulate release of Adiponectin and promote fat burning.)    Consume any alcohol or caffeine 6-12 hours before bedtime to prevent sleep disturbances and bladder irritation at night. Referred patient to [facility] Patient Manual for recommended antidotes, if any new symptoms or side effects have been experienced once dietary approach is initiated. Advised patient to notify our office if this occurs. Exercise Prescription:   Emphasized importance of mild physical activity and reducing sedentary time.  Advised patient to work with RD to ensure you have proper calories for your level of activity. A goal of 30 minutes physical activity daily is recommended for health benefit and at least 60 minutes daily to prevent weight regain. During weight loss phase, no less than 75% of this time needs to be performing aerobic (i.e. Walking) activity with no more than 25% of the time performing resistance exercises (i.e. Weight lifting, strengthening, toning). During weight maintenance phase, 50% of the physical activity time needs to be spent performing aerobic activity with 50% of the total time performing resistance exercises. Behavior and Lifestyle Prescription:  Counseled patient on stressors, stress management and self-care approaches. Encouraged pt to seek formal counseling for stressors mentioned today. If already receiving formal counseling encouraged patient to continue these sessions routinely. Go to ER if any thoughts or attempts of suicide are experienced. Sleep Prescription:   A goal of 7-9 hours nightly of uninterrupted sleep is recommended to turn off the Grehlin hormone to be released from the stomach and triggers appetite while promoting weight gain. Proper rest turns on Leptin hormone to be released from white adipose tissue and promotes weight loss. Counseled patient on health topics:  Obesity, Insulin Resistance, LCD dietary approaches, benefits, contraindications, possible side effects to these diets and how to prevent poor outcomes (including staying hydrated, limit physical exercise while transitioning into this program, avoid skipping meals, etc), weight loss goals, sleep hygiene, barriers to losing weight and keeping weight off, strategies to overcome habits or challenges to approve or continue adherence and stressors. Immunizations noted. Influenza and Covid-19 vaccines recommended, if patient has not had it. Obesity may increase risk for severe illness and death from Covid-19 disease.   Offered empathy, encouragement, legitimation, prayers, partnership to patient. Praised patient for successes. Patient was offered a choice/choices in the treatment plan today. Patient expressed understanding with the diagnoses and the plan and agrees with recommendations. Return in about 1 month (around 5/27/2022) for ND LCD, MEDICATION MANAGMENT. Total time:  61 mins spent in counseling, coordinating care, reviewing and discussing results, reviewing and discussing relevant provider communication, completing relevant documentation, and discussing treatment plans in reference to The primary encounter diagnosis was Class 2 severe obesity due to excess calories with serious comorbidity and body mass index (BMI) of 36.0 to 36.9 in adult [facility]). Diagnoses of Hx of laparoscopic adjustable gastric banding, Mixed hyperlipidemia, Chronic fatigue, Type 2 diabetes mellitus without complication, without long-term current use of insulin (HCC), Fibromyalgia, Arteriosclerotic heart disease (ASHD), Recurrent major depressive disorder, in full remission ([facility] [address] BILL on CPAP, Coronary artery disease involving native heart without angina pectoris, unspecified vessel or lesion type, Personal history of kidney stones, Chronic insomnia, Bariatric surgery status, History of laparoscopic cholecystectomy, BMI 36.0-36.9,adult, Vitamin D deficiency, and Vitamin B12 deficiency were also pertinent to this visit. Luis Alberto Elam, was evaluated through a synchronous (real-time) audio-video encounter. The patient (or guardian if applicable) is aware that this is a billable service, which includes applicable co-pays. This Virtual Visit was conducted with patient's (and/or legal guardian's) consent. The visit was conducted pursuant to the emergency declaration under the [facility] [place]note 2[place], [address] [place] [address] wa[facility] authority and the "[employer] and [employer] General Act.  Patient identification was verified, and a caregiver was present when appropriate. The patient was located in a state where the provider was licensed to provide care. [id number] MD FOR ALLOWING ME THE PRIVILEGE TO PARTICIPATE IN THE CARE OF OUR MUTUAL PATIENT, Ms. Liliana Sharma. Oleg Hill DO, Diplomate JERSON TIJERINA    There are no Patient Instructions on file for this visit.

## 2022-05-02 NOTE — PROGRESS NOTES
Reina Montanez presents for weekly or bi-monthly evaluation of Obesity Body mass index is 36.74 kg/m². Visit Vitals  Resp 18   Ht 5' 7\" (1.702 m)   Wt 234 lb 9.6 oz (106.4 kg)   BMI 36.74 kg/m²       Wt Readings from Last 3 Encounters:   04/27/22 235 lb 9.6 oz (106.9 kg)   04/18/22 237 lb 8 oz (107.7 kg)   04/11/22 234 lb 9.6 oz (106.4 kg)       1. Obesity (BMI 30-39. 9)         I have reviewed and agree with nurse documentation of Weekly Education Class nurse progress note for [employer] [facility] [address] [place] [address] [place]). Reina Montanez is compliant with program requirements and may continue participation utilizing the [employer] meal replacements, as discussed. Patient has been advised to refer to the [facility] Patient Manual and notify us if any side effects to this meal plan are present and/or persist.  Reina Montanez may continue the current dietary approach, care and follow up at the monthly office visit with the provider, as scheduled. Follow-up and Dispositions    · Return in about 2 weeks (around 4/25/2022). Documentation true and accepted by Fina Anderson.  Samir Taveras., AOBFP, Diplomate JERSON TIJERINA

## 2022-05-10 ENCOUNTER — CLINICAL SUPPORT (OUTPATIENT)
Dept: SURGERY | Age: 72
End: 2022-05-10

## 2022-05-10 VITALS
HEART RATE: 82 BPM | SYSTOLIC BLOOD PRESSURE: 114 MMHG | DIASTOLIC BLOOD PRESSURE: 74 MMHG | RESPIRATION RATE: 18 BRPM | WEIGHT: 238.7 LBS | HEIGHT: 67 IN | TEMPERATURE: 98 F | BODY MASS INDEX: 37.46 KG/M2 | OXYGEN SATURATION: 94 %

## 2022-05-10 DIAGNOSIS — E66.9 OBESITY (BMI 30-39.9): Primary | ICD-10-CM

## 2022-05-10 NOTE — PROGRESS NOTES
Weight Management. 1. Have you been to the ER, urgent care clinic since your last visit? Hospitalized since your last visit? No    2. Have you seen or consulted any other health care providers outside of the [address] [place] [address] since your last visit? Include any pap smears or colon screening.  No

## 2022-05-10 NOTE — PROGRESS NOTES
Week # 1    Progress Note: Weekly Education Class in the [facility] Program         Patient is on Very Low Calorie Diet [] (4 meal replacements per day, 800 kcal/day)      Low Calorie Diet [x] (2-3 meal replacements per day, 4047-3038 kcal/day)    1) Did patient have any new symptoms or physical problems? Yes []    No [x]    If yes, check & comment: weakness [], fatigue [], lightheadedness [], headache [], cramps [], cold intolerance [], hair loss [], diarrhea [], constipation [],  NA [] other:                                 2) Has patient had any medical attention from other providers, urgent care or the emergency room this week? Yes []  No [x]       NA [], If yes, why:                                       3) Any other sugar sweetened beverages consumed this week? Yes [x]  No []    4) Did patient have any problems adhering to the diet? Yes []  No [] NA []NOT ANSWERED      If yes, Vacation [], Celebrations [], Conferences [], Family Reunions [] other:                                                5) How many hours of sleep this week? 7-10    (range)  NA []    Number of meal replacements consumed daily? 1/2-1  (range)  NA []    Average ounces of water patient consumed daily this week (not including shakes)? 30     (divide the weekly total by 7)    Did you eat any food outside of the program? Yes [x] No []    Physical Activity Over the Past Week:    Cardio exercise: 15 min  Strength exercise: 7 workouts / week  Number of steps walked per day: ? How has patient mood overall been this week? Sad [], Happy [], Stressed [], Tired [], Content [], NA [], other   Many different moods           Medications reconciled by nurse Yes [x]  No[]    Patient was given therapeutic recommendations for any noted side effects of their dietary approach based upon [facility] patient manual per providers recommendation.      Week # 2    Progress Note: Weekly Education Class in the [facility] Program Patient is on Very Low Calorie Diet [] (4 meal replacements per day, 800 kcal/day)      Low Calorie Diet [x] (2-3 meal replacements per day, 0608-6912 kcal/day)    1) Did patient have any new symptoms or physical problems? Yes []    No []  NOT ANSWERED      If yes, check & comment: weakness [], fatigue [], lightheadedness [], headache [], cramps [], cold intolerance [], hair loss [], diarrhea [], constipation [],  NA [] other:                                 2) Has patient had any medical attention from other providers, urgent care or the emergency room this week? Yes []  No []NOT ANSWERED         NA [], If yes, why:                                       3) Any other sugar sweetened beverages consumed this week? Yes []  No []NOT ANSWERED      4) Did patient have any problems adhering to the diet? Yes []  No [] NA []NOT ANSWERED      If yes, Vacation [], Celebrations [], Conferences [], Family Reunions [] other:                                                5) How many hours of sleep this week? 8-11    (range)  NA []    Number of meal replacements consumed daily? 1  (range)  NA []    Average ounces of water patient consumed daily this week (not including shakes)? 40     (divide the weekly total by 7)    Did you eat any food outside of the program? Yes [x] No []    Physical Activity Over the Past Week:    Cardio exercise: 5 min  Strength exercise: 3 workouts / week  Number of steps walked per day: 0    How has patient mood overall been this week? Sad [], Happy [], Stressed [], Tired [], Content [], NA [], other   Many different moods           Medications reconciled by nurse Yes [x]  No[]    Patient was given therapeutic recommendations for any noted side effects of their dietary approach based upon [facility] patient manual per providers recommendation.

## 2022-05-16 ENCOUNTER — HOSPITAL ENCOUNTER (OUTPATIENT)
Dept: CT IMAGING | Age: 72
Discharge: HOME | End: 2022-05-16
Attending: INTERNAL MEDICINE
Payer: MEDICARE

## 2022-05-16 DIAGNOSIS — R91.8 PULMONARY NODULES: ICD-10-CM

## 2022-05-16 PROCEDURE — 71250 CT THORAX DX C-: CPT

## 2022-05-23 ENCOUNTER — CLINICAL SUPPORT (OUTPATIENT)
Dept: SURGERY | Age: 72
End: 2022-05-23

## 2022-05-23 VITALS
TEMPERATURE: 97.6 F | BODY MASS INDEX: 36.71 KG/M2 | HEIGHT: 67 IN | OXYGEN SATURATION: 98 % | HEART RATE: 66 BPM | DIASTOLIC BLOOD PRESSURE: 66 MMHG | WEIGHT: 233.9 LBS | SYSTOLIC BLOOD PRESSURE: 110 MMHG | RESPIRATION RATE: 20 BRPM

## 2022-05-23 DIAGNOSIS — E66.01 CLASS 2 SEVERE OBESITY DUE TO EXCESS CALORIES WITH SERIOUS COMORBIDITY AND BODY MASS INDEX (BMI) OF 36.0 TO 36.9 IN ADULT (HCC): Primary | ICD-10-CM

## 2022-05-23 DIAGNOSIS — E66.9 OBESITY (BMI 30-39.9): Primary | ICD-10-CM

## 2022-05-23 NOTE — PROGRESS NOTES
[facility]  Metabolic Program Follow-up Nutrition Consult    Date: 2022   Physician: Summer Adams DO  Name: Jeanine Miller  :  1950    Type of Plan: LCD  Weeks on Plan: 10 weeks  Virtual visit was completed through [employer]. ASSESSMENT:    Medications/Supplements:   Prior to Admission medications    Medication Sig Start Date End Date Taking? Authorizing Provider   ergocalciferol (ERGOCALCIFEROL) 1,250 mcg (50,000 unit) capsule Take 1 Capsule by mouth every seven (7) days for 90 days. Indications: low vitamin D levels 22  Gena DIAZ DO   cyanocobalamin, vitamin B-12, 1,000 mcg/mL kit 1 mL by Injection route every thirty (30) days. Indications: inadequate vitamin B12 22   Gena DIAZ DO   Insulin Needles, Disposable, (Taya Pen Needle) 32 gauge x 5/32\" ndle Use daily as directed 22   Gena DIAZ DO   semaglutide (Ozempic) 0.25 mg or 0.5 mg/dose (2 mg/1.5 ml) subq pen 0.5 mg by SubCUTAneous route every seven (7) days. Start after finishing 0.25 mg x 4 weeks  Indications: type 2 diabetes mellitus 22   Gena DIAZ DO   semaglutide (Ozempic) 0.25 mg or 0.5 mg/dose (2 mg/1.5 ml) subq pen 0.25 mg by SubCUTAneous route every seven (7) days. Indications: type 2 diabetes mellitus 22   Gena DIAZ DO   acyclovir (ZOVIRAX) 400 mg tablet TAKE 1 TABLET BY MOUTH TWICE DAILY FOR 10 DAYS AS NEEDED  Patient not taking: Reported on 2022   Provider, Historical   melatonin 10 mg capsule Take  by mouth as needed. Takes one to two caps    Provider, Historical   HYDROcodone-acetaminophen (NORCO) 5-325 mg per tablet TAKE 1 TABLET BY MOUTH TWICE DAILY AS NEEDED FOR PAIN 1/10/22   Provider, Historical   cyclobenzaprine (FLEXERIL) 10 mg tablet Take  by mouth three (3) times daily as needed for Muscle Spasm(s).     Provider, Historical   metoprolol succinate (TOPROL-XL) 50 mg XL tablet TAKE 1 TABLET EVERY DAY 1/2/22   Leona Gonzalez MD   DILT- mg capsule TAKE 1 CAPSULE EVERY DAY  (KEEP  APPOINTMENT  FOR  FURTHER  REFILLS) 11/4/21   Zach Knutson NP   diazePAM (VALIUM) 10 mg tablet TAKE 1 TABLET BY MOUTH EVERY DAY AS NEEDED 5/3/21   Provider, Historical   hydroCHLOROthiazide (HYDRODIURIL) 12.5 mg tablet TAKE 1 TABLET BY MOUTH EVERY DAY IN THE MORNING 5/6/21   Provider, Historical   omeprazole (PRILOSEC) 20 mg capsule Take  by mouth. PRN 11/10/20   Provider, Historical   pantoprazole (PROTONIX) 40 mg tablet TAKE 1 TABLET BY MOUTH EVERY DAY BEFORE A MEAL 2/23/21   Provider, Historical   traZODone (DESYREL) 50 mg tablet Take 100 mg by mouth as needed. 9/24/20   Provider, Historical   rosuvastatin (CRESTOR) 40 mg tablet Take 1 Tab by mouth nightly. 8/20/20   Tina Jones NP   pregabalin (LYRICA) 150 mg capsule Take 150 mg by mouth two (2) times a day. Provider, Historical   DULoxetine (CYMBALTA) 60 mg capsule Take 60 mg by mouth daily. Provider, Historical   ezetimibe (ZETIA) 10 mg tablet Take  by mouth. Provider, Historical   metFORMIN (GLUMETZA ER) 500 mg TG24 24 hour tablet Take 500 mg by mouth two (2) times a day. Provider, Historical   cpap machine kit by Does Not Apply route as needed. Provider, Historical   Aspirin, Buffered 81 mg tab Take  by mouth. Provider, Historical              Starting Weight: 238#  Current Weight: 238# (237# last visit one month ago)  Overall Pounds Lost: 0 Overall Pounds Gained: 0  Self report weight 232#    Exercise/Physical Activity: walking 8-15 minutes most days, swimming in the river. Exercising with  1 day per week. Back pain better.     Is patient using [facility] products: yes  If yes, how many per day: 1    Aversions/side effects of product/program: none    Fluids used to mix with products: water    Reported Diet History:  Started Fay Lawrence since last visit, less portions, less desire to eat refined carbs and sugar cravings. Meals: keto bread and chicken thigh roasted, two wedges pineapple. No pasta, rice, potatoes. More protein (especially chicken), sirloin occasionally, and vegetables. Higher salads, squash, sauteed vegetables. Snacks: a few cookies (but less than pre-program which she contributes as a success)     Beverages: water, alcohol occasionally    24 Hour Diet Recall  Breakfast  ND shake with banana   Lunch  Fried chicken one piece, 2 beers   Dinner  Protein and vegetable   Snacks  1-2 slices fruit with 1 tsp peanut butter   Beverages  Water, beer     Barriers/concerns preventing patient from achieving goal(s) since last visit: daughter graduated, out to eat to celebrate, had alcohol. 's 58th reunion was last week, perhaps overate here including rice. To the river recently, had alcohol. NUTRITION INTERVENTION:  Pt educated on nutrition recommendations for the [facility] Low Calorie Plan (LCD), with the specific meal pattern of 2 meal replacements every day plus a grocery meal and snack. Daily recommended totals: 1200 calories, 60 grams carbs, 80+ grams protein, and remaining calories as healthy fats. Use LCD handout for meal and snack suggestions and preparation. Grocery meal:  Use the balanced plate method to plan meals, include 3-6 oz of lean source of protein, unlimited non-starchy vegetables, 1/2 cup whole grains/beans OR 1/2 cup fruit OR 1 serving of low fat dairy. Utilize handouts listing healthy snack and meal ideas. Read all nutrition labels. Demonstrated and emphasized identifying serving size, total fat, sugar and protein content. Defined low fat as </= 3 g per serving. Discussed lean and extra lean sources of protein. Avoid foods with sugar listed in the first 3 ingredients and >/10 g sugar per serving. Consume meal replacements every three to four hours or pattern as discussed with provider. Mix with water. May add herbs/spices for taste.     Practice mindful eating habits; take small bites, chew thoroughly, avoid distractions, utilize hunger/fullness scale. Reviewed attendance policy of attending weekly nutrition classes. Metabolic support group recommended, and increase physical activity (approved per MD) for long term weight maintenance. NUTRITION MONITORING AND EVALUATION: wakes up feeling determined, mood is positive this visit vs last visit. Self report 232#, which is down 6# since our last visit. May still deviate from plan (alcohol or sweets) rarely, but she is considering serving sizes and that is keeping her motivated overall. Encouraged her to continue to focus on balanced plate, movement, and water. Pt motivated, adherence likely. The following goals were established with patient;  1) use balanced plate method. If choosing a complex carb like pintos, or beans, limit to 1/4 plate or tennis ball sized. Ok to increase to 3-5 ounces protein to assist with fullness. ongoing  2) 1 ND shake every day,within 2 hours of rising. Goal is 2 per day. Ongoing, currently at one a day. Goal is to start two. Ok to add a very small piece of fruit to promote adherence to drink shakes. The smallest whole fruit, 1/2 cup cut fruit, 3-4 strawberries, etc.  3) increase to 3 20 ounce bottles of water per day. ongoing  4) 1/4-1/2 cup fruit for a snack is ok BUT pair with a lean protein or healthy fat: 2 slices low sodium deli meat, low fat string cheese, 10-12 nuts, 1-2 tsp nut butter. ongoing  5) try a ND product in the evening after dinner to assist with cravings ongoing  6) new goal: walk up all steps holding a grocery bag without stopping at each step. Specific tips and techniques to facilitate compliance with above recommendations were provided and discussed. If further details are desired please contact me at [phone]. This phone number was also provided to the patient for any further questions or concerns.           Austin Broderick, MS, RD, LDN

## 2022-05-23 NOTE — PROGRESS NOTES
1. Have you been to the ER, urgent care clinic since your last visit? Hospitalized since your last visit? No    2. Have you seen or consulted any other health care providers outside of the [address] [place] [address] since your last visit? Include any pap smears or colon screening. Yes When: 05/10/2022 dr. Mary Robert, 05/11/2022 [facility], 05/23/2022 dr Carine Powell, [facility]     05/10/2022      Progress Note: Weekly Education Class in the [facility]         Patient is on Very Low Calorie Diet [] (4 meal replacements per day, 800 kcal/day)      Low Calorie Diet [x] (2-3 meal replacements per day, 1785-9225 kcal/day)    1) Did patient have any new symptoms or physical problems? Yes []    No [x]    If yes, check & comment: weakness [], fatigue [], lightheadedness [], headache [], cramps [], cold intolerance [], hair loss [], diarrhea [], constipation [],  NA [] other:         2) Has patient had any medical attention from other providers, urgent care or the emergency room this week? Yes []  No [x]       NA [], If yes, why:         #3 no answer  3) Any other sugar sweetened beverages consumed this week? Yes []  No []    4) Did patient have any problems adhering to the diet? Yes []  No [] NA [x]    If yes, Vacation [], Celebrations [], Conferences [], Family Reunions [] other:     5) How many hours of sleep this week? 9-10    (range)  NA []    Number of meal replacements consumed daily? 0-1 (range)  NA []    Average ounces of water patient consumed daily this week (not including shakes)? 34 (divide the weekly total by 7)    Did you eat any food outside of the program? Yes [] No [x]    Physical Activity Over the Past Week:    Cardio exercise: 40 min  Strength exercise: 4 workouts / week  Number of steps walked per day: \"1.5mile\"  How has patient mood overall been this week?  Sad [], Happy [], Stressed [], Tired [], Content [], NA [x], other            Medications reconciled by nurse Yes [x]  No[]    Patient was given therapeutic recommendations for any noted side effects of their dietary approach based upon [facility] patient manual per providers recommendation. 05/17/2022  Progress Note: Weekly Education Class in the [facility] Loss [facility]         Patient is on Very Low Calorie Diet [] (4 meal replacements per day, 800 kcal/day)      Low Calorie Diet [x] (2-3 meal replacements per day, 7450-2718 kcal/day)    1) Did patient have any new symptoms or physical problems? Yes []    No [x]    If yes, check & comment: weakness [], fatigue [], lightheadedness [], headache [], cramps [], cold intolerance [], hair loss [], diarrhea [], constipation [],  NA [] other:      2) Has patient had any medical attention from other providers, urgent care or the emergency room this week? Yes []  No []       NA [x], If yes, why:      #3 no answer      3) Any other sugar sweetened beverages consumed this week? Yes []  No []    4) Did patient have any problems adhering to the diet? Yes []  No [] NA []    If yes, Vacation [], Celebrations [x], Conferences [], Family Reunions [] other:          5) How many hours of sleep this week? 9-10    (range)  NA []    Number of meal replacements consumed daily? 0-1 (range)  NA []    Average ounces of water patient consumed daily this week (not including shakes)? 36   (divide the weekly total by 7)    Did you eat any food outside of the program? Yes [] No []    Physical Activity Over the Past Week:    Cardio exercise: 20 min  Strength exercise: 1 workouts / week  Number of steps walked per day: \"4/10 mile\"    How has patient mood overall been this week? Sad [], Happy [], Stressed [], Tired [], Content [], NA [], other            Medications reconciled by nurse Yes [x]  No[]    Patient was given therapeutic recommendations for any noted side effects of their dietary approach based upon [facility] patient manual per providers recommendation.

## 2022-05-25 ENCOUNTER — VIRTUAL VISIT (OUTPATIENT)
Dept: SURGERY | Age: 72
End: 2022-05-25
Payer: MEDICARE

## 2022-05-25 DIAGNOSIS — E11.9 TYPE 2 DIABETES MELLITUS WITHOUT COMPLICATION, WITHOUT LONG-TERM CURRENT USE OF INSULIN (HCC): ICD-10-CM

## 2022-05-25 DIAGNOSIS — Z87.442 PERSONAL HISTORY OF KIDNEY STONES: ICD-10-CM

## 2022-05-25 DIAGNOSIS — Z98.84 HX OF LAPAROSCOPIC ADJUSTABLE GASTRIC BANDING: ICD-10-CM

## 2022-05-25 DIAGNOSIS — F33.42 RECURRENT MAJOR DEPRESSIVE DISORDER, IN FULL REMISSION (HCC): ICD-10-CM

## 2022-05-25 DIAGNOSIS — R63.8 CRAVING FOR PARTICULAR FOOD: ICD-10-CM

## 2022-05-25 DIAGNOSIS — E55.9 VITAMIN D DEFICIENCY: ICD-10-CM

## 2022-05-25 DIAGNOSIS — E78.2 MIXED HYPERLIPIDEMIA: ICD-10-CM

## 2022-05-25 DIAGNOSIS — Z99.89 OSA ON CPAP: ICD-10-CM

## 2022-05-25 DIAGNOSIS — M79.7 FIBROMYALGIA: ICD-10-CM

## 2022-05-25 DIAGNOSIS — F51.04 CHRONIC INSOMNIA: ICD-10-CM

## 2022-05-25 DIAGNOSIS — Z90.49 HISTORY OF LAPAROSCOPIC CHOLECYSTECTOMY: ICD-10-CM

## 2022-05-25 DIAGNOSIS — E53.8 VITAMIN B12 DEFICIENCY: ICD-10-CM

## 2022-05-25 DIAGNOSIS — R53.82 CHRONIC FATIGUE: ICD-10-CM

## 2022-05-25 DIAGNOSIS — E66.01 CLASS 2 SEVERE OBESITY DUE TO EXCESS CALORIES WITH SERIOUS COMORBIDITY AND BODY MASS INDEX (BMI) OF 36.0 TO 36.9 IN ADULT (HCC): Primary | ICD-10-CM

## 2022-05-25 DIAGNOSIS — I25.10 ARTERIOSCLEROTIC HEART DISEASE (ASHD): ICD-10-CM

## 2022-05-25 DIAGNOSIS — G47.33 OSA ON CPAP: ICD-10-CM

## 2022-05-25 DIAGNOSIS — R74.01 ELEVATED ALT MEASUREMENT: ICD-10-CM

## 2022-05-25 PROCEDURE — 1090F PRES/ABSN URINE INCON ASSESS: CPT | Performed by: FAMILY MEDICINE

## 2022-05-25 PROCEDURE — 1123F ACP DISCUSS/DSCN MKR DOCD: CPT | Performed by: FAMILY MEDICINE

## 2022-05-25 PROCEDURE — 3051F HG A1C>EQUAL 7.0%<8.0%: CPT | Performed by: FAMILY MEDICINE

## 2022-05-25 PROCEDURE — 2022F DILAT RTA XM EVC RTNOPTHY: CPT | Performed by: FAMILY MEDICINE

## 2022-05-25 PROCEDURE — 3017F COLORECTAL CA SCREEN DOC REV: CPT | Performed by: FAMILY MEDICINE

## 2022-05-25 PROCEDURE — G8400 PT W/DXA NO RESULTS DOC: HCPCS | Performed by: FAMILY MEDICINE

## 2022-05-25 PROCEDURE — G9717 DOC PT DX DEP/BP F/U NT REQ: HCPCS | Performed by: FAMILY MEDICINE

## 2022-05-25 PROCEDURE — G8756 NO BP MEASURE DOC: HCPCS | Performed by: FAMILY MEDICINE

## 2022-05-25 PROCEDURE — 99215 OFFICE O/P EST HI 40 MIN: CPT | Performed by: FAMILY MEDICINE

## 2022-05-25 PROCEDURE — G8427 DOCREV CUR MEDS BY ELIG CLIN: HCPCS | Performed by: FAMILY MEDICINE

## 2022-05-25 PROCEDURE — 1101F PT FALLS ASSESS-DOCD LE1/YR: CPT | Performed by: FAMILY MEDICINE

## 2022-05-25 NOTE — PROGRESS NOTES
[id number] [address] [facility]es[facility], 200 [facility] 22.  [phone] o  350 [facility] VISIT    Patient Name:  Derick Arora, 1950  PCP:  Cleve Hull MD    Method of Delivery: Synchronous (real-time) audio-video technology  Platform:  [email or web address] me  My location:  Home Office                Patient location:  home    Patient's preferred weight management approach:  Low Calorie Diet, LCD ([phone] kcal/day). Number meal replacements consumed daily:  1 OTC Orgain or Premiere Protein shakes then ND shakes  Current Weight Loss Medication:  Ozempic 0.25 mg SC weekly, dose 4 of 4 on Sundays rxd by me, Metformin 500 mg I po BID by pcp    Derick Arora is a 66 y.o. female with Obesity Class 2 There is no height or weight on file to calculate BMI. and associated health concerns. Patient presents today for Weight Management and Medication Management    Challenges adhering to the plan over the past month:  My daughter's graduation celebration. [place] with my daughter and her fiancé over the weekend. I went with my  to his 60th high school football reunion at [facility]. I drink an alcoholic beverage while there to help me relax. It was difficult to help him get ready for the event as he did not even want to take a bath. I have reduced my scotch and water intake to 3 times a week. I previously drink nightly while cooking cooking to alleviate hunger.     Patient goals for participation in weight management program:   175 lbs (lose 55 lbs), have energy to get my house together and do more w my      Anthropometric Measures Previously    Start Date:  03/08/22  Start Weight:  230 lbs 8 oz       BMI:  36.1 kg/m2        Neck circumference:  14.75 in                                 Waist circumference:  47 in              Body fat percentage:  45.1 %      Anthropometric MeasuresToday  Today's Self Reported Weight 5/25/2022: 231.4 lbs 0 oz      Weight Metrics 5/23/2022 5/10/2022 4/27/2022 4/18/2022 4/11/2022 3/21/2022 3/16/2022   Weight 233 lb 14.4 oz 238 lb 11.2 oz 235 lb 9.6 oz 237 lb 8 oz 234 lb 9.6 oz 234 lb 3.2 oz 238 lb   Neck Circ (inches) - - - - - - -   Waist Measure Inches - - - - - - -   Body Fat % - - - - - - -   BMI 36.63 kg/m2 37.39 kg/m2 36.9 kg/m2 37.2 kg/m2 36.74 kg/m2 36.68 kg/m2 37.28 kg/m2     Neck Circumference:  Acceptable range for M >16 inches, F>15 inches  Waist Circumference:  Acceptable range for M> 40 inches/102 cm, F > 35 inches, 88 cm  Body Fat: Acceptable range for M 18-24%, F 25-31%     Weight at last appointment on 04/27/22:  235.6 lbs 0 oz      Pounds loss since the last doctor appointment with our Center a month ago:  4 lbs  Total pounds loss since starting this therapeutic approach on start date: 0 lbs   Is patient satisfied with his/her progress since the last appointment: Yes, I feel stronger. Factors contributing to weight change:  Working w my , reducing my alcohol intake. I no longer drink scotch and water while cooking every night. Graduation celebration for my daughter and high school [facility] [employer] [facility]  recently. SURGICAL HISTORY  Previous Weight Loss Surgery:  Lap Gastric Band 01/2008 by Dr. Ron Alcaraz. Contacted Dr. Ta Hernández for refill and requested a Revision to Lap Gastric Bypass but was denied by her insurance bc it is \"not medically necessary\" and told she was too old. Past Surgical History:   Procedure Laterality Date    COLONOSCOPY N/A 8/14/2020    Dr. Karlee Pérez. w colon polypectomy. due q 3 yrs.  HX BARIATRIC SURGERY  01/2008    LAP GASTRIC BANDING. Dr. Ron Alcaraz.  HX CARPAL TUNNEL RELEASE Bilateral     HX CHOLECYSTECTOMY  1984    HX LITHOTRIPSY Left     due to kidney stone.   Dr. Scott Cody Right due to KIDNEY STONE REMOVAL. Dr. Chaya Phillips.  IR ENDOVENOUS ABLATION RF INITIAL VEIN BI Right 2021    Dr. Shakeel Crowe. due to Venous Insufficiency.  NH COLSC FLX W/RMVL OF TUMOR POLYP LESION SNARE TQ  12/22/2010         NH EGD TRANSORAL BIOPSY SINGLE/MULTIPLE  12/22/2010    Dr. Mcdonnell Shows  Weight loss medication prescribed by our office:  Ozempic    Negative side effects: 0  Are hunger, cravings and appetite controlled with use of the weight loss medication:   Yes  Is the medication tolerated well:  Yes - no longer having sugar cravings like I was  Does patient desire refills of any medications previously prescribed by our office:  Yes    Home Medications    Medication Sig Start Date End Date Taking? Authorizing Provider   ergocalciferol (ERGOCALCIFEROL) 1,250 mcg (50,000 unit) capsule Take 1 Capsule by mouth every seven (7) days for 90 days. Indications: low vitamin D levels 4/27/22 7/26/22 Yes Jerald Maldonado DO   cyanocobalamin, vitamin B-12, 1,000 mcg/mL kit 1 mL by Injection route every thirty (30) days. Indications: inadequate vitamin B12 4/27/22  Yes Lyn DIAZ DO   Insulin Needles, Disposable, (Taya Pen Needle) 32 gauge x 5/32\" ndle Use daily as directed 4/27/22  Yes Jerald Conti DO   semaglutide (Ozempic) 0.25 mg or 0.5 mg/dose (2 mg/1.5 ml) subq pen 0.5 mg by SubCUTAneous route every seven (7) days. Start after finishing 0.25 mg x 4 weeks  Indications: type 2 diabetes mellitus 4/27/22  Yes Jerald Conti DO   melatonin 10 mg capsule Take  by mouth as needed. Takes one to two caps   Yes Provider, Historical   HYDROcodone-acetaminophen (NORCO) 5-325 mg per tablet TAKE 1 TABLET BY MOUTH TWICE DAILY AS NEEDED FOR PAIN 1/10/22  Yes Provider, Historical   cyclobenzaprine (FLEXERIL) 10 mg tablet Take  by mouth three (3) times daily as needed for Muscle Spasm(s).    Yes Provider, Historical   metoprolol succinate (TOPROL-XL) 50 mg XL tablet TAKE 1 TABLET EVERY DAY 1/2/22  Yes Ken Leiva MD   DILT- mg capsule TAKE 1 CAPSULE EVERY DAY  (KEEP  APPOINTMENT  FOR  FURTHER  REFILLS) 11/4/21  Yes Issac THEODORE NP   diazePAM (VALIUM) 10 mg tablet TAKE 1 TABLET BY MOUTH EVERY DAY AS NEEDED 5/3/21  Yes Provider, Historical   hydroCHLOROthiazide (HYDRODIURIL) 12.5 mg tablet TAKE 1 TABLET BY MOUTH EVERY DAY IN THE MORNING 5/6/21  Yes Provider, Historical   omeprazole (PRILOSEC) 20 mg capsule Take  by mouth. PRN 11/10/20  Yes Provider, Historical   pantoprazole (PROTONIX) 40 mg tablet TAKE 1 TABLET BY MOUTH EVERY DAY BEFORE A MEAL 2/23/21  Yes Provider, Historical   traZODone (DESYREL) 50 mg tablet Take 100 mg by mouth as needed. 9/24/20  Yes Provider, Historical   rosuvastatin (CRESTOR) 40 mg tablet Take 1 Tab by mouth nightly. 8/20/20  Yes Teodoro Jones NP   pregabalin (LYRICA) 150 mg capsule Take 150 mg by mouth two (2) times a day. Yes Provider, Historical   DULoxetine (CYMBALTA) 60 mg capsule Take 60 mg by mouth daily. Yes Provider, Historical   ezetimibe (ZETIA) 10 mg tablet Take  by mouth. Yes Provider, Historical   metFORMIN (GLUMETZA ER) 500 mg TG24 24 hour tablet Take 500 mg by mouth two (2) times a day. Yes Provider, Historical   cpap machine kit by Does Not Apply route as needed. Yes Provider, Historical   Aspirin, Buffered 81 mg tab Take  by mouth. Yes Provider, Historical   semaglutide (Ozempic) 0.25 mg or 0.5 mg/dose (2 mg/1.5 ml) subq pen 0.25 mg by SubCUTAneous route every seven (7) days.  Indications: type 2 diabetes mellitus  Patient not taking: Reported on 5/25/2022 4/27/22   Ira DIAZ DO   acyclovir (ZOVIRAX) 400 mg tablet TAKE 1 TABLET BY MOUTH TWICE DAILY FOR 10 DAYS AS NEEDED  Patient not taking: Reported on 4/27/2022 4/4/22   Provider, Historical     Medications that cause weight gain: Hydrocodone, Flexeril, Toprol, diltiazem, Valium, Prilosec, Protonix, trazodone, Crestor, Lyrica, Cymbalta, Zetia  Medications that cause weight loss:  Ozempic, Metformin  Any changes to medications since last office visit with me:  Started Ozempic 0.25 mg SC weekly    Allergies   Allergen Reactions    Mobic [Meloxicam] Anxiety     Jittery       EATING HABITS  Patient met with the RD over the past month:  Yes  Nutritional changes made over the last month per recommendation of the RD:  Add peanut butter or cheese curls to fruit   Are hunger, cravings and appetite controlled:  Yes  Negative Side Effects from meal replacements:  Yes   Does patient want to utilize [place] meal replacements:  No and I still have lots of OTC Protein products  Barriers to eating meals:  Celebrations. DRINKING HABITS  Average amount of water consumed daily: 60-80 oz/day  (Goal 2 L or 67 oz daily, minimally)  Amount of caffeine consumed daily: 2 C coffee 8-9 oz w half and half    Sugar-sweetened beverages consumed daily (including alcohol, sodas, teas, juices, etc.): 1 draft at the graduation celebration and my 's reunion.   Barriers preventing patient from drinking minimum 2L water/day:  0     Social History     Tobacco Use    Smoking status: Former Smoker     Packs/day: 0.50     Years: 20.00     Pack years: 10.00     Types: Cigarettes     Quit date: 12/29/2011     Years since quitting: 10.4    Smokeless tobacco: Never Used    Tobacco comment: QUIT SMOKING 01/2022   Vaping Use    Vaping Use: Never used   Substance Use Topics    Alcohol use: Not Currently     Comment: QUIT ALCOHOL 05/2021- was 2 g wine, whiskey or vodka    Drug use: No         SLEEP HABITS  Total hours of sleep nightly: 9-10 hours (Goal 7-9 hours/nightly)  Rx or OTC Sleep Aids:  Trazodone and OTC Melatonin   Sleep Hx:  Chronic Insomnia   Daytime Sleepiness:  No  Occupation:   Caretaker for my    Barriers to getting proper rest:  0     PHYSICAL ACTIVITY HISTORY  Type of physical activity:  Getting in and out of the boat at the [address] this past weekend. . Physical activity is performed 1 times a week for 60 minutes 1 times in the day  Enjoyment with physical activity:  yes  Barriers limiting physical activity:  Time. BEHAVIORAL HEALTH HISTORY  DEPRESSION SCREENING:    3 most recent PHQ Screens 2022   Little interest or pleasure in doing things Not at all   Feeling down, depressed, irritable, or hopeless Not at all   Total Score PHQ 2 0   Trouble falling or staying asleep, or sleeping too much -   Feeling tired or having little energy -   Poor appetite, weight loss, or overeating -   Feeling bad about yourself - or that you are a failure or have let yourself or your family down -   Trouble concentrating on things such as school, work, reading, or watching TV -   Moving or speaking so slowly that other people could have noticed; or the opposite being so fidgety that others notice -   Thoughts of being better off dead, or hurting yourself in some way -   PHQ 9 Score -   How difficult have these problems made it for you to do your work, take care of your home and get along with others -       History of mental health conditions (including Depression, Anxiety, Anorexia, Bulimia or Binge Eating disorder): Anxiety, Depression, Fibromyalgia  Treating provider and medication(s): Rx Trazadone 100 mg q hs for MAD, Cymbalta for MELANY, MAD by pcp and counseling michael Justice  Current major lifestyle changes or stressors:   renovating my home. I smoke 2-3 cigs/day to de-stress. Recall:   w COPD needs care.  Mom  .  Need a Lap fill but can not get it until after having my colonoscopy     Mobile Apps used for meal planning, calorie counting, water consumption, sleep, or physical activity:  0     ASSOCIATED MEDICAL CONDITIONS  Past Medical History:   Diagnosis Date    Acute meniscal tear of left knee     Anxiety     Arteriosclerotic heart disease (ASHD)     Dr. Cleve Manriquez CAD (coronary artery disease)     Dr. Waldemar Camilo. 19 Cor Calcium score 112, Mod CAD.  Carpal tunnel syndrome, bilateral     Cataract of both eyes     Dr. Ana Kelley    Cervical radiculitis     Dr. Lisseth Belle. Dr. Phong Malone.    Chronic pain     back, neck, shoulder. Dr. Argentina Mckinney Depression     [facility]. Lindsey Garcia, therapist    Diabetes mellitus ([facility] [address] 2019    Diverticulosis     Dry eye syndrome of both eyes     Dr. Margie Torre Erosive esophagitis     Dr. Andria Juarez [facility], rheum.  Foot swelling     BL    Gallstones     Hypercholesterolemia     Hyperglycemia     A1c 6.0    Internal carotid artery stenosis, bilateral     Mild    Long term current use of anticoagulant therapy     Lumbar degenerative disc disease     L4-5. Dr. Garcia Mulligan.  Neuropathy     Personal history of kidney stones     BL. Dr. Nolvia Mills.  Pulmonary nodule, right     per CT.  4 mm RML, 5mm RLL. Dr. Jenifer Calles PVC's (premature ventricular contractions)     Dr. Zarate Cough. Txd Diltiazem, Metoprolol    Shoulder pain, left     L>R. Dr. Lisseth Belle    Sleep apnea     CPAP    Valvular heart disease     hx of Mitral valve disorder    Varicose veins of both legs with edema     Dr. Kimberli Bell (For Female Patients)  Social History     Substance and Sexual Activity   Sexual Activity Not Currently    Partners: Male    Comment: MENOPAUSE     OB History        5    Para        Term                AB        Living   5       SAB        IAB        Ectopic        Molar        Multiple        Live Births   5               Menopause:  Yes  LMP:  No LMP recorded. (Menstrual status: Menopause). Are you pregnant or planning on becoming pregnant within 6 months:  No    Do you have upcoming travel in the next 6 weeks:  No.   Patient will also notify the dietician for recommendations during travel.   Immunization History   Administered Date(s) Administered   [address] [place] [address] COVID-19, Pfizer [employer], DO NOT Dilute, Miguel Angel-Sucrose, 12+ yrs, PF, 30mcg/0.3 mL dose 02/23/2021, 03/26/2021    COVID-19, Pfizer Purple top, DILUTE for use, 12+ yrs, 30mcg/0.3mL dose 10/08/2021    Tdap 10/12/2020       OTHER MEDICAL CARE SINCE LAST OFFICE VISIT  Pt saw Dr. Sathya Mohan for pulmonary nodules. He ordered a new chest CT. Pt requests results. She has relapsed w smoking cigarettes to help w stress from her stubborn spouse. She smokes 2-3 cigs/day. She denies any associated symptoms. ROS  Pt denies lack of focus, fatigue, feeling weak, headaches, dizziness, light headedness, nausea, vomiting, diarrhea, constipation, indigestion, rapid heart rate, SOB, low blood sugar, feeling cold, hair loss, rash, fluid retention, leg aches, difficulty sleeping, irritability, mood swings, or other associated symptoms. Review of Systems negative except as noted above in HPI.      HEALTH MAINTENANCE  Health Maintenance   Topic Date Due    Hepatitis C Screening  Never done    Foot Exam Q1  Never done    MICROALBUMIN Q1  Never done    Eye Exam Retinal or Dilated  Never done    Shingrix Vaccine Age 50> (1 of 2) Never done    Breast Cancer Screen Mammogram  Never done    Bone Densitometry (Dexa) Screening  Never done    Pneumococcal 65+ years (2 - PCV) 03/07/2021    Medicare Yearly Exam  Never done    A1C test (Diabetic or Prediabetic)  03/08/2023    Lipid Screen  03/08/2023    Depression Monitoring  05/23/2023    Colorectal Cancer Screening Combo  08/14/2030    DTaP/Tdap/Td series (2 - Td or Tdap) 10/12/2030    Flu Vaccine  Completed    COVID-19 Vaccine  Completed       PHYSICAL EXAMINATION  (Limited due to being a telehealth encounter)  Self-reported Vital Signs:  Patient-Reported Vitals 5/25/2022   Patient-Reported Weight 231.4 lbs   Patient-Reported Pulse 66   Patient-Reported SpO2 -   Patient-Reported Systolic  202   Patient-Reported Diastolic 66   Patient-Reported LMP -        Weight Metrics 5/23/2022 5/10/2022 4/27/2022 4/18/2022 4/11/2022 3/21/2022 3/16/2022   Weight 233 lb 14.4 oz 238 lb 11.2 oz 235 lb 9.6 oz 237 lb 8 oz 234 lb 9.6 oz 234 lb 3.2 oz 238 lb   Neck Circ (inches) - - - - - - -   Waist Measure Inches - - - - - - -   Body Fat % - - - - - - -   BMI 36.63 kg/m2 37.39 kg/m2 36.9 kg/m2 37.2 kg/m2 36.74 kg/m2 36.68 kg/m2 37.28 kg/m2     Neck Circumference:  Acceptable range for M >16 inches, F>15 inches  Waist Circumference:  Acceptable range for M> 40 inches/102 cm, F > 35 inches, 88 cm  Body Fat: Acceptable range for M 18-24%, F 25-31%    General appearance - Well nourished. Well appearing. Well developed. No acute distress. Obese. Head - Normocephalic. Atraumatic. Eyes - Extraocular eye movements intact. Sclera anicteric. Ears - Hearing is grossly normal bilaterally. Nose - normal and patent. No discharge. No nasal flaring noted. Neck -   Midline trachea. No neck masses noted. No neck retractions noted. Chest - Unlabored respirations. Symmetrical chest.  No conversational dyspnea noted. Heart - normal rate. Neurological - awake, alert and oriented to person, place, and time and event. Cranial nerves II through XII intact. Clear speech. Steady gait. Musculoskeletal - Intact x 4 extremities. No pain with movement. Psychological -   normal behavior, dress and thought processes. Good insight. Good eye contact. Normal affect. Appropriate mood. Normal speech.     DATA REVIEWED    Lab Results   Component Value Date/Time    WBC 7.3 03/08/2022 01:55 PM    HGB 13.5 03/08/2022 01:55 PM    HCT 41.1 03/08/2022 01:55 PM    PLATELET 662 88/48/3603 01:55 PM    MCV 94 03/08/2022 01:55 PM     Lab Results   Component Value Date/Time    Sodium 141 03/08/2022 01:55 PM    Potassium 4.9 03/08/2022 01:55 PM    Chloride 100 03/08/2022 01:55 PM    CO2 22 03/08/2022 01:55 PM    Anion gap 1 (L) 09/28/2021 12:32 PM    Glucose 105 (H) 03/08/2022 01:55 PM    BUN 16 03/08/2022 01:55 PM    Creatinine 0.73 03/08/2022 01:55 PM    BUN/Creatinine ratio 22 03/08/2022 01:55 PM    GFR est AA >60 09/28/2021 12:32 PM    GFR est non-AA >60 09/28/2021 12:32 PM    Calcium 9.7 03/08/2022 01:55 PM    Bilirubin, total 0.4 03/08/2022 01:55 PM    Alk.  phosphatase 57 03/08/2022 01:55 PM    Protein, total 6.4 03/08/2022 01:55 PM    Albumin 4.3 03/08/2022 01:55 PM    Globulin 3.0 09/28/2021 12:32 PM    A-G Ratio 2.0 03/08/2022 01:55 PM    ALT (SGPT) 46 (H) 03/08/2022 01:55 PM    AST (SGOT) 30 03/08/2022 01:55 PM     Lab Results   Component Value Date/Time    Hemoglobin A1c 7.0 (H) 03/08/2022 01:55 PM     Lab Results   Component Value Date/Time    TSH 2.310 03/08/2022 01:55 PM     Lab Results   Component Value Date/Time    Uric acid 3.8 03/08/2022 01:55 PM     Magnesium   Date Value Ref Range Status   03/08/2022 2.1 1.6 - 2.3 mg/dL Final     Lab Results   Component Value Date/Time    Vitamin B12 173 (L) 03/08/2022 01:55 PM    Folate 8.8 03/08/2022 01:55 PM     Lab Results   Component Value Date/Time    VITAMIN D, 25-HYDROXY 29.4 (L) 03/08/2022 01:55 PM     Lab Results   Component Value Date/Time    Iron 60 03/08/2022 01:55 PM     Lab Results   Component Value Date/Time    Cholesterol, total 109 09/28/2021 12:32 PM    Cholesterol, Total 154 03/08/2022 01:55 PM    HDL Cholesterol 44 09/28/2021 12:32 PM    LDL, calculated 44.4 09/28/2021 12:32 PM    VLDL, calculated 20.6 09/28/2021 12:32 PM    Triglyceride 103 09/28/2021 12:32 PM    CHOL/HDL Ratio 2.5 09/28/2021 12:32 PM   )  Results for orders placed or performed in visit on 03/08/22   [facility] (WITHOUT GRAPH)     Status: Abnormal   Result Value Ref Range Status    LDL-P 1,276 (H) <1,000 nmol/L Final     Comment:                           Low                   < 1000                            Moderate         1000 - 1299                            Borderline-High  1300 - 1599                            High             1600 - 2000 Very High             > 2000      LDL-C(NIH CALC) 70 0 - 99 mg/dL Final     Comment:                           Optimal               <  100                            Above optimal     100 -  129                            Borderline        130 -  159                            High              160 -  189                            Very high             >  189      HDL-C 63 >39 mg/dL Final    Triglycerides 122 0 - 149 mg/dL Final    Cholesterol, Total 154 100 - 199 mg/dL Final    HDL-P (Total) 50.4 >=30.5 umol/L Final    Small LDL-P 882 (H) <=527 nmol/L Final    LDL size 20.3 (L) >20.5 nm Final     Comment:  ----------------------------------------------------------                   ** INTERPRETATIVE INFORMATION**                   PARTICLE CONCENTRATION AND SIZE                      <--Lower CVD Risk   Higher CVD Risk-->    LDL AND HDL PARTICLES   Percentile in Reference Population    HDL-P (total)        High     75th    50th    25th   Low                         >34.9    34.9    30.5    26.7   <26.7    Small LDL-P          Low      25th    50th    75th   High                         <117     117     527     839    >839    LDL Size   <-Large (Pattern A)->    <-Small (Pattern B)->                      23.0    20.6           20.5      19.0   ----------------------------------------------------------  Small LDL-P and LDL Size are associated with CVD risk, but not after  LDL-P is taken into account. LP-IR SCORE 47 (H) <=45 Final     Comment: INSULIN RESISTANCE MARKER      <--Insulin Sensitive    Insulin Resistant-->             Percentile in Reference Population  Insulin Resistance Score  LP-IR Score   Low   25th   50th   75th   High                <27   27     45     63     >63  LP-IR Score is inaccurate if patient is non-fasting.   The LP-IR score is a laboratory developed index that has been  associated with insulin resistance and diabetes risk and should be  used as one component of a physician's clinical assessment. Narrative    Test(s) 145031-PEA-L; 930298-TXQ-Y; 020396-Hkcqndpyiwmxt; 603049-  Cholesterol, Total; 983530-FUE-D (Total); 884297-Small LDL-P; 790139-  LDL Size; 091141-ZJ-YJ Score  was developed and its performance characteristics determined  by [facility]arn[facility] Kim. It has not been cleared or approved by the Food  and Drug Administration. Performed at:  [address]  [address]  [id number]  : Omar Yoder MD, Phone:  [phone]        EKG:   Results for orders placed or performed during the hospital encounter of 10/12/20   EKG, 12 LEAD, INITIAL   Result Value Ref Range    Ventricular Rate 55 BPM    Atrial Rate 55 BPM    P-R Interval 138 ms    QRS Duration 78 ms    Q-T Interval 430 ms    QTC Calculation (Bezet) 411 ms    Calculated P Axis 42 degrees    Calculated R Axis 21 degrees    Calculated T Axis 52 degrees    Diagnosis       Sinus bradycardia  Septal infarct (cited on or before 23-OCT-2015)  When compared with ECG of 23-OCT-2015 12:33,  premature supraventricular complexes are no longer present  Confirmed by Allison Aguilera MD ([id number]) on 10/12/2020 4:33:45 PM       QTC interval  411 ms. No prolonged QTc noted. Acceptable QTC upper limits:  440 ms for Males, 460 ms for Females    ASSESSMENT     ICD-10-CM ICD-9-CM    1. Class 2 severe obesity due to excess calories with serious comorbidity and body mass index (BMI) of 36.0 to 36.9 in adult ([facility]  E66.01 278.01     Z68.36 V85.36    2. Chronic fatigue  R53.82 780.79     improving   3. Type 2 diabetes mellitus without complication, without long-term current use of insulin (HCC)  E11.9 250.00    4. Mixed hyperlipidemia  E78.2 272.2     w elevated LDLP, sdLDLP   5. Vitamin B12 deficiency  E53.8 266.2    6. Vitamin D deficiency  E55.9 268.9    7. Fibromyalgia  M79.7 729.1     stable   8. Recurrent major depressive disorder, in full remission ([facility]  F33.42 296.36     stable   9.  Craving for particular food  R63.8 783.9     sugar, improving with Metformin and Ozempic for DM2   10. Hx of laparoscopic adjustable gastric banding  Z98.84 V45.86    11. Arteriosclerotic heart disease (ASHD)  I25.10 414.00    12. Elevated ALT measurement  R74.01 790.4    13. BILL on CPAP  G47.33 327.23     Z99.89 V46.8    14. Personal history of kidney stones  Z87.442 V13.01    15. Chronic insomnia  F51.04 780.52     stable on Trazodone   16. History of laparoscopic cholecystectomy  Z90.49 V45.89        We discussed the expected course, resolution and complications of the diagnosis(es) in detail. WEIGHT MANAGEMENT PLAN  Agree with nurse documentation. Chart was reviewed and updated during the office visit today. Emphasized the importance of the patient continuing care from current primary care provider and other specialists while participating in this weight management program.  Patient has full access to all our office notes, orders, lab results on [employer] and can provide them copies, if desired. Advised patient to follow up with pcp for any acute symptoms and Health Maintenance. Reviewed office notes from other health care providers. Continue follow up as directed by these providers. Referrals given as noted above. Continue current medications as directed by prescribers. Medication(s) prescribed today as noted above and sent electronically to the pharmacy on file after discussing risks, benefits, costs, interactions, alternatives, contraindications and potential side effects:  Continue Metformin and Ozempic. Increase Ozempic . 25 mg x 4 doses to 0.50 mg SC weekly x 4 doses. Identified and discussed medications patient is taking that can cause weight gain or weight loss as recorded on patient's medication list.  Advised patient not to stop any use without discussing with the prescribing provider. Ask prescribing providers to consider more weight neutral or weight negative options.  Will monitor patient's weight and health with continued use. Supplement recommendations: Take OTC Magnesium 400 mg po at night prn sleep, muscle cramping, constipation. Take OTC vit B12 1000 mcg daily orally if taking Metformin or experiencing numbness and tingling. Take OTC Fish Oil 1000 mg with EPA and -1,000 mg daily if experiencing dry skin, low HDL. Use with caution if history of heartburn exists. Increase fiber products (I.e. fiber tea, fiber shakes) or try OTC Fiber products (I.e. Benefiber, Metamucil, psyllium, etc) if experiencing constipation or hunger. Take OTC Lactaid with meal replacements, if lactose intolerance history exists or symptoms begin. Referred patient to [facility] Patient Manual for recommended antidotes, if any new symptoms or side effects have been experienced once dietary approach is initiated. Advised patient to notify our office if this occurs. If patient has had bariatric surgery, Vale Chen was advised to take the vitamin regimen and follow dietary recommendations as directed by patient's bariatric surgical team.  Get annual labs to re-evaluate vitamin levels. Avoid NSAIDS. Avoid concentrated sweets and carbonated beverages. Reviewed and discussed most recent lab results and EKG. Advised patient to sign a release to provide our program a copy of any new lab results, EKG or pertinent tests from other physicians, if it is unavailable today. Informed patient that an EKG will be performed for every 50 lbs of weight loss. Recheck pertinent labs every 3 months while participating in LCD using [facility] meal replacements, as discussed to identify electrolyte abnormalities and guide therapeutic approach. Advised patient to sign up for [employer] and view lab results directly. Notify me by [facility] [employer] if any questions or concerns arise. Discussed the patient's BMI, anthropometric measures and goals with patient.   Informed patient that weight loss goal of 5-10% in 6-12 months has shown significant improvement in obesity and its related health conditions including DM, heart disease, asthma. A key study published in Arthritis & Rheumatism of Overweight and Obese Adults with OA found that losing 1 pound of weight resulted in 4 pounds of pressure being removed from the knees. Dietary Prescription:    Annie Lerner has fulfilled [facility] program requirements this month by completing homework forms, attending educational classes, nurse triage visits and monthly provider appointments as agreed and remains in good standing. Reviewed and appreciate registered dietician note for nutritional counseling. Patient has attended all requirements of the program over the past month and is in good standing. Reviewed and appreciate bimonthly nurse visits and agree with documentation. Followed up on any patient concerns today. Recommended meal plan:  [employer]irection Low Calorie Dietary approach, 4790-4452 kcal/day, 1-2 meal replacements daily and 1-2 grocery meals. Make sure proper daily calories are consumed with each meal.  Encouraged patient to stay within the recommended amount of calories per day. Do not skip meals. Meals must be eaten within a 3-4 hour time period in order to prevent potential side effects, including low blood sugar. Schedule 1:1 session with RD. Strongly emphasized that patient drink a minimum of 2 L (67 oz) of water daily up to half your body weight in ounces of water while utilizing this dietary approach to prevent dehydration and potential side effects. Drink the majority of water prior to supper to prevent nocturnal urination. Avoid water enhancers and sugar-sweetened beverages, including sodas, alcohol, juice. Limit caffeine intake to prevent sleep interference, heart palpitations and dehydration from increased urination.   Will allow 1 8 oz cup of black coffee or green tea (to stimulate release of Adiponectin and promote fat burning.)    Consume any alcohol or caffeine 6-12 hours before bedtime to prevent sleep disturbances and bladder irritation at night. Referred patient to [employer] Patient Manual for recommended antidotes, if any new symptoms or side effects have been experienced once dietary approach is initiated. Advised patient to notify our office if this occurs. Exercise Prescription:   Emphasized importance of mild physical activity and reducing sedentary time. Advised patient to work with RD to ensure you have proper calories for your level of activity. A goal of 30 minutes physical activity daily is recommended for health benefit and at least 60 minutes daily to prevent weight regain. During weight loss phase, no less than 75% of this time needs to be performing aerobic (i.e. Walking) activity with no more than 25% of the time performing resistance exercises (i.e. Weight lifting, strengthening, toning). During weight maintenance phase, 50% of the physical activity time needs to be spent performing aerobic activity with 50% of the total time performing resistance exercises. Behavior and Lifestyle Prescription:  Counseled patient on stressors, stress management and self-care approaches. Encouraged pt to seek formal counseling for stressors mentioned today. If already receiving formal counseling encouraged patient to continue these sessions routinely. Go to ER if any thoughts or attempts of suicide are experienced. Sleep Prescription:   A goal of 7-9 hours nightly of uninterrupted sleep is recommended to turn off the Grehlin hormone to be released from the stomach and triggers appetite while promoting weight gain. Proper rest turns on Leptin hormone to be released from white adipose tissue and promotes weight loss.        Counseled patient on health topics:  Obesity, Insulin Resistance, LCD dietary approaches, benefits, contraindications, possible side effects to these diets and how to prevent poor outcomes (including staying hydrated, limit physical exercise while transitioning into this program, avoid skipping meals, etc), weight loss goals, sleep hygiene, barriers to losing weight and keeping weight off, strategies to overcome habits or challenges to approve or continue adherence and stressors. Immunizations noted. Influenza and Covid-19 vaccines recommended, if patient has not had it. Obesity may increase risk for severe illness and death from Covid-19 disease. Offered empathy, encouragement, legitimation, prayers, partnership to patient. Praised patient for successes. Patient was offered a choice/choices in the treatment plan today. Patient expressed understanding with the diagnoses and the plan and agrees with recommendations. Return in about 1 month (around 6/25/2022) for ND LCD, MEDICATION MANAGMENT. Total time:  60 mins spent in counseling, coordinating care, reviewing and discussing results, reviewing and discussing relevant provider communication, completing relevant documentation, and discussing treatment plans in reference to The primary encounter diagnosis was Class 2 severe obesity due to excess calories with serious comorbidity and body mass index (BMI) of 36.0 to 36.9 in adult [facility]). Diagnoses of Chronic fatigue, Type 2 diabetes mellitus without complication, without long-term current use of insulin (HCC), Mixed hyperlipidemia, Vitamin B12 deficiency, Vitamin D deficiency, Fibromyalgia, Recurrent major depressive disorder, in full remission ([address] Craving for particular food, Hx of laparoscopic adjustable gastric banding, Arteriosclerotic heart disease (ASHD), Elevated ALT measurement, BILL on CPAP, Personal history of kidney stones, Chronic insomnia, and History of laparoscopic cholecystectomy were also pertinent to this visit. Luis Alberto Elam was evaluated through a synchronous (real-time) audio-video encounter.  The patient (or guardian if applicable) is aware that this is a billable service, which includes applicable co-pays. This Virtual Visit was conducted with patient's (and/or legal guardian's) consent. The visit was conducted pursuant to the emergency declaration under the [facility], [address] [place] [address] authority and the [employer] General Act. Patient identification was verified, and a caregiver was present when appropriate. The patient was located in a state where the provider was licensed to provide care. [id number] MD FOR ALLOWING ME THE PRIVILEGE TO PARTICIPATE IN THE CARE OF OUR MUTUAL PATIENT, Ms. Margie Ventura. Ekta Eli DO, DiplomatJERSON Pineda    There are no Patient Instructions on file for this visit.

## 2022-05-25 NOTE — PROGRESS NOTES
1. Have you been to the ER, urgent care clinic since your last visit? Hospitalized since your last visit? No    2. Have you seen or consulted any other health care providers outside of the [address] [place] [address] since your last visit? Include any pap smears or colon screening. No     Pt stated to writer no VS available at time of call.

## 2022-05-31 DIAGNOSIS — E11.9 TYPE 2 DIABETES MELLITUS WITHOUT COMPLICATION, WITHOUT LONG-TERM CURRENT USE OF INSULIN (HCC): ICD-10-CM

## 2022-06-01 NOTE — PROGRESS NOTES
Cuong Jansen presents for weekly or bi-monthly evaluation of Obesity Body mass index is 37.39 kg/m². Visit Vitals  /74 (BP 1 Location: Right arm, BP Patient Position: Sitting, BP Cuff Size: Adult long)   Pulse 82   Temp 98 °F (36.7 °C) (Oral)   Resp 18   Ht 5' 7\" (1.702 m)   Wt 238 lb 11.2 oz (108.3 kg)   SpO2 94%   BMI 37.39 kg/m²       Wt Readings from Last 3 Encounters:   05/23/22 233 lb 14.4 oz (106.1 kg)   05/10/22 238 lb 11.2 oz (108.3 kg)   04/27/22 235 lb 9.6 oz (106.9 kg)       1. Obesity (BMI 30-39. 9)         I have reviewed and agree with nurse documentation of Weekly Education Class nurse progress note for [employer] [address] [place] [address] [place]). Cuong Jansen is compliant with program requirements and may continue participation utilizing the [employer] meal replacements, as discussed. Patient has been advised to refer to the [facility] Patient Manual and notify us if any side effects to this meal plan are present and/or persist.  Cuong Jansen may continue the current dietary approach, care and follow up at the monthly office visit with the provider, as scheduled. Follow-up and Dispositions    · Return in about 2 weeks (around 5/24/2022). Documentation true and accepted by Gayle Orta.  Ana Franco, AOTENISHAP, DiplomatJERSON Pineda

## 2022-06-01 NOTE — PROGRESS NOTES
Vale Chen presents for weekly or bi-monthly evaluation of Obesity Body mass index is 36.63 kg/m². Visit Vitals  /66 (BP 1 Location: Left arm, BP Patient Position: Sitting, BP Cuff Size: Large adult)   Pulse 66   Temp 97.6 °F (36.4 °C)   Resp 20   Ht 5' 7\" (1.702 m)   Wt 233 lb 14.4 oz (106.1 kg)   SpO2 98%   BMI 36.63 kg/m²       Wt Readings from Last 3 Encounters:   05/23/22 233 lb 14.4 oz (106.1 kg)   05/10/22 238 lb 11.2 oz (108.3 kg)   04/27/22 235 lb 9.6 oz (106.9 kg)       1. Obesity (BMI 30-39. 9)         I have reviewed and agree with nurse documentation of Weekly Education Class nurse progress note for [facility] Weight [address] [facility] [place]). Vale Chen is compliant with program requirements and may continue participation utilizing the [employer]irection meal replacements, as discussed. Patient has been advised to refer to the [facility] Patient Manual and notify us if any side effects to this meal plan are present and/or persist.  Vale Chen may continue the current dietary approach, care and follow up at the monthly office visit with the provider, as scheduled. Follow-up and Dispositions    · Return in about 2 weeks (around 6/6/2022) for weight check. Documentation true and accepted by Yosi Millard.  Matthieu Arriaga., AOBFP, Diplomate JERSON TIJERINA

## 2022-06-02 ENCOUNTER — HOSPITAL ENCOUNTER (OUTPATIENT)
Dept: GENERAL RADIOLOGY | Age: 72
Discharge: HOME | End: 2022-06-02
Payer: MEDICARE

## 2022-06-02 ENCOUNTER — TRANSCRIBE ORDER (OUTPATIENT)
Dept: REGISTRATION | Age: 72
End: 2022-06-02

## 2022-06-02 DIAGNOSIS — M17.0 OSTEOARTHRITIS OF KNEES, BILATERAL: Primary | ICD-10-CM

## 2022-06-02 DIAGNOSIS — M17.0 OSTEOARTHRITIS OF KNEES, BILATERAL: ICD-10-CM

## 2022-06-02 PROCEDURE — 73560 X-RAY EXAM OF KNEE 1 OR 2: CPT

## 2022-06-08 ENCOUNTER — CLINICAL SUPPORT (OUTPATIENT)
Dept: SURGERY | Age: 72
End: 2022-06-08

## 2022-06-08 VITALS
SYSTOLIC BLOOD PRESSURE: 102 MMHG | HEIGHT: 67 IN | TEMPERATURE: 98 F | RESPIRATION RATE: 20 BRPM | OXYGEN SATURATION: 96 % | DIASTOLIC BLOOD PRESSURE: 60 MMHG | WEIGHT: 227.1 LBS | BODY MASS INDEX: 35.64 KG/M2 | HEART RATE: 63 BPM

## 2022-06-08 DIAGNOSIS — E66.9 OBESITY (BMI 30-39.9): Primary | ICD-10-CM

## 2022-06-08 RX ORDER — PREDNISOLN SP/MOXIFLOX/BROMFEN 1 %-0.5 %
DROPS OPHTHALMIC (EYE)
COMMUNITY
Start: 2022-05-24 | End: 2022-09-26 | Stop reason: ALTCHOICE

## 2022-06-08 NOTE — PROGRESS NOTES
1. Have you been to the ER, urgent care clinic since your last visit? Hospitalized since your last visit? No    2. Have you seen or consulted any other health care providers outside of the [address] [place] [address] since your last visit? Include any pap smears or colon screening. Yes When: 06/08/2022 Dr. Roger Bhakta, [address] [place] [address]     Patient attended triage but did not bring homework form. Patient instructed to email or fax completed homework form to us. Patient informed that not bringing the homework form can result in not being seen next time.

## 2022-06-14 DIAGNOSIS — E11.9 TYPE 2 DIABETES MELLITUS WITHOUT COMPLICATION, WITHOUT LONG-TERM CURRENT USE OF INSULIN (HCC): ICD-10-CM

## 2022-06-14 RX ORDER — SEMAGLUTIDE 1.34 MG/ML
INJECTION, SOLUTION SUBCUTANEOUS
Qty: 1.5 ML | OUTPATIENT
Start: 2022-06-14

## 2022-06-14 RX ORDER — SEMAGLUTIDE 1.34 MG/ML
0.5 INJECTION, SOLUTION SUBCUTANEOUS
Qty: 4 PEN | Refills: 0 | Status: SHIPPED | OUTPATIENT
Start: 2022-06-14 | End: 2022-06-30 | Stop reason: DRUGHIGH

## 2022-06-14 NOTE — TELEPHONE ENCOUNTER
Called to pt. Verified pt's name and date of birth. Per Dr. Tulio Dunham, confirmed that the pt is taking Ozempic. She has completed 4 doses of 0.25 and one dose of the 0.50mg with one dose of 0.50 left in the pen. Pt indicated to writer that she has not noticed any sideeffects from the medication and that she feels that she has tolerated the medication well. Pt next visit here is 06/30/2022, and she will not have enough medication to last until that next visit.

## 2022-06-15 NOTE — PROGRESS NOTES
[address] [place] [address] [address], [address]  [phone] o  [phone] f    FOLLOW UP MEDICAL EXAMINATION    Method of Delivery:   Face to Face  Patient Name:  Harman Mcmillan, 1950  PCP:  Xin Bah MD    Harman Mcmillan is a 66 y.o. female who is a patient with Obesity Class *** There is no height or weight on file to calculate BMI. and associated health concerns. Patient presents today for No chief complaint on file.       Patient's preferred weight management approach:  Low Calorie Diet (LCD, 9810-2609 kcal/day), Number meal replacements consumed per day:  ***  Current weight loss medication(s):  ***     Challenges adhering to the plan over the past month:  ***    Patient's goals for participating in this weight management program:   *** lbs    Anthropometric Measures Previously    Start Date:  ***  Start Weight:  *** lbs *** oz     BMI:  *** kg/m2 Neck circumference:  *** in    Waist circumference:  *** in   Body fat percentage:  *** %      Weight at last appointment on ***:  *** lbs *** oz        Anthropometric MeasuresToday  Today's Weight 6/14/2022:  *** lbs *** oz      Weight Metrics 6/8/2022 5/23/2022 5/10/2022 4/27/2022 4/18/2022 4/11/2022 3/21/2022   Weight 227 lb 1.6 oz 233 lb 14.4 oz 238 lb 11.2 oz 235 lb 9.6 oz 237 lb 8 oz 234 lb 9.6 oz 234 lb 3.2 oz   Neck Circ (inches) - - - - - - -   Waist Measure Inches - - - - - - -   Body Fat % - - - - - - -   BMI 35.57 kg/m2 36.63 kg/m2 37.39 kg/m2 36.9 kg/m2 37.2 kg/m2 36.74 kg/m2 36.68 kg/m2     Neck Circumference:  Acceptable range for M >16 inches, F>15 inches  Waist Circumference:  Acceptable range for M> 40 inches/102 cm, F > 35 inches, 88 cm  Body Fat: Acceptable range for M 18-24%, F 25-31%    Pounds loss since the last doctor appointment with our Center a month ago:  *** lbs  Total pounds loss since starting this therapeutic approach on start date: *** lbs. Is patient satisfied with his/her progress since the last appointment: {Yes / QE:76716}  Factors contributing to weight change:  ***    SURGICAL HISTORY  Previous Weight Loss Surgery:  ***     Past Surgical History:   Procedure Laterality Date    COLONOSCOPY N/A 8/14/2020    Dr. Mary Bojorquez. w colon polypectomy. due q 3 yrs.  HX BARIATRIC SURGERY  01/2008    LAP GASTRIC BANDING. Dr. Roc Hernandez.  HX CARPAL TUNNEL RELEASE Bilateral     HX CHOLECYSTECTOMY  1984    HX LITHOTRIPSY Left     due to kidney stone. Dr. Trey Og Right     due to [address] Dr. Vanessa Jones.  IR ENDOVENOUS ABLATION RF INITIAL VEIN BI Right 2021    Dr. Hawk Reyes. [place]. due to Venous Insufficiency.  NH COLSC FLX W/RMVL OF TUMOR POLYP LESION SNARE TQ  12/22/2010         NH EGD TRANSORAL BIOPSY SINGLE/MULTIPLE  12/22/2010    Dr. Michell Wiley  As above. Home Medications    Medication Sig Start Date End Date Taking? Authorizing Provider   prednisoln [id number] 1-0.5-0.075 % drop 1 DROP INTO SURGERY EYE 5/24/22   Provider, Historical   ergocalciferol (ERGOCALCIFEROL) 1,250 mcg (50,000 unit) capsule Take 1 Capsule by mouth every seven (7) days for 90 days. Indications: low vitamin D levels 4/27/22 7/26/22  Vj Kaufman R, DO   cyanocobalamin, vitamin B-12, 1,000 mcg/mL kit 1 mL by Injection route every thirty (30) days. Indications: inadequate vitamin B12 4/27/22   Vj Kaufman R, DO   Insulin Needles, Disposable, (Taya Pen Needle) 32 gauge x 5/32\" ndle Use daily as directed 4/27/22   Vj Shae R, DO   semaglutide (Ozempic) 0.25 mg or 0.5 mg/dose (2 mg/1.5 ml) subq pen 0.5 mg by SubCUTAneous route every seven (7) days.  Start after finishing 0.25 mg x 4 weeks  Indications: type 2 diabetes mellitus 4/27/22   Vj Hyannis R, DO   semaglutide (Ozempic) 0.25 mg or 0.5 mg/dose (2 mg/1.5 ml) subq pen 0.25 mg by SubCUTAneous route every seven (7) days. Indications: type 2 diabetes mellitus  Patient not taking: Reported on 5/25/2022 4/27/22   Cranston Barthel R, DO   acyclovir (ZOVIRAX) 400 mg tablet TAKE 1 TABLET BY MOUTH TWICE DAILY FOR 10 DAYS AS NEEDED  Patient not taking: Reported on 4/27/2022 4/4/22   Provider, Historical   melatonin 10 mg capsule Take  by mouth as needed. Takes one to two caps    Provider, Historical   HYDROcodone-acetaminophen (NORCO) 5-325 mg per tablet TAKE 1 TABLET BY MOUTH TWICE DAILY AS NEEDED FOR PAIN 1/10/22   Provider, Historical   cyclobenzaprine (FLEXERIL) 10 mg tablet Take  by mouth three (3) times daily as needed for Muscle Spasm(s). Provider, Historical   metoprolol succinate (TOPROL-XL) 50 mg XL tablet TAKE 1 TABLET EVERY DAY 1/2/22   Deyanira James MD   DILT- mg capsule TAKE 1 CAPSULE EVERY DAY  (KEEP  APPOINTMENT  FOR  FURTHER  REFILLS) 11/4/21   Ginny Gallagher NP   diazePAM (VALIUM) 10 mg tablet TAKE 1 TABLET BY MOUTH EVERY DAY AS NEEDED 5/3/21   Provider, Historical   hydroCHLOROthiazide (HYDRODIURIL) 12.5 mg tablet TAKE 1 TABLET BY MOUTH EVERY DAY IN THE MORNING 5/6/21   Provider, Historical   omeprazole (PRILOSEC) 20 mg capsule Take  by mouth. PRN 11/10/20   Provider, Historical   pantoprazole (PROTONIX) 40 mg tablet TAKE 1 TABLET BY MOUTH EVERY DAY BEFORE A MEAL 2/23/21   Provider, Historical   traZODone (DESYREL) 50 mg tablet Take 100 mg by mouth as needed. 9/24/20   Provider, Historical   rosuvastatin (CRESTOR) 40 mg tablet Take 1 Tab by mouth nightly. 8/20/20   Bina Jones, KANIKA   pregabalin (LYRICA) 150 mg capsule Take 150 mg by mouth two (2) times a day. Provider, Historical   DULoxetine (CYMBALTA) 60 mg capsule Take 60 mg by mouth daily. Provider, Historical   ezetimibe (ZETIA) 10 mg tablet Take  by mouth.     Provider, Historical   metFORMIN (GLUMETZA ER) 500 mg TG24 24 hour tablet Take 500 mg by mouth two (2) times a day.    Provider, Historical   cpap machine kit by Does Not Apply route as needed. Provider, Historical   Aspirin, Buffered 81 mg tab Take  by mouth.     Provider, Historical        EATING HABITS  Has patient met with the RD over the past month for 1:1 session:  ***  Are hunger, cravings and appetite controlled:  {Yes / AF:01644}  Does patient want to utilize [employer] meal replacements:  {Yes / DZ:16750}  Barriers to eating meals:  ***    DRINKING HABITS  Average amount of water consumed daily: *** oz/day  (Goal 2 L or 67 oz daily, minimally)  Amount of caffeine consumed daily: *** oz ***   Sugar-sweetened beverages consumed daily (including alcohol, sodas, teas, juices, etc.): ***  Barriers preventing patient from drinking minimum 2L water/day:  ***     Social History     Tobacco Use    Smoking status: Former Smoker     Packs/day: 0.50     Years: 20.00     Pack years: 10.00     Types: Cigarettes     Quit date: 12/29/2011     Years since quitting: 10.4    Smokeless tobacco: Never Used    Tobacco comment: QUIT SMOKING 01/2022   Vaping Use    Vaping Use: Never used   Substance Use Topics    Alcohol use: Not Currently     Comment: QUIT ALCOHOL 05/2021- was 2 g wine, whiskey or vodka    Drug use: No         SLEEP HABITS  Total hours of sleep nightly: *** hours (Goal 7-9 hours/nightly)  Rx or OTC Sleep Aids:  ***   Sleep Hx:  ***   Barriers to getting proper rest:  ***     PHYSICAL ACTIVITY HISTORY  Type of physical activity:  ***  Barriers limiting physical activity:  ***    BEHAVIORAL HEALTH HISTORY  DEPRESSION SCREENING:    3 most recent PHQ Screens 6/8/2022   Little interest or pleasure in doing things Not at all   Feeling down, depressed, irritable, or hopeless Not at all   Total Score PHQ 2 0   Trouble falling or staying asleep, or sleeping too much -   Feeling tired or having little energy -   Poor appetite, weight loss, or overeating -   Feeling bad about yourself - or that you are a failure or have let yourself or your family down -   Trouble concentrating on things such as school, work, reading, or watching TV -   Moving or speaking so slowly that other people could have noticed; or the opposite being so fidgety that others notice -   Thoughts of being better off dead, or hurting yourself in some way -   PHQ 9 Score -   How difficult have these problems made it for you to do your work, take care of your home and get along with others -       Any history of mental health conditions (including Depression, Anxiety, Anorexia, Bulimia or Binge Eating disorder): ***  Treating provider and medication(s):  ***  Any current major lifestyle changes or stressors:   ***   Is mental health condition controlled: {Yes / No:80375}. No SI/SA. ASSOCIATED MEDICAL CONDITIONS  Past Medical History:   Diagnosis Date    Acute meniscal tear of left knee     Anxiety     Arteriosclerotic heart disease (ASHD)     Dr. Nieves Gaona CAD (coronary artery disease)     Dr. Etta Martinez.  03/25/19 Cor Calcium score 112, Mod CAD.  Carpal tunnel syndrome, bilateral     Cataract of both eyes     Dr. Paradise Rangel    Cervical radiculitis     Dr. Michelle Rock. Dr. Don Landaverde.    Chronic pain     back, neck, shoulder. Dr. Chase Neal Depression     [facility]. Helio Linares, therapist    Diabetes mellitus ([facility] [address] 03/21/2019    Diverticulosis     Dry eye syndrome of both eyes     Dr. Aleah Lozoya Erosive esophagitis     Dr. Matti [place], rheum.  Foot swelling     BL    Gallstones 1984    Hypercholesterolemia     Hyperglycemia     A1c 6.0    Internal carotid artery stenosis, bilateral 2022    Mild    Long term current use of anticoagulant therapy     Lumbar degenerative disc disease     L4-5. Dr. Marco Segal.  Neuropathy     Personal history of kidney stones     [place]. Dr. Ayse Suggs.  Pulmonary nodule, right 2015    per CT.  4 mm RML, 5mm RLL.   Dr. Eder Davidson PVC's (premature ventricular contractions)     Dr. Dalton Guerrero. Txd Diltiazem, Metoprolol    Shoulder pain, left     L>R. Dr. Carrie Toscano    Sleep apnea     CPAP    Valvular heart disease     hx of Mitral valve disorder    Varicose veins of both legs with edema     Dr. Rosa Alonso (For Female Patients)  Social History     Substance and Sexual Activity   Sexual Activity Not Currently    Partners: Male    Comment: MENOPAUSE     OB History        5    Para        Term                AB        Living   5       SAB        IAB        Ectopic        Molar        Multiple        Live Births   5               LMP:  No LMP recorded. (Menstrual status: Menopause). Do you have upcoming travel in the next 6 weeks:  {Yes / VD:19597}  If so, contact the dietician for meal plan modification and recommendations. Immunization History   Administered Date(s) Administered    COVID-19, [employer], DO NOT Dilute, Miguel Angel-Sucrose, 12+ yrs, PF, 30mcg/0.3 mL dose 2021, 2021    COVID-19, [employer] top, DILUTE for use, 12+ yrs, 30mcg/0.3mL dose 10/08/2021    Tdap 10/12/2020       OTHER MEDICAL CARE SINCE LAST OFFICE VISIT  ***    ROS  Pt denies hunger, cravings, lack of focus, fatigue, feeling weak, headaches, dizziness, light headedness, nausea, vomiting, diarrhea, constipation, indigestion, rapid heart rate, SOB, low blood sugar, feeling cold, hair loss, rash, fluid retention, leg aches, difficulty sleeping, irritability, mood swings, or other associated symptoms. Review of Systems negative except as noted above in HPI.      HEALTH MAINTENANCE  Health Maintenance   Topic Date Due    Hepatitis C Screening  Never done    Foot Exam Q1  Never done    MICROALBUMIN Q1  Never done    Eye Exam Retinal or Dilated  Never done    Shingrix Vaccine Age 50> (1 of 2) Never done    Breast Cancer Screen Mammogram  Never done    Bone Densitometry (Dexa) Screening  Never done    Pneumococcal 65+ years (2 - PCV) 03/07/2021    Medicare Yearly Exam  Never done    A1C test (Diabetic or Prediabetic)  03/08/2023    Lipid Screen  03/08/2023    Depression Monitoring  06/08/2023    Colorectal Cancer Screening Combo  08/14/2030    DTaP/Tdap/Td series (2 - Td or Tdap) 10/12/2030    Flu Vaccine  Completed    COVID-19 Vaccine  Completed       PHYSICAL EXAMINATION    There were no vitals taken for this visit. Weight Metrics 6/8/2022 5/23/2022 5/10/2022 4/27/2022 4/18/2022 4/11/2022 3/21/2022   Weight 227 lb 1.6 oz 233 lb 14.4 oz 238 lb 11.2 oz 235 lb 9.6 oz 237 lb 8 oz 234 lb 9.6 oz 234 lb 3.2 oz   Neck Circ (inches) - - - - - - -   Waist Measure Inches - - - - - - -   Body Fat % - - - - - - -   BMI 35.57 kg/m2 36.63 kg/m2 37.39 kg/m2 36.9 kg/m2 37.2 kg/m2 36.74 kg/m2 36.68 kg/m2          General appearance - Well nourished. Well appearing. Well developed. No acute distress. Obese. Head - Normocephalic. Atraumatic. Eyes - Extraocular eye movements intact. Sclera anicteric. Ears - Hearing is grossly normal bilaterally. Nose - normal and patent. Neck - supple. Midline trachea. No carotid bruits noted bilaterally. No thyromegaly noted. Chest - clear to auscultation bilaterally anteriorly and posteriorly. No wheezes. No rales or rhonchi. Breath sounds are symmetrical bilaterally. Unlabored respirations. Heart - normal rate. Regular rhythm. Normal S1, S2. No murmur noted. No rubs, clicks or gallops noted. Abdomen - soft and distended. No masses or organomegaly. No rebound, rigidity or guarding. Bowel sounds normal x 4 quadrants. No tenderness noted. Neurological - awake, alert and oriented to person, place, and time and event. Cranial nerves II through XII intact. Clear speech. Muscle strength is +5/5 x 4 extremities. Heme/Lymph - peripheral pulses normal x 4 extremities. No peripheral edema is noted. Musculoskeletal - Intact x 4 extremities.   No pain with movement. Back exam - normal range of motion. No CVA tenderness. Negative Straight Leg Test bilaterally. No buffalo hump noted. Skin - no rashes, erythema, ecchymosis, lacerations, abrasions, suspicious moles noted. No skin tags or moles. No acanthosis nigricans noted in the axilla or neck. Psychological -   normal behavior, dress and thought processes. Good insight. Good eye contact. Normal affect. Appropriate mood. Normal speech. DATA REVIEWED    Lab Results   Component Value Date/Time    WBC 7.3 03/08/2022 01:55 PM    HGB 13.5 03/08/2022 01:55 PM    HCT 41.1 03/08/2022 01:55 PM    PLATELET 831 98/63/6738 01:55 PM    MCV 94 03/08/2022 01:55 PM     Lab Results   Component Value Date/Time    Sodium 141 03/08/2022 01:55 PM    Potassium 4.9 03/08/2022 01:55 PM    Chloride 100 03/08/2022 01:55 PM    CO2 22 03/08/2022 01:55 PM    Anion gap 1 (L) 09/28/2021 12:32 PM    Glucose 105 (H) 03/08/2022 01:55 PM    BUN 16 03/08/2022 01:55 PM    Creatinine 0.73 03/08/2022 01:55 PM    BUN/Creatinine ratio 22 03/08/2022 01:55 PM    GFR est AA >60 09/28/2021 12:32 PM    GFR est non-AA >60 09/28/2021 12:32 PM    Calcium 9.7 03/08/2022 01:55 PM    Bilirubin, total 0.4 03/08/2022 01:55 PM    Alk.  phosphatase 57 03/08/2022 01:55 PM    Protein, total 6.4 03/08/2022 01:55 PM    Albumin 4.3 03/08/2022 01:55 PM    Globulin 3.0 09/28/2021 12:32 PM    A-G Ratio 2.0 03/08/2022 01:55 PM    ALT (SGPT) 46 (H) 03/08/2022 01:55 PM    AST (SGOT) 30 03/08/2022 01:55 PM     Lab Results   Component Value Date/Time    Hemoglobin A1c 7.0 (H) 03/08/2022 01:55 PM     Lab Results   Component Value Date/Time    TSH 2.310 03/08/2022 01:55 PM     Lab Results   Component Value Date/Time    Uric acid 3.8 03/08/2022 01:55 PM     Magnesium   Date Value Ref Range Status   03/08/2022 2.1 1.6 - 2.3 mg/dL Final     Lab Results   Component Value Date/Time    Vitamin B12 173 (L) 03/08/2022 01:55 PM    Folate 8.8 03/08/2022 01:55 PM     Lab Results   Component Value Date/Time    VITAMIN D, 25-HYDROXY 29.4 (L) 03/08/2022 01:55 PM     Lab Results   Component Value Date/Time    Iron 60 03/08/2022 01:55 PM     Lab Results   Component Value Date/Time    Cholesterol, total 109 09/28/2021 12:32 PM    Cholesterol, Total 154 03/08/2022 01:55 PM    HDL Cholesterol 44 09/28/2021 12:32 PM    LDL, calculated 44.4 09/28/2021 12:32 PM    VLDL, calculated 20.6 09/28/2021 12:32 PM    Triglyceride 103 09/28/2021 12:32 PM    CHOL/HDL Ratio 2.5 09/28/2021 12:32 PM   )  Results for orders placed or performed in visit on 03/08/22   NMR LIPOPROFILE WITH LIPIDS (WITHOUT GRAPH)     Status: Abnormal   Result Value Ref Range Status    LDL-P 1,276 (H) <1,000 nmol/L Final     Comment:                           Low                   < 1000                            Moderate         1000 - 1299                            Borderline-High  1300 - 1599                            High             1600 - 2000                            Very High             > 2000      LDL-C(NIH CALC) 70 0 - 99 mg/dL Final     Comment:                           Optimal               <  100                            Above optimal     100 -  129                            Borderline        130 -  159                            High              160 -  189                            Very high             >  189      HDL-C 63 >39 mg/dL Final    Triglycerides 122 0 - 149 mg/dL Final    Cholesterol, Total 154 100 - 199 mg/dL Final    HDL-P (Total) 50.4 >=30.5 umol/L Final    Small LDL-P 882 (H) <=527 nmol/L Final    LDL size 20.3 (L) >20.5 nm Final     Comment:  ----------------------------------------------------------                   ** INTERPRETATIVE INFORMATION**                   PARTICLE CONCENTRATION AND SIZE                      <--Lower CVD Risk   Higher CVD Risk-->    LDL AND HDL PARTICLES   Percentile in Reference Population    HDL-P (total)        High     75th    50th    25th   Low >34.9    34.9    30.5    26.7   <26.7    Small LDL-P          Low      25th    50th    75th   High                         <117     117     527     839    >839    LDL Size   <-Large (Pattern A)->    <-Small (Pattern B)->                      23.0    20.6           20.5      19.0   ----------------------------------------------------------  Small LDL-P and LDL Size are associated with CVD risk, but not after  LDL-P is taken into account. LP-IR SCORE 47 (H) <=45 Final     Comment: INSULIN RESISTANCE MARKER      <--Insulin Sensitive    Insulin Resistant-->             Percentile in Reference Population  Insulin Resistance Score  LP-IR Score   Low   25th   50th   75th   High                <27   27     45     63     >63  LP-IR Score is inaccurate if patient is non-fasting. The LP-IR score is a laboratory developed index that has been  associated with insulin resistance and diabetes risk and should be  used as one component of a physician's clinical assessment. Narrative    Test(s) 172473-HEY-O; 968429-JZP-H; 836195-Dxndltemxefia; 318560-  Cholesterol, Total; 383760-EBE-L (Total); 884297-Small LDL-P; 831958-  LDL Size; 353015-EZ-FE Score  was developed and its performance characteristics determined  by [facility]. It has not been cleared or approved by the Food  and Drug Administration.   Performed at:  [address]  [id number]  : Cheri Shepard MD, Phone:  [phone]        EKG:    Results for orders placed or performed during the hospital encounter of 10/12/20   EKG, 12 LEAD, INITIAL   Result Value Ref Range    Ventricular Rate 55 BPM    Atrial Rate 55 BPM    P-R Interval 138 ms    QRS Duration 78 ms    Q-T Interval 430 ms    QTC Calculation (Bezet) 411 ms    Calculated P Axis 42 degrees    Calculated R Axis 21 degrees    Calculated T Axis 52 degrees    Diagnosis       Sinus bradycardia  Septal infarct (cited on or before 23-OCT-2015)  When compared with ECG of 23-OCT-2015 12:33,  premature supraventricular complexes are no longer present  Confirmed by aRshmi Mead MD ([id number]) on 10/12/2020 4:33:45 PM         ASSESSMENT   {No Diagnosis Found}      We discussed the expected course, resolution and complications of the diagnosis(es) in detail. WEIGHT MANAGEMENT PLAN    Chart was reviewed and updated during the office visit today. Emphasized the importance of the patient continuing care from current primary care provider and other specialists while participating in this weight management program.    Patient has full access to all our office notes, orders, lab results on [employer] and can provide them copies, if desired. Advised patient to follow up with pcp for Health Maintenance and any acute symptoms. Reviewed office notes from other health care providers. Continue follow up as directed by these providers. ***Referrals given as noted above. ***Since patient has had bariatric surgery, Karan Lezama was advised to take the vitamin regimen and follow dietary recommendations as directed by patient's bariatric surgical team.  Get annual labs to re-evaluate vitamin levels, per bariatricians recommendations. Avoid NSAIDS, concentrated sweets and carbonated beverages. ***Reminded patients who are female gender and of reproductive age that with weight loss, they may become more fertile. Recommended the use of condoms or some reliable form of contraception if pregnancy is not desired. Notify our office immediately if patient becomes pregnant. Reviewed and discussed most recent EKG, tests and lab results. Advised patient to sign a release to provide our program a copy of any new lab results, EKG or pertinent tests from other physicians, if it is unavailable today. Informed patient that an EKG will be performed for every 50 lbs of weight loss.   Recheck pertinent labs every 3 months while participating in D using [employer] meal replacements, as discussed to identify electrolyte abnormalities and guide therapeutic approach. Advised patient to sign up for MyChart and view lab results directly. Notify me by [facility] if any questions or concerns arise. ***An order form for pertinent labs was given to patient today. Patient was advised to have the labwork performed 1 week prior to the next appointment with me. Labs ordered today will be reviewed in detail at the next office visit and time allowed for any questions regarding the results. Discussed the patient's medications, BMI, anthropometric measures and goals with patient. Informed patient that weight loss goal of 5-10% in 6-12 months has shown significant improvement in obesity and its related health conditions including DM, heart disease, asthma. A key study published in Arthritis & Rheumatism of Overweight and Obese Adults with OA found that losing 1 pound of weight resulted in 4 pounds of pressure being removed from the knees. Continue current medications as directed by prescribers. Weight loss medication prescription(s) as noted above was sent electronically to the pharmacy on file after discussing risks, benefits, costs, interactions, alternatives, contraindications and potential side effects:  ***    [place]  was [place] and patient was found to be in compliance before prescribing the weight loss medication today. A Controlled Substance Agreement was reviewed, signed and copy given to patient today or has been found on file. SEE SCANNED DOCUMENT. Other prescriptions sent electronically to pharmacy on file today as noted above: ***    Identified and discussed medications the patient is taking that can cause weight gain or weight loss as recorded on patient's medication list.  Advised patient not to stop use of any without discussing with the prescribing provider.     Recommended patient to ask prescribing providers to consider more weight neutral or weight negative options. Will monitor patient's weight and health with continued use of current medications. Supplement recommendations: Take OTC Magnesium 400 mg po at night prn sleep, muscle cramping, constipation. Take OTC vit B12 1000 mcg daily orally if taking Metformin or experiencing numbness and tingling. Take OTC Fish Oil 1000 mg with EPA and -1,000 mg daily if experiencing dry skin, low HDL. Use with caution if history of heartburn exists. Increase fiber products (I.e. fiber tea, fiber shakes) or try OTC Fiber products (I.e. Benefiber, Metamucil, psyllium, etc) if experiencing constipation. Take OTC Gas-X or Lactaid with meal replacements, if lactose intolerance history exists or symptoms begin. Referred patient to Levine, Susan. \[facility].\"" Patient Manual for recommended antidotes. If any new symptoms or side effects have been experienced once dietary approach is initiated, advised patient to notify our office. Dietary Prescription:    Lucy Ledezma has fulfilled Levine, Susan. \[facility].\"" program requirements this month by completing homework forms, attending educational classes, nurse triage visits and monthly provider appointments as agreed and remains in good standing. Reviewed and appreciate registered dietician note for nutritional counseling. Patient has attended requirements of the program over the past month and is in good standing. Reviewed and appreciate bi-monthly nurse visits and agree with documentation. Followed up on any patient concerns today. Recommended meal plan:  [facility] Low Calorie Dietary approach, 2250-9135 kcal/day, *** meal replacements daily with 1 \"grocery\" meal.   Make sure proper daily calories are consumed for each meal.  Encouraged patient to stay within the recommended amount of calories per day   Reviewed nutrition and importance of regular protein intake and minimizing hidden carbohydrate sources.    Decrease simple carbohydrates in any regular meal allowed (white foods, sweet foods, sweet drinks and alcohol), increase green leafy vegetables and protein (lean meats and beans) with each meal.    Avoid fried foods and limit saturated fats. Do not skip meals. Eat meals within 3-4 hours to prevent low blood sugar events. Emphasized importance of drinking a minimum of 2 L (67 oz) of water daily up to half your body weight in ounces of water while utilizing this dietary approach to prevent dehydration and potential side effects. Avoid water enhancers or sugar-sweetened beverages including alcohol, juice, soda, lemonade. Try OTC water infusion recipes. Limit caffeine, will allow 1 8 oz cup of black coffee or green tea (to stimulate release of Adiponectin and promote fat burning.)  Schedule 1:1 session with RD. Exercise Prescription:   Emphasized importance of reducing sedentary time and performing physical activity at least 5 days a week, as tolerated. A goal of 30 minutes physical activity daily is recommended for health benefit and at least 60 minutes daily to prevent weight regain. During weight loss phase, no less than 75% of this time needs to be performing aerobic (i.e. Walking) activity with no more than 25% of the time performing resistance exercises (i.e. Weight lifting, strengthening, toning). During weight maintenance phase, 50% of the physical activity time needs to be spent performing aerobic activity with 50% of the total time performing resistance exercises. Behavior and Lifestyle Prescription:  Counseled patient on stressors, stress management and self-care approaches. Strongly encouraged pt to seek formal counseling for stressors mentioned today. If already receiving formal counseling please continue these sessions routinely. Go to ER if any thoughts or attempts of suicide are experienced.     Sleep Prescription:   A goal of 7-9 hours nightly of uninterrupted sleep is recommended to turn off the Grehlin hormone to be released from the stomach and triggers appetite while promoting weight gain. Proper rest turns on Leptin hormone to be released from white adipose tissue and promotes weight loss. Avoid alcohol and caffeine 6-12 hours before bedtime to reduce risk of sleep disturbance and bladder irritation while asleep. ***Discussed snoring, symptoms of Sleep Apnea and improvements with weight loss. Strongly encouraged compliance with CPAP, if Sleep Apnea has been diagnosed. Advised patient to follow up with sleep specialist for every 25 pounds lost to see if CPAP settings need to be adjusted. Counseled patient on health topics:    Obesity, Insulin Resistance, LCD dietary approaches, benefits, contraindications, possible side effects to these diets and how to prevent poor outcomes (including staying hydrated, limit physical exercise while transitioning into this program, avoid skipping meals, etc), weight loss goals, sleep hygiene, stressors, barriers to losing weight and keeping weight off, strategies to overcome habits or challenges to improve or continue adherence. Immunizations noted. Covid-19 vaccines/boosters recommended, if patient has not had it. Obesity may increase risk for severe illness and death from Covid-19 disease. Offered empathy, encouragement, legitimation, prayers, partnership to patient. Praised patient for successes. Patient was offered a choice/choices in the treatment plan today. Patient expressed understanding with the diagnoses and the plan and agrees with recommendations. No follow-ups on file. Total time:  *** mins spent in counseling, coordinating care, reviewing and discussing results, reviewing relevant documentation from other providers, completing relevant documentation, and discussing treatment plans in reference to There were no encounter diagnoses.        Rajesh Linares MD FOR ALLOWING ME THE PRIVILEGE TO PARTICIPATE IN THE CARE OF OUR MUTUAL PATIENT, Ms. Magnolia Delgado WITH RESPECT TO WEIGHT MANAGEMENT. Tracy Grubbs DO, DABOM, FOMA    There are no Patient Instructions on file for this visit.

## 2022-06-15 NOTE — TELEPHONE ENCOUNTER
Called to pt. Verified pt. Name and date of birth. Notified pt. Per Dr. Aline Noble message. Pt stated understanding.

## 2022-06-15 NOTE — TELEPHONE ENCOUNTER
Please notify pt that Ozempic 0.50 mg SC weekly has been sent to her pharmacy for another month since she is tolerating it well. We will decide whether to increase to 1.0 mg SC dose at her follow up appointment. Thanks.

## 2022-06-22 ENCOUNTER — CLINICAL SUPPORT (OUTPATIENT)
Dept: SURGERY | Age: 72
End: 2022-06-22

## 2022-06-22 VITALS
HEIGHT: 67 IN | BODY MASS INDEX: 35.38 KG/M2 | DIASTOLIC BLOOD PRESSURE: 59 MMHG | RESPIRATION RATE: 18 BRPM | SYSTOLIC BLOOD PRESSURE: 96 MMHG | TEMPERATURE: 98.4 F | WEIGHT: 225.4 LBS | OXYGEN SATURATION: 95 % | HEART RATE: 76 BPM

## 2022-06-22 DIAGNOSIS — E66.9 OBESITY (BMI 30-39.9): Primary | ICD-10-CM

## 2022-06-22 RX ORDER — BUDESONIDE AND FORMOTEROL FUMARATE DIHYDRATE 80; 4.5 UG/1; UG/1
2 AEROSOL RESPIRATORY (INHALATION) 2 TIMES DAILY
COMMUNITY
End: 2022-09-09 | Stop reason: DRUGHIGH

## 2022-06-22 NOTE — PROGRESS NOTES
6/9/2022    Progress Note: Weekly Education Class in the [facility]         Patient is on Very Low Calorie Diet [] (4 meal replacements per day, 800 kcal/day)      Low Calorie Diet [x] (2-3 meal replacements per day, 5908-1431 kcal/day)    1) Did patient have any new symptoms or physical problems? Yes []    No [x]    If yes, check & comment: weakness [], fatigue [], lightheadedness [], headache [], cramps [], cold intolerance [], hair loss [], diarrhea [], constipation [],  NA [] other:                                 2) Has patient had any medical attention from other providers, urgent care or the emergency room this week? Yes [x]  No []       NA [], If yes, why:   [address]  Left eye                                      3) Any other sugar sweetened beverages consumed this week? Yes []  No []NOT ANSWERED      4) Did patient have any problems adhering to the diet? Yes []  No [x] NA []    If yes, Vacation [], Celebrations [], Conferences [], Family Reunions [] other:                                                5) How many hours of sleep this week? 8-12    (range)  NA []    Number of meal replacements consumed daily? 1  (range)  NA []    Average ounces of water patient consumed daily this week (not including shakes)? 76     (divide the weekly total by 7)    Did you eat any food outside of the program? Yes [x] No []    Physical Activity Over the Past Week:    Cardio exercise: 80  min  Strength exercise: 5 workouts / week  Number of steps walked per day: 2.65 miles    How has patient mood overall been this week? Sad [], Happy [], Stressed [], Tired [], Content [], NA [], other   Good           Medications reconciled by nurse Yes [x]  No[]    Patient was given therapeutic recommendations for any noted side effects of their dietary approach based upon [facility] patient manual per providers recommendation.      6/16/2022    Progress Note: Weekly Education Class in the [facility]         Patient is on Very Low Calorie Diet [] (4 meal replacements per day, 800 kcal/day)      Low Calorie Diet [x] (2-3 meal replacements per day, 3114-8675 kcal/day)    1) Did patient have any new symptoms or physical problems? Yes []    No []NOT ANSWERED      If yes, check & comment: weakness [], fatigue [], lightheadedness [], headache [], cramps [], cold intolerance [], hair loss [], diarrhea [], constipation [],  NA [] other:                                 2) Has patient had any medical attention from other providers, urgent care or the emergency room this week? Yes [x]  No []       NA [], If yes, why:   L4 Epidural - back pain  Counselor & knee pain                                    3) Any other sugar sweetened beverages consumed this week? Yes []  No []NOT ANSWERED      4) Did patient have any problems adhering to the diet? Yes []  No [] NA []NOT ANSWERED      If yes, Vacation [], Celebrations [], Conferences [], Family Reunions [] other:                                                5) How many hours of sleep this week? 8-10    (range)  NA []    Number of meal replacements consumed daily? 1  (range)  NA []    Average ounces of water patient consumed daily this week (not including shakes)? 49     (divide the weekly total by 7)    Did you eat any food outside of the program? Yes [x] No []    Physical Activity Over the Past Week:    Cardio exercise: 100 min  Strength exercise: 5 workouts / week  Number of steps walked per day: 2 miles    How has patient mood overall been this week? Sad [], Happy [], Stressed [], Tired [x], Content [], NA [], other   Good           Medications reconciled by nurse Yes [x]  No[]    Patient was given therapeutic recommendations for any noted side effects of their dietary approach based upon [facility] patient manual per providers recommendation.

## 2022-06-22 NOTE — PROGRESS NOTES
Weight Management. 1. Have you been to the ER, urgent care clinic since your last visit? Hospitalized since your last visit? No    2. Have you seen or consulted any other health care providers outside of the [address] [place] [address] since your last visit? Include any pap smears or colon screening. [address] [place] [address] for cataract surgery. Patient has no c/o being dizzy or lightheaded. Stated she is staying hydrated throughout the day. Advised her to drink 2 liters or 67 oz of water per day. The patient verbalized understanding of all.

## 2022-06-27 ENCOUNTER — TELEPHONE (OUTPATIENT)
Dept: SURGERY | Age: 72
End: 2022-06-27

## 2022-06-27 NOTE — TELEPHONE ENCOUNTER
The patient returned my call. I advised the patient that she can give herself the B-12 injections subcutaneously like she gives herself the Ozempic. She asked if she will get good results this way, I advised her that she will. The patient verbalized understanding of all. I asked her if she had any other questions or concerns. The patient did not. The patient thanked me.

## 2022-06-27 NOTE — TELEPHONE ENCOUNTER
Nathaniel BAIRES. She stated that the patient was a little confused about the B-12 injections that Dr. Betty Murray ordered. When the pharmacist asked the patient if she was going to do the injections subcutaneously or IM, the patient did not know. Marylene Actis stated that she gave the patient information sheets on how to do the injections both ways. She asked if I could call the patient and advise her. I told Marylene Actis I would do so.

## 2022-06-27 NOTE — TELEPHONE ENCOUNTER
Deborah Lopez with [address] calling about clarification on prescription request.

## 2022-06-30 ENCOUNTER — OFFICE VISIT (OUTPATIENT)
Dept: SURGERY | Age: 72
End: 2022-06-30
Payer: MEDICARE

## 2022-06-30 VITALS
BODY MASS INDEX: 35.17 KG/M2 | SYSTOLIC BLOOD PRESSURE: 110 MMHG | WEIGHT: 224.1 LBS | HEART RATE: 66 BPM | TEMPERATURE: 98.2 F | RESPIRATION RATE: 20 BRPM | HEIGHT: 67 IN | DIASTOLIC BLOOD PRESSURE: 60 MMHG | OXYGEN SATURATION: 95 %

## 2022-06-30 DIAGNOSIS — Z99.89 OSA ON CPAP: ICD-10-CM

## 2022-06-30 DIAGNOSIS — E11.9 TYPE 2 DIABETES MELLITUS WITHOUT COMPLICATION, WITHOUT LONG-TERM CURRENT USE OF INSULIN (HCC): ICD-10-CM

## 2022-06-30 DIAGNOSIS — Z98.84 HX OF LAPAROSCOPIC ADJUSTABLE GASTRIC BANDING: ICD-10-CM

## 2022-06-30 DIAGNOSIS — E66.01 CLASS 2 SEVERE OBESITY DUE TO EXCESS CALORIES WITH SERIOUS COMORBIDITY AND BODY MASS INDEX (BMI) OF 35.0 TO 35.9 IN ADULT (HCC): Primary | ICD-10-CM

## 2022-06-30 DIAGNOSIS — M79.7 FIBROMYALGIA: ICD-10-CM

## 2022-06-30 DIAGNOSIS — Z87.442 PERSONAL HISTORY OF KIDNEY STONES: ICD-10-CM

## 2022-06-30 DIAGNOSIS — E55.9 VITAMIN D DEFICIENCY: ICD-10-CM

## 2022-06-30 DIAGNOSIS — F33.42 RECURRENT MAJOR DEPRESSIVE DISORDER, IN FULL REMISSION (HCC): ICD-10-CM

## 2022-06-30 DIAGNOSIS — E78.2 MIXED HYPERLIPIDEMIA: ICD-10-CM

## 2022-06-30 DIAGNOSIS — R74.01 ELEVATED ALT MEASUREMENT: ICD-10-CM

## 2022-06-30 DIAGNOSIS — E53.8 VITAMIN B12 DEFICIENCY: ICD-10-CM

## 2022-06-30 DIAGNOSIS — G47.33 OSA ON CPAP: ICD-10-CM

## 2022-06-30 DIAGNOSIS — Z79.899 LONG-TERM CURRENT USE OF PROTON PUMP INHIBITOR THERAPY: ICD-10-CM

## 2022-06-30 DIAGNOSIS — R53.82 CHRONIC FATIGUE: ICD-10-CM

## 2022-06-30 DIAGNOSIS — I25.10 ARTERIOSCLEROTIC HEART DISEASE (ASHD): ICD-10-CM

## 2022-06-30 DIAGNOSIS — F51.04 CHRONIC INSOMNIA: ICD-10-CM

## 2022-06-30 DIAGNOSIS — Z90.49 HISTORY OF LAPAROSCOPIC CHOLECYSTECTOMY: ICD-10-CM

## 2022-06-30 DIAGNOSIS — R63.8 CRAVING FOR PARTICULAR FOOD: ICD-10-CM

## 2022-06-30 PROCEDURE — G9717 DOC PT DX DEP/BP F/U NT REQ: HCPCS | Performed by: FAMILY MEDICINE

## 2022-06-30 PROCEDURE — 1123F ACP DISCUSS/DSCN MKR DOCD: CPT | Performed by: FAMILY MEDICINE

## 2022-06-30 PROCEDURE — 1090F PRES/ABSN URINE INCON ASSESS: CPT | Performed by: FAMILY MEDICINE

## 2022-06-30 PROCEDURE — 3044F HG A1C LEVEL LT 7.0%: CPT | Performed by: FAMILY MEDICINE

## 2022-06-30 PROCEDURE — G8754 DIAS BP LESS 90: HCPCS | Performed by: FAMILY MEDICINE

## 2022-06-30 PROCEDURE — G8417 CALC BMI ABV UP PARAM F/U: HCPCS | Performed by: FAMILY MEDICINE

## 2022-06-30 PROCEDURE — G8427 DOCREV CUR MEDS BY ELIG CLIN: HCPCS | Performed by: FAMILY MEDICINE

## 2022-06-30 PROCEDURE — 99215 OFFICE O/P EST HI 40 MIN: CPT | Performed by: FAMILY MEDICINE

## 2022-06-30 PROCEDURE — 2022F DILAT RTA XM EVC RTNOPTHY: CPT | Performed by: FAMILY MEDICINE

## 2022-06-30 PROCEDURE — 1101F PT FALLS ASSESS-DOCD LE1/YR: CPT | Performed by: FAMILY MEDICINE

## 2022-06-30 PROCEDURE — G8752 SYS BP LESS 140: HCPCS | Performed by: FAMILY MEDICINE

## 2022-06-30 PROCEDURE — G8400 PT W/DXA NO RESULTS DOC: HCPCS | Performed by: FAMILY MEDICINE

## 2022-06-30 PROCEDURE — G8536 NO DOC ELDER MAL SCRN: HCPCS | Performed by: FAMILY MEDICINE

## 2022-06-30 PROCEDURE — 3017F COLORECTAL CA SCREEN DOC REV: CPT | Performed by: FAMILY MEDICINE

## 2022-06-30 RX ORDER — SEMAGLUTIDE 1.34 MG/ML
1 INJECTION, SOLUTION SUBCUTANEOUS
Qty: 3 EACH | Refills: 0 | Status: SHIPPED | OUTPATIENT
Start: 2022-06-30 | End: 2022-07-27 | Stop reason: ALTCHOICE

## 2022-06-30 NOTE — PROGRESS NOTES
1. Have you been to the ER, urgent care clinic since your last visit? Hospitalized since your last visit? No    2. Have you seen or consulted any other health care providers outside of the [address] [place] [address] since your last visit? Include any pap smears or colon screening. Yes When: 06/21/2022- follow up cateract surgery       06/22/2022  Progress Note: Weekly Education Class in the [facility]         Patient is on Very Low Calorie Diet [] (4 meal replacements per day, 800 kcal/day)      Low Calorie Diet [x] (2-3 meal replacements per day, 7247-0587 kcal/day)    1) Did patient have any new symptoms or physical problems? Yes []    No []    If yes, check & comment: weakness [], fatigue [], lightheadedness [], headache [], cramps [], cold intolerance [], hair loss [], diarrhea [], constipation [],  NA [x] other:                                 2) Has patient had any medical attention from other providers, urgent care or the emergency room this week? Yes []  No []       NA [x], If yes, why:                            #3 no answer          3) Any other sugar sweetened beverages consumed this week? Yes []  No []    4) Did patient have any problems adhering to the diet? Yes []  No [] NA [x]    If yes, Vacation [], Celebrations [], Conferences [], Family Reunions [] other:                     5) How many hours of sleep this week? 8-9.5   (range)  NA []    Number of meal replacements consumed daily? 0-1 (range)  NA []    Average ounces of water patient consumed daily this week (not including shakes)? 67    (divide the weekly total by 7)    Did you eat any food outside of the program? Yes [] No []    Physical Activity Over the Past Week:    Cardio exercise: 35 min  Strength exercise: 4 workouts / week  Number of steps walked per day: 2.5 miles    How has patient mood overall been this week?  Sad [], Happy [], Stressed [], Tired [x], Content [], NA [], other  Okay, anxious depressed           Medications reconciled by nurse Yes [x]  No[]    Patient was given therapeutic recommendations for any noted side effects of their dietary approach based upon [facility] patient manual per providers recommendation.

## 2022-06-30 NOTE — PROGRESS NOTES
[address] [place] [address].  [phone] o  [phone] f    FOLLOW UP MEDICAL EXAMINATION    Method of Delivery:   Face to Face  Patient Name:  Andrae Birmingham, 1950  PCP:  Anusha Puente MD    Andrae Birmingham is a 66 y.o. female who is a patient with Obesity Class 2 Body mass index is 35.1 kg/m². and associated health concerns. Patient presents today for No chief complaint on file. Patient's preferred weight management approach:  Low Calorie Diet (LCD, 7075-8796 kcal/day), Number meal replacements consumed per day:  1  Current weight loss medication(s):  Ozempic 0.50 mg SC weekly on Sunday, dose 3 of 4 rxd by me and Metformin 500 mg BID by pcp for high blood sugar and high insulin, A1c 7.0.  tolerating well.     Challenges adhering to the plan over the past month:  I need to chew    Patient goals for participation in weight management program:   175 lbs (lose 55 lbs), have energy to get my house together and do more w my      Anthropometric Measures Previously    Start Date:  03/08/22  Start Weight:  230 lbs 8 oz       BMI:  36.1 kg/m2        Neck circumference:  14.75 in                                 Waist circumference:  47 in              Body fat percentage:  45.1 %       Weight at last appointment on 05/25/22:  231.4 lbs 0 oz        Anthropometric MeasuresToday  Today's Weight 6/30/2022:  224 lbs 1.6 oz      Weight Metrics 6/30/2022 6/30/2022 6/22/2022 6/8/2022 5/23/2022 5/10/2022 4/27/2022   Weight - 224 lb 1.6 oz 225 lb 6.4 oz 227 lb 1.6 oz 233 lb 14.4 oz 238 lb 11.2 oz 235 lb 9.6 oz   Neck Circ (inches) 14.5 - - - - - -   Waist Measure Inches 45 - - - - - -   Body Fat % 44.4 - - - - - -   BMI - 35.1 kg/m2 35.3 kg/m2 35.57 kg/m2 36.63 kg/m2 37.39 kg/m2 36.9 kg/m2     Neck Circumference:  Acceptable range for M >16 inches, F>15 inches  Waist Circumference:  Acceptable range for M> 40 inches/102 cm, F > 35 inches, 88 cm  Body Fat: Acceptable range for M 18-24%, F 25-31%    Pounds loss since the last doctor appointment with our Center a month ago:  7 lbs  Total pounds loss since starting this therapeutic approach on start date: 6 lbs. Is patient satisfied with his/her progress since the last appointment: Yes  Factors contributing to weight change:  stress. Diet changes,  medication. SURGICAL HISTORY  Previous Weight Loss Surgery:  Lap Gastric Band 01/2008 by Dr. Kandy Marlow. Contacted Dr. Tylor Santamaria for refill and requested a Revision to Lap Gastric Bypass but was denied by her insurance bc it is \"not medically necessary\" and told she was too old. Past Surgical History:   Procedure Laterality Date    COLONOSCOPY N/A 8/14/2020    Dr. Lukas Chung. w colon polypectomy. due q 3 yrs. HX BARIATRIC SURGERY  01/2008    LAP GASTRIC BANDING. Dr. Kandy Marlow. HX CARPAL TUNNEL RELEASE Bilateral     HX CHOLECYSTECTOMY  1984    HX LITHOTRIPSY Left     due to kidney stone. Dr. Debra Bush Left     HX UROLOGICAL Right     due to [address] Dr. Adwoa Carlisle. IR ENDOVENOUS ABLATION RF INITIAL VEIN BI Right 2021    Dr. Fredi Dickson. [place]. due to Venous Insufficiency. VT COLSC FLX W/RMVL OF TUMOR POLYP LESION SNARE TQ  12/22/2010         VT EGD TRANSORAL BIOPSY SINGLE/MULTIPLE  12/22/2010    Dr. Stewart Murphy  As above. Home Medications    Medication Sig Start Date End Date Taking? Authorizing Provider   budesonide-formoteroL (Symbicort) 80-4.5 mcg/actuation HFAA Take 2 Puffs by inhalation two (2) times a day. Yes Provider, Historical   semaglutide (Ozempic) 0.25 mg or 0.5 mg/dose (2 mg/1.5 ml) subq pen 0.5 mg by SubCUTAneous route every seven (7) days.  Start after finishing 0.25 mg x 4 weeks  Indications: type 2 diabetes mellitus 6/14/22  Yes Baylee Haney R,    prednisoln le-obhfhfur-plrywzf 1-0.5-0.075 % drop 1 DROP INTO SURGERY EYE 5/24/22  Yes Provider, Historical   ergocalciferol (ERGOCALCIFEROL) 1,250 mcg (50,000 unit) capsule Take 1 Capsule by mouth every seven (7) days for 90 days. Indications: low vitamin D levels 4/27/22 7/26/22 Yes Jerald Maldonado, DO   cyanocobalamin, vitamin B-12, 1,000 mcg/mL kit 1 mL by Injection route every thirty (30) days. Indications: inadequate vitamin B12 4/27/22  Yes Jose Carlos DIAZ, DO   Insulin Needles, Disposable, (Taya Pen Needle) 32 gauge x 5/32\" ndle Use daily as directed 4/27/22  Yes Jerald Epps DO   melatonin 10 mg capsule Take  by mouth as needed. Takes one to two caps   Yes Provider, Historical   HYDROcodone-acetaminophen (NORCO) 5-325 mg per tablet TAKE 1 TABLET BY MOUTH TWICE DAILY AS NEEDED FOR PAIN 1/10/22  Yes Provider, Historical   cyclobenzaprine (FLEXERIL) 10 mg tablet Take  by mouth three (3) times daily as needed for Muscle Spasm(s). Yes Provider, Historical   metoprolol succinate (TOPROL-XL) 50 mg XL tablet TAKE 1 TABLET EVERY DAY 1/2/22  Yes Wendy Armendariz MD   DILT- mg capsule TAKE 1 CAPSULE EVERY DAY  (KEEP  APPOINTMENT  FOR  FURTHER  REFILLS) 11/4/21  Yes Kasandra THEODORE NP   hydroCHLOROthiazide (HYDRODIURIL) 12.5 mg tablet TAKE 1 TABLET BY MOUTH EVERY DAY IN THE MORNING 5/6/21  Yes Provider, Historical   omeprazole (PRILOSEC) 20 mg capsule Take  by mouth. PRN 11/10/20  Yes Provider, Historical   pantoprazole (PROTONIX) 40 mg tablet TAKE 1 TABLET BY MOUTH EVERY DAY BEFORE A MEAL 2/23/21  Yes Provider, Historical   traZODone (DESYREL) 50 mg tablet Take 100 mg by mouth as needed. 9/24/20  Yes Provider, Historical   rosuvastatin (CRESTOR) 40 mg tablet Take 1 Tab by mouth nightly. 8/20/20  Yes Wily Jones NP   pregabalin (LYRICA) 150 mg capsule Take 150 mg by mouth two (2) times a day. Yes Provider, Historical   DULoxetine (CYMBALTA) 60 mg capsule Take 60 mg by mouth daily.    Yes Provider, Historical   ezetimibe (ZETIA) 10 mg tablet Take  by mouth. Yes Provider, Historical   metFORMIN (GLUMETZA ER) 500 mg TG24 24 hour tablet Take 500 mg by mouth two (2) times a day. Yes Provider, Historical   cpap machine kit by Does Not Apply route as needed. Yes Provider, Historical   Aspirin, Buffered 81 mg tab Take 1 Tablet by mouth daily. Yes Provider, Historical   acyclovir (ZOVIRAX) 400 mg tablet TAKE 1 TABLET BY MOUTH TWICE DAILY FOR 10 DAYS AS NEEDED  Patient not taking: Reported on 6/22/2022 4/4/22   Provider, Historical   diazePAM (VALIUM) 10 mg tablet TAKE 1 TABLET BY MOUTH EVERY DAY AS NEEDED 5/3/21   Provider, Historical      Medications that cause weight gain: Flexeril, Toprol, diltiazem, Prilosec, Protonix, Crestor, Lyrica, Cymbalta, Zetia, acyclovir, Valium  Medications that cause weight loss: Metformin, Ozempic  Any changes to medications since last office visit with me:  Started Ozempic  Allergies   Allergen Reactions    Mobic [Meloxicam] Anxiety     Jittery       EATING HABITS  Has patient met with the RD over the past month for 1:1 session:  No  Nutritional changes made over the last month per recommendation of the RD:  na   Are hunger, cravings and appetite controlled:  Yes  Negative Side Effects from meal replacements:  0   Does patient want to utilize [place] meal replacements:  Yes  Barriers to eating meals:  I need to chew. Stress. I might not have enough calories.     DRINKING HABITS  Average amount of water consumed daily: 70-80 oz/day  (Goal 2 L or 67 oz daily, minimally)  Amount of caffeine consumed daily: 2 C coffee w half and half in 8 oz    Sugar-sweetened beverages consumed daily (including alcohol, sodas, teas, juices, etc.): 0  Barriers preventing patient from drinking minimum 2L water/day:  0     Social History     Tobacco Use    Smoking status: Former Smoker     Packs/day: 0.50     Years: 20.00     Pack years: 10.00     Types: Cigarettes     Quit date: 12/29/2011 Years since quitting: 10.5    Smokeless tobacco: Never Used    Tobacco comment: QUIT SMOKING 01/2022   Vaping Use    Vaping Use: Never used   Substance Use Topics    Alcohol use: Not Currently     Comment: QUIT ALCOHOL 05/2021- was 2 g wine, whiskey or vodka    Drug use: No         SLEEP HABITS  Total hours of sleep nightly: 8-10 hours (Goal 7-9 hours/nightly)  Rx or OTC Sleep Aids: Trazodone, OTC melatonin   Sleep Hx: Insomnia-not using CPAP   Occupation:       Barriers to getting proper rest: Sleeping in the living room while renovating the house. Is hard to hook up CPAP     PHYSICAL ACTIVITY HISTORY  Type of physical activity: Working on the [place],  once a week with strength training for my legs and balance  Barriers limiting physical activity: Caretaking for my  who fell. BEHAVIORAL HEALTH HISTORY  DEPRESSION SCREENING:    3 most recent PHQ Screens 6/30/2022   Little interest or pleasure in doing things Not at all   Feeling down, depressed, irritable, or hopeless Not at all   Total Score PHQ 2 0   Trouble falling or staying asleep, or sleeping too much -   Feeling tired or having little energy -   Poor appetite, weight loss, or overeating -   Feeling bad about yourself - or that you are a failure or have let yourself or your family down -   Trouble concentrating on things such as school, work, reading, or watching TV -   Moving or speaking so slowly that other people could have noticed; or the opposite being so fidgety that others notice -   Thoughts of being better off dead, or hurting yourself in some way -   PHQ 9 Score -   How difficult have these problems made it for you to do your work, take care of your home and get along with others -       History of mental health conditions (including Depression, Anxiety, Anorexia, Bulimia or Binge Eating disorder):  Anxiety, Depression, Fibromyalgia  Treating provider and medication(s): Rx Trazadone 100 mg q hs for MAD, Cymbalta for MELANY, MAD by pcp and counseling michael Christiansen  Current major lifestyle changes or stressors:   renovating my home. I smoke 2-3 cigs/day to de-stress. Care taking for my  who fell. Step daughter is in nursing home now. Recall:   w COPD needs care. Mom  . Need a Lap fill but can not get it until after having my colonoscopy   Is mental health condition controlled: Yes. No SI/SA. Mobile Apps used for meal planning, calorie counting, water consumption, sleep, or physical activity:  0     ASSOCIATED MEDICAL CONDITIONS  Past Medical History:   Diagnosis Date    Acute meniscal tear of left knee     Anxiety     Arteriosclerotic heart disease (ASHD)     Dr. Dionna Grimm    CAD (coronary artery disease)     Dr. Dionna Grimm.  19 Cor Calcium score 112, Mod CAD. Carpal tunnel syndrome, bilateral     Cataract of both eyes     Dr. Rivera [place]    Cervical radiculitis     Dr. Katarzyna Olmstead. Dr. Jacinto Waldron.    Chronic pain     back, neck, shoulder. Dr. Adeola Andrews. Erasmo Christiansen, therapist    [facility] 2019    Diverticulosis     Dry eye syndrome of both eyes     Dr. Magaly Sharma    Erosive esophagitis     Dr. Rani [place], rheum. Foot swelling     BL    Gallstones     Hypercholesterolemia     Hyperglycemia     A1c 6.0    Internal carotid artery stenosis, bilateral     Mild    Long term current use of anticoagulant therapy     Lumbar degenerative disc disease     L4-5. Dr. Jeffy Bernardo. Neuropathy     Personal history of kidney stones     BL. Dr. Asha Moreno. Pulmonary nodule, right     per CT.  4 mm RML, 5mm RLL. Dr. Dhaval Alvarez    PVC's (premature ventricular contractions)     Dr. Dionna Grimm. Txd Diltiazem, Metoprolol    Shoulder pain, left     L>R.   Dr. Katarzyna Olmstead    Sleep apnea     CPAP    Valvular heart disease     hx of Mitral valve disorder    Varicose veins of both legs with edema     Dr. Noelle Schneider OB/GYN HISTORY (For Female Patients)  Social History     Substance and Sexual Activity   Sexual Activity Not Currently    Partners: Male    Comment: MENOPAUSE     OB History          5    Para        Term                AB        Living   5         SAB        IAB        Ectopic        Molar        Multiple        Live Births   5               Menopause:  Yes  LMP:  No LMP recorded. (Menstrual status: Menopause). Are you pregnant or planning on becoming pregnant within 6 months:  No    Do you have upcoming travel in the next 6 weeks:  No  If so, contact the dietician for meal plan modification and recommendations. Immunization History   Administered Date(s) Administered    COVID-19, PFIZER GRAY top, DO NOT Dilute, (age 15 y+), IM, 30 mcg/0.3 mL 2021, 2021    COVID-19, PFIZER PURPLE top, DILUTE for use, (age 15 y+), IM, 30mcg/0.3mL 10/08/2021    Tdap 10/12/2020       OTHER MEDICAL CARE SINCE LAST OFFICE VISIT  Patient has a history of low vitamin B12 and has started taking her injections monthly. She feels her energy is improving. ROS  Pt denies hunger, cravings, lack of focus, fatigue, feeling weak, headaches, dizziness, light headedness, nausea, vomiting, diarrhea, constipation, indigestion, rapid heart rate, SOB, low blood sugar, feeling cold, hair loss, rash, fluid retention, leg aches, difficulty sleeping, irritability, mood swings, or other associated symptoms. Review of Systems negative except as noted above in HPI.      HEALTH MAINTENANCE  Health Maintenance   Topic Date Due    Hepatitis C Screening  Never done    Pneumococcal 65+ years (1 - PCV) Never done    Foot Exam Q1  Never done    MICROALBUMIN Q1  Never done    Eye Exam Retinal or Dilated  Never done    Shingrix Vaccine Age 50> (1 of 2) Never done    Breast Cancer Screen Mammogram  Never done    Bone Densitometry (Dexa) Screening  Never done    Medicare Yearly Exam  Never done    Flu Vaccine (Season Ended) 09/01/2022    A1C test (Diabetic or Prediabetic)  03/08/2023    Lipid Screen  03/08/2023    Depression Monitoring  06/08/2023    Colorectal Cancer Screening Combo  08/14/2030    DTaP/Tdap/Td series (2 - Td or Tdap) 10/12/2030    COVID-19 Vaccine  Completed       PHYSICAL EXAMINATION    Visit Vitals  /60 (BP 1 Location: Left arm, BP Patient Position: Sitting, BP Cuff Size: Large adult)   Pulse 66   Temp 98.2 °F (36.8 °C)   Resp 20   Ht 5' 7\" (1.702 m)   Wt 224 lb 1.6 oz (101.7 kg)   SpO2 95%   BMI 35.10 kg/m²         Weight Metrics 6/30/2022 6/30/2022 6/22/2022 6/8/2022 5/23/2022 5/10/2022 4/27/2022   Weight - 224 lb 1.6 oz 225 lb 6.4 oz 227 lb 1.6 oz 233 lb 14.4 oz 238 lb 11.2 oz 235 lb 9.6 oz   Neck Circ (inches) 14.5 - - - - - -   Waist Measure Inches 45 - - - - - -   Body Fat % 44.4 - - - - - -   BMI - 35.1 kg/m2 35.3 kg/m2 35.57 kg/m2 36.63 kg/m2 37.39 kg/m2 36.9 kg/m2          General appearance - Well nourished. Well appearing. Well developed. No acute distress. Obese. Head - Normocephalic. Atraumatic. Eyes - Extraocular eye movements intact. Sclera anicteric. Ears - Hearing is grossly normal bilaterally. Nose - normal and patent. Neck - supple. Midline trachea. No carotid bruits noted bilaterally. No thyromegaly noted. Chest - clear to auscultation bilaterally anteriorly and posteriorly. No wheezes. No rales or rhonchi. Breath sounds are symmetrical bilaterally. Unlabored respirations. Heart - normal rate. Regular rhythm. Normal S1, S2. No murmur noted. No rubs, clicks or gallops noted. Abdomen - soft and distended. No masses or organomegaly. No rebound, rigidity or guarding. Bowel sounds normal x 4 quadrants. No tenderness noted. Neurological - awake, alert and oriented to person, place, and time and event. Cranial nerves II through XII intact. Clear speech. Muscle strength is +5/5 x 4 extremities. Heme/Lymph - peripheral pulses normal x 4 extremities.   No peripheral edema is noted. Musculoskeletal - Intact x 4 extremities. No pain with movement. Back exam - normal range of motion. No CVA tenderness. Negative Straight Leg Test bilaterally. No buffalo hump noted. Skin - no rashes, erythema, ecchymosis, lacerations, abrasions, suspicious moles noted. No skin tags or moles. No acanthosis nigricans noted in the axilla or neck. Psychological -   normal behavior, dress and thought processes. Good insight. Good eye contact. Normal affect. Appropriate mood. Normal speech. DATA REVIEWED    Lab Results   Component Value Date/Time    WBC 7.3 03/08/2022 01:55 PM    HGB 13.5 03/08/2022 01:55 PM    HCT 41.1 03/08/2022 01:55 PM    PLATELET 634 04/40/7155 01:55 PM    MCV 94 03/08/2022 01:55 PM     Lab Results   Component Value Date/Time    Sodium 141 03/08/2022 01:55 PM    Potassium 4.9 03/08/2022 01:55 PM    Chloride 100 03/08/2022 01:55 PM    CO2 22 03/08/2022 01:55 PM    Anion gap 1 (L) 09/28/2021 12:32 PM    Glucose 105 (H) 03/08/2022 01:55 PM    BUN 16 03/08/2022 01:55 PM    Creatinine 0.73 03/08/2022 01:55 PM    BUN/Creatinine ratio 22 03/08/2022 01:55 PM    GFR est AA >60 09/28/2021 12:32 PM    GFR est non-AA >60 09/28/2021 12:32 PM    Calcium 9.7 03/08/2022 01:55 PM    Bilirubin, total 0.4 03/08/2022 01:55 PM    Alk.  phosphatase 57 03/08/2022 01:55 PM    Protein, total 6.4 03/08/2022 01:55 PM    Albumin 4.3 03/08/2022 01:55 PM    Globulin 3.0 09/28/2021 12:32 PM    A-G Ratio 2.0 03/08/2022 01:55 PM    ALT (SGPT) 46 (H) 03/08/2022 01:55 PM    AST (SGOT) 30 03/08/2022 01:55 PM     Lab Results   Component Value Date/Time    Hemoglobin A1c 7.0 (H) 03/08/2022 01:55 PM     Lab Results   Component Value Date/Time    TSH 2.310 03/08/2022 01:55 PM     Lab Results   Component Value Date/Time    Uric acid 3.8 03/08/2022 01:55 PM     Magnesium   Date Value Ref Range Status   03/08/2022 2.1 1.6 - 2.3 mg/dL Final     Lab Results   Component Value Date/Time    Vitamin B12 173 (L) 03/08/2022 01:55 PM    Folate 8.8 03/08/2022 01:55 PM     Lab Results   Component Value Date/Time    VITAMIN D, 25-HYDROXY 29.4 (L) 03/08/2022 01:55 PM     Lab Results   Component Value Date/Time    Iron 60 03/08/2022 01:55 PM     Lab Results   Component Value Date/Time    Cholesterol, total 109 09/28/2021 12:32 PM    Cholesterol, Total 154 03/08/2022 01:55 PM    HDL Cholesterol 44 09/28/2021 12:32 PM    LDL, calculated 44.4 09/28/2021 12:32 PM    VLDL, calculated 20.6 09/28/2021 12:32 PM    Triglyceride 103 09/28/2021 12:32 PM    CHOL/HDL Ratio 2.5 09/28/2021 12:32 PM   )  Results for orders placed or performed in visit on 03/08/22   [address] (WITHOUT GRAPH)     Status: Abnormal   Result Value Ref Range Status    LDL-P 1,276 (H) <1,000 nmol/L Final     Comment:                           Low                   < 1000                            Moderate         1000 - 1299                            Borderline-High  1300 - 1599                            High             1600 - 2000                            Very High             > 2000      LDL-C(NIH CALC) 70 0 - 99 mg/dL Final     Comment:                           Optimal               <  100                            Above optimal     100 -  129                            Borderline        130 -  159                            High              160 -  189                            Very high             >  189      HDL-C 63 >39 mg/dL Final    Triglycerides 122 0 - 149 mg/dL Final    Cholesterol, Total 154 100 - 199 mg/dL Final    HDL-P (Total) 50.4 >=30.5 umol/L Final    Small LDL-P 882 (H) <=527 nmol/L Final    LDL size 20.3 (L) >20.5 nm Final     Comment:  ----------------------------------------------------------                   ** INTERPRETATIVE INFORMATION**                   PARTICLE CONCENTRATION AND SIZE                      <--Lower CVD Risk   Higher CVD Risk-->    LDL AND HDL PARTICLES   Percentile in Reference Population    HDL-P (total)        High     75th    50th    25th   Low                         >34.9    34.9    30.5    26.7   <26.7    Small LDL-P          Low      25th    50th    75th   High                         <117     117     527     839    >839    LDL Size   <-Large (Pattern A)->    <-Small (Pattern B)->                      23.0    20.6           20.5      19.0   ----------------------------------------------------------  Small LDL-P and LDL Size are associated with CVD risk, but not after  LDL-P is taken into account. LP-IR SCORE 47 (H) <=45 Final     Comment: INSULIN RESISTANCE MARKER      <--Insulin Sensitive    Insulin Resistant-->             Percentile in Reference Population  Insulin Resistance Score  LP-IR Score   Low   25th   50th   75th   High                <27   27     45     63     >63  LP-IR Score is inaccurate if patient is non-fasting. The LP-IR score is a laboratory developed index that has been  associated with insulin resistance and diabetes risk and should be  used as one component of a physician's clinical assessment. Narrative    Test(s) 059263-FMX-J; 606901-LTM-I; 105621-Wxqosxxktrlko; 984073-  Cholesterol, Total; 948527-VCO-Q (Total); 884297-Small LDL-P; 233106-  LDL Size; 909808-HG-EB Score  was developed and its performance characteristics determined  by [facility]. It has not been cleared or approved by the Food  and Drug Administration.   Performed at:  [facility]  [address]  [id number]  : Diane Victor MD, Phone:  [phone]        EKG:    Results for orders placed or performed during the hospital encounter of 10/12/20   EKG, 12 LEAD, INITIAL   Result Value Ref Range    Ventricular Rate 55 BPM    Atrial Rate 55 BPM    P-R Interval 138 ms    QRS Duration 78 ms    Q-T Interval 430 ms    QTC Calculation (Bezet) 411 ms    Calculated P Axis 42 degrees    Calculated R Axis 21 degrees    Calculated T Axis 52 degrees    Diagnosis Sinus bradycardia  Septal infarct (cited on or before 23-OCT-2015)  When compared with ECG of 23-OCT-2015 12:33,  premature supraventricular complexes are no longer present  Confirmed by Bandar Ruiz MD ([id number]) on 10/12/2020 4:33:45 PM       QTC interval  411 ms. No prolonged QTc noted. (Acceptable QTC upper limits:  440 ms for Males, 460 ms for Females)    ASSESSMENT     ICD-10-CM ICD-9-CM    1. Class 2 severe obesity due to excess calories with serious comorbidity and body mass index (BMI) of 35.0 to 35.9 in adult ([facility])  E66.01 278.01     Z68.35 V85.35       2. Type 2 diabetes mellitus without complication, without long-term current use of insulin ([facility])  E11.9 250.00 HEMOGLOBIN A1C WITH EAG      METABOLIC PANEL, COMPREHENSIVE      DISCONTINUED: semaglutide (Ozempic) 1 mg/dose (4 mg/3 mL) pnij      3. Chronic fatigue  R11.79 057.07 METABOLIC PANEL, COMPREHENSIVE    due to vit D vs vit B12 vs Fibromyalgia vs other      4. Chronic insomnia  F51.04 780.52       5. Mixed hyperlipidemia  E78.2 272.2 NMR LIPOPROFILE WITH LIPIDS (WITHOUT GRAPH)      6. Vitamin B12 deficiency  E53.8 266.2 VITAMIN B12 & FOLATE      7. Vitamin D deficiency  E55.9 268.9 VITAMIN D, 25 HYDROXY      8. Fibromyalgia  M79.7 729.1       9. Recurrent major depressive disorder, in full remission ([email or web address].)  F33.42 296.36       10. BILL on CPAP  G47.33 327.23     Z99.89 V46.8       11. Craving for particular food  R63.8 783.9       12. Hx of laparoscopic adjustable gastric banding  Z98.84 V45.86       13. Arteriosclerotic heart disease (ASHD)  I25.10 414.00       14. Elevated ALT measurement  R74.01 790.4       15. Personal history of kidney stones  Z87.442 V13.01       16. History of laparoscopic cholecystectomy  Z90.49 V45.89       17. Long-term current use of proton pump inhibitor therapy  Z79.899 V58.69       18.  BMI 34.0-34.9,adult  Z68.34 V85.34             We discussed the expected course, resolution and complications of the diagnosis(es) in detail. WEIGHT MANAGEMENT PLAN    Chart was reviewed and updated during the office visit today. Emphasized the importance of the patient continuing care from current primary care provider and other specialists while participating in this weight management program.    Patient has full access to all our office notes, orders, lab results on [employer] and can provide them copies, if desired. Advised patient to follow up with pcp for Health Maintenance and any acute symptoms. Since patient has had bariatric surgery, Adry Vasquez was advised to take the vitamin regimen and follow dietary recommendations as directed by patient's bariatric surgical team.  Get annual labs to re-evaluate vitamin levels, per bariatricians recommendations. Avoid NSAIDS, concentrated sweets and carbonated beverages. Reminded patients who are female gender and of reproductive age that with weight loss, they may become more fertile. Recommended the use of condoms or some reliable form of contraception if pregnancy is not desired. Notify our office immediately if patient becomes pregnant. Reviewed and discussed most recent EKG, tests and lab results. Advised patient to sign a release to provide our program a copy of any new lab results, EKG or pertinent tests from other physicians, if it is unavailable today. Informed patient that an EKG will be performed for every 50 lbs of weight loss. Recheck pertinent labs every 3 months while participating in LCD using [employer] meal replacements, as discussed to identify electrolyte abnormalities and guide therapeutic approach. Advised patient to sign up for [facility] and view lab results directly. Notify me by [facility] if any questions or concerns arise. An order form for pertinent labs was given to patient today. Patient was advised to have the labwork performed 1 week prior to the next appointment with me.     Labs ordered today will be reviewed in detail at the next office visit and time allowed for any questions regarding the results. Discussed the patient's medications, BMI, anthropometric measures and goals with patient. Informed patient that weight loss goal of 5-10% in 6-12 months has shown significant improvement in obesity and its related health conditions including DM, heart disease, asthma. A key study published in Arthritis & Rheumatism of Overweight and Obese Adults with OA found that losing 1 pound of weight resulted in 4 pounds of pressure being removed from the knees. Continue current medications as directed by prescribers. Weight loss medication prescription(s) as noted above was sent electronically to the pharmacy on file after discussing risks, benefits, costs, interactions, alternatives, contraindications and potential side effects:  Conitnue Ozempic 0.50 mg x 4 doses then increase to 1.0 mg SC weekly dose for DM2. Other prescriptions sent electronically to pharmacy on file today as noted above: 0    Identified and discussed medications the patient is taking that can cause weight gain or weight loss as recorded on patient's medication list.  Advised patient not to stop use of any without discussing with the prescribing provider. Recommended patient to ask prescribing providers to consider more weight neutral or weight negative options. Will monitor patient's weight and health with continued use of current medications. Supplement recommendations: Take OTC Magnesium 400 mg po at night prn sleep, muscle cramping, constipation. Take OTC vit B12 1000 mcg daily orally if taking Metformin or experiencing numbness and tingling. Take OTC Fish Oil 1000 mg with EPA and -1,000 mg daily if experiencing dry skin, low HDL. Use with caution if history of heartburn exists. Increase fiber products (I.e. fiber tea, fiber shakes) or try OTC Fiber products (I.e. Benefiber, Metamucil, psyllium, etc) if experiencing constipation.     Take OTC Gas-X or Lactaid with meal replacements, if lactose intolerance history exists or symptoms begin. Referred patient to [facility] Patient Manual for recommended antidotes. If any new symptoms or side effects have been experienced once dietary approach is initiated, advised patient to notify our office. Dietary Prescription:    Fan Katz has fulfilled [facility] program requirements this month by completing homework forms, attending educational classes, nurse triage visits and monthly provider appointments as agreed and remains in good standing. Reviewed and appreciate registered dietician note for nutritional counseling. Patient has attended requirements of the program over the past month and is in good standing. Reviewed and appreciate bi-monthly nurse visits and agree with documentation. Followed up on any patient concerns today. Recommended meal plan:  [employer] Low Calorie Dietary approach, 1465-9974 kcal/day, 1-3 meal replacements daily with 1 \"grocery\" meal. - Add 1-2 ND bars to help w desire to chew. Make sure proper daily calories are consumed for each meal.  Encouraged patient to stay within the recommended amount of calories per day   Reviewed nutrition and importance of regular protein intake and minimizing hidden carbohydrate sources. Decrease simple carbohydrates in any regular meal allowed (white foods, sweet foods, sweet drinks and alcohol), increase green leafy vegetables and protein (lean meats and beans) with each meal.    Avoid fried foods and limit saturated fats. Do not skip meals. Eat meals within 3-4 hours to prevent low blood sugar events. Emphasized importance of drinking a minimum of 2 L (67 oz) of water daily up to half your body weight in ounces of water while utilizing this dietary approach to prevent dehydration and potential side effects. Avoid water enhancers or sugar-sweetened beverages including alcohol, juice, soda, lemonade. Try OTC water infusion recipes. Limit caffeine, will allow 1 8 oz cup of black coffee or green tea (to stimulate release of Adiponectin and promote fat burning.)  Schedule 1:1 session with RD. Exercise Prescription:   Emphasized importance of reducing sedentary time and performing physical activity at least 5 days a week, as tolerated. A goal of 30 minutes physical activity daily is recommended for health benefit and at least 60 minutes daily to prevent weight regain. During weight loss phase, no less than 75% of this time needs to be performing aerobic (i.e. Walking) activity with no more than 25% of the time performing resistance exercises (i.e. Weight lifting, strengthening, toning). During weight maintenance phase, 50% of the physical activity time needs to be spent performing aerobic activity with 50% of the total time performing resistance exercises. Behavior and Lifestyle Prescription:  Counseled patient on stressors, stress management and self-care approaches. Strongly encouraged pt to seek formal counseling for stressors mentioned today. If already receiving formal counseling please continue these sessions routinely. Go to ER if any thoughts or attempts of suicide are experienced. Sleep Prescription:   A goal of 7-9 hours nightly of uninterrupted sleep is recommended to turn off the Grehlin hormone to be released from the stomach and triggers appetite while promoting weight gain. Proper rest turns on Leptin hormone to be released from white adipose tissue and promotes weight loss. Avoid alcohol and caffeine 6-12 hours before bedtime to reduce risk of sleep disturbance and bladder irritation while asleep. Discussed snoring, symptoms of Sleep Apnea and improvements with weight loss. Strongly encouraged compliance with CPAP, if Sleep Apnea has been diagnosed. Advised patient to follow up with sleep specialist for every 25 pounds lost to see if CPAP settings need to be adjusted. Counseled patient on health topics:    Obesity, Insulin Resistance, LCD dietary approaches, benefits, contraindications, possible side effects to these diets and how to prevent poor outcomes (including staying hydrated, limit physical exercise while transitioning into this program, avoid skipping meals, etc), weight loss goals, sleep hygiene, stressors, barriers to losing weight and keeping weight off, strategies to overcome habits or challenges to improve or continue adherence. Immunizations noted. Covid-19 vaccines/boosters recommended, if patient has not had it. Obesity may increase risk for severe illness and death from Covid-19 disease. Offered empathy, encouragement, legitimation, prayers, partnership to patient. Praised patient for successes. Patient was offered a choice/choices in the treatment plan today. Patient expressed understanding with the diagnoses and the plan and agrees with recommendations. Return in about 1 month (around 7/30/2022) for ND LCD, MEDICATION MANAGMENT, results. Total time:  40 mins spent in counseling, coordinating care, reviewing and discussing results, reviewing relevant documentation from other providers, completing relevant documentation, and discussing treatment plans in reference to The primary encounter diagnosis was Class 2 severe obesity due to excess calories with serious comorbidity and body mass index (BMI) of 35.0 to 35.9 in adult [facility]).  Diagnoses of Type 2 diabetes mellitus without complication, without long-term current use of insulin (HCC), Chronic fatigue, Chronic insomnia, Mixed hyperlipidemia, Vitamin B12 deficiency, Vitamin D deficiency, Fibromyalgia, Recurrent major depressive disorder, in full remission ([address] BILL on CPAP, Craving for particular food, Hx of laparoscopic adjustable gastric banding, Arteriosclerotic heart disease (ASHD), Elevated ALT measurement, Personal history of kidney stones, History of laparoscopic cholecystectomy, Long-term current use of proton pump inhibitor therapy, and BMI 34.0-34.9,adult were also pertinent to this visit. [id number]more MD FOR ALLOWING ME THE PRIVILEGE TO PARTICIPATE IN THE CARE OF OUR MUTUAL PATIENT, Ms. Charla Lomas. Claudia Broderick DO, Diplomate JERSON TIJERINA    There are no Patient Instructions on file for this visit.

## 2022-07-01 LAB
25(OH)D3+25(OH)D2 SERPL-MCNC: 72 NG/ML (ref 30–100)
ALBUMIN SERPL-MCNC: 4.4 G/DL (ref 3.7–4.7)
ALBUMIN/GLOB SERPL: 2.3 {RATIO} (ref 1.2–2.2)
ALP SERPL-CCNC: 49 IU/L (ref 44–121)
ALT SERPL-CCNC: 70 IU/L (ref 0–32)
AST SERPL-CCNC: 57 IU/L (ref 0–40)
BILIRUB SERPL-MCNC: 0.3 MG/DL (ref 0–1.2)
BUN SERPL-MCNC: 33 MG/DL (ref 8–27)
BUN/CREAT SERPL: 31 (ref 12–28)
CALCIUM SERPL-MCNC: 9.6 MG/DL (ref 8.7–10.3)
CHLORIDE SERPL-SCNC: 104 MMOL/L (ref 96–106)
CHOLEST SERPL-MCNC: 132 MG/DL (ref 100–199)
CO2 SERPL-SCNC: 23 MMOL/L (ref 20–29)
CREAT SERPL-MCNC: 1.06 MG/DL (ref 0.57–1)
EGFR: 55 ML/MIN/1.73
EST. AVERAGE GLUCOSE BLD GHB EST-MCNC: 137 MG/DL
FOLATE SERPL-MCNC: 11.8 NG/ML
GLOBULIN SER CALC-MCNC: 1.9 G/DL (ref 1.5–4.5)
GLUCOSE SERPL-MCNC: 96 MG/DL (ref 65–99)
HBA1C MFR BLD: 6.4 % (ref 4.8–5.6)
HDL SERPL-SCNC: 33.7 UMOL/L
HDLC SERPL-MCNC: 41 MG/DL
LDL SERPL QN: 19.7 NM
LDL SERPL-SCNC: 1087 NMOL/L
LDL SMALL SERPL-SCNC: 859 NMOL/L
LDLC SERPL CALC-MCNC: 56 MG/DL (ref 0–99)
LP-IR SCORE SERPL: 69
POTASSIUM SERPL-SCNC: 4.5 MMOL/L (ref 3.5–5.2)
PROT SERPL-MCNC: 6.3 G/DL (ref 6–8.5)
SODIUM SERPL-SCNC: 140 MMOL/L (ref 134–144)
TRIGL SERPL-MCNC: 220 MG/DL (ref 0–149)
VIT B12 SERPL-MCNC: 723 PG/ML (ref 232–1245)

## 2022-07-01 NOTE — PROGRESS NOTES
Amisha Head presents for weekly or bi-monthly evaluation of Obesity Body mass index is 35.3 kg/m². Visit Vitals  BP (!) 96/59 (BP 1 Location: Right arm, BP Patient Position: Sitting, BP Cuff Size: Adult long)   Pulse 76   Temp 98.4 °F (36.9 °C) (Oral)   Resp 18   Ht 5' 7\" (1.702 m)   Wt 225 lb 6.4 oz (102.2 kg)   SpO2 95%   BMI 35.30 kg/m²       Wt Readings from Last 3 Encounters:   06/30/22 224 lb 1.6 oz (101.7 kg)   06/22/22 225 lb 6.4 oz (102.2 kg)   06/08/22 227 lb 1.6 oz (103 kg)       1. Obesity (BMI 30-39. 9)         I have reviewed and agree with nurse documentation of Weekly Education Class nurse progress note for [facility] Weight [facility] [place]). Amisha Head is compliant with program requirements and may continue participation utilizing the [employer] meal replacements, as discussed. Patient has been advised to refer to the [facility] Patient Manual and notify us if any side effects to this meal plan are present and/or persist.  Amisha Head may continue the current dietary approach, care and follow up at the monthly office visit with the provider, as scheduled. Follow-up and Dispositions    · Return in about 2 weeks (around 7/6/2022). Documentation true and accepted by Becky Cooper.  Huber Yoon., AOBFP, Diplomate JERSON TIJERINA

## 2022-07-01 NOTE — PROGRESS NOTES
Nurse note reviewed. Nurse reminded patient to review the requirements for participation in the [facility]. \[facility].\"" Program and to complete this homework documentation to remain in good standing. Patient expressed understanding and agreed to comply. Provider will follow up with the patient at the monthly office visit.

## 2022-07-06 DIAGNOSIS — E53.8 VITAMIN B12 DEFICIENCY: ICD-10-CM

## 2022-07-07 RX ORDER — CYANOCOBALAMIN 1000 UG/ML
INJECTION, SOLUTION INTRAMUSCULAR; SUBCUTANEOUS
Qty: 1 ML | Refills: 1 | Status: SHIPPED | OUTPATIENT
Start: 2022-07-07 | End: 2022-07-11

## 2022-07-08 ENCOUNTER — TELEPHONE (OUTPATIENT)
Dept: SURGERY | Age: 72
End: 2022-07-08

## 2022-07-08 NOTE — TELEPHONE ENCOUNTER
Returning patient's call re: Doni Zamora told patient she would sent B12 injection refills to the [facility] and [email or web address] on [address]. I contacted patient to inform her that the prescription was sent yesterday to that pharmacy, patient did not answer so I left a vmail with the information and if patient had any questions to call us. Patient stated that she needed the prescription before Sunday.

## 2022-07-11 DIAGNOSIS — E53.8 VITAMIN B12 DEFICIENCY: ICD-10-CM

## 2022-07-11 RX ORDER — CYANOCOBALAMIN 1000 UG/ML
INJECTION, SOLUTION INTRAMUSCULAR; SUBCUTANEOUS
Qty: 1 ML | Refills: 1 | Status: CANCELLED
Start: 2022-07-11

## 2022-07-11 NOTE — PROGRESS NOTES
Moris sent a note to clinic requesting 90 day refill of cyanocobalamin 1000mcg/ml inject one ml every 30 days.    Pt last visit here 06/30/2022 with you  Pt next visit here is 07/27/2022 with Dr. Candace Hankins

## 2022-07-13 ENCOUNTER — CLINICAL SUPPORT (OUTPATIENT)
Dept: SURGERY | Age: 72
End: 2022-07-13

## 2022-07-13 VITALS
OXYGEN SATURATION: 95 % | DIASTOLIC BLOOD PRESSURE: 63 MMHG | HEIGHT: 67 IN | RESPIRATION RATE: 18 BRPM | BODY MASS INDEX: 34.42 KG/M2 | SYSTOLIC BLOOD PRESSURE: 102 MMHG | TEMPERATURE: 98.2 F | HEART RATE: 61 BPM | WEIGHT: 219.3 LBS

## 2022-07-13 DIAGNOSIS — E66.9 OBESITY (BMI 30-39.9): Primary | ICD-10-CM

## 2022-07-13 NOTE — PROGRESS NOTES
Weight Management. 1. Have you been to the ER, urgent care clinic since your last visit? Hospitalized since your last visit? No    2. Have you seen or consulted any other health care providers outside of the [address] [place] [address] since your last visit? Include any pap smears or colon screening.  No

## 2022-07-14 ENCOUNTER — OFFICE VISIT (OUTPATIENT)
Dept: SURGERY | Age: 72
End: 2022-07-14

## 2022-07-14 DIAGNOSIS — E66.01 CLASS 2 SEVERE OBESITY DUE TO EXCESS CALORIES WITH SERIOUS COMORBIDITY AND BODY MASS INDEX (BMI) OF 35.0 TO 35.9 IN ADULT (HCC): Primary | ICD-10-CM

## 2022-07-14 RX ORDER — CYANOCOBALAMIN 1000 UG/ML
1000 INJECTION, SOLUTION INTRAMUSCULAR; SUBCUTANEOUS
Qty: 3 ML | Refills: 0
Start: 2022-07-14

## 2022-07-14 NOTE — PROGRESS NOTES
[facility]  Metabolic Weight Loss Program        Patient's Name: Jose Parr  : 1950    This patient is enrolled in [address] [place] [address] Weight Loss Program and attended the required weekly virtual nutrition class hosted via [employer].       Sharon Hamilton, MS, RD, LDN

## 2022-07-18 ENCOUNTER — TRANSCRIBE ORDER (OUTPATIENT)
Dept: REGISTRATION | Age: 72
End: 2022-07-18

## 2022-07-18 ENCOUNTER — HOSPITAL ENCOUNTER (OUTPATIENT)
Dept: GENERAL RADIOLOGY | Age: 72
Discharge: HOME | End: 2022-07-18
Payer: MEDICARE

## 2022-07-18 DIAGNOSIS — M47.816 SPONDYLOSIS WITHOUT MYELOPATHY OR RADICULOPATHY, LUMBAR REGION: Primary | ICD-10-CM

## 2022-07-18 DIAGNOSIS — M47.816 SPONDYLOSIS WITHOUT MYELOPATHY OR RADICULOPATHY, LUMBAR REGION: ICD-10-CM

## 2022-07-18 PROCEDURE — 72110 X-RAY EXAM L-2 SPINE 4/>VWS: CPT

## 2022-07-20 NOTE — PROGRESS NOTES
Progress Note: Weekly Education Class in the [facility] Weight Loss Program         Patient is on Very Low Calorie Diet [] (4 meal replacements per day, 800 kcal/day)      Low Calorie Diet [x] (2-3 meal replacements per day, 3868-8271 kcal/day)    1) Did patient have any new symptoms or physical problems? Yes []    No [x]    If yes, check & comment: weakness [], fatigue [], lightheadedness [], headache [], cramps [], cold intolerance [], hair loss [], diarrhea [], constipation [],  NA [] other:                                 2) Has patient had any medical attention from other providers, urgent care or the emergency room this week? Yes [x]  No []       NA [], If yes, why:   Knee injection                           #3 no answer         3) Any other sugar sweetened beverages consumed this week? Yes []  No []    4) Did patient have any problems adhering to the diet? Yes [x]  No [] NA []    If yes, Vacation [], Celebrations [], Conferences [], Family Reunions [] other:    in hospital                                              5) How many hours of sleep this week? 7-10    (range)  NA []    Number of meal replacements consumed daily? 1-2 (range)  NA []    Average ounces of water patient consumed daily this week (not including shakes)? 77     (divide the weekly total by 7)    Did you eat any food outside of the program? Yes [x] No []    Physical Activity Over the Past Week:    Cardio exercise: 95 min  Strength exercise: 3 workouts / week  Number of steps walked per day: 100-700    How has patient mood overall been this week? Sad [], Happy [], Stressed [], Tired Content [], NA [], other  okay, over whelmed           Medications reconciled by nurse Yes [x]  No[]    Patient was given therapeutic recommendations for any noted side effects of their dietary approach based upon [facility] patient manual per providers recommendation.      07/07/2022  Progress Note: Weekly Education Class in the [facility] Weight Loss Program         Patient is on Very Low Calorie Diet [] (4 meal replacements per day, 800 kcal/day)      Low Calorie Diet [x] (2-3 meal replacements per day, 6252-2077 kcal/day)    1) Did patient have any new symptoms or physical problems? Yes []    No []    If yes, check & comment: weakness [], fatigue [], lightheadedness [], headache [], cramps [], cold intolerance [], hair loss [], diarrhea [], constipation [],  NA [x] other:                                 2) Has patient had any medical attention from other providers, urgent care or the emergency room this week? Yes []  No []       NA [], If yes, why:                             No answer #3          3) Any other sugar sweetened beverages consumed this week? Yes []  No []    4) Did patient have any problems adhering to the diet? Yes []  No [] NA [x]    If yes, Vacation [], Celebrations [], Conferences [], Family Reunions [] other:                                               5) How many hours of sleep this week? 7-9   (range)  NA []    Number of meal replacements consumed daily? 1(range)  NA []    Average ounces of water patient consumed daily this week (not including shakes)? 57   (divide the weekly total by 7)    Did you eat any food outside of the program? Yes [x] No []    Physical Activity Over the Past Week:    Cardio exercise: 60 min  Strength exercise: 2 workouts / week  Number of steps walked per day: 1200    How has patient mood overall been this week? Sad [], Happy [], Stressed [], Tired [], Content [], NA [], other   good          Medications reconciled by nurse Yes [x]  No[]    Patient was given therapeutic recommendations for any noted side effects of their dietary approach based upon [facility] patient manual per providers recommendation.

## 2022-07-27 ENCOUNTER — OFFICE VISIT (OUTPATIENT)
Dept: SURGERY | Age: 72
End: 2022-07-27
Payer: MEDICARE

## 2022-07-27 VITALS
BODY MASS INDEX: 35.56 KG/M2 | WEIGHT: 226.6 LBS | RESPIRATION RATE: 20 BRPM | HEART RATE: 75 BPM | TEMPERATURE: 98.4 F | HEIGHT: 67 IN | SYSTOLIC BLOOD PRESSURE: 132 MMHG | DIASTOLIC BLOOD PRESSURE: 77 MMHG | OXYGEN SATURATION: 94 %

## 2022-07-27 DIAGNOSIS — E78.2 MIXED HYPERLIPIDEMIA: ICD-10-CM

## 2022-07-27 DIAGNOSIS — E11.9 TYPE 2 DIABETES MELLITUS WITHOUT COMPLICATION, WITHOUT LONG-TERM CURRENT USE OF INSULIN (HCC): ICD-10-CM

## 2022-07-27 DIAGNOSIS — E66.01 CLASS 2 SEVERE OBESITY DUE TO EXCESS CALORIES WITH SERIOUS COMORBIDITY AND BODY MASS INDEX (BMI) OF 35.0 TO 35.9 IN ADULT (HCC): Primary | ICD-10-CM

## 2022-07-27 DIAGNOSIS — M79.7 FIBROMYALGIA: ICD-10-CM

## 2022-07-27 PROCEDURE — 1090F PRES/ABSN URINE INCON ASSESS: CPT | Performed by: FAMILY MEDICINE

## 2022-07-27 PROCEDURE — G8754 DIAS BP LESS 90: HCPCS | Performed by: FAMILY MEDICINE

## 2022-07-27 PROCEDURE — 1101F PT FALLS ASSESS-DOCD LE1/YR: CPT | Performed by: FAMILY MEDICINE

## 2022-07-27 PROCEDURE — 99214 OFFICE O/P EST MOD 30 MIN: CPT | Performed by: FAMILY MEDICINE

## 2022-07-27 PROCEDURE — 3044F HG A1C LEVEL LT 7.0%: CPT | Performed by: FAMILY MEDICINE

## 2022-07-27 PROCEDURE — G8536 NO DOC ELDER MAL SCRN: HCPCS | Performed by: FAMILY MEDICINE

## 2022-07-27 PROCEDURE — 3017F COLORECTAL CA SCREEN DOC REV: CPT | Performed by: FAMILY MEDICINE

## 2022-07-27 PROCEDURE — 1123F ACP DISCUSS/DSCN MKR DOCD: CPT | Performed by: FAMILY MEDICINE

## 2022-07-27 PROCEDURE — G8400 PT W/DXA NO RESULTS DOC: HCPCS | Performed by: FAMILY MEDICINE

## 2022-07-27 PROCEDURE — G9717 DOC PT DX DEP/BP F/U NT REQ: HCPCS | Performed by: FAMILY MEDICINE

## 2022-07-27 PROCEDURE — G8427 DOCREV CUR MEDS BY ELIG CLIN: HCPCS | Performed by: FAMILY MEDICINE

## 2022-07-27 PROCEDURE — G8752 SYS BP LESS 140: HCPCS | Performed by: FAMILY MEDICINE

## 2022-07-27 PROCEDURE — G8417 CALC BMI ABV UP PARAM F/U: HCPCS | Performed by: FAMILY MEDICINE

## 2022-07-27 PROCEDURE — 2022F DILAT RTA XM EVC RTNOPTHY: CPT | Performed by: FAMILY MEDICINE

## 2022-07-27 RX ORDER — SEMAGLUTIDE 2.68 MG/ML
2 INJECTION, SOLUTION SUBCUTANEOUS
Qty: 1 BOX | Refills: 2 | Status: SHIPPED | OUTPATIENT
Start: 2022-07-27

## 2022-07-27 NOTE — PROGRESS NOTES
[facility] Weight Loss Program Progress Note:   F/up Physician Visit    CC: Weight Management      Matias Aguirre is a 66 y.o. female who is here for her  f/up physician visit for the  LCD Program.  She is taking ozempic but has had a lot of traumatic events as well as celebrations so she has been eating a lot more cals that desired and has regained thw  last 2 weeks        Weight Metrics 7/27/2022 7/13/2022 6/30/2022 6/30/2022 6/22/2022 6/8/2022 5/23/2022   Weight 226 lb 9.6 oz 219 lb 4.8 oz - 224 lb 1.6 oz 225 lb 6.4 oz 227 lb 1.6 oz 233 lb 14.4 oz   Neck Circ (inches) 14.5 - 14.5 - - - -   Waist Measure Inches 45 - 45 - - - -   Body Fat % 44.7 - 44.4 - - - -   BMI 35.49 kg/m2 34.35 kg/m2 - 35.1 kg/m2 35.3 kg/m2 35.57 kg/m2 36.63 kg/m2               Participation   Did you attend clinic and class last week? no    Review of Systems  Since your last visit, have you experienced any complications? no  If yes, please list:       Are you taking an appetite suppressant? yes  If so, is there any Chest Pain, Palpitations or Dizziness? HUNGER CONTROL: not good    BP Readings from Last 3 Encounters:   07/27/22 132/77   07/13/22 102/63   06/30/22 110/60       SLEEP:  7-8    Have you received any other medical care this week? no  If yes, where and for what? Have you discontinued or changed any medicine or dose of your medicine since your last visit with Dr Gino Johnston? no  If yes, where and for what? Diet  How many ounces of calorie-free liquids did you consume each day?  70 oz    How many meal replacements did you take each day? 1    Did you have any problems adhering to the program?  yes If yes, please explain:      Exercise  Aerobic exercise: 0 min  Resistance exercise: 0 workouts / week  Any discomfort? If yes, where?       Objective  Visit Vitals  /77 (BP 1 Location: Left upper arm, BP Patient Position: Sitting, BP Cuff Size: Large adult)   Pulse 75   Temp 98.4 °F (36.9 °C)   Resp 20   Ht 5' 7\" (1.702 m)   Wt 226 lb 9.6 oz (102.8 kg)   SpO2 94%   BMI 35.49 kg/m²     No LMP recorded. (Menstrual status: Menopause). Physical Exam  Appearance: well,   Mental:A&O x 3, NAD  H:NC/AT,  EENT:   EOMI, PERRL, No scleral icterus  Neck: no bruit or JVD  Lung: clear, No W/R  CV 2/6 DESMOND at [facility]  ABD: soft, active, nontender  Ext:  no Edema  Neuro: nonfocal  Assessment / Plan    Encounter Diagnoses   Name Primary? Class 2 severe obesity due to excess calories with serious comorbidity and body mass index (BMI) of 35.0 to 35.9 in [facility]) Yes    Type 2 diabetes mellitus without complication, without long-term current use of insulin ([facility])     Mixed hyperlipidemia     Fibromyalgia      Diagnoses and all orders for this visit:    1. Class 2 severe obesity due to excess calories with serious comorbidity and body mass index (BMI) of 35.0 to 35.9 in [facility])  Start pool exercise  Stop the junk food  Semaglutide 2 mg every 7 days    2. Type 2 diabetes mellitus without complication, without long-term current use of insulin (HCC)  Semaglutide 2 mg every 7 days    3. Mixed hyperlipidemia  Crestor and zetia  4. Fibromyalgia    1. Weight management poorly controlled   Progress was reviewed with patient    2. Labs    Latest results reviewed with patient       face to face time with Tanisha consisted of counseling & coordinating and/or discussing treatment plans in reference to her obesity The primary encounter diagnosis was Class 2 severe obesity due to excess calories with serious comorbidity and body mass index (BMI) of 35.0 to 35.9 in [facility]). Diagnoses of Type 2 diabetes mellitus without complication, without long-term current use of insulin ([facility] [address] [address] Mixed hyperlipidemia, and Fibromyalgia were also pertinent to this visit.

## 2022-07-27 NOTE — PROGRESS NOTES
1. Have you been to the ER, urgent care clinic since your last visit? Hospitalized since your last visit? No    2. Have you seen or consulted any other health care providers outside of the [address] [place] [address] since your last visit? Include any pap smears or colon screening. No    Progress Note: Weekly Education Class in the [facility] Weight Loss Program         Patient is on Very Low Calorie Diet [] (4 meal replacements per day, 800 kcal/day)      Low Calorie Diet [x] (2-3 meal replacements per day, 6963-3148 kcal/day)    1) Did patient have any new symptoms or physical problems? Yes []    No [x]    If yes, check & comment: weakness [], fatigue [], lightheadedness [], headache [], cramps [], cold intolerance [], hair loss [], diarrhea [], constipation [],  NA [] other:                                 2) Has patient had any medical attention from other providers, urgent care or the emergency room this week? Yes [x]  No []       NA [], If yes, why: knee injections                                      3) Any other sugar sweetened beverages consumed this week? Yes [x]  No []    4) Did patient have any problems adhering to the diet? Yes [x]  No [] NA []    If yes, Vacation [], Celebrations [], Conferences [], Family Reunions [] other:    in the hospital                                             5) How many hours of sleep this week? 8-9    (range)  NA []    Number of meal replacements consumed daily? 1 (range)  NA []    Average ounces of water patient consumed daily this week (not including shakes)? 69 (divide the weekly total by 7)    Did you eat any food outside of the program? Yes [x] No []    Physical Activity Over the Past Week:    Cardio exercise: 10 min  Strength exercise: 1 workouts / week  Number of steps walked per day: ? How has patient mood overall been this week?  Sad [], Happy [], Stressed [], Tired [], Content [], NA [], other  stress, okay good           Medications reconciled by nurse Yes [x]  No[]    Patient was given therapeutic recommendations for any noted side effects of their dietary approach based upon [facility] patient manual per providers recommendation. 07/21/2022      Progress Note: Weekly Education Class in the [facility] Weight Loss Program         Patient is on Very Low Calorie Diet [] (4 meal replacements per day, 800 kcal/day)      Low Calorie Diet [x] (2-3 meal replacements per day, 8220-0015 kcal/day)    1) Did patient have any new symptoms or physical problems? Yes []    No []    If yes, check & comment: weakness [], fatigue [], lightheadedness [], headache [], cramps [], cold intolerance [], hair loss [], diarrhea [], constipation [],  NA [x] other:                                 2) Has patient had any medical attention from other providers, urgent care or the emergency room this week? Yes []  No []       NA [x], If yes, why:                                       3) Any other sugar sweetened beverages consumed this week? Yes [x]  No []    4) Did patient have any problems adhering to the diet? Yes [x]  No [] NA []    If yes, Vacation [], Celebrations [x], Conferences [x], Family Reunions [x] other:                                               5) How many hours of sleep this week? 8-10   (range)  NA []    Number of meal replacements consumed daily? 1 (range)  NA []    Average ounces of water patient consumed daily this week (not including shakes)? 56  (divide the weekly total by 7)    Did you eat any food outside of the program? Yes [] No [x]    Physical Activity Over the Past Week:    Cardio exercise: 0 min  Strength exercise: 0 workouts / week  Number of steps walked per day: 0    How has patient mood overall been this week?  Sad [], Happy [], Stressed [], Tired [], Content [], NA [], other  okay           Medications reconciled by nurse Yes [x]  No[]    Patient was given therapeutic recommendations for any noted side effects of their dietary approach based upon [facility] patient manual per providers recommendation.

## 2022-08-01 NOTE — PROGRESS NOTES
Nedra Denney presents for weekly or bi-monthly evaluation of Obesity Body mass index is 34.35 kg/m². Visit Vitals  /63 (BP 1 Location: Right arm, BP Patient Position: Sitting, BP Cuff Size: Adult long)   Pulse 61   Temp 98.2 °F (36.8 °C) (Oral)   Resp 18   Ht 5' 7\" (1.702 m)   Wt 219 lb 4.8 oz (99.5 kg)   SpO2 95%   BMI 34.35 kg/m²       Wt Readings from Last 3 Encounters:   07/27/22 226 lb 9.6 oz (102.8 kg)   07/13/22 219 lb 4.8 oz (99.5 kg)   06/30/22 224 lb 1.6 oz (101.7 kg)       1. Obesity (BMI 30-39. 9)         I have reviewed and agree with nurse documentation of Weekly Education Class nurse progress note for [facility] Weight [facility] [place]). Nedra Denney is compliant with program requirements and may continue participation utilizing the [employer] meal replacements, as discussed. Patient has been advised to refer to the [facility] Patient Manual and notify us if any side effects to this meal plan are present and/or persist.  Nedra Denney may continue the current dietary approach, care and follow up at the monthly office visit with the provider, as scheduled. Follow-up and Dispositions    Return in about 2 weeks (around 7/27/2022). Documentation true and accepted by Mine Vigil.  Inocencia Hsu., AOBFP, Diplomate JERSON TIJERINA

## 2022-08-08 ENCOUNTER — TRANSCRIBE ORDER (OUTPATIENT)
Dept: SCHEDULING | Age: 72
End: 2022-08-08

## 2022-08-08 DIAGNOSIS — R79.89 D-DIMER, ELEVATED: Primary | ICD-10-CM

## 2022-08-08 DIAGNOSIS — R06.02 SOB (SHORTNESS OF BREATH): Primary | ICD-10-CM

## 2022-08-11 RX ORDER — METOPROLOL SUCCINATE 50 MG/1
TABLET, EXTENDED RELEASE ORAL
Qty: 90 TABLET | Refills: 2 | Status: SHIPPED | OUTPATIENT
Start: 2022-08-11

## 2022-08-12 ENCOUNTER — HOSPITAL ENCOUNTER (OUTPATIENT)
Dept: GENERAL RADIOLOGY | Age: 72
Discharge: HOME | End: 2022-08-12
Payer: MEDICARE

## 2022-08-12 ENCOUNTER — CLINICAL SUPPORT (OUTPATIENT)
Dept: SURGERY | Age: 72
End: 2022-08-12

## 2022-08-12 ENCOUNTER — TRANSCRIBE ORDER (OUTPATIENT)
Dept: REGISTRATION | Age: 72
End: 2022-08-12

## 2022-08-12 VITALS
HEIGHT: 67 IN | DIASTOLIC BLOOD PRESSURE: 68 MMHG | SYSTOLIC BLOOD PRESSURE: 101 MMHG | RESPIRATION RATE: 20 BRPM | BODY MASS INDEX: 34.2 KG/M2 | HEART RATE: 76 BPM | WEIGHT: 217.9 LBS | OXYGEN SATURATION: 93 % | TEMPERATURE: 98.7 F

## 2022-08-12 DIAGNOSIS — M54.16 LUMBAR RADICULOPATHY: ICD-10-CM

## 2022-08-12 DIAGNOSIS — E78.2 MIXED HYPERLIPIDEMIA: ICD-10-CM

## 2022-08-12 DIAGNOSIS — M16.11 OSTEOARTHRITIS OF RIGHT HIP: Primary | ICD-10-CM

## 2022-08-12 DIAGNOSIS — E11.9 TYPE 2 DIABETES MELLITUS WITHOUT COMPLICATION, WITHOUT LONG-TERM CURRENT USE OF INSULIN (HCC): ICD-10-CM

## 2022-08-12 DIAGNOSIS — E66.9 CLASS 1 OBESITY: Primary | ICD-10-CM

## 2022-08-12 DIAGNOSIS — M16.11 OSTEOARTHRITIS OF RIGHT HIP: ICD-10-CM

## 2022-08-12 PROCEDURE — 73521 X-RAY EXAM HIPS BI 2 VIEWS: CPT

## 2022-08-12 NOTE — PROGRESS NOTES
1. Have you been to the ER, urgent care clinic since your last visit? Hospitalized since your last visit? No    2. Have you seen or consulted any other health care providers outside of the [address] [place] [address] since your last visit? Include any pap smears or colon screening. Yes When: [facility],         Progress Note: Weekly Education Class in the [facility] Weight Loss Program         Patient is on Very Low Calorie Diet [] (4 meal replacements per day, 800 kcal/day)      Low Calorie Diet [x] (2-3 meal replacements per day, 4188-0679 kcal/day)    1) Did patient have any new symptoms or physical problems? Yes []    No []    If yes, check & comment: weakness [], fatigue [], lightheadedness [], headache [], cramps [], cold intolerance [], hair loss [], diarrhea [], constipation [],  NA [x] other:                                 2) Has patient had any medical attention from other providers, urgent care or the emergency room this week? Yes [x]  No []       NA [], If yes, why:    Dr. Baron Leggett                          #3 no answer         3) Any other sugar sweetened beverages consumed this week? Yes []  No []    4) Did patient have any problems adhering to the diet? Yes [x]  No [] NA []    If yes, Vacation [], Celebrations [], Conferences [], Family Reunions [] other:   \"Sometimes\"                                             5) How many hours of sleep this week?  8-10   (range)  NA []    Number of meal replacements consumed daily? 1 (range)  NA []    Average ounces of water patient consumed daily this week (not including shakes)? 81  (divide the weekly total by 7)    Did you eat any food outside of the program? Yes [x] No []    Physical Activity Over the Past Week:    Cardio exercise: 25 min  Strength exercise:  3 workouts / week  Number of steps walked per day: 1 mile- \"a lot\"    How has patient mood overall been this week?  Sad [], Happy [], Stressed [], Tired [], Content [], NA [], other tabatha         Medications reconciled by nurse Yes [x]  No[]    Patient was given therapeutic recommendations for any noted side effects of their dietary approach based upon [facility] patient manual per providers recommendation. Progress Note: Weekly Education Class in the [facility] Weight Loss Program         Patient is on Very Low Calorie Diet [] (4 meal replacements per day, 800 kcal/day)      Low Calorie Diet [x] (2-3 meal replacements per day, 4836-1021 kcal/day)    1) Did patient have any new symptoms or physical problems? Yes []    No []    If yes, check & comment: weakness [], fatigue [], lightheadedness [], headache [], cramps [], cold intolerance [], hair loss [], diarrhea [], constipation [],  NA [x] other:                                 2) Has patient had any medical attention from other providers, urgent care or the emergency room this week? Yes []  No []       NA [x], If yes, why:                                #3 no answer       3) Any other sugar sweetened beverages consumed this week? Yes []  No []    4) Did patient have any problems adhering to the diet? Yes [x]  No [] NA []    If yes, Vacation [], Celebrations [], Conferences [], Family Reunions [] other:  8-9                                              5) How many hours of sleep this week? 8-9   (range)  NA []    Number of meal replacements consumed daily? 1(range)  NA []    Average ounces of water patient consumed daily this week (not including shakes)? 64     (divide the weekly total by 7)    Did you eat any food outside of the program? Yes [x] No []    Physical Activity Over the Past Week:    Cardio exercise: n/a min  Strength exercise: 1 workouts / week  Number of steps walked per day: n/a    How has patient mood overall been this week?  Sad [x], Happy [], Stressed [], Tired [], Content [], NA [], other  Okay, sad, pain and over whelmed good         Medications reconciled by nurse Yes [x]  No[]    Patient was given therapeutic recommendations for any noted side effects of their dietary approach based upon [employer] patient manual per providers recommendation.

## 2022-08-26 ENCOUNTER — CLINICAL SUPPORT (OUTPATIENT)
Dept: SURGERY | Age: 72
End: 2022-08-26

## 2022-08-26 VITALS
HEART RATE: 92 BPM | HEIGHT: 67 IN | BODY MASS INDEX: 33.35 KG/M2 | RESPIRATION RATE: 20 BRPM | OXYGEN SATURATION: 93 % | DIASTOLIC BLOOD PRESSURE: 78 MMHG | WEIGHT: 212.5 LBS | SYSTOLIC BLOOD PRESSURE: 112 MMHG

## 2022-08-26 DIAGNOSIS — E11.9 TYPE 2 DIABETES MELLITUS WITHOUT COMPLICATION, WITHOUT LONG-TERM CURRENT USE OF INSULIN (HCC): ICD-10-CM

## 2022-08-26 DIAGNOSIS — E66.9 CLASS 1 OBESITY: Primary | ICD-10-CM

## 2022-08-26 DIAGNOSIS — E78.2 MIXED HYPERLIPIDEMIA: ICD-10-CM

## 2022-08-26 NOTE — PROGRESS NOTES
1. Have you been to the ER, urgent care clinic since your last visit? Hospitalized since your last visit? No    2. Have you seen or consulted any other health care providers outside of the [address] [place] [address] since your last visit? Include any pap smears or colon screening. No      Progress Note: Weekly Education Class in the [facility] Weight Loss Program         Patient is on Very Low Calorie Diet [] (4 meal replacements per day, 800 kcal/day)      Low Calorie Diet [x] (2-3 meal replacements per day, 7871-4533 kcal/day)    1) Did patient have any new symptoms or physical problems? Yes []    No []    If yes, check & comment: weakness [], fatigue [], lightheadedness [], headache [], cramps [], cold intolerance [], hair loss [], diarrhea [], constipation [],  NA [x] other:                                 2) Has patient had any medical attention from other providers, urgent care or the emergency room this week? Yes []  No []       NA [x], If yes, why:                            #3 no answer           3) Any other sugar sweetened beverages consumed this week? Yes []  No []    4) Did patient have any problems adhering to the diet? Yes []  No [] NA [x]    If yes, Vacation [], Celebrations [], Conferences [], Family Reunions [] other:                                                5) How many hours of sleep this week? 8-10    (range)  NA []    Number of meal replacements consumed daily? 0-1 (range)  NA []    Average ounces of water patient consumed daily this week (not including shakes)? 48     (divide the weekly total by 7)    Did you eat any food outside of the program? Yes [x] No []    Physical Activity Over the Past Week:    Cardio exercise: 0 min  Strength exercise: 40 workouts / week  Number of steps walked per day: 0    How has patient mood overall been this week?  Sad [], Happy [], Stressed [], Tired [], Content [], NA [], other  okay          Medications reconciled by nurse Yes [x]  No[]    Patient was given therapeutic recommendations for any noted side effects of their dietary approach based upon [facility] patient manual per providers recommendation. Progress Note: Weekly Education Class in the [facility] Weight Loss Program         Patient is on Very Low Calorie Diet [] (4 meal replacements per day, 800 kcal/day)      Low Calorie Diet [x] (2-3 meal replacements per day, 7220-2595 kcal/day)    1) Did patient have any new symptoms or physical problems? Yes []    No []    If yes, check & comment: weakness [], fatigue [], lightheadedness [], headache [], cramps [], cold intolerance [], hair loss [], diarrhea [], constipation [],  NA [x] other:                                 2) Has patient had any medical attention from other providers, urgent care or the emergency room this week? Yes []  No []       NA [x], If yes, why:                              #3 no answer         3) Any other sugar sweetened beverages consumed this week? Yes []  No []    4) Did patient have any problems adhering to the diet? Yes []  No [] NA [x]    If yes, Vacation [], Celebrations [], Conferences [], Family Reunions [] other:                                                5) How many hours of sleep this week? 8-9    (range)  NA []    Number of meal replacements consumed daily? 0-1 (range)  NA []    Average ounces of water patient consumed daily this week (not including shakes)? 52     (divide the weekly total by 7)    Did you eat any food outside of the program? Yes [x] No []    Physical Activity Over the Past Week:    Cardio exercise: 0 min  Strength exercise: 45 workouts / week  Number of steps walked per day: 0    How has patient mood overall been this week?  Sad [], Happy [], Stressed [], Tired [], Content [], NA [], other good, okay           Medications reconciled by nurse Yes [x]  No[]    Patient was given therapeutic recommendations for any noted side effects of their dietary approach based upon [facility] patient manual per providers recommendation.

## 2022-08-31 ENCOUNTER — VIRTUAL VISIT (OUTPATIENT)
Dept: SURGERY | Age: 72
End: 2022-08-31

## 2022-08-31 DIAGNOSIS — E78.2 MIXED HYPERLIPIDEMIA: ICD-10-CM

## 2022-08-31 DIAGNOSIS — E66.01 CLASS 2 SEVERE OBESITY DUE TO EXCESS CALORIES WITH SERIOUS COMORBIDITY AND BODY MASS INDEX (BMI) OF 35.0 TO 35.9 IN ADULT (HCC): Primary | ICD-10-CM

## 2022-08-31 DIAGNOSIS — E11.9 TYPE 2 DIABETES MELLITUS WITHOUT COMPLICATION, WITHOUT LONG-TERM CURRENT USE OF INSULIN (HCC): ICD-10-CM

## 2022-08-31 NOTE — PROGRESS NOTES
1. Have you been to the ER, urgent care clinic since your last visit? Hospitalized since your last visit? No    2. Have you seen or consulted any other health care providers outside of the [address] [place] [address] since your last visit? Include any pap smears or colon screening.  No

## 2022-08-31 NOTE — PROGRESS NOTES
[facility] Progress Note:   F/up Physician Visit    Cam Tran is a 74 y.o. female who was seen by synchronous (real-time) audio-video technology on 8/31/2022. Consent:  She and/or her healthcare decision maker is aware that this patient-initiated Telehealth encounter is a billable service, with coverage as determined by her insurance carrier. She is aware that she may receive a bill and has provided verbal consent to proceed: Yes    I was at home while conducting this encounter. 126/74  p: [phone]  Subjective:   Cam Tran was seen for Weight Management ([email or web address] )    f/up physician visit for the LCD Program.  212 8/26/22  210.6 today      Prior to Admission medications    Medication Sig Start Date End Date Taking? Authorizing Provider   metoprolol succinate (TOPROL-XL) 50 mg XL tablet TAKE 1 TABLET EVERY DAY 8/11/22  Yes Wendy Armendariz MD   semaglutide (Ozempic) 2 mg/dose (8 mg/3 mL) pnij 2 mg by SubCUTAneous route every seven (7) days. 7/27/22  Yes Mikayla Marin MD   cyanocobalamin (VITAMIN B12) 1,000 mcg/mL injection 1 mL by IntraMUSCular route every thirty (30) days. 7/14/22  Yes Jerald Epps, DO   budesonide-formoteroL (SYMBICORT) 80-4.5 mcg/actuation HFAA Take 2 Puffs by inhalation two (2) times a day. Yes Provider, Historical   prednisoln np-pzcfmgow-lpqszfo 1-0.5-0.075 % drop 1 DROP INTO SURGERY EYE 5/24/22  Yes Provider, Historical   Insulin Needles, Disposable, (Taya Pen Needle) 32 gauge x 5/32\" ndle Use daily as directed 4/27/22  Yes Jerald Epps DO   melatonin 10 mg capsule Take  by mouth as needed. Takes one to two caps   Yes Provider, Historical   HYDROcodone-acetaminophen (NORCO) 5-325 mg per tablet TAKE 1 TABLET BY MOUTH TWICE DAILY AS NEEDED FOR PAIN 1/10/22  Yes Provider, Historical   cyclobenzaprine (FLEXERIL) 10 mg tablet Take  by mouth three (3) times daily as needed for Muscle Spasm(s).    Yes Provider, Historical DILT- mg capsule TAKE 1 CAPSULE EVERY DAY  (KEEP  APPOINTMENT  FOR  FURTHER  REFILLS) 11/4/21  Yes Will THEODORE NP   diazePAM (VALIUM) 10 mg tablet TAKE 1 TABLET BY MOUTH EVERY DAY AS NEEDED 5/3/21  Yes Provider, Historical   hydroCHLOROthiazide (HYDRODIURIL) 12.5 mg tablet TAKE 1 TABLET BY MOUTH EVERY DAY IN THE MORNING 5/6/21  Yes Provider, Historical   omeprazole (PRILOSEC) 20 mg capsule Take  by mouth. PRN 11/10/20  Yes Provider, Historical   pantoprazole (PROTONIX) 40 mg tablet TAKE 1 TABLET BY MOUTH EVERY DAY BEFORE A MEAL 2/23/21  Yes Provider, Historical   traZODone (DESYREL) 50 mg tablet Take 100 mg by mouth as needed. 9/24/20  Yes Provider, Historical   rosuvastatin (CRESTOR) 40 mg tablet Take 1 Tab by mouth nightly. 8/20/20  Yes Caesar Jones NP   pregabalin (LYRICA) 150 mg capsule Take 150 mg by mouth two (2) times a day. Yes Provider, Historical   DULoxetine (CYMBALTA) 60 mg capsule Take 60 mg by mouth daily. Yes Provider, Historical   ezetimibe (ZETIA) 10 mg tablet Take  by mouth. Yes Provider, Historical   metFORMIN (GLUMETZA ER) 500 mg TG24 24 hour tablet Take 500 mg by mouth two (2) times a day. Yes Provider, Historical   cpap machine kit by Does Not Apply route as needed. Yes Provider, Historical   Aspirin, Buffered 81 mg tab Take 1 Tablet by mouth daily.    Yes Provider, Historical   acyclovir (ZOVIRAX) 400 mg tablet TAKE 1 TABLET BY MOUTH TWICE DAILY FOR 10 DAYS AS NEEDED  Patient not taking: Reported on 8/31/2022 4/4/22   Provider, Historical     Allergies   Allergen Reactions    Mobic [Meloxicam] Anxiety     Jittery       Patient Active Problem List    Diagnosis Date Noted    Vitamin B12 deficiency 04/27/2022    Vitamin D deficiency 04/27/2022    Elevated ALT measurement 04/27/2022    Anxiety 04/27/2022    BMI 36.0-36.9,adult 03/08/2022    Chronic insomnia 03/08/2022    History of laparoscopic cholecystectomy 03/08/2022    History of tobacco abuse 03/08/2022 Long-term current use of proton pump inhibitor therapy 03/08/2022    BILL on CPAP 03/08/2022    Abnormal weight gain 03/08/2022    Chronic fatigue 03/08/2022    Class 2 severe obesity due to excess calories with serious comorbidity and body mass index (BMI) of 36.0 to 36.9 in adult [facility]) 03/08/2022    Hyperglycemia     Foot swelling     Fibromyalgia     Lumbar degenerative disc disease     Cataract of both eyes     Dry eye syndrome of both eyes     Gastritis     Depression     Personal history of kidney stones     Arteriosclerotic heart disease (ASHD)     Cervical radiculitis     CAD (coronary artery disease)     Internal carotid artery stenosis, bilateral 2022    Varicose veins of both legs with edema 01/12/2021    Venous insufficiency 01/12/2021    Shoulder pain, left 2021    Agatston CAC score, <100 03/25/2019    Diabetes mellitus ([facility] [address] 03/21/2019    Erosive esophagitis 07/14/2016    Colon polyps 07/14/2016    Diverticulosis 07/14/2016    ASHD (arteriosclerotic heart disease) 12/29/2015    Chest pain 10/23/2015    Hx of laparoscopic adjustable gastric banding 09/14/2015    Palpitations 04/10/2015    Mixed hyperlipidemia 04/10/2015    Coronary artery disease 04/10/2015    Pulmonary nodule, right 2015    BIRD (dyspnea on exertion) 07/29/2014    Mitral valve disorder 07/29/2014     Current Outpatient Medications   Medication Sig Dispense Refill    metoprolol succinate (TOPROL-XL) 50 mg XL tablet TAKE 1 TABLET EVERY DAY 90 Tablet 2    semaglutide (Ozempic) 2 mg/dose (8 mg/3 mL) pnij 2 mg by SubCUTAneous route every seven (7) days. 1 Box 2    cyanocobalamin (VITAMIN B12) 1,000 mcg/mL injection 1 mL by IntraMUSCular route every thirty (30) days. 3 mL 0    budesonide-formoteroL (SYMBICORT) 80-4.5 mcg/actuation HFAA Take 2 Puffs by inhalation two (2) times a day.       prednisoln ej-oxlrkeax-txmqgax 1-0.5-0.075 % drop 1 DROP INTO SURGERY EYE      Insulin Needles, Disposable, (Taya Pen Needle) 32 gauge x 5/32\" ndle Use daily as directed 100 Pen Needle 1    melatonin 10 mg capsule Take  by mouth as needed. Takes one to two caps      HYDROcodone-acetaminophen (NORCO) 5-325 mg per tablet TAKE 1 TABLET BY MOUTH TWICE DAILY AS NEEDED FOR PAIN      cyclobenzaprine (FLEXERIL) 10 mg tablet Take  by mouth three (3) times daily as needed for Muscle Spasm(s). DILT- mg capsule TAKE 1 CAPSULE EVERY DAY  (KEEP  APPOINTMENT  FOR  FURTHER  REFILLS) 90 Capsule 3    diazePAM (VALIUM) 10 mg tablet TAKE 1 TABLET BY MOUTH EVERY DAY AS NEEDED      hydroCHLOROthiazide (HYDRODIURIL) 12.5 mg tablet TAKE 1 TABLET BY MOUTH EVERY DAY IN THE MORNING      omeprazole (PRILOSEC) 20 mg capsule Take  by mouth. PRN      pantoprazole (PROTONIX) 40 mg tablet TAKE 1 TABLET BY MOUTH EVERY DAY BEFORE A MEAL      traZODone (DESYREL) 50 mg tablet Take 100 mg by mouth as needed. rosuvastatin (CRESTOR) 40 mg tablet Take 1 Tab by mouth nightly. 90 Tab 3    pregabalin (LYRICA) 150 mg capsule Take 150 mg by mouth two (2) times a day. DULoxetine (CYMBALTA) 60 mg capsule Take 60 mg by mouth daily. ezetimibe (ZETIA) 10 mg tablet Take  by mouth.      metFORMIN (GLUMETZA ER) 500 mg TG24 24 hour tablet Take 500 mg by mouth two (2) times a day. cpap machine kit by Does Not Apply route as needed. Aspirin, Buffered 81 mg tab Take 1 Tablet by mouth daily.       acyclovir (ZOVIRAX) 400 mg tablet TAKE 1 TABLET BY MOUTH TWICE DAILY FOR 10 DAYS AS NEEDED (Patient not taking: Reported on 8/31/2022)         ROS      CC: Weight Management      Andrae Birmingham is a 74 y.o. female who is here for her      Weight Metrics 8/26/2022 8/12/2022 7/27/2022 7/13/2022 6/30/2022 6/30/2022 6/22/2022   Weight 212 lb 8 oz 217 lb 14.4 oz 226 lb 9.6 oz 219 lb 4.8 oz - 224 lb 1.6 oz 225 lb 6.4 oz   Neck Circ (inches) - - 14.5 - 14.5 - -   Waist Measure Inches - - 45 - 45 - -   Body Fat % - - 44.7 - 44.4 - -   BMI 33.28 kg/m2 34.13 kg/m2 35.49 kg/m2 34.35 kg/m2 - 35.1 kg/m2 35.3 kg/m2 Outpatient Medications Marked as Taking for the 8/31/22 encounter (Virtual Visit) with Bret Turner MD   Medication Sig Dispense Refill    metoprolol succinate (TOPROL-XL) 50 mg XL tablet TAKE 1 TABLET EVERY DAY 90 Tablet 2    semaglutide (Ozempic) 2 mg/dose (8 mg/3 mL) pnij 2 mg by SubCUTAneous route every seven (7) days. 1 Box 2    cyanocobalamin (VITAMIN B12) 1,000 mcg/mL injection 1 mL by IntraMUSCular route every thirty (30) days. 3 mL 0    budesonide-formoteroL (SYMBICORT) 80-4.5 mcg/actuation HFAA Take 2 Puffs by inhalation two (2) times a day. prednisoln xo-zwlfmegj-xrxcgio 1-0.5-0.075 % drop 1 DROP INTO SURGERY EYE      Insulin Needles, Disposable, (Taya Pen Needle) 32 gauge x 5/32\" ndle Use daily as directed 100 Pen Needle 1    melatonin 10 mg capsule Take  by mouth as needed. Takes one to two caps      HYDROcodone-acetaminophen (NORCO) 5-325 mg per tablet TAKE 1 TABLET BY MOUTH TWICE DAILY AS NEEDED FOR PAIN      cyclobenzaprine (FLEXERIL) 10 mg tablet Take  by mouth three (3) times daily as needed for Muscle Spasm(s). DILT- mg capsule TAKE 1 CAPSULE EVERY DAY  (KEEP  APPOINTMENT  FOR  FURTHER  REFILLS) 90 Capsule 3    diazePAM (VALIUM) 10 mg tablet TAKE 1 TABLET BY MOUTH EVERY DAY AS NEEDED      hydroCHLOROthiazide (HYDRODIURIL) 12.5 mg tablet TAKE 1 TABLET BY MOUTH EVERY DAY IN THE MORNING      omeprazole (PRILOSEC) 20 mg capsule Take  by mouth. PRN      pantoprazole (PROTONIX) 40 mg tablet TAKE 1 TABLET BY MOUTH EVERY DAY BEFORE A MEAL      traZODone (DESYREL) 50 mg tablet Take 100 mg by mouth as needed. rosuvastatin (CRESTOR) 40 mg tablet Take 1 Tab by mouth nightly. 90 Tab 3    pregabalin (LYRICA) 150 mg capsule Take 150 mg by mouth two (2) times a day. DULoxetine (CYMBALTA) 60 mg capsule Take 60 mg by mouth daily. ezetimibe (ZETIA) 10 mg tablet Take  by mouth.      metFORMIN (GLUMETZA ER) 500 mg TG24 24 hour tablet Take 500 mg by mouth two (2) times a day. cpap machine kit by Does Not Apply route as needed. Aspirin, Buffered 81 mg tab Take 1 Tablet by mouth daily. Participation   Did you attend clinic and class last week? no    Review of Systems  Since your last visit, have you experienced any complications? no  If yes, please list:       Are you taking an appetite suppressant? yes  If so, is there any Chest Pain, Palpitations or Dizziness? HUNGER CONTROL: good      BP Readings from Last 3 Encounters:   08/26/22 112/78   08/12/22 101/68   07/27/22 132/77       SLEEP:  7    Have you received any other medical care this week? no  If yes, where and for what? Have you discontinued or changed any medicine or dose of your medicine since your last visit with Dr Andria Ochoa? no  If yes, where and for what? Diet  How many ounces of calorie-free liquids did you consume each day?  ? oz  She sips constantly all day. . always has a water bottle  How many meal replacements did you take each day? 1 and one meal    Did you have any problems adhering to the program?  no If yes, please explain:      Exercise  Aerobic exercise:  every 4-5 days 60 min  Resistance exercise: 1 workouts / week   Any discomfort? If yes, where? Objective  There were no vitals taken for this visit. No LMP recorded. (Menstrual status: Menopause). PHYSICAL EXAMINATION:  [ INSTRUCTIONS:  \"[x]\" Indicates a positive item  \"[]\" Indicates a negative item  -- DELETE ALL ITEMS NOT EXAMINED]  Vital Signs: (As obtained by patient/caregiver at home)  There were no vitals taken for this visit.      Constitutional: [x] Appears well-developed and well-nourished [x] No apparent distress      [] Abnormal -     Mental status: [x] Alert and awake  [x] Oriented to person/place/time [x] Able to follow commands    [] Abnormal -     Eyes:   EOM    [x]  Normal    [] Abnormal -   Sclera  [x]  Normal    [] Abnormal -          Discharge [x]  None visible   [] Abnormal - HENT: [x] Normocephalic, atraumatic  [] Abnormal -   [x] Mouth/Throat: Mucous membranes are moist    External Ears [x] Normal  [] Abnormal -    Neck: [x] No visualized mass [] Abnormal -     Pulmonary/Chest: [x] Respiratory effort normal   [x] No visualized signs of difficulty breathing or respiratory distress        [] Abnormal -      Musculoskeletal:   [x] Normal gait with no signs of ataxia         [x] Normal range of motion of neck        [] Abnormal -     Neurological:        [x] No Facial Asymmetry (Cranial nerve 7 motor function) (limited exam due to video visit)          [x] No gaze palsy        [] Abnormal -          Skin:        [x] No significant exanthematous lesions or discoloration noted on facial skin         [] Abnormal -            Psychiatric:       [x] Normal Affect [] Abnormal -        [x] No Hallucinations    Other pertinent observable physical exam findings:-      Assessment / Plan    Encounter Diagnoses   Name Primary? Class 2 severe obesity due to excess calories with serious comorbidity and body mass index (BMI) of 35.0 to 35.9 in [facility]) Yes    Type 2 diabetes mellitus without complication, without long-term current use of insulin ([facility] [address]     Mixed hyperlipidemia      Diagnoses and all orders for this visit:    1. Class 2 severe obesity due to excess calories with serious comorbidity and body mass index (BMI) of 35.0 to 35.9 in [facility])  Check with dietitian to see if she is getting enough protein  On semaglutide at 2 mg for appetite and diabetes  Down 5 lbs this month  Aiming to get below bmi 30  2. Type 2 diabetes mellitus without complication, without long-term current use of insulin ([facility])  Cnt the semaglutide  3. Mixed hyperlipidemia  Cont crestor and  zetia  1. Weight management improved   Progress was reviewed with patient    2.   Labs    Latest results reviewed with patient          face to face time with Tanisha consisted of counseling & coordinating and/or discussing treatment plans in reference to her obesity The primary encounter diagnosis was Class 2 severe obesity due to excess calories with serious comorbidity and body mass index (BMI) of 35.0 to 35.9 in adult [facility]). Diagnoses of Type 2 diabetes mellitus without complication, without long-term current use of insulin (HCC) and Mixed hyperlipidemia were also pertinent to this visit. Coding Help - Use CPT Codes 34690-00741, 58439-28738 for Established and New Patients respectively, either employing EM elements or Time rules. Other codes (example consult codes) may also apply. We discussed the expected course, resolution and complications of the diagnosis(es) in detail. Medication risks, benefits, costs, interactions, and alternatives were discussed as indicated. I advised her to contact the office if her condition worsens, changes or fails to improve as anticipated. She expressed understanding with the diagnosis(es) and plan. Pursuant to the emergency declaration under the [facility] waiver authority and the [employer] Act, this Virtual  Visit was conducted, with patient's consent, to reduce the patient's risk of exposure to COVID-19 and provide continuity of care for an established patient. Services were provided through a video synchronous discussion virtually to substitute for in-person clinic visit.     Cal Guerra MD

## 2022-09-01 ENCOUNTER — OFFICE VISIT (OUTPATIENT)
Dept: SURGERY | Age: 72
End: 2022-09-01

## 2022-09-01 DIAGNOSIS — E66.01 CLASS 2 SEVERE OBESITY DUE TO EXCESS CALORIES WITH SERIOUS COMORBIDITY AND BODY MASS INDEX (BMI) OF 35.0 TO 35.9 IN ADULT (HCC): Primary | ICD-10-CM

## 2022-09-01 NOTE — PROGRESS NOTES
Nurse note from patient's weekly / LCD / Maintenance class was reviewed. Pertinent medical concerns were:   reviewed     BP Readings from Last 3 Encounters:   08/26/22 112/78   08/12/22 101/68   07/27/22 132/77       Weight Metrics 8/26/2022 8/12/2022 7/27/2022 7/13/2022 6/30/2022 6/30/2022 6/22/2022   Weight 212 lb 8 oz 217 lb 14.4 oz 226 lb 9.6 oz 219 lb 4.8 oz - 224 lb 1.6 oz 225 lb 6.4 oz   Neck Circ (inches) - - 14.5 - 14.5 - -   Waist Measure Inches - - 45 - 45 - -   Body Fat % - - 44.7 - 44.4 - -   BMI 33.28 kg/m2 34.13 kg/m2 35.49 kg/m2 34.35 kg/m2 - 35.1 kg/m2 35.3 kg/m2       Current Outpatient Medications   Medication Sig Dispense Refill    metoprolol succinate (TOPROL-XL) 50 mg XL tablet TAKE 1 TABLET EVERY DAY 90 Tablet 2    semaglutide (Ozempic) 2 mg/dose (8 mg/3 mL) pnij 2 mg by SubCUTAneous route every seven (7) days. 1 Box 2    cyanocobalamin (VITAMIN B12) 1,000 mcg/mL injection 1 mL by IntraMUSCular route every thirty (30) days. 3 mL 0    budesonide-formoteroL (SYMBICORT) 80-4.5 mcg/actuation HFAA Take 2 Puffs by inhalation two (2) times a day. prednisoln at-vmqtylnx-xauvzjv 1-0.5-0.075 % drop 1 DROP INTO SURGERY EYE      Insulin Needles, Disposable, (Taya Pen Needle) 32 gauge x 5/32\" ndle Use daily as directed 100 Pen Needle 1    acyclovir (ZOVIRAX) 400 mg tablet TAKE 1 TABLET BY MOUTH TWICE DAILY FOR 10 DAYS AS NEEDED (Patient not taking: Reported on 8/31/2022)      melatonin 10 mg capsule Take  by mouth as needed. Takes one to two caps      HYDROcodone-acetaminophen (NORCO) 5-325 mg per tablet TAKE 1 TABLET BY MOUTH TWICE DAILY AS NEEDED FOR PAIN      cyclobenzaprine (FLEXERIL) 10 mg tablet Take  by mouth three (3) times daily as needed for Muscle Spasm(s).       DILT- mg capsule TAKE 1 CAPSULE EVERY DAY  (KEEP  APPOINTMENT  FOR  FURTHER  REFILLS) 90 Capsule 3    diazePAM (VALIUM) 10 mg tablet TAKE 1 TABLET BY MOUTH EVERY DAY AS NEEDED      hydroCHLOROthiazide (HYDRODIURIL) 12.5 mg tablet TAKE 1 TABLET BY MOUTH EVERY DAY IN THE MORNING      omeprazole (PRILOSEC) 20 mg capsule Take  by mouth. PRN      pantoprazole (PROTONIX) 40 mg tablet TAKE 1 TABLET BY MOUTH EVERY DAY BEFORE A MEAL      traZODone (DESYREL) 50 mg tablet Take 100 mg by mouth as needed. rosuvastatin (CRESTOR) 40 mg tablet Take 1 Tab by mouth nightly. 90 Tab 3    pregabalin (LYRICA) 150 mg capsule Take 150 mg by mouth two (2) times a day. DULoxetine (CYMBALTA) 60 mg capsule Take 60 mg by mouth daily. ezetimibe (ZETIA) 10 mg tablet Take  by mouth.      metFORMIN (GLUMETZA ER) 500 mg TG24 24 hour tablet Take 500 mg by mouth two (2) times a day. cpap machine kit by Does Not Apply route as needed. Aspirin, Buffered 81 mg tab Take 1 Tablet by mouth daily.

## 2022-09-02 NOTE — PROGRESS NOTES
[facility]  Metabolic Weight Loss Program        Patient's Name: Cam Tran  : 1950    This patient is enrolled in [address] [place] [address] Weight Loss Program and attended the required weekly virtual nutrition class hosted via [email or web address].       Trisha Vila, MS, RD, LDN

## 2022-09-02 NOTE — PROGRESS NOTES
Nurse note from patient's weekly  / LCD / Maintenance class was reviewed. Pertinent medical concerns were:   reviewed     BP Readings from Last 3 Encounters:   08/26/22 112/78   08/12/22 101/68   07/27/22 132/77       Weight Metrics 8/26/2022 8/12/2022 7/27/2022 7/13/2022 6/30/2022 6/30/2022 6/22/2022   Weight 212 lb 8 oz 217 lb 14.4 oz 226 lb 9.6 oz 219 lb 4.8 oz - 224 lb 1.6 oz 225 lb 6.4 oz   Neck Circ (inches) - - 14.5 - 14.5 - -   Waist Measure Inches - - 45 - 45 - -   Body Fat % - - 44.7 - 44.4 - -   BMI 33.28 kg/m2 34.13 kg/m2 35.49 kg/m2 34.35 kg/m2 - 35.1 kg/m2 35.3 kg/m2       Current Outpatient Medications   Medication Sig Dispense Refill    metoprolol succinate (TOPROL-XL) 50 mg XL tablet TAKE 1 TABLET EVERY DAY 90 Tablet 2    semaglutide (Ozempic) 2 mg/dose (8 mg/3 mL) pnij 2 mg by SubCUTAneous route every seven (7) days. 1 Box 2    cyanocobalamin (VITAMIN B12) 1,000 mcg/mL injection 1 mL by IntraMUSCular route every thirty (30) days. 3 mL 0    budesonide-formoteroL (SYMBICORT) 80-4.5 mcg/actuation HFAA Take 2 Puffs by inhalation two (2) times a day. prednisoln lj-uaivqvts-hcapcjc 1-0.5-0.075 % drop 1 DROP INTO SURGERY EYE      Insulin Needles, Disposable, (Taya Pen Needle) 32 gauge x 5/32\" ndle Use daily as directed 100 Pen Needle 1    acyclovir (ZOVIRAX) 400 mg tablet TAKE 1 TABLET BY MOUTH TWICE DAILY FOR 10 DAYS AS NEEDED (Patient not taking: Reported on 8/31/2022)      melatonin 10 mg capsule Take  by mouth as needed. Takes one to two caps      HYDROcodone-acetaminophen (NORCO) 5-325 mg per tablet TAKE 1 TABLET BY MOUTH TWICE DAILY AS NEEDED FOR PAIN      cyclobenzaprine (FLEXERIL) 10 mg tablet Take  by mouth three (3) times daily as needed for Muscle Spasm(s).       DILT- mg capsule TAKE 1 CAPSULE EVERY DAY  (KEEP  APPOINTMENT  FOR  FURTHER  REFILLS) 90 Capsule 3    diazePAM (VALIUM) 10 mg tablet TAKE 1 TABLET BY MOUTH EVERY DAY AS NEEDED      hydroCHLOROthiazide (HYDRODIURIL) 12.5 mg tablet TAKE 1 TABLET BY MOUTH EVERY DAY IN THE MORNING      omeprazole (PRILOSEC) 20 mg capsule Take  by mouth. PRN      pantoprazole (PROTONIX) 40 mg tablet TAKE 1 TABLET BY MOUTH EVERY DAY BEFORE A MEAL      traZODone (DESYREL) 50 mg tablet Take 100 mg by mouth as needed. rosuvastatin (CRESTOR) 40 mg tablet Take 1 Tab by mouth nightly. 90 Tab 3    pregabalin (LYRICA) 150 mg capsule Take 150 mg by mouth two (2) times a day. DULoxetine (CYMBALTA) 60 mg capsule Take 60 mg by mouth daily. ezetimibe (ZETIA) 10 mg tablet Take  by mouth.      metFORMIN (GLUMETZA ER) 500 mg TG24 24 hour tablet Take 500 mg by mouth two (2) times a day. cpap machine kit by Does Not Apply route as needed. Aspirin, Buffered 81 mg tab Take 1 Tablet by mouth daily.

## 2022-09-09 ENCOUNTER — CLINICAL SUPPORT (OUTPATIENT)
Dept: SURGERY | Age: 72
End: 2022-09-09

## 2022-09-09 VITALS
SYSTOLIC BLOOD PRESSURE: 120 MMHG | TEMPERATURE: 98.2 F | RESPIRATION RATE: 18 BRPM | OXYGEN SATURATION: 95 % | BODY MASS INDEX: 33.56 KG/M2 | WEIGHT: 213.8 LBS | DIASTOLIC BLOOD PRESSURE: 74 MMHG | HEIGHT: 67 IN | HEART RATE: 80 BPM

## 2022-09-09 DIAGNOSIS — E78.2 MIXED HYPERLIPIDEMIA: ICD-10-CM

## 2022-09-09 DIAGNOSIS — E11.9 TYPE 2 DIABETES MELLITUS WITHOUT COMPLICATION, WITHOUT LONG-TERM CURRENT USE OF INSULIN (HCC): ICD-10-CM

## 2022-09-09 DIAGNOSIS — E66.9 CLASS 1 OBESITY: Primary | ICD-10-CM

## 2022-09-09 RX ORDER — BUDESONIDE AND FORMOTEROL FUMARATE DIHYDRATE 160; 4.5 UG/1; UG/1
2 AEROSOL RESPIRATORY (INHALATION) 2 TIMES DAILY
COMMUNITY
Start: 2022-08-22

## 2022-09-09 NOTE — PROGRESS NOTES
Weight Management. 1. Have you been to the ER, urgent care clinic since your last visit? Hospitalized since your last visit? No    2. Have you seen or consulted any other health care providers outside of the [address] [place] [address] since your last visit? Include any pap smears or colon screening.  No

## 2022-09-09 NOTE — PROGRESS NOTES
8/27/2022    Progress Note: Weekly Education Class in the [facility] Weight Loss Program         Patient is on Very Low Calorie Diet [] (4 meal replacements per day, 800 kcal/day)  Modified    Low Calorie Diet [x] (2-3 meal replacements per day, 6850-7179 kcal/day)    1) Did patient have any new symptoms or physical problems? Yes []    No [x]    If yes, check & comment: weakness [], fatigue [], lightheadedness [], headache [], cramps [], cold intolerance [], hair loss [], diarrhea [], constipation [],  NA [] other:                                 2) Has patient had any medical attention from other providers, urgent care or the emergency room this week? Yes [x]  No []       NA [], If yes, why: [facility]  Hip problems                                      3) Any other sugar sweetened beverages consumed this week? Yes []  No [x]    4) Did patient have any problems adhering to the diet? Yes [x]  No [] NA []    If yes, Vacation [], Celebrations [], Conferences [], Family Reunions [x] other:                                                5) How many hours of sleep this week? 7-10    (range)  NA []    Number of meal replacements consumed daily? 1  (range)  NA []    Average ounces of water patient consumed daily this week (not including shakes)? 62     (divide the weekly total by 7)    Did you eat any food outside of the program? Yes [x] No []    Physical Activity Over the Past Week:    Cardio exercise: 0 day min  Strength exercise: 0 workouts / week  Number of steps walked per day: 0    How has patient mood overall been this week? Sad [], Happy [], Stressed [], Tired [], Content [], NA [], other Several different moods             Medications reconciled by nurse Yes [x]  No[]    Patient was given therapeutic recommendations for any noted side effects of their dietary approach based upon [facility] patient manual per providers recommendation.      9/3/2022    Progress Note: Weekly Education Class in the [facility]         Patient is on Very Low Calorie Diet [] (4 meal replacements per day, 800 kcal/day)  Modified    Low Calorie Diet [x] (2-3 meal replacements per day, 1865-0691 kcal/day)    1) Did patient have any new symptoms or physical problems? Yes []    No []NOT ANSWERED      If yes, check & comment: weakness [], fatigue [], lightheadedness [], headache [], cramps [], cold intolerance [], hair loss [], diarrhea [], constipation [],  NA [] other:                                 2) Has patient had any medical attention from other providers, urgent care or the emergency room this week? Yes []  No [x]       NA [], If yes, why:                                       3) Any other sugar sweetened beverages consumed this week? Yes [x]  No []    4) Did patient have any problems adhering to the diet? Yes [x]  No [] NA []    If yes, Vacation [], Celebrations [], Conferences [], Family Reunions [] other: 's fall                                               5) How many hours of sleep this week? 7-8    (range)  NA []    Number of meal replacements consumed daily? 1  (range)  NA []    Average ounces of water patient consumed daily this week (not including shakes)? 53     (divide the weekly total by 7)    Did you eat any food outside of the program? Yes [x] No []    Physical Activity Over the Past Week:    Cardio exercise: 0 min  Strength exercise: 0 workouts / week  Number of steps walked per day: 0    How has patient mood overall been this week? Sad [], Happy [], Stressed [], Tired [], Content [], NA [], other Several different moods             Medications reconciled by nurse Yes [x]  No[]    Patient was given therapeutic recommendations for any noted side effects of their dietary approach based upon [facility] patient manual per providers recommendation.

## 2022-09-12 ENCOUNTER — HOSPITAL ENCOUNTER (OUTPATIENT)
Dept: NON INVASIVE DIAGNOSTICS | Age: 72
Discharge: HOME | End: 2022-09-12
Attending: INTERNAL MEDICINE
Payer: MEDICARE

## 2022-09-12 ENCOUNTER — HOSPITAL ENCOUNTER (OUTPATIENT)
Dept: CT IMAGING | Age: 72
Discharge: HOME | End: 2022-09-12
Attending: INTERNAL MEDICINE
Payer: MEDICARE

## 2022-09-12 VITALS
SYSTOLIC BLOOD PRESSURE: 120 MMHG | BODY MASS INDEX: 33.56 KG/M2 | HEIGHT: 67 IN | DIASTOLIC BLOOD PRESSURE: 74 MMHG | WEIGHT: 213.85 LBS

## 2022-09-12 DIAGNOSIS — R79.89 D-DIMER, ELEVATED: ICD-10-CM

## 2022-09-12 DIAGNOSIS — R06.02 SOB (SHORTNESS OF BREATH): ICD-10-CM

## 2022-09-12 LAB
ECHO AV AREA PEAK VELOCITY: 2.5 CM2
ECHO AV AREA VTI: 2.7 CM2
ECHO AV AREA/BSA PEAK VELOCITY: 1.2 CM2/M2
ECHO AV AREA/BSA VTI: 1.3 CM2/M2
ECHO AV MEAN GRADIENT: 6 MMHG
ECHO AV MEAN VELOCITY: 1.1 M/S
ECHO AV PEAK GRADIENT: 10 MMHG
ECHO AV PEAK VELOCITY: 1.6 M/S
ECHO AV VELOCITY RATIO: 0.81
ECHO AV VTI: 29.7 CM
ECHO EST RA PRESSURE: 10 MMHG
ECHO LA DIAMETER INDEX: 1.68 CM/M2
ECHO LA DIAMETER: 3.5 CM
ECHO LA VOL 4C: 54 ML (ref 22–52)
ECHO LA VOLUME INDEX A4C: 26 ML/M2 (ref 16–34)
ECHO LV E' LATERAL VELOCITY: 6 CM/S
ECHO LV E' SEPTAL VELOCITY: 6 CM/S
ECHO LV EDV A4C: 49 ML
ECHO LV EDV INDEX A4C: 24 ML/M2
ECHO LV EJECTION FRACTION A4C: 54 %
ECHO LV ESV A4C: 23 ML
ECHO LV ESV INDEX A4C: 11 ML/M2
ECHO LV FRACTIONAL SHORTENING: 38 % (ref 28–44)
ECHO LV INTERNAL DIMENSION DIASTOLE INDEX: 2.26 CM/M2
ECHO LV INTERNAL DIMENSION DIASTOLIC: 4.7 CM (ref 3.9–5.3)
ECHO LV INTERNAL DIMENSION SYSTOLIC INDEX: 1.39 CM/M2
ECHO LV INTERNAL DIMENSION SYSTOLIC: 2.9 CM
ECHO LV IVSD: 0.9 CM (ref 0.6–0.9)
ECHO LV MASS 2D: 153.4 G (ref 67–162)
ECHO LV MASS INDEX 2D: 73.8 G/M2 (ref 43–95)
ECHO LV POSTERIOR WALL DIASTOLIC: 1 CM (ref 0.6–0.9)
ECHO LV RELATIVE WALL THICKNESS RATIO: 0.43
ECHO LVOT AREA: 3.1 CM2
ECHO LVOT AV VTI INDEX: 0.86
ECHO LVOT DIAM: 2 CM
ECHO LVOT MEAN GRADIENT: 3 MMHG
ECHO LVOT PEAK GRADIENT: 6 MMHG
ECHO LVOT PEAK VELOCITY: 1.3 M/S
ECHO LVOT STROKE VOLUME INDEX: 38.6 ML/M2
ECHO LVOT SV: 80.4 ML
ECHO LVOT VTI: 25.6 CM
ECHO MV A VELOCITY: 1.34 M/S
ECHO MV AREA PHT: 3.3 CM2
ECHO MV AREA VTI: 2.3 CM2
ECHO MV E DECELERATION TIME (DT): 326.6 MS
ECHO MV E VELOCITY: 0.9 M/S
ECHO MV E/A RATIO: 0.67
ECHO MV E/E' LATERAL: 15
ECHO MV E/E' RATIO (AVERAGED): 15
ECHO MV E/E' SEPTAL: 15
ECHO MV LVOT VTI INDEX: 1.34
ECHO MV MAX VELOCITY: 1.7 M/S
ECHO MV MEAN GRADIENT: 3 MMHG
ECHO MV MEAN VELOCITY: 0.8 M/S
ECHO MV PEAK GRADIENT: 11 MMHG
ECHO MV PRESSURE HALF TIME (PHT): 66 MS
ECHO MV VTI: 34.3 CM
ECHO PV MAX VELOCITY: 1.2 M/S
ECHO PV PEAK GRADIENT: 5 MMHG
ECHO RIGHT VENTRICULAR SYSTOLIC PRESSURE (RVSP): 37 MMHG
ECHO RV FREE WALL PEAK S': 16 CM/S
ECHO RV TAPSE: 1.6 CM (ref 1.7–?)
ECHO TV REGURGITANT MAX VELOCITY: 2.62 M/S
ECHO TV REGURGITANT PEAK GRADIENT: 28 MMHG

## 2022-09-12 PROCEDURE — 74011000636 HC RX REV CODE- 636: Performed by: INTERNAL MEDICINE

## 2022-09-12 PROCEDURE — 93306 TTE W/DOPPLER COMPLETE: CPT | Performed by: INTERNAL MEDICINE

## 2022-09-12 PROCEDURE — 93306 TTE W/DOPPLER COMPLETE: CPT

## 2022-09-12 PROCEDURE — 71275 CT ANGIOGRAPHY CHEST: CPT

## 2022-09-12 RX ADMIN — IOPAMIDOL 100 ML: 755 INJECTION, SOLUTION INTRAVENOUS at 14:28

## 2022-09-13 ENCOUNTER — OFFICE VISIT (OUTPATIENT)
Dept: SURGERY | Age: 72
End: 2022-09-13

## 2022-09-13 DIAGNOSIS — E66.01 CLASS 2 SEVERE OBESITY DUE TO EXCESS CALORIES WITH SERIOUS COMORBIDITY AND BODY MASS INDEX (BMI) OF 35.0 TO 35.9 IN ADULT (HCC): Primary | ICD-10-CM

## 2022-09-13 NOTE — PROGRESS NOTES
[facility]  Metabolic Weight Loss Program        Patient's Name: Lucy Ledezma  : 1950    This patient is enrolled in [address] [place] [address] Weight Loss Program and attended the required weekly virtual nutrition class hosted via [employer].       Michael Harper, MS, RD, LDN

## 2022-09-22 ENCOUNTER — OFFICE VISIT (OUTPATIENT)
Dept: SURGERY | Age: 72
End: 2022-09-22

## 2022-09-22 DIAGNOSIS — E66.01 CLASS 2 SEVERE OBESITY DUE TO EXCESS CALORIES WITH SERIOUS COMORBIDITY AND BODY MASS INDEX (BMI) OF 35.0 TO 35.9 IN ADULT (HCC): Primary | ICD-10-CM

## 2022-09-26 ENCOUNTER — CLINICAL SUPPORT (OUTPATIENT)
Dept: SURGERY | Age: 72
End: 2022-09-26

## 2022-09-26 VITALS
OXYGEN SATURATION: 94 % | DIASTOLIC BLOOD PRESSURE: 71 MMHG | HEIGHT: 67 IN | BODY MASS INDEX: 33.02 KG/M2 | TEMPERATURE: 98.5 F | RESPIRATION RATE: 18 BRPM | WEIGHT: 210.4 LBS | HEART RATE: 67 BPM | SYSTOLIC BLOOD PRESSURE: 109 MMHG

## 2022-09-26 DIAGNOSIS — E66.9 CLASS 1 OBESITY: Primary | ICD-10-CM

## 2022-09-26 DIAGNOSIS — E11.9 TYPE 2 DIABETES MELLITUS WITHOUT COMPLICATION, WITHOUT LONG-TERM CURRENT USE OF INSULIN (HCC): ICD-10-CM

## 2022-09-26 DIAGNOSIS — E78.2 MIXED HYPERLIPIDEMIA: ICD-10-CM

## 2022-09-26 RX ORDER — ACETAMINOPHEN 500 MG
1000 TABLET ORAL
COMMUNITY

## 2022-09-26 NOTE — PROGRESS NOTES
9/10/2022    Progress Note: Weekly Education Class in the [facility] Weight Loss Program         Patient is on Very Low Calorie Diet [] (4 meal replacements per day, 800 kcal/day)   Modified   Low Calorie Diet [] (2-3 meal replacements per day, 5839-2567 kcal/day)    1) Did patient have any new symptoms or physical problems? Yes []    No []NOT ANSWERED      If yes, check & comment: weakness [], fatigue [], lightheadedness [], headache [], cramps [], cold intolerance [], hair loss [], diarrhea [], constipation [],  NA [] other:                                 2) Has patient had any medical attention from other providers, urgent care or the emergency room this week? Yes []  No []NOT ANSWERED         NA [], If yes, why:                                       3) Any other sugar sweetened beverages consumed this week? Yes [x]  No []    4) Did patient have any problems adhering to the diet? Yes [x]  No [] NA []    If yes, Vacation [], Celebrations [], Conferences [], Family Reunions [] other: house remodel                                               5) How many hours of sleep this week? 7.5-9    (range)  NA []    Number of meal replacements consumed daily? 1  (range)  NA []    Average ounces of water patient consumed daily this week (not including shakes)? 68     (divide the weekly total by 7)    Did you eat any food outside of the program? Yes [x] No []    Physical Activity Over the Past Week:    Cardio exercise: 0 min  Strength exercise: 1 workouts / week  Number of steps walked per day: 0    How has patient mood overall been this week? Sad [], Happy [], Stressed [], Tired [], Content [], NA [], other Several different moods             Medications reconciled by nurse Yes [x]  No[]    Patient was given therapeutic recommendations for any noted side effects of their dietary approach based upon [facility] patient manual per providers recommendation.      9/17/2022    Progress Note: Weekly Education Class in the [facility]         Patient is on Very Low Calorie Diet [] (4 meal replacements per day, 800 kcal/day)   Modified   Low Calorie Diet [] (2-3 meal replacements per day, 5003-0030 kcal/day)    1) Did patient have any new symptoms or physical problems? Yes []    No []NOT ANSWERED      If yes, check & comment: weakness [], fatigue [], lightheadedness [], headache [], cramps [], cold intolerance [], hair loss [], diarrhea [], constipation [],  NA [] other:                                 2) Has patient had any medical attention from other providers, urgent care or the emergency room this week? Yes []  No [x]       NA [], If yes, why:                                       3) Any other sugar sweetened beverages consumed this week? Yes [x]  No []    4) Did patient have any problems adhering to the diet? Yes []  No [] NA []NOT ANSWERED      If yes, Vacation [], Celebrations [], Conferences [], Family Reunions [] other:                                                5) How many hours of sleep this week? 8-10    (range)  NA []    Number of meal replacements consumed daily? 1  (range)  NA []    Average ounces of water patient consumed daily this week (not including shakes)? 68    (divide the weekly total by 7)    Did you eat any food outside of the program? Yes [x] No []    Physical Activity Over the Past Week:    Cardio exercise: 0 min  Strength exercise: 1 workouts / week  Number of steps walked per day: 0    How has patient mood overall been this week? Sad [], Happy [], Stressed [], Tired [], Content [], NA [], other Several different moods             Medications reconciled by nurse Yes [x]  No[]    Patient was given therapeutic recommendations for any noted side effects of their dietary approach based upon [facility] patient manual per providers recommendation.

## 2022-09-26 NOTE — PROGRESS NOTES
Weight Management. 1. Have you been to the ER, urgent care clinic since your last visit? Hospitalized since your last visit? No    2. Have you seen or consulted any other health care providers outside of the [address] [place] [address] since your last visit? Include any pap smears or colon screening.  No

## 2022-10-03 NOTE — PROGRESS NOTES
Nurse note from patient's weekly LCD / Maintenance class was reviewed. Pertinent medical concerns were:   reviewed     BP Readings from Last 3 Encounters:   09/26/22 109/71   09/12/22 120/74   09/09/22 120/74       Weight Metrics 9/26/2022 9/12/2022 9/9/2022 8/26/2022 8/12/2022 7/27/2022 7/13/2022   Weight 210 lb 6.4 oz 213 lb 13.5 oz 213 lb 12.8 oz 212 lb 8 oz 217 lb 14.4 oz 226 lb 9.6 oz 219 lb 4.8 oz   Neck Circ (inches) - - - - - 14.5 -   Waist Measure Inches - - - - - 45 -   Body Fat % - - - - - 44.7 -   BMI 32.95 kg/m2 33.49 kg/m2 33.49 kg/m2 33.28 kg/m2 34.13 kg/m2 35.49 kg/m2 34.35 kg/m2       Current Outpatient Medications   Medication Sig Dispense Refill    acetaminophen (Tylenol Extra Strength) 500 mg tablet Take 1,000 mg by mouth every eight (8) hours as needed for Pain. Symbicort 160-4.5 mcg/actuation HFAA Take 2 Puffs by inhalation two (2) times a day. metoprolol succinate (TOPROL-XL) 50 mg XL tablet TAKE 1 TABLET EVERY DAY 90 Tablet 2    semaglutide (Ozempic) 2 mg/dose (8 mg/3 mL) pnij 2 mg by SubCUTAneous route every seven (7) days. 1 Box 2    cyanocobalamin (VITAMIN B12) 1,000 mcg/mL injection 1 mL by IntraMUSCular route every thirty (30) days. 3 mL 0    Insulin Needles, Disposable, (Taya Pen Needle) 32 gauge x 5/32\" ndle Use daily as directed 100 Pen Needle 1    melatonin 10 mg capsule Take  by mouth as needed. Takes one to two caps      HYDROcodone-acetaminophen (NORCO) 5-325 mg per tablet TAKE 1 TABLET BY MOUTH TWICE DAILY AS NEEDED FOR PAIN      cyclobenzaprine (FLEXERIL) 10 mg tablet Take  by mouth three (3) times daily as needed for Muscle Spasm(s).       DILT- mg capsule TAKE 1 CAPSULE EVERY DAY  (KEEP  APPOINTMENT  FOR  FURTHER  REFILLS) 90 Capsule 3    diazePAM (VALIUM) 10 mg tablet TAKE 1 TABLET BY MOUTH EVERY DAY AS NEEDED      hydroCHLOROthiazide (HYDRODIURIL) 12.5 mg tablet TAKE 1 TABLET BY MOUTH EVERY DAY IN THE MORNING      omeprazole (PRILOSEC) 20 mg capsule Take 20 mg by mouth as needed. pantoprazole (PROTONIX) 40 mg tablet TAKE 1 TABLET BY MOUTH EVERY DAY BEFORE A MEAL      traZODone (DESYREL) 50 mg tablet Take 100 mg by mouth as needed. rosuvastatin (CRESTOR) 40 mg tablet Take 1 Tab by mouth nightly. 90 Tab 3    pregabalin (LYRICA) 150 mg capsule Take 150 mg by mouth two (2) times a day. DULoxetine (CYMBALTA) 60 mg capsule Take 60 mg by mouth daily. ezetimibe (ZETIA) 10 mg tablet Take  by mouth.      metFORMIN (GLUMETZA ER) 500 mg TG24 24 hour tablet Take 500 mg by mouth two (2) times a day. cpap machine kit by Does Not Apply route as needed. Aspirin, Buffered 81 mg tab Take 1 Tablet by mouth daily.

## 2022-10-03 NOTE — PROGRESS NOTES
Nurse note from patient's weekly  LCD / Maintenance class was reviewed. Pertinent medical concerns were:   reviewed     BP Readings from Last 3 Encounters:   09/26/22 109/71   09/12/22 120/74   09/09/22 120/74       Weight Metrics 9/26/2022 9/12/2022 9/9/2022 8/26/2022 8/12/2022 7/27/2022 7/13/2022   Weight 210 lb 6.4 oz 213 lb 13.5 oz 213 lb 12.8 oz 212 lb 8 oz 217 lb 14.4 oz 226 lb 9.6 oz 219 lb 4.8 oz   Neck Circ (inches) - - - - - 14.5 -   Waist Measure Inches - - - - - 45 -   Body Fat % - - - - - 44.7 -   BMI 32.95 kg/m2 33.49 kg/m2 33.49 kg/m2 33.28 kg/m2 34.13 kg/m2 35.49 kg/m2 34.35 kg/m2       Current Outpatient Medications   Medication Sig Dispense Refill    metoprolol succinate (TOPROL-XL) 50 mg XL tablet TAKE 1 TABLET EVERY DAY 90 Tablet 2    semaglutide (Ozempic) 2 mg/dose (8 mg/3 mL) pnij 2 mg by SubCUTAneous route every seven (7) days. 1 Box 2    cyanocobalamin (VITAMIN B12) 1,000 mcg/mL injection 1 mL by IntraMUSCular route every thirty (30) days. 3 mL 0    Insulin Needles, Disposable, (Taya Pen Needle) 32 gauge x 5/32\" ndle Use daily as directed 100 Pen Needle 1    melatonin 10 mg capsule Take  by mouth as needed. Takes one to two caps      HYDROcodone-acetaminophen (NORCO) 5-325 mg per tablet TAKE 1 TABLET BY MOUTH TWICE DAILY AS NEEDED FOR PAIN      cyclobenzaprine (FLEXERIL) 10 mg tablet Take  by mouth three (3) times daily as needed for Muscle Spasm(s). DILT- mg capsule TAKE 1 CAPSULE EVERY DAY  (KEEP  APPOINTMENT  FOR  FURTHER  REFILLS) 90 Capsule 3    diazePAM (VALIUM) 10 mg tablet TAKE 1 TABLET BY MOUTH EVERY DAY AS NEEDED      hydroCHLOROthiazide (HYDRODIURIL) 12.5 mg tablet TAKE 1 TABLET BY MOUTH EVERY DAY IN THE MORNING      omeprazole (PRILOSEC) 20 mg capsule Take 20 mg by mouth as needed. pantoprazole (PROTONIX) 40 mg tablet TAKE 1 TABLET BY MOUTH EVERY DAY BEFORE A MEAL      traZODone (DESYREL) 50 mg tablet Take 100 mg by mouth as needed.       rosuvastatin (CRESTOR) 40 mg tablet Take 1 Tab by mouth nightly. 90 Tab 3    pregabalin (LYRICA) 150 mg capsule Take 150 mg by mouth two (2) times a day. DULoxetine (CYMBALTA) 60 mg capsule Take 60 mg by mouth daily. ezetimibe (ZETIA) 10 mg tablet Take  by mouth.      metFORMIN (GLUMETZA ER) 500 mg TG24 24 hour tablet Take 500 mg by mouth two (2) times a day. cpap machine kit by Does Not Apply route as needed. Aspirin, Buffered 81 mg tab Take 1 Tablet by mouth daily. acetaminophen (Tylenol Extra Strength) 500 mg tablet Take 1,000 mg by mouth every eight (8) hours as needed for Pain. Symbicort 160-4.5 mcg/actuation HFAA Take 2 Puffs by inhalation two (2) times a day.

## 2022-10-05 ENCOUNTER — VIRTUAL VISIT (OUTPATIENT)
Dept: SURGERY | Age: 72
End: 2022-10-05
Payer: MEDICARE

## 2022-10-05 DIAGNOSIS — E11.9 TYPE 2 DIABETES MELLITUS WITHOUT COMPLICATION, WITHOUT LONG-TERM CURRENT USE OF INSULIN (HCC): ICD-10-CM

## 2022-10-05 DIAGNOSIS — E66.9 CLASS 1 OBESITY: Primary | ICD-10-CM

## 2022-10-05 DIAGNOSIS — E78.2 MIXED HYPERLIPIDEMIA: ICD-10-CM

## 2022-10-05 PROCEDURE — 99214 OFFICE O/P EST MOD 30 MIN: CPT | Performed by: FAMILY MEDICINE

## 2022-10-05 PROCEDURE — G8536 NO DOC ELDER MAL SCRN: HCPCS | Performed by: FAMILY MEDICINE

## 2022-10-05 PROCEDURE — G8756 NO BP MEASURE DOC: HCPCS | Performed by: FAMILY MEDICINE

## 2022-10-05 PROCEDURE — 1090F PRES/ABSN URINE INCON ASSESS: CPT | Performed by: FAMILY MEDICINE

## 2022-10-05 PROCEDURE — 1101F PT FALLS ASSESS-DOCD LE1/YR: CPT | Performed by: FAMILY MEDICINE

## 2022-10-05 PROCEDURE — 2022F DILAT RTA XM EVC RTNOPTHY: CPT | Performed by: FAMILY MEDICINE

## 2022-10-05 PROCEDURE — G8427 DOCREV CUR MEDS BY ELIG CLIN: HCPCS | Performed by: FAMILY MEDICINE

## 2022-10-05 PROCEDURE — G8400 PT W/DXA NO RESULTS DOC: HCPCS | Performed by: FAMILY MEDICINE

## 2022-10-05 PROCEDURE — 1123F ACP DISCUSS/DSCN MKR DOCD: CPT | Performed by: FAMILY MEDICINE

## 2022-10-05 PROCEDURE — G8417 CALC BMI ABV UP PARAM F/U: HCPCS | Performed by: FAMILY MEDICINE

## 2022-10-05 PROCEDURE — G9717 DOC PT DX DEP/BP F/U NT REQ: HCPCS | Performed by: FAMILY MEDICINE

## 2022-10-05 PROCEDURE — 3044F HG A1C LEVEL LT 7.0%: CPT | Performed by: FAMILY MEDICINE

## 2022-10-05 PROCEDURE — 3017F COLORECTAL CA SCREEN DOC REV: CPT | Performed by: FAMILY MEDICINE

## 2022-10-05 NOTE — PROGRESS NOTES
[facility] Progress Note:   F/up Physician Visit    Kecia Hanson is a 74 y.o. female who was seen by synchronous (real-time) audio-video technology on 10/5/2022. Consent:  She and/or her healthcare decision maker is aware that this patient-initiated Telehealth encounter is a billable service, with coverage as determined by her insurance carrier. She is aware that she may receive a bill and has provided verbal consent to proceed: Yes    I was at home while conducting this encounter. 712  Subjective:   Kecia Hanson was seen for Weight Management (1 month follow up, Modified LCD, [email or web address])    f/up physician visit for the/ LCD Program.      July 226  Now 213  Had gone down more than that but had a family reunion last week and she regained 3 pounds  Was 200 in the office on 9/26/22    She has misplaced her bp cuff    Prior to Admission medications    Medication Sig Start Date End Date Taking? Authorizing Provider   acetaminophen (TYLENOL) 500 mg tablet Take 1,000 mg by mouth every eight (8) hours as needed for Pain. Yes Provider, Historical   Symbicort 160-4.5 mcg/actuation HFAA Take 2 Puffs by inhalation two (2) times a day. 8/22/22  Yes Provider, Historical   metoprolol succinate (TOPROL-XL) 50 mg XL tablet TAKE 1 TABLET EVERY DAY 8/11/22  Yes Sixto Naylor MD   semaglutide (Ozempic) 2 mg/dose (8 mg/3 mL) pnij 2 mg by SubCUTAneous route every seven (7) days. 7/27/22  Yes Catracho Yoon MD   cyanocobalamin (VITAMIN B12) 1,000 mcg/mL injection 1 mL by IntraMUSCular route every thirty (30) days. 7/14/22  Yes Jerald Nina,    Insulin Needles, Disposable, (Taya Pen Needle) 32 gauge x 5/32\" ndle Use daily as directed 4/27/22  Yes Jerald Maldonado DO   melatonin 10 mg capsule Take  by mouth as needed.  Takes one to two caps   Yes Provider, Historical   HYDROcodone-acetaminophen (NORCO) 5-325 mg per tablet TAKE 1 TABLET BY MOUTH TWICE DAILY AS NEEDED FOR PAIN 1/10/22  Yes Provider, Historical   cyclobenzaprine (FLEXERIL) 10 mg tablet Take  by mouth three (3) times daily as needed for Muscle Spasm(s). Yes Provider, Historical   DILT- mg capsule TAKE 1 CAPSULE EVERY DAY  (KEEP  APPOINTMENT  FOR  FURTHER  REFILLS) 11/4/21  Yes Avani THEODORE NP   diazePAM (VALIUM) 10 mg tablet TAKE 1 TABLET BY MOUTH EVERY DAY AS NEEDED 5/3/21  Yes Provider, Historical   hydroCHLOROthiazide (HYDRODIURIL) 12.5 mg tablet TAKE 1 TABLET BY MOUTH EVERY DAY IN THE MORNING 5/6/21  Yes Provider, Historical   omeprazole (PRILOSEC) 20 mg capsule Take 20 mg by mouth as needed. 11/10/20  Yes Provider, Historical   pantoprazole (PROTONIX) 40 mg tablet TAKE 1 TABLET BY MOUTH EVERY DAY BEFORE A MEAL 2/23/21  Yes Provider, Historical   traZODone (DESYREL) 50 mg tablet Take 100 mg by mouth as needed. 9/24/20  Yes Provider, Historical   rosuvastatin (CRESTOR) 40 mg tablet Take 1 Tab by mouth nightly. 8/20/20  Yes Isabella Jones NP   pregabalin (LYRICA) 150 mg capsule Take 150 mg by mouth two (2) times a day. Yes Provider, Historical   DULoxetine (CYMBALTA) 60 mg capsule Take 60 mg by mouth daily. Yes Provider, Historical   ezetimibe (ZETIA) 10 mg tablet Take  by mouth. Yes Provider, Historical   metFORMIN (GLUMETZA ER) 500 mg TG24 24 hour tablet Take 500 mg by mouth two (2) times a day. Yes Provider, Historical   Aspirin, Buffered 81 mg tab Take 1 Tablet by mouth daily. Yes Provider, Historical   cpap machine kit by Does Not Apply route as needed.   Patient not taking: Reported on 10/5/2022    Provider, Historical     Allergies   Allergen Reactions    Mobic [Meloxicam] Anxiety     Jittery       Patient Active Problem List    Diagnosis Date Noted    Vitamin B12 deficiency 04/27/2022    Vitamin D deficiency 04/27/2022    Elevated ALT measurement 04/27/2022    Anxiety 04/27/2022    BMI 36.0-36.9,adult 03/08/2022    Chronic insomnia 03/08/2022    History of laparoscopic cholecystectomy 03/08/2022    History of tobacco abuse 03/08/2022    Long-term current use of proton pump inhibitor therapy 03/08/2022    BILL on CPAP 03/08/2022    Abnormal weight gain 03/08/2022    Chronic fatigue 03/08/2022    Class 2 severe obesity due to excess calories with serious comorbidity and body mass index (BMI) of 36.0 to 36.9 in adult [facility]) 03/08/2022    Hyperglycemia     Foot swelling     Fibromyalgia     Lumbar degenerative disc disease     Cataract of both eyes     Dry eye syndrome of both eyes     Gastritis     Depression     Personal history of kidney stones     Arteriosclerotic heart disease (ASHD)     Cervical radiculitis     CAD (coronary artery disease)     Internal carotid artery stenosis, bilateral 2022    Varicose veins of both legs with edema 01/12/2021    Venous insufficiency 01/12/2021    Shoulder pain, left 2021    Agatston CAC score, <100 03/25/2019    Diabetes mellitus ([facility] [address] 03/21/2019    Erosive esophagitis 07/14/2016    Colon polyps 07/14/2016    Diverticulosis 07/14/2016    ASHD (arteriosclerotic heart disease) 12/29/2015    Chest pain 10/23/2015    Hx of laparoscopic adjustable gastric banding 09/14/2015    Palpitations 04/10/2015    Mixed hyperlipidemia 04/10/2015    Coronary artery disease 04/10/2015    Pulmonary nodule, right 2015    BIRD (dyspnea on exertion) 07/29/2014    Mitral valve disorder 07/29/2014     Current Outpatient Medications   Medication Sig Dispense Refill    acetaminophen (TYLENOL) 500 mg tablet Take 1,000 mg by mouth every eight (8) hours as needed for Pain. Symbicort 160-4.5 mcg/actuation HFAA Take 2 Puffs by inhalation two (2) times a day. metoprolol succinate (TOPROL-XL) 50 mg XL tablet TAKE 1 TABLET EVERY DAY 90 Tablet 2    semaglutide (Ozempic) 2 mg/dose (8 mg/3 mL) pnij 2 mg by SubCUTAneous route every seven (7) days. 1 Box 2    cyanocobalamin (VITAMIN B12) 1,000 mcg/mL injection 1 mL by IntraMUSCular route every thirty (30) days.  3 mL 0 Insulin Needles, Disposable, (Taya Pen Needle) 32 gauge x 5/32\" ndle Use daily as directed 100 Pen Needle 1    melatonin 10 mg capsule Take  by mouth as needed. Takes one to two caps      HYDROcodone-acetaminophen (NORCO) 5-325 mg per tablet TAKE 1 TABLET BY MOUTH TWICE DAILY AS NEEDED FOR PAIN      cyclobenzaprine (FLEXERIL) 10 mg tablet Take  by mouth three (3) times daily as needed for Muscle Spasm(s). DILT- mg capsule TAKE 1 CAPSULE EVERY DAY  (KEEP  APPOINTMENT  FOR  FURTHER  REFILLS) 90 Capsule 3    diazePAM (VALIUM) 10 mg tablet TAKE 1 TABLET BY MOUTH EVERY DAY AS NEEDED      hydroCHLOROthiazide (HYDRODIURIL) 12.5 mg tablet TAKE 1 TABLET BY MOUTH EVERY DAY IN THE MORNING      omeprazole (PRILOSEC) 20 mg capsule Take 20 mg by mouth as needed. pantoprazole (PROTONIX) 40 mg tablet TAKE 1 TABLET BY MOUTH EVERY DAY BEFORE A MEAL      traZODone (DESYREL) 50 mg tablet Take 100 mg by mouth as needed. rosuvastatin (CRESTOR) 40 mg tablet Take 1 Tab by mouth nightly. 90 Tab 3    pregabalin (LYRICA) 150 mg capsule Take 150 mg by mouth two (2) times a day. DULoxetine (CYMBALTA) 60 mg capsule Take 60 mg by mouth daily. ezetimibe (ZETIA) 10 mg tablet Take  by mouth.      metFORMIN (GLUMETZA ER) 500 mg TG24 24 hour tablet Take 500 mg by mouth two (2) times a day. Aspirin, Buffered 81 mg tab Take 1 Tablet by mouth daily. cpap machine kit by Does Not Apply route as needed.  (Patient not taking: Reported on 10/5/2022)         GREGORY      CC: Weight Management      Braxton Acevedo is a 74 y.o. female who is here for her      Weight Metrics 9/26/2022 9/12/2022 9/9/2022 8/26/2022 8/12/2022 7/27/2022 7/13/2022   Weight 210 lb 6.4 oz 213 lb 13.5 oz 213 lb 12.8 oz 212 lb 8 oz 217 lb 14.4 oz 226 lb 9.6 oz 219 lb 4.8 oz   Neck Circ (inches) - - - - - 14.5 -   Waist Measure Inches - - - - - 45 -   Body Fat % - - - - - 44.7 -   BMI 32.95 kg/m2 33.49 kg/m2 33.49 kg/m2 33.28 kg/m2 34.13 kg/m2 35.49 kg/m2 34.35 kg/m2         Outpatient Medications Marked as Taking for the 10/5/22 encounter (Virtual Visit) with Jared Armendariz MD   Medication Sig Dispense Refill    acetaminophen (TYLENOL) 500 mg tablet Take 1,000 mg by mouth every eight (8) hours as needed for Pain. Symbicort 160-4.5 mcg/actuation HFAA Take 2 Puffs by inhalation two (2) times a day. metoprolol succinate (TOPROL-XL) 50 mg XL tablet TAKE 1 TABLET EVERY DAY 90 Tablet 2    semaglutide (Ozempic) 2 mg/dose (8 mg/3 mL) pnij 2 mg by SubCUTAneous route every seven (7) days. 1 Box 2    cyanocobalamin (VITAMIN B12) 1,000 mcg/mL injection 1 mL by IntraMUSCular route every thirty (30) days. 3 mL 0    Insulin Needles, Disposable, (Taya Pen Needle) 32 gauge x 5/32\" ndle Use daily as directed 100 Pen Needle 1    melatonin 10 mg capsule Take  by mouth as needed. Takes one to two caps      HYDROcodone-acetaminophen (NORCO) 5-325 mg per tablet TAKE 1 TABLET BY MOUTH TWICE DAILY AS NEEDED FOR PAIN      cyclobenzaprine (FLEXERIL) 10 mg tablet Take  by mouth three (3) times daily as needed for Muscle Spasm(s). DILT- mg capsule TAKE 1 CAPSULE EVERY DAY  (KEEP  APPOINTMENT  FOR  FURTHER  REFILLS) 90 Capsule 3    diazePAM (VALIUM) 10 mg tablet TAKE 1 TABLET BY MOUTH EVERY DAY AS NEEDED      hydroCHLOROthiazide (HYDRODIURIL) 12.5 mg tablet TAKE 1 TABLET BY MOUTH EVERY DAY IN THE MORNING      omeprazole (PRILOSEC) 20 mg capsule Take 20 mg by mouth as needed. pantoprazole (PROTONIX) 40 mg tablet TAKE 1 TABLET BY MOUTH EVERY DAY BEFORE A MEAL      traZODone (DESYREL) 50 mg tablet Take 100 mg by mouth as needed. rosuvastatin (CRESTOR) 40 mg tablet Take 1 Tab by mouth nightly. 90 Tab 3    pregabalin (LYRICA) 150 mg capsule Take 150 mg by mouth two (2) times a day. DULoxetine (CYMBALTA) 60 mg capsule Take 60 mg by mouth daily.       ezetimibe (ZETIA) 10 mg tablet Take  by mouth.      metFORMIN (GLUMETZA ER) 500 mg TG24 24 hour tablet Take 500 mg by mouth two (2) times a day. Aspirin, Buffered 81 mg tab Take 1 Tablet by mouth daily. Participation   Did you attend clinic and class last week? yes    Review of Systems  Since your last visit, have you experienced any complications? no  If yes, please list:       Are you taking an appetite suppressant? yes  If so, is there any Chest Pain, Palpitations or Dizziness? HUNGER CONTROL: good    BP Readings from Last 3 Encounters:   09/26/22 109/71   09/12/22 120/74   09/09/22 120/74       SLEEP:  9    Have you received any other medical care this week? no  If yes, where and for what? Have you discontinued or changed any medicine or dose of your medicine since your last visit with Dr Yancy Alpers? no  If yes, where and for what? Diet  How many ounces of calorie-free liquids did you consume each day? 64  oz    How many meal replacements did you take each day? 1 and 2 meals    Did you have any problems adhering to the program?  no If yes, please explain:      Exercise  Aerobic exercise: she has a  every 4-5 days min  Resistance exercise: 0 workouts / week  Any discomfort? If yes, where? Objective  There were no vitals taken for this visit. No LMP recorded. (Menstrual status: Menopause). PHYSICAL EXAMINATION:  [ INSTRUCTIONS:  \"[x]\" Indicates a positive item  \"[]\" Indicates a negative item  -- DELETE ALL ITEMS NOT EXAMINED]  Vital Signs: (As obtained by patient/caregiver at home)  There were no vitals taken for this visit.      Constitutional: [x] Appears well-developed and well-nourished [x] No apparent distress      [] Abnormal -     Mental status: [x] Alert and awake  [x] Oriented to person/place/time [x] Able to follow commands    [] Abnormal -     Eyes:   EOM    [x]  Normal    [] Abnormal -   Sclera  [x]  Normal    [] Abnormal -          Discharge [x]  None visible   [] Abnormal -     HENT: [x] Normocephalic, atraumatic  [] Abnormal -   [x] Mouth/Throat: Mucous membranes are moist    External Ears [x] Normal  [] Abnormal -    Neck: [x] No visualized mass [] Abnormal -     Pulmonary/Chest: [x] Respiratory effort normal   [x] No visualized signs of difficulty breathing or respiratory distress        [] Abnormal -      Musculoskeletal:   [x] Normal gait with no signs of ataxia         [x] Normal range of motion of neck        [] Abnormal -     Neurological:        [x] No Facial Asymmetry (Cranial nerve 7 motor function) (limited exam due to video visit)          [x] No gaze palsy        [] Abnormal -          Skin:        [x] No significant exanthematous lesions or discoloration noted on facial skin         [] Abnormal -            Psychiatric:       [x] Normal Affect [] Abnormal -        [x] No Hallucinations    Other pertinent observable physical exam findings:-      Assessment / Plan    Encounter Diagnoses   Name Primary? Class 1 obesity Yes    Mixed hyperlipidemia     Type 2 diabetes mellitus without complication, without long-term current use of insulin ([email or web address] [facility]      Diagnoses and all orders for this visit:    1. Class 1 obesity  Cont the LCD  Going in the right direction although regained a little after a family celebration for a few dys  Getting back on routine  Cont exercise as she is doing  2. Mixed hyperlipidemia  Cont crestor 40 mg ea day  Cont zetia10 mg ea day  3. Type 2 diabetes mellitus without complication, without long-term current use of insulin ([facility])  Cont ozempic at 2 mg every 7 days    4. Ess hypertension  Metoprolol xl 50 mg ea day  Hctz 12.5  1. Weight management improved   Progress was reviewed with patient  Increase exercise to 4 days a week  Cont ozempic 2 mg per week    2. Labs    Latest results reviewed with patient          face to face time with Yohana John consisted of counseling & coordinating and/or discussing treatment plans in reference to her obesity The primary encounter diagnosis was Class 1 obesity.  Diagnoses of Mixed hyperlipidemia and Type 2 diabetes mellitus without complication, without long-term current use of insulin ([facility] [address] were also pertinent to this visit. Coding Help - Use CPT Codes 68498-63107, 62569-77500 for Established and New Patients respectively, either employing EM elements or Time rules. Other codes (example consult codes) may also apply. We discussed the expected course, resolution and complications of the diagnosis(es) in detail. Medication risks, benefits, costs, interactions, and alternatives were discussed as indicated. I advised her to contact the office if her condition worsens, changes or fails to improve as anticipated. She expressed understanding with the diagnosis(es) and plan. Pursuant to the emergency declaration under the [facility] waiver authority and the [employer], this Virtual  Visit was conducted, with patient's consent, to reduce the patient's risk of exposure to COVID-19 and provide continuity of care for an established patient. Services were provided through a video synchronous discussion virtually to substitute for in-person clinic visit.     Erika Barboza MD

## 2022-10-05 NOTE — PROGRESS NOTES
Chief Complaint   Patient presents with    Weight Management     1 month follow up, Modified LCD, [email or web address]        Nursing Notes:  Pt stated that she is unable to locate her blood pressure machine so she is not able to collect a blood pressure today before her visit. 1. Have you been to the ER, urgent care clinic since your last visit? Hospitalized since your last visit? No    2. Have you seen or consulted any other health care providers outside of the [address] [place] [address] since your last visit? Include any pap smears or colon screening.  No

## 2022-10-10 NOTE — PROGRESS NOTES
[address], [place], [address] [place] [address]  [phone]     Subjective:      Michaela Rocha is a 74 y.o. female is here for routine f/u. She has a PMHx of CAD / elevated Agatston CAC score, HTN, HLD, BILL, mild COPD and obesity. Last OV 10/2021. Today,    Sees Dr Kaleb Omer with [address]  She is enrolled in  [address] [place] [address] Weight Loss Program.  She has most 20 lbs since. She is also working out with a fitness hayes at least once a week, no exertional symptoms  Once she's below 200 lbs, can qualify for the [place] device. The patient denies chest pain/ shortness of breath, orthopnea, PND, LE edema, palpitations, syncope, or presyncope.    09/12/22    ECHO ADULT COMPLETE 09/12/2022 9/12/2022    Interpretation Summary    Left Ventricle: Normal left ventricular systolic function with a visually estimated EF of 55 - 60%. Left ventricle size is normal. Normal wall thickness. Normal wall motion.     Signed by: Yudi Masterson MD on 9/12/2022  1:21 PM        Patient Active Problem List    Diagnosis Date Noted    Vitamin B12 deficiency 04/27/2022    Vitamin D deficiency 04/27/2022    Elevated ALT measurement 04/27/2022    Anxiety 04/27/2022    BMI 36.0-36.9,adult 03/08/2022    Chronic insomnia 03/08/2022    History of laparoscopic cholecystectomy 03/08/2022    History of tobacco abuse 03/08/2022    Long-term current use of proton pump inhibitor therapy 03/08/2022    BILL on CPAP 03/08/2022    Abnormal weight gain 03/08/2022    Chronic fatigue 03/08/2022    Class 2 severe obesity due to excess calories with serious comorbidity and body mass index (BMI) of 36.0 to 36.9 in adult [facility]) 03/08/2022    Hyperglycemia     Foot swelling     Fibromyalgia     Lumbar degenerative disc disease     Cataract of both eyes     Dry eye syndrome of both eyes     Gastritis     Depression     Personal history of kidney stones     Arteriosclerotic heart disease (ASHD)     Cervical radiculitis     CAD (coronary artery disease)     Internal carotid artery stenosis, bilateral 2022    Varicose veins of both legs with edema 01/12/2021    Venous insufficiency 01/12/2021    Shoulder pain, left 2021    Agatston CAC score, <100 03/25/2019    Diabetes mellitus ([facility] 03/21/2019    Erosive esophagitis 07/14/2016    Colon polyps 07/14/2016    Diverticulosis 07/14/2016    ASHD (arteriosclerotic heart disease) 12/29/2015    Chest pain 10/23/2015    Hx of laparoscopic adjustable gastric banding 09/14/2015    Palpitations 04/10/2015    Mixed hyperlipidemia 04/10/2015    Coronary artery disease 04/10/2015    Pulmonary nodule, right 2015    BIRD (dyspnea on exertion) 07/29/2014    Mitral valve disorder 07/29/2014      Nayeli Willett MD  Past Medical History:   Diagnosis Date    Acute meniscal tear of left knee     Anxiety     Arteriosclerotic heart disease (ASHD)     Dr. Serjio Becker    CAD (coronary artery disease)     Dr. Serjio Becker.  03/25/19 Cor Calcium score 112, Mod CAD. Carpal tunnel syndrome, bilateral     Cataract of both eyes     Dr. Roldan Short    Cervical radiculitis     Dr. Ranulfo Sewell. Dr. Brittany Garcia.    Chronic pain     back, neck, shoulder. Dr. Carine Painter. Neita Phoenix, therapist    Diabetes mellitus ([facility] 03/21/2019    Diverticulosis     Dry eye syndrome of both eyes     Dr. Nereida Jones    Erosive esophagitis     Dr. Brown [place], rheum. Foot swelling     BL    Gallstones 1984    Hypercholesterolemia     Hyperglycemia     A1c 6.0    Internal carotid artery stenosis, bilateral 2022    Mild    Long term current use of anticoagulant therapy     Lumbar degenerative disc disease     L4-5. Dr. Anam Atwood. Neuropathy     Personal history of kidney stones     BL. Dr. James Noe. Pulmonary nodule, right 2015    per CT.  4 mm RML, 5mm RLL. Dr. Mindi Bah    PVC's (premature ventricular contractions)     Dr. Serjio Becker.   Txd Diltiazem, Metoprolol    Shoulder pain, left 2021 L>R.  Dr. Liliana Tong    Sleep apnea     CPAP    Valvular heart disease     hx of Mitral valve disorder    Varicose veins of both legs with edema     Dr. Palak Khan      Past Surgical History:   Procedure Laterality Date    COLONOSCOPY N/A 8/14/2020    Dr. Alek Raza. w colon polypectomy. due q 3 yrs. HX BARIATRIC SURGERY  01/2008    LAP GASTRIC BANDING. Dr. Demetrio Arvizu. HX CARPAL TUNNEL RELEASE Bilateral     HX CHOLECYSTECTOMY  1984    HX LITHOTRIPSY Left     due to kidney stone. Dr. Katalina Bettencourt Left     HX UROLOGICAL Right     due to [address] Dr. Teri Sethi. IR ENDOVENOUS ABLATION RF INITIAL VEIN BI Right 2021    Dr. Abdullahi Acosta. [place]. due to Venous Insufficiency.     SC COLSC FLX W/RMVL OF TUMOR POLYP LESION SNARE TQ  12/22/2010         SC EGD TRANSORAL BIOPSY SINGLE/MULTIPLE  12/22/2010    Dr. Raymond Alamo [Meloxicam] Anxiety     Jittery      Family History   Problem Relation Age of Onset    Hypertension Father     SKIN CANCER Father     Prostate Cancer Father     Parkinson's Disease Mother     Heart Attack Mother 48    Heart Surgery Mother     Coronary Art Dis Mother     Diabetes Sister     Heart Disease Sister     Other Sister         DJD    Other Sister         gastric bypass    Obesity Paternal Grandmother     Obesity Paternal Aunt       Social History     Socioeconomic History    Marital status:      Spouse name: Not on file    Number of children: 5    Years of education: Not on file    Highest education level: Not on file   Occupational History    Occupation: Retired   Tobacco Use    Smoking status: Former     Packs/day: 0.50     Years: 20.00     Pack years: 10.00     Types: Cigarettes     Quit date: 12/29/2011     Years since quitting: 10.7    Smokeless tobacco: Never    Tobacco comments:     QUIT SMOKING 01/2022   Vaping Use    Vaping Use: Never used   Substance and Sexual Activity    Alcohol use: Not Currently     Comment: QUIT ALCOHOL 05/2021- was 2 g wine, whiskey or vodka    Drug use: No    Sexual activity: Not Currently     Partners: Male     Comment: MENOPAUSE   Other Topics Concern    Not on file   Social History Narrative    Not on file     Social Determinants of Health     Financial Resource Strain: Not on file   Food Insecurity: Not on file   Transportation Needs: Not on file   Physical Activity: Not on file   Stress: Not on file   Social Connections: Not on file   Intimate Partner Violence: Not on file   Housing Stability: Not on file      Current Outpatient Medications   Medication Sig    acetaminophen (TYLENOL) 500 mg tablet Take 1,000 mg by mouth every eight (8) hours as needed for Pain. Symbicort 160-4.5 mcg/actuation HFAA Take 2 Puffs by inhalation two (2) times a day. metoprolol succinate (TOPROL-XL) 50 mg XL tablet TAKE 1 TABLET EVERY DAY    semaglutide (Ozempic) 2 mg/dose (8 mg/3 mL) pnij 2 mg by SubCUTAneous route every seven (7) days. cyanocobalamin (VITAMIN B12) 1,000 mcg/mL injection 1 mL by IntraMUSCular route every thirty (30) days. Insulin Needles, Disposable, (Taya Pen Needle) 32 gauge x 5/32\" ndle Use daily as directed    melatonin 10 mg capsule Take  by mouth as needed. Takes one to two caps    HYDROcodone-acetaminophen (NORCO) 5-325 mg per tablet TAKE 1 TABLET BY MOUTH TWICE DAILY AS NEEDED FOR PAIN    cyclobenzaprine (FLEXERIL) 10 mg tablet Take  by mouth three (3) times daily as needed for Muscle Spasm(s). DILT- mg capsule TAKE 1 CAPSULE EVERY DAY  (KEEP  APPOINTMENT  FOR  FURTHER  REFILLS)    diazePAM (VALIUM) 10 mg tablet TAKE 1 TABLET BY MOUTH EVERY DAY AS NEEDED    hydroCHLOROthiazide (HYDRODIURIL) 12.5 mg tablet TAKE 1 TABLET BY MOUTH EVERY DAY IN THE MORNING    omeprazole (PRILOSEC) 20 mg capsule Take 20 mg by mouth as needed.     pantoprazole (PROTONIX) 40 mg tablet TAKE 1 TABLET BY MOUTH EVERY DAY BEFORE A MEAL    traZODone (DESYREL) 50 mg tablet Take 100 mg by mouth as needed. rosuvastatin (CRESTOR) 40 mg tablet Take 1 Tab by mouth nightly. pregabalin (LYRICA) 150 mg capsule Take 150 mg by mouth two (2) times a day. DULoxetine (CYMBALTA) 60 mg capsule Take 60 mg by mouth daily. ezetimibe (ZETIA) 10 mg tablet Take  by mouth.    metFORMIN (GLUMETZA ER) 500 mg TG24 24 hour tablet Take 500 mg by mouth two (2) times a day. cpap machine kit by Does Not Apply route as needed. (Patient not taking: Reported on 10/5/2022)    Aspirin, Buffered 81 mg tab Take 1 Tablet by mouth daily. No current facility-administered medications for this visit. Review of Symptoms:  11 systems reviewed, negative other than as stated in the HPI    Physical ExamPhysical Exam:    There were no vitals filed for this visit. There is no height or weight on file to calculate BMI. General PE  Gen:  NAD  Mental Status - Alert. General Appearance - Not in acute distress. HEENT:  PERRL, no carotid bruits or JVD  Chest and Lung Exam   Inspection: Accessory muscles - No use of accessory muscles in breathing. Auscultation:   Breath sounds: - Normal.   Cardiovascular   Inspection: Jugular vein - Bilateral - Inspection Normal.   Palpation/Percussion:   Apical Impulse: - Normal.   Auscultation: Rhythm - Regular. Heart Sounds - S1 WNL and S2 WNL. No S3 or S4. Murmurs & Other Heart Sounds: Auscultation of the heart reveals - No Murmurs. Peripheral Vascular   Upper Extremity: Inspection - Bilateral - No Cyanotic nailbeds or Digital clubbing. Lower Extremity:   Palpation: Edema - Bilateral - No edema. Abdomen:   Soft, non-tender, bowel sounds are active.   Neuro: A&O times 3, CN and motor grossly WNL    Labs:   Lab Results   Component Value Date/Time    Cholesterol, total 109 09/28/2021 12:32 PM    Cholesterol, Total 132 06/30/2022 11:56 AM    Cholesterol, Total 154 03/08/2022 01:55 PM    HDL Cholesterol 44 09/28/2021 12:32 PM    LDL, calculated 44.4 09/28/2021 12:32 PM    Triglyceride 103 09/28/2021 12:32 PM    CHOL/HDL Ratio 2.5 09/28/2021 12:32 PM     Lab Results   Component Value Date/Time    CK 41 10/23/2015 01:23 PM     Lab Results   Component Value Date/Time    Sodium 140 06/30/2022 11:56 AM    Potassium 4.5 06/30/2022 11:56 AM    Chloride 104 06/30/2022 11:56 AM    CO2 23 06/30/2022 11:56 AM    Anion gap 1 (L) 09/28/2021 12:32 PM    Glucose 96 06/30/2022 11:56 AM    BUN 33 (H) 06/30/2022 11:56 AM    Creatinine 1.06 (H) 06/30/2022 11:56 AM    BUN/Creatinine ratio 31 (H) 06/30/2022 11:56 AM    GFR est AA >60 09/28/2021 12:32 PM    GFR est non-AA >60 09/28/2021 12:32 PM    Calcium 9.6 06/30/2022 11:56 AM    Bilirubin, total 0.3 06/30/2022 11:56 AM    Alk. phosphatase 49 06/30/2022 11:56 AM    Protein, total 6.3 06/30/2022 11:56 AM    Albumin 4.4 06/30/2022 11:56 AM    Globulin 3.0 09/28/2021 12:32 PM    A-G Ratio 2.3 (H) 06/30/2022 11:56 AM    ALT (SGPT) 70 (H) 06/30/2022 11:56 AM       EKG:  NSR        Assessment:          ICD-10-CM ICD-9-CM    1. ASHD (arteriosclerotic heart disease)  I25.10 414.00       2. Coronary artery disease involving native heart without angina pectoris, unspecified vessel or lesion type  I25.10 414.01       3. Agatston CAC score, <100  R93.1 793.2       4. Mixed hyperlipidemia  E78.2 272.2       5. Venous insufficiency  I87.2 459.81       6. Palpitations  R00.2 785.1       7. Mitral valve disorder  I05.9 394.9       8. BILL (obstructive sleep apnea)  G47.33 327.23           No orders of the defined types were placed in this encounter.        Plan:     CAD  Agatston CAC score 100-199  Coronary calcium score 112 in 5/2019  Previous stress echo 1/2016 without evidence of ischemia  Continue BB, CCB, ASA and statin therapy     HTN  Controlled with current therapy  Serum Cr 1.06 in 6/2022     Mixed hyperlipidemia  6/2022 LDL 56 Continue Rosuva 40 mg daily, Zetia 10 mg daily     Palpitations, controlled  Event montior with frequent PVCs, 4% ectopic burden  Continue diltiazem and metoprolol        Venous insufficiency, Varicose veins of both legs with edema  Bilateral GSV reflux per LE dopplers 10/2020. Normal CHAITANYA 10/2020  S/p RF ablation right GSV in 6/28/2021. Followed by Dr Kiara Ibarra, last OV 9/2021     COPD, mild  Quit smoking 2020- congratulated. Followed by Dr Carrie Fuller    BILL  On PAP therapy    Counseled on diet and exercise- eventual goal of 30-60 minutes 5-7 times a week as per AHA guidelines. Patient was made aware during visit today that all testing completed would be instantaneously available on their MyChart for review. Discussed that these results will be made available to the provider at the same time. They were advised to wait at least 3 business days to allow for provider's interpretation of results with follow-up before calling our office with concerns about their results.     Continue current care and f/u in 1 yr        Lukas Long NP

## 2022-10-17 ENCOUNTER — OFFICE VISIT (OUTPATIENT)
Dept: CARDIOLOGY CLINIC | Age: 72
End: 2022-10-17
Payer: MEDICARE

## 2022-10-17 VITALS
BODY MASS INDEX: 32.49 KG/M2 | HEART RATE: 83 BPM | SYSTOLIC BLOOD PRESSURE: 110 MMHG | HEIGHT: 67 IN | DIASTOLIC BLOOD PRESSURE: 78 MMHG | WEIGHT: 207 LBS | OXYGEN SATURATION: 96 % | RESPIRATION RATE: 18 BRPM

## 2022-10-17 DIAGNOSIS — I25.10 CORONARY ARTERY DISEASE INVOLVING NATIVE HEART WITHOUT ANGINA PECTORIS, UNSPECIFIED VESSEL OR LESION TYPE: ICD-10-CM

## 2022-10-17 DIAGNOSIS — R00.2 PALPITATIONS: ICD-10-CM

## 2022-10-17 DIAGNOSIS — G47.33 OSA (OBSTRUCTIVE SLEEP APNEA): ICD-10-CM

## 2022-10-17 DIAGNOSIS — I87.2 VENOUS INSUFFICIENCY: ICD-10-CM

## 2022-10-17 DIAGNOSIS — R93.1 AGATSTON CAC SCORE, <100: ICD-10-CM

## 2022-10-17 DIAGNOSIS — I25.10 ASHD (ARTERIOSCLEROTIC HEART DISEASE): Primary | ICD-10-CM

## 2022-10-17 DIAGNOSIS — E78.2 MIXED HYPERLIPIDEMIA: ICD-10-CM

## 2022-10-17 DIAGNOSIS — I05.9 MITRAL VALVE DISORDER: ICD-10-CM

## 2022-10-17 PROCEDURE — 1123F ACP DISCUSS/DSCN MKR DOCD: CPT | Performed by: NURSE PRACTITIONER

## 2022-10-17 PROCEDURE — G8400 PT W/DXA NO RESULTS DOC: HCPCS | Performed by: NURSE PRACTITIONER

## 2022-10-17 PROCEDURE — G8752 SYS BP LESS 140: HCPCS | Performed by: NURSE PRACTITIONER

## 2022-10-17 PROCEDURE — 1090F PRES/ABSN URINE INCON ASSESS: CPT | Performed by: NURSE PRACTITIONER

## 2022-10-17 PROCEDURE — 99214 OFFICE O/P EST MOD 30 MIN: CPT | Performed by: NURSE PRACTITIONER

## 2022-10-17 PROCEDURE — G8427 DOCREV CUR MEDS BY ELIG CLIN: HCPCS | Performed by: NURSE PRACTITIONER

## 2022-10-17 PROCEDURE — G9717 DOC PT DX DEP/BP F/U NT REQ: HCPCS | Performed by: NURSE PRACTITIONER

## 2022-10-17 PROCEDURE — 93000 ELECTROCARDIOGRAM COMPLETE: CPT | Performed by: NURSE PRACTITIONER

## 2022-10-17 PROCEDURE — 3017F COLORECTAL CA SCREEN DOC REV: CPT | Performed by: NURSE PRACTITIONER

## 2022-10-17 PROCEDURE — G8536 NO DOC ELDER MAL SCRN: HCPCS | Performed by: NURSE PRACTITIONER

## 2022-10-17 PROCEDURE — G8754 DIAS BP LESS 90: HCPCS | Performed by: NURSE PRACTITIONER

## 2022-10-17 PROCEDURE — G8417 CALC BMI ABV UP PARAM F/U: HCPCS | Performed by: NURSE PRACTITIONER

## 2022-10-17 PROCEDURE — 1101F PT FALLS ASSESS-DOCD LE1/YR: CPT | Performed by: NURSE PRACTITIONER

## 2022-10-19 ENCOUNTER — CLINICAL SUPPORT (OUTPATIENT)
Dept: SURGERY | Age: 72
End: 2022-10-19

## 2022-10-19 VITALS
HEIGHT: 67 IN | WEIGHT: 207.8 LBS | RESPIRATION RATE: 18 BRPM | OXYGEN SATURATION: 97 % | HEART RATE: 65 BPM | DIASTOLIC BLOOD PRESSURE: 79 MMHG | BODY MASS INDEX: 32.62 KG/M2 | SYSTOLIC BLOOD PRESSURE: 125 MMHG | TEMPERATURE: 97.9 F

## 2022-10-19 DIAGNOSIS — E78.2 MIXED HYPERLIPIDEMIA: ICD-10-CM

## 2022-10-19 DIAGNOSIS — E66.9 CLASS 1 OBESITY: Primary | ICD-10-CM

## 2022-10-19 DIAGNOSIS — E11.9 TYPE 2 DIABETES MELLITUS WITHOUT COMPLICATION, WITHOUT LONG-TERM CURRENT USE OF INSULIN (HCC): ICD-10-CM

## 2022-10-19 NOTE — PROGRESS NOTES
Weight Management. 1. Have you been to the ER, urgent care clinic since your last visit? Hospitalized since your last visit? No    2. Have you seen or consulted any other health care providers outside of the [address] [place] [address] since your last visit? Include any pap smears or colon screening.    [facility]  - hip injection; medial branch block with Dr. Joanne Tabares

## 2022-10-19 NOTE — PROGRESS NOTES
10/8/2022    Progress Note: Weekly Education Class in the [facility]         Patient is on Very Low Calorie Diet [] (4 meal replacements per day, 800 kcal/day)      Low Calorie Diet [x] (2-3 meal replacements per day, 8406-3346 kcal/day)    1) Did patient have any new symptoms or physical problems? Yes []    No [x]    If yes, check & comment: weakness [], fatigue [], lightheadedness [], headache [], cramps [], cold intolerance [], hair loss [], diarrhea [], constipation [],  NA [] other:                                 2) Has patient had any medical attention from other providers, urgent care or the emergency room this week? Yes [x]  No []       NA [], If yes, why: Dr. Vanessa Bryant @ [facility]  F/up on tests; Dr. Nellie Benavides - [facility]  Branch block                                      3) Any other sugar sweetened beverages consumed this week? Yes []  No []NOT ANSWERED      4) Did patient have any problems adhering to the diet? Yes [x]  No [] NA []    If yes, Vacation [], Celebrations [], Conferences [], Family Reunions [] other: attended a memorial service  For the death of a very young   neighbor                                               5) How many hours of sleep this week? 8-9    (range)  NA []    Number of meal replacements consumed daily? 1  (range)  NA []    Average ounces of water patient consumed daily this week (not including shakes)? 65     (divide the weekly total by 7)    Did you eat any food outside of the program? Yes [x] No []    Physical Activity Over the Past Week:    Cardio exercise: 0 min  Strength exercise: 2 workouts / week  Number of steps walked per day: 2.5 miles    How has patient mood overall been this week?  Sad [], Happy [], Stressed [], Tired [], Content [], NA [], other Good           Medications reconciled by nurse Yes [x]  No[]    Patient was given therapeutic recommendations for any noted side effects of their dietary approach based upon [facility] patient manual per providers recommendation. 10/15/2022    Progress Note: Weekly Education Class in the [facility]         Patient is on Very Low Calorie Diet [] (4 meal replacements per day, 800 kcal/day)      Low Calorie Diet [x] (2-3 meal replacements per day, 0234-8997 kcal/day)    1) Did patient have any new symptoms or physical problems? Yes []    No [x]    If yes, check & comment: weakness [], fatigue [], lightheadedness [], headache [], cramps [], cold intolerance [], hair loss [], diarrhea [], constipation [],  NA [] other:                                 2) Has patient had any medical attention from other providers, urgent care or the emergency room this week? Yes [x]  No []       NA [], If yes, why: Dr. Bass Mt -   Hip injection; Dr. Elizabeth Garcia - card-  iologist                                      3) Any other sugar sweetened beverages consumed this week? Yes []  No []NOT ANSWERED      4) Did patient have any problems adhering to the diet? Yes [x]  No [] NA []    If yes, Vacation [], Celebrations [x], Conferences [], Family Reunions [] other: family visit from [place]                                               5) How many hours of sleep this week? 8-9    (range)  NA []    Number of meal replacements consumed daily? 1  (range)  NA []    Average ounces of water patient consumed daily this week (not including shakes)? 64     (divide the weekly total by 7)    Did you eat any food outside of the program? Yes [x] No []    Physical Activity Over the Past Week:    Cardio exercise: 45 min  Strength exercise: 1 workouts / week  Number of steps walked per day: 1.5 miles    How has patient mood overall been this week?  Sad [], Happy [], Stressed [], Tired [], Content [], NA [], other Several different moods             Medications reconciled by nurse Yes [x]  No[]    Patient was given therapeutic recommendations for any noted side effects of their dietary approach based upon [facility] patient manual per providers recommendation.

## 2022-10-31 NOTE — PROGRESS NOTES
Nurse note from patient's weekly / LCD / Maintenance class was reviewed. Pertinent medical concerns were:   reviewed     BP Readings from Last 3 Encounters:   10/19/22 125/79   10/17/22 110/78   09/26/22 109/71       Weight Metrics 10/19/2022 10/17/2022 9/26/2022 9/12/2022 9/9/2022 8/26/2022 8/12/2022   Weight 207 lb 12.8 oz 207 lb 210 lb 6.4 oz 213 lb 13.5 oz 213 lb 12.8 oz 212 lb 8 oz 217 lb 14.4 oz   Neck Circ (inches) - - - - - - -   Waist Measure Inches - - - - - - -   Body Fat % - - - - - - -   BMI 32.55 kg/m2 32.42 kg/m2 32.95 kg/m2 33.49 kg/m2 33.49 kg/m2 33.28 kg/m2 34.13 kg/m2       Current Outpatient Medications   Medication Sig Dispense Refill    acetaminophen (TYLENOL) 500 mg tablet Take 1,000 mg by mouth every eight (8) hours as needed for Pain. Symbicort 160-4.5 mcg/actuation HFAA Take 2 Puffs by inhalation two (2) times a day. metoprolol succinate (TOPROL-XL) 50 mg XL tablet TAKE 1 TABLET EVERY DAY 90 Tablet 2    semaglutide (Ozempic) 2 mg/dose (8 mg/3 mL) pnij 2 mg by SubCUTAneous route every seven (7) days. 1 Box 2    cyanocobalamin (VITAMIN B12) 1,000 mcg/mL injection 1 mL by IntraMUSCular route every thirty (30) days. 3 mL 0    Insulin Needles, Disposable, (Taya Pen Needle) 32 gauge x 5/32\" ndle Use daily as directed 100 Pen Needle 1    melatonin 10 mg capsule Take  by mouth as needed. Takes one to two caps      HYDROcodone-acetaminophen (NORCO) 5-325 mg per tablet TAKE 1 TABLET BY MOUTH TWICE DAILY AS NEEDED FOR PAIN      cyclobenzaprine (FLEXERIL) 10 mg tablet Take  by mouth three (3) times daily as needed for Muscle Spasm(s). DILT- mg capsule TAKE 1 CAPSULE EVERY DAY  (KEEP  APPOINTMENT  FOR  FURTHER  REFILLS) 90 Capsule 3    diazePAM (VALIUM) 10 mg tablet TAKE 1 TABLET BY MOUTH EVERY DAY AS NEEDED      hydroCHLOROthiazide (HYDRODIURIL) 12.5 mg tablet TAKE 1 TABLET BY MOUTH EVERY DAY IN THE MORNING      omeprazole (PRILOSEC) 20 mg capsule Take 20 mg by mouth as needed. pantoprazole (PROTONIX) 40 mg tablet TAKE 1 TABLET BY MOUTH EVERY DAY BEFORE A MEAL      traZODone (DESYREL) 50 mg tablet Take 100 mg by mouth as needed. rosuvastatin (CRESTOR) 40 mg tablet Take 1 Tab by mouth nightly. 90 Tab 3    pregabalin (LYRICA) 150 mg capsule Take 150 mg by mouth two (2) times a day. DULoxetine (CYMBALTA) 60 mg capsule Take 60 mg by mouth daily. ezetimibe (ZETIA) 10 mg tablet Take  by mouth.      metFORMIN (GLUMETZA ER) 500 mg TG24 24 hour tablet Take 500 mg by mouth two (2) times a day. Aspirin, Buffered 81 mg tab Take 1 Tablet by mouth daily.

## 2022-11-08 ENCOUNTER — OFFICE VISIT (OUTPATIENT)
Dept: SURGERY | Age: 72
End: 2022-11-08
Payer: MEDICARE

## 2022-11-08 VITALS
OXYGEN SATURATION: 95 % | DIASTOLIC BLOOD PRESSURE: 73 MMHG | HEIGHT: 67 IN | BODY MASS INDEX: 33.24 KG/M2 | SYSTOLIC BLOOD PRESSURE: 113 MMHG | RESPIRATION RATE: 18 BRPM | WEIGHT: 211.8 LBS | HEART RATE: 73 BPM | TEMPERATURE: 97.8 F

## 2022-11-08 DIAGNOSIS — E53.8 VITAMIN B12 DEFICIENCY: ICD-10-CM

## 2022-11-08 DIAGNOSIS — E11.9 TYPE 2 DIABETES MELLITUS WITHOUT COMPLICATION, WITHOUT LONG-TERM CURRENT USE OF INSULIN (HCC): ICD-10-CM

## 2022-11-08 DIAGNOSIS — E55.9 VITAMIN D DEFICIENCY: ICD-10-CM

## 2022-11-08 DIAGNOSIS — E78.2 MIXED HYPERLIPIDEMIA: ICD-10-CM

## 2022-11-08 DIAGNOSIS — E66.9 CLASS 1 OBESITY: Primary | ICD-10-CM

## 2022-11-08 PROCEDURE — G8536 NO DOC ELDER MAL SCRN: HCPCS | Performed by: FAMILY MEDICINE

## 2022-11-08 PROCEDURE — 3017F COLORECTAL CA SCREEN DOC REV: CPT | Performed by: FAMILY MEDICINE

## 2022-11-08 PROCEDURE — 3044F HG A1C LEVEL LT 7.0%: CPT | Performed by: FAMILY MEDICINE

## 2022-11-08 PROCEDURE — 2022F DILAT RTA XM EVC RTNOPTHY: CPT | Performed by: FAMILY MEDICINE

## 2022-11-08 PROCEDURE — G8427 DOCREV CUR MEDS BY ELIG CLIN: HCPCS | Performed by: FAMILY MEDICINE

## 2022-11-08 PROCEDURE — G8400 PT W/DXA NO RESULTS DOC: HCPCS | Performed by: FAMILY MEDICINE

## 2022-11-08 PROCEDURE — 1090F PRES/ABSN URINE INCON ASSESS: CPT | Performed by: FAMILY MEDICINE

## 2022-11-08 PROCEDURE — 1123F ACP DISCUSS/DSCN MKR DOCD: CPT | Performed by: FAMILY MEDICINE

## 2022-11-08 PROCEDURE — G9717 DOC PT DX DEP/BP F/U NT REQ: HCPCS | Performed by: FAMILY MEDICINE

## 2022-11-08 PROCEDURE — G8754 DIAS BP LESS 90: HCPCS | Performed by: FAMILY MEDICINE

## 2022-11-08 PROCEDURE — G8417 CALC BMI ABV UP PARAM F/U: HCPCS | Performed by: FAMILY MEDICINE

## 2022-11-08 PROCEDURE — 1101F PT FALLS ASSESS-DOCD LE1/YR: CPT | Performed by: FAMILY MEDICINE

## 2022-11-08 PROCEDURE — 99214 OFFICE O/P EST MOD 30 MIN: CPT | Performed by: FAMILY MEDICINE

## 2022-11-08 PROCEDURE — G8752 SYS BP LESS 140: HCPCS | Performed by: FAMILY MEDICINE

## 2022-11-08 RX ORDER — ERGOCALCIFEROL 1.25 MG/1
CAPSULE ORAL
COMMUNITY
Start: 2022-10-18

## 2022-11-08 NOTE — PROGRESS NOTES
[facility] Progress Note:   F/up Physician Visit    CC: Weight Management      Jeremy Mir is a 74 y.o. female who is here for her  f/up physician visit for the  [facility].  She has not had ozempic for 2 weeks  She has had several celebrations this month and has gained 4 lbs      Weight Metrics 11/8/2022 11/8/2022 10/19/2022 10/17/2022 9/26/2022 9/12/2022 9/9/2022   Weight - 211 lb 12.8 oz 207 lb 12.8 oz 207 lb 210 lb 6.4 oz 213 lb 13.5 oz 213 lb 12.8 oz   Neck Circ (inches) 14.5 - - - - - -   Waist Measure Inches 43.5 - - - - - -   Body Fat % 43.1 - - - - - -   BMI - 33.17 kg/m2 32.55 kg/m2 32.42 kg/m2 32.95 kg/m2 33.49 kg/m2 33.49 kg/m2         Outpatient Medications Marked as Taking for the 11/8/22 encounter (Office Visit) with Leo Lyle MD   Medication Sig Dispense Refill    ergocalciferol (ERGOCALCIFEROL) 1,250 mcg (50,000 unit) capsule TAKE 1 CAPSULE BY MOUTH EVERY 7 DAYS FOR 90 DAYS. semaglutide 1 mg/dose (4 mg/3 mL) pnij 1 mg by SubCUTAneous route every seven (7) days. 4 Each 1    acetaminophen (TYLENOL) 500 mg tablet Take 1,000 mg by mouth every eight (8) hours as needed for Pain. Symbicort 160-4.5 mcg/actuation HFAA Take 2 Puffs by inhalation two (2) times a day. metoprolol succinate (TOPROL-XL) 50 mg XL tablet TAKE 1 TABLET EVERY DAY 90 Tablet 2    cyanocobalamin (VITAMIN B12) 1,000 mcg/mL injection 1 mL by IntraMUSCular route every thirty (30) days. 3 mL 0    Insulin Needles, Disposable, (Taya Pen Needle) 32 gauge x 5/32\" ndle Use daily as directed 100 Pen Needle 1    melatonin 10 mg capsule Take  by mouth as needed. Takes one to two caps      HYDROcodone-acetaminophen (NORCO) 5-325 mg per tablet TAKE 1 TABLET BY MOUTH TWICE DAILY AS NEEDED FOR PAIN      cyclobenzaprine (FLEXERIL) 10 mg tablet Take  by mouth three (3) times daily as needed for Muscle Spasm(s).       DILT- mg capsule TAKE 1 CAPSULE EVERY DAY  (KEEP  APPOINTMENT  FOR  FURTHER  REFILLS) 90 Capsule 3    diazePAM (VALIUM) 10 mg tablet TAKE 1 TABLET BY MOUTH EVERY DAY AS NEEDED      hydroCHLOROthiazide (HYDRODIURIL) 12.5 mg tablet TAKE 1 TABLET BY MOUTH EVERY DAY IN THE MORNING      omeprazole (PRILOSEC) 20 mg capsule Take 20 mg by mouth as needed. pantoprazole (PROTONIX) 40 mg tablet TAKE 1 TABLET BY MOUTH EVERY DAY BEFORE A MEAL      traZODone (DESYREL) 50 mg tablet Take 100 mg by mouth as needed. rosuvastatin (CRESTOR) 40 mg tablet Take 1 Tab by mouth nightly. 90 Tab 3    pregabalin (LYRICA) 150 mg capsule Take 150 mg by mouth two (2) times a day. DULoxetine (CYMBALTA) 60 mg capsule Take 60 mg by mouth daily. ezetimibe (ZETIA) 10 mg tablet Take  by mouth.      metFORMIN (GLUMETZA ER) 500 mg TG24 24 hour tablet Take 500 mg by mouth two (2) times a day. Aspirin, Buffered 81 mg tab Take 1 Tablet by mouth daily. Participation   Did you attend clinic and class last week? no    Review of Systems  Since your last visit, have you experienced any complications? no  If yes, please list:       Are you taking an appetite suppressant? no  If so, is there any Chest Pain, Palpitations or Dizziness? HUNGER CONTROL: good    BP Readings from Last 3 Encounters:   11/08/22 113/73   10/19/22 125/79   10/17/22 110/78       SLEEP:8    Have you received any other medical care this week? no  If yes, where and for what? Have you discontinued or changed any medicine or dose of your medicine since your last visit with Dr Harless Bence? no  If yes, where and for what? Diet  How many ounces of calorie-free liquids did you consume each day?  80 oz    How many meal replacements did you take each day? 1 and 2 grocery meals    Did you have any problems adhering to the program?  yes If yes, please explain:      Exercise  Aerobic exercise:  every 4 days and is more active around the house  Resistance exercise: 1 workouts / week  Any discomfort?        If yes, where?      Objective  Visit Vitals  /73 (BP 1 Location: Right arm, BP Patient Position: Sitting, BP Cuff Size: Large adult)   Pulse 73   Temp 97.8 °F (36.6 °C) (Oral)   Resp 18   Ht 5' 7\" (1.702 m)   Wt 211 lb 12.8 oz (96.1 kg)   SpO2 95%   BMI 33.17 kg/m²     No LMP recorded. (Menstrual status: Menopause). Physical Exam  Appearance: well,   Mental:A&O x 3, NAD  H:NC/AT,  EENT:   EOMI, PERRL, No scleral icterus  Neck: no bruit or JVD  Lung: clear, No W/R  ABD: soft, active, nontender  Ext:  no Edema  Neuro: nonfocal  Assessment / Plan    Encounter Diagnoses   Name Primary? Class 1 obesity Yes    Type 2 diabetes mellitus without complication, without long-term current use of insulin (HCC)     Mixed hyperlipidemia     Vitamin D deficiency     Vitamin B12 deficiency      Diagnoses and all orders for this visit:    1. Class 1 obesity    Cont to work on activity  Sleeping well  Cont to work with the dietitian every month one on one    2. Type 2 diabetes mellitus without complication, without long-term current use of insulin (HCC)  -     semaglutide 1 mg/dose (4 mg/3 mL) pnij; 1 mg by SubCUTAneous route every seven (7) days. 3. Mixed hyperlipidemia    1. Weight management improved   Progress was reviewed with patient    2. Labs    Latest results reviewed with patient       face to face time with Melchor Sender consisted of counseling & coordinating and/or discussing treatment plans in reference to her obesity The primary encounter diagnosis was Class 1 obesity. Diagnoses of Type 2 diabetes mellitus without complication, without long-term current use of insulin ([facility] [address] Mixed hyperlipidemia, Vitamin D deficiency, and Vitamin B12 deficiency were also pertinent to this visit.

## 2022-11-08 NOTE — PROGRESS NOTES
10/19/2022    Progress Note: Weekly Education Class in the [facility]         Patient is on Very Low Calorie Diet [] (4 meal replacements per day, 800 kcal/day)      Low Calorie Diet [x] (2-3 meal replacements per day, 8532-3733 kcal/day)    1) Did patient have any new symptoms or physical problems? Yes []    No [x]    If yes, check & comment: weakness [], fatigue [], lightheadedness [], headache [], cramps [], cold intolerance [], hair loss [], diarrhea [], constipation [],  NA [] other:                                 2) Has patient had any medical attention from other providers, urgent care or the emergency room this week? Yes []  No [x]       NA [], If yes, why:                                       3) Any other sugar sweetened beverages consumed this week? Yes [x]  No []    4) Did patient have any problems adhering to the diet? Yes []  No [] NA []NOT ANSWERED      If yes, Vacation [], Celebrations [], Conferences [], Family Reunions [] other:                                                5) How many hours of sleep this week? 8-10    (range)  NA []    Number of meal replacements consumed daily? 1  (range)  NA []    Average ounces of water patient consumed daily this week (not including shakes)? 74     (divide the weekly total by 7)    Did you eat any food outside of the program? Yes [x] No []    Physical Activity Over the Past Week:    Cardio exercise: 15 min  Strength exercise: 1 workouts / week  Number of steps walked per day: 500-1 mile    How has patient mood overall been this week? Sad [], Happy [], Stressed [], Tired [], Content [], NA [], other   Good           Medications reconciled by nurse Yes [x]  No[]    Patient was given therapeutic recommendations for any noted side effects of their dietary approach based upon [facility] patient manual per providers recommendation.      10/26/2022    Progress Note: Weekly Education Class in the [facility] Patient is on Very Low Calorie Diet [] (4 meal replacements per day, 800 kcal/day)      Low Calorie Diet [x] (2-3 meal replacements per day, 7443-6113 kcal/day)    1) Did patient have any new symptoms or physical problems? Yes []    No [x]    If yes, check & comment: weakness [], fatigue [], lightheadedness [], headache [], cramps [], cold intolerance [], hair loss [], diarrhea [], constipation [],  NA [] other:                                 2) Has patient had any medical attention from other providers, urgent care or the emergency room this week? Yes [x]  No []       NA [], If yes, why: hip injection                                      3) Any other sugar sweetened beverages consumed this week? Yes [x]  No []    4) Did patient have any problems adhering to the diet? Yes []  No [] NA []NOT ANSWERED      If yes, Vacation [], Celebrations [], Conferences [], Family Reunions [] other:                                                5) How many hours of sleep this week? 7-9    (range)  NA []    Number of meal replacements consumed daily? 1  (range)  NA []    Average ounces of water patient consumed daily this week (not including shakes)? 80     (divide the weekly total by 7)    Did you eat any food outside of the program? Yes [x] No []    Physical Activity Over the Past Week:    Cardio exercise: 0 min  Strength exercise: 2 workouts / week  Number of steps walked per day: 500    How has patient mood overall been this week? Sad [], Happy [], Stressed [], Tired [], Content [], NA [], other   Several different moods             Medications reconciled by nurse Yes [x]  No[]    Patient was given therapeutic recommendations for any noted side effects of their dietary approach based upon [facility] patient manual per providers recommendation.

## 2022-11-08 NOTE — LETTER
11/25/2022    Ms. Thiago Cervantes  [id number]      Dear Stacy Arora:    Please find your most recent results below. Resulted Orders   METABOLIC PANEL, COMPREHENSIVE   Result Value Ref Range    Glucose 73 70 - 99 mg/dL    BUN 15 8 - 27 mg/dL    Creatinine 0.75 0.57 - 1.00 mg/dL    eGFR 83 >59 mL/min/1.73    BUN/Creatinine ratio 20 12 - 28    Sodium 139 134 - 144 mmol/L    Potassium 4.2 3.5 - 5.2 mmol/L    Chloride 103 96 - 106 mmol/L    CO2 25 20 - 29 mmol/L    Calcium 10.0 8.7 - 10.3 mg/dL    Protein, total 5.8 (L) 6.0 - 8.5 g/dL    Albumin 4.0 3.7 - 4.7 g/dL    GLOBULIN, TOTAL 1.8 1.5 - 4.5 g/dL    A-G Ratio 2.2 1.2 - 2.2    Bilirubin, total 0.3 0.0 - 1.2 mg/dL    Alk.  phosphatase 42 (L) 44 - 121 IU/L    AST (SGOT) 22 0 - 40 IU/L    ALT (SGPT) 26 0 - 32 IU/L   HEMOGLOBIN A1C WITH EAG   Result Value Ref Range    Hemoglobin A1c 6.0 (H) 4.8 - 5.6 %    Estimated average glucose 126 mg/dL         RECOMMENDATIONS:    The blood sugar is better but still in the prediabetes zone  The vit D level an cholesterol levels are normal  The liver and kidney tests are normal       Please call me if you have any questions: [phone]    Sincerely,      Cortes Arguelles MD

## 2022-11-08 NOTE — PROGRESS NOTES
Weight Management. 1 month follow up. 1. Have you been to the ER, urgent care clinic since your last visit? Hospitalized since your last visit? No    2. Have you seen or consulted any other health care providers outside of the [address] [place] [address] since your last visit? Include any pap smears or colon screening.  Yes,  [facility]    BMI - 32.9

## 2022-11-09 LAB
25(OH)D3+25(OH)D2 SERPL-MCNC: 92.5 NG/ML (ref 30–100)
ALBUMIN SERPL-MCNC: 4 G/DL (ref 3.7–4.7)
ALBUMIN/GLOB SERPL: 2.2 {RATIO} (ref 1.2–2.2)
ALP SERPL-CCNC: 42 IU/L (ref 44–121)
ALT SERPL-CCNC: 26 IU/L (ref 0–32)
AST SERPL-CCNC: 22 IU/L (ref 0–40)
BILIRUB SERPL-MCNC: 0.3 MG/DL (ref 0–1.2)
BUN SERPL-MCNC: 15 MG/DL (ref 8–27)
BUN/CREAT SERPL: 20 (ref 12–28)
CALCIUM SERPL-MCNC: 10 MG/DL (ref 8.7–10.3)
CHLORIDE SERPL-SCNC: 103 MMOL/L (ref 96–106)
CHOLEST SERPL-MCNC: 127 MG/DL (ref 100–199)
CO2 SERPL-SCNC: 25 MMOL/L (ref 20–29)
CREAT SERPL-MCNC: 0.75 MG/DL (ref 0.57–1)
EGFR: 83 ML/MIN/1.73
EST. AVERAGE GLUCOSE BLD GHB EST-MCNC: 126 MG/DL
GLOBULIN SER CALC-MCNC: 1.8 G/DL (ref 1.5–4.5)
GLUCOSE SERPL-MCNC: 73 MG/DL (ref 70–99)
HBA1C MFR BLD: 6 % (ref 4.8–5.6)
HDLC SERPL-MCNC: 46 MG/DL
LDLC SERPL CALC-MCNC: 59 MG/DL (ref 0–99)
POTASSIUM SERPL-SCNC: 4.2 MMOL/L (ref 3.5–5.2)
PROT SERPL-MCNC: 5.8 G/DL (ref 6–8.5)
SODIUM SERPL-SCNC: 139 MMOL/L (ref 134–144)
TRIGL SERPL-MCNC: 127 MG/DL (ref 0–149)
VIT B12 SERPL-MCNC: 310 PG/ML (ref 232–1245)
VLDLC SERPL CALC-MCNC: 22 MG/DL (ref 5–40)

## 2022-11-15 ENCOUNTER — TRANSCRIBE ORDER (OUTPATIENT)
Dept: SCHEDULING | Age: 72
End: 2022-11-15

## 2022-11-15 DIAGNOSIS — I25.10 CORONARY ARTERY DISEASE INVOLVING NATIVE CORONARY ARTERY OF NATIVE HEART WITHOUT ANGINA PECTORIS: Primary | ICD-10-CM

## 2022-11-20 NOTE — PROGRESS NOTES
The blood sugar is better but still in the prediabetes zone  The vit D level an cholesterol levels are normal  The liver and kidney tests are normal

## 2022-11-25 ENCOUNTER — DOCUMENTATION ONLY (OUTPATIENT)
Dept: SURGERY | Age: 72
End: 2022-11-25

## 2022-12-02 ENCOUNTER — CLINICAL SUPPORT (OUTPATIENT)
Dept: SURGERY | Age: 72
End: 2022-12-02

## 2022-12-02 ENCOUNTER — TELEPHONE (OUTPATIENT)
Dept: SURGERY | Age: 72
End: 2022-12-02

## 2022-12-02 VITALS
OXYGEN SATURATION: 98 % | TEMPERATURE: 98.5 F | HEART RATE: 90 BPM | SYSTOLIC BLOOD PRESSURE: 118 MMHG | RESPIRATION RATE: 18 BRPM | WEIGHT: 205.7 LBS | BODY MASS INDEX: 32.28 KG/M2 | DIASTOLIC BLOOD PRESSURE: 80 MMHG | HEIGHT: 67 IN

## 2022-12-02 DIAGNOSIS — E66.9 OBESITY (BMI 30.0-34.9): Primary | ICD-10-CM

## 2022-12-02 NOTE — TELEPHONE ENCOUNTER
Patient identified with two patient identifiers. Patient informed I was calling to clarify request. Patient states she needs medical records/ medication list sent over to her medical insurance. Patient asked name of company states it a free agent. Asked if it was for her medical insurance states name of company is [employer]. Patient informed she would need to sign auth to release medical information. Patient informed this can be sent to her via [employer] or she could come up to office to sign. Patient also informed since she is actively on [employer] she can also view her medication list from there it will also be attached to recent AVS on file. Patient expressed understanding will return call if any other questions or concerns.

## 2022-12-02 NOTE — PROGRESS NOTES
Identified pt with two pt identifiers (name and ). Reviewed chart in preparation for visit and have obtained necessary documentation. Lea Taveras is a 74 y.o. female  Chief Complaint   Patient presents with    Weight Management     Visit Vitals  /80 (BP 1 Location: Left arm, BP Patient Position: Sitting, BP Cuff Size: Adult)   Pulse 90   Temp 98.5 °F (36.9 °C) (Oral)   Resp 18   Ht 5' 7\" (1.702 m)   Wt 205 lb 11.2 oz (93.3 kg)   SpO2 98%   BMI 32.22 kg/m²       1. Have you been to the ER, urgent care clinic since your last visit? Hospitalized since your last visit? No    2. Have you seen or consulted any other health care providers outside of the [address] [place] [address] since your last visit? Include any pap smears or colon screening.  No

## 2022-12-02 NOTE — TELEPHONE ENCOUNTER
Patient called stating that she would like her medication list sent to [employer] at her insurance company.

## 2022-12-11 RX ORDER — DILTIAZEM HYDROCHLORIDE 120 MG/1
CAPSULE, EXTENDED RELEASE ORAL
Qty: 90 CAPSULE | Refills: 3 | Status: SHIPPED | OUTPATIENT
Start: 2022-12-11

## 2022-12-16 ENCOUNTER — CLINICAL SUPPORT (OUTPATIENT)
Dept: SURGERY | Age: 72
End: 2022-12-16

## 2022-12-16 VITALS
DIASTOLIC BLOOD PRESSURE: 72 MMHG | HEART RATE: 76 BPM | OXYGEN SATURATION: 95 % | TEMPERATURE: 98.1 F | HEIGHT: 67 IN | RESPIRATION RATE: 16 BRPM | BODY MASS INDEX: 32.21 KG/M2 | SYSTOLIC BLOOD PRESSURE: 108 MMHG | WEIGHT: 205.2 LBS

## 2022-12-16 DIAGNOSIS — E66.9 CLASS 1 OBESITY: Primary | ICD-10-CM

## 2022-12-16 NOTE — PROGRESS NOTES
Identified pt with two pt identifiers (name and ). Reviewed chart in preparation for visit and have obtained necessary documentation. Liz Jacobo is a 74 y.o. female  Chief Complaint   Patient presents with    Weight Management     LCD     Visit Vitals  /72 (BP 1 Location: Left upper arm, BP Patient Position: Sitting, BP Cuff Size: Adult)   Pulse 76   Temp 98.1 °F (36.7 °C) (Oral)   Resp 16   Ht 5' 7\" (1.702 m)   Wt 205 lb 3.2 oz (93.1 kg)   SpO2 95%   BMI 32.14 kg/m²       1. Have you been to the ER, urgent care clinic since your last visit? Hospitalized since your last visit? No    2. Have you seen or consulted any other health care providers outside of the [address] [place] [address] since your last visit? Include any pap smears or colon screening. No        Progress Note: Weekly Education Class in the [facility]         Patient is on Very Low Calorie Diet [] (4 meal replacements per day, 800 kcal/day)      Low Calorie Diet [x] (2-3 meal replacements per day, 8750-7260 kcal/day)    1) Did patient have any new symptoms or physical problems? Yes []    No [x]    If yes, check & comment: weakness [], fatigue [], lightheadedness [], headache [], cramps [], cold intolerance [], hair loss [], diarrhea [], constipation [],  NA [] other:                                 2) Has patient had any medical attention from other providers, urgent care or the emergency room this week? Yes []  No [x]       NA [], If yes, why:                                       3) Any other sugar sweetened beverages consumed this week? Yes [x]  No []    4) Did patient have any problems adhering to the diet? Yes [x]  No [] NA []    If yes, Vacation [], Celebrations [], Conferences [], Family Reunions [x] other:  Grandson's Party                                              5) How many hours of sleep this week? 8-11    (range)  NA []    Number of meal replacements consumed daily?  1 (range)  NA []    Average ounces of water patient consumed daily this week (not including shakes)? 50 oz     (divide the weekly total by 7)    Did you eat any food outside of the program? Yes [x] No []    Physical Activity Over the Past Week:    Cardio exercise: 88 min  Strength exercise: 1 workouts / week  Number of steps walked per day: not documented    How has patient mood overall been this week? Sad [], Happy [], Stressed [], Tired [x], Content [], NA [x], other  anxious,nervous          Medications reconciled by nurse Yes [x]  No[]    Patient was given therapeutic recommendations for any noted side effects of their dietary approach based upon [facility] patient manual per providers recommendation. Progress Note: Weekly Education Class in the [facility] Loss [facility]         Patient is on Very Low Calorie Diet [] (4 meal replacements per day, 800 kcal/day)      Low Calorie Diet [x] (2-3 meal replacements per day, 5153-2108 kcal/day)    1) Did patient have any new symptoms or physical problems? Yes []    No [x]    If yes, check & comment: weakness [], fatigue [], lightheadedness [], headache [], cramps [], cold intolerance [], hair loss [], diarrhea [], constipation [],  NA [] other:                                 2) Has patient had any medical attention from other providers, urgent care or the emergency room this week? Yes [x]  No []       NA [], If yes, why: Follow up from procedures                                      3) Any other sugar sweetened beverages consumed this week? Yes []  No [x]    4) Did patient have any problems adhering to the diet? Yes [x]  No [] NA []    If yes, Vacation [], Celebrations [x], Conferences [], Family Reunions [] other: Birthday's                                               5) How many hours of sleep this week? 8-10    (range)  NA []    Number of meal replacements consumed daily?  1 (range)  NA []    Average ounces of water patient consumed daily this week (not including shakes)? 64    (divide the weekly total by 7)    Did you eat any food outside of the program? Yes [x] No []    Physical Activity Over the Past Week:    Cardio exercise: 0 min  Strength exercise: 0 workouts / week  Number of steps walked per day: not documented    How has patient mood overall been this week? Sad [], Happy [], Stressed [], Tired [x], Content [], NA [], other            Medications reconciled by nurse Yes [x]  No[]    Patient was given therapeutic recommendations for any noted side effects of their dietary approach based upon [facility] patient manual per providers recommendation.

## 2022-12-25 DIAGNOSIS — E11.9 TYPE 2 DIABETES MELLITUS WITHOUT COMPLICATION, WITHOUT LONG-TERM CURRENT USE OF INSULIN (HCC): ICD-10-CM

## 2022-12-28 RX ORDER — SEMAGLUTIDE 1.34 MG/ML
INJECTION, SOLUTION SUBCUTANEOUS
Qty: 6 EACH | Refills: 0 | Status: SHIPPED | OUTPATIENT
Start: 2022-12-28

## 2023-01-05 ENCOUNTER — TELEPHONE (OUTPATIENT)
Dept: SURGERY | Age: 73
End: 2023-01-05

## 2023-01-05 DIAGNOSIS — E11.9 TYPE 2 DIABETES MELLITUS WITHOUT COMPLICATION, WITHOUT LONG-TERM CURRENT USE OF INSULIN (HCC): ICD-10-CM

## 2023-01-05 RX ORDER — SEMAGLUTIDE 1.34 MG/ML
1 INJECTION, SOLUTION SUBCUTANEOUS
Qty: 1 EACH | Refills: 1 | Status: SHIPPED | OUTPATIENT
Start: 2023-01-05

## 2023-01-05 NOTE — PROGRESS NOTES
Patient called and stated that she s/w [place] [facility] She can get a one month supply of the Ozempic at a time for only $45... It will need to be sent to [facility]. I advised her that I would send the request to the provider.

## 2023-01-05 NOTE — TELEPHONE ENCOUNTER
Patient called stating that she needs a refill on her Ozempic. She cancelled the refill that she had from 12/28 b/c her insurance changed on January 1st and it is now too expensive. She is asking if we can send a new Rx for the Ozempic to a [address] [place] [address] J. I advised the patient that we do not do that. I advised the patient that we may be able to give her a paper prescription so she can send it where she wants and not have to use her insurance. She will need to s/w Dr. José Miguel Brown about this. The patient stated that her next appointment is not until February 1st.    I advised the patient that I will s/w a provider and call her back. The patient verbalized understanding of all.

## 2023-01-16 ENCOUNTER — HOSPITAL ENCOUNTER (OUTPATIENT)
Dept: GENERAL RADIOLOGY | Age: 73
Discharge: HOME | End: 2023-01-16
Payer: MEDICARE

## 2023-01-16 ENCOUNTER — TRANSCRIBE ORDER (OUTPATIENT)
Dept: REGISTRATION | Age: 73
End: 2023-01-16

## 2023-01-16 DIAGNOSIS — M54.16 RADICULOPATHY, LUMBAR REGION: ICD-10-CM

## 2023-01-16 DIAGNOSIS — M54.16 RADICULOPATHY, LUMBAR REGION: Primary | ICD-10-CM

## 2023-01-16 PROCEDURE — 72100 X-RAY EXAM L-S SPINE 2/3 VWS: CPT

## 2023-01-20 ENCOUNTER — CLINICAL SUPPORT (OUTPATIENT)
Dept: SURGERY | Age: 73
End: 2023-01-20

## 2023-01-20 VITALS
HEIGHT: 67 IN | SYSTOLIC BLOOD PRESSURE: 105 MMHG | TEMPERATURE: 98.2 F | BODY MASS INDEX: 31.19 KG/M2 | DIASTOLIC BLOOD PRESSURE: 67 MMHG | HEART RATE: 79 BPM | WEIGHT: 198.7 LBS | OXYGEN SATURATION: 96 %

## 2023-01-20 DIAGNOSIS — E66.9 OBESITY (BMI 30.0-34.9): ICD-10-CM

## 2023-01-20 DIAGNOSIS — E66.9 CLASS 1 OBESITY: Primary | ICD-10-CM
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
## 2023-01-31 NOTE — PROGRESS NOTES
Nurse note from patient's weekly  / LCD / Maintenance class was reviewed. Pertinent medical concerns were:   reviewed     BP Readings from Last 3 Encounters:   01/20/23 105/67   12/16/22 108/72   12/02/22 118/80       Weight Metrics 1/20/2023 12/16/2022 12/2/2022 11/8/2022 11/8/2022 10/19/2022 10/17/2022   Weight 198 lb 11.2 oz 205 lb 3.2 oz 205 lb 11.2 oz - 211 lb 12.8 oz 207 lb 12.8 oz 207 lb   Neck Circ (inches) - - - 14.5 - - -   Waist Measure Inches - - - 43.5 - - -   Body Fat % - - - 43.1 - - -   BMI 31.12 kg/m2 32.14 kg/m2 32.22 kg/m2 - 33.17 kg/m2 32.55 kg/m2 32.42 kg/m2       Current Outpatient Medications   Medication Sig Dispense Refill    semaglutide (Ozempic) 1 mg/dose (4 mg/3 mL) pnij 1 mg by SubCUTAneous route every seven (7) days. 1 Each 1    ergocalciferol (ERGOCALCIFEROL) 1,250 mcg (50,000 unit) capsule TAKE 1 CAPSULE BY MOUTH EVERY 7 DAYS FOR 90 DAYS. 12 Capsule 0    DILT- mg capsule TAKE 1 CAPSULE EVERY DAY , NEED MD APPOINTMENT FOR REFILLS 90 Capsule 3    acetaminophen (TYLENOL) 500 mg tablet Take 1,000 mg by mouth every eight (8) hours as needed for Pain. Symbicort 160-4.5 mcg/actuation HFAA Take 2 Puffs by inhalation two (2) times a day. metoprolol succinate (TOPROL-XL) 50 mg XL tablet TAKE 1 TABLET EVERY DAY 90 Tablet 2    cyanocobalamin (VITAMIN B12) 1,000 mcg/mL injection 1 mL by IntraMUSCular route every thirty (30) days. 3 mL 0    Insulin Needles, Disposable, (Taya Pen Needle) 32 gauge x 5/32\" ndle Use daily as directed 100 Pen Needle 1    melatonin 10 mg capsule Take  by mouth as needed. Takes one to two caps      HYDROcodone-acetaminophen (NORCO) 5-325 mg per tablet TAKE 1 TABLET BY MOUTH TWICE DAILY AS NEEDED FOR PAIN      diazePAM (VALIUM) 10 mg tablet       hydroCHLOROthiazide (HYDRODIURIL) 12.5 mg tablet TAKE 1 TABLET BY MOUTH EVERY DAY IN THE MORNING      omeprazole (PRILOSEC) 20 mg capsule Take 20 mg by mouth as needed.       traZODone (DESYREL) 50 mg tablet Take 100 mg by mouth as needed. rosuvastatin (CRESTOR) 40 mg tablet Take 1 Tab by mouth nightly. 90 Tab 3    pregabalin (LYRICA) 150 mg capsule Take 150 mg by mouth two (2) times a day. DULoxetine (CYMBALTA) 60 mg capsule Take 60 mg by mouth daily. ezetimibe (ZETIA) 10 mg tablet Take  by mouth.      metFORMIN (GLUMETZA ER) 500 mg TG24 24 hour tablet Take 500 mg by mouth two (2) times a day. Aspirin, Buffered 81 mg tab Take 1 Tablet by mouth daily. cyclobenzaprine (FLEXERIL) 10 mg tablet Take  by mouth three (3) times daily as needed for Muscle Spasm(s).  (Patient not taking: Reported on 1/20/2023)      pantoprazole (PROTONIX) 40 mg tablet TAKE 1 TABLET BY MOUTH EVERY DAY BEFORE A MEAL (Patient not taking: Reported on 1/20/2023)

## 2023-02-01 ENCOUNTER — OFFICE VISIT (OUTPATIENT)
Dept: SURGERY | Age: 73
End: 2023-02-01
Payer: MEDICARE

## 2023-02-01 VITALS
OXYGEN SATURATION: 96 % | RESPIRATION RATE: 18 BRPM | SYSTOLIC BLOOD PRESSURE: 130 MMHG | BODY MASS INDEX: 29.73 KG/M2 | TEMPERATURE: 98.2 F | HEIGHT: 67 IN | WEIGHT: 189.4 LBS | HEART RATE: 65 BPM | DIASTOLIC BLOOD PRESSURE: 78 MMHG

## 2023-02-01 DIAGNOSIS — E53.8 VITAMIN B12 DEFICIENCY: ICD-10-CM

## 2023-02-01 DIAGNOSIS — E55.9 VITAMIN D DEFICIENCY: ICD-10-CM

## 2023-02-01 DIAGNOSIS — E11.9 TYPE 2 DIABETES MELLITUS WITHOUT COMPLICATION, WITHOUT LONG-TERM CURRENT USE OF INSULIN (HCC): ICD-10-CM

## 2023-02-01 DIAGNOSIS — E66.3 OVERWEIGHT (BMI 25.0-29.9): Primary | ICD-10-CM

## 2023-02-01 RX ORDER — CYANOCOBALAMIN 1000 UG/ML
1000 INJECTION, SOLUTION INTRAMUSCULAR; SUBCUTANEOUS
Qty: 3 ML | Refills: 1
Start: 2023-02-01

## 2023-02-01 NOTE — PROGRESS NOTES
[facility] Progress Note:   F/up Physician Visit    CC: Weight Management      Jadon Ibarra is a 74 y.o. female who is here for her  f/up physician visit for the / LCD Program.  She does a modified LCD    Weight Metrics 2/1/2023 2/1/2023 1/20/2023 12/16/2022 12/2/2022 11/8/2022 11/8/2022   Weight - 189 lb 6.4 oz 198 lb 11.2 oz 205 lb 3.2 oz 205 lb 11.2 oz - 211 lb 12.8 oz   Neck Circ (inches) 13.5 - - - - 14.5 -   Waist Measure Inches 39.5 - - - - 43.5 -   Body Fat % 40.1 - - - - 43.1 -   BMI - 29.66 kg/m2 31.12 kg/m2 32.14 kg/m2 32.22 kg/m2 - 33.17 kg/m2         Outpatient Medications Marked as Taking for the 2/1/23 encounter (Office Visit) with Ac Meadows MD   Medication Sig Dispense Refill    semaglutide 2 mg/dose (8 mg/3 mL) pnij 2 mg by SubCUTAneous route every seven (7) days. 4 Each 2    cyanocobalamin (VITAMIN B12) 1,000 mcg/mL injection 1 mL by IntraMUSCular route every thirty (30) days. 3 mL 1    semaglutide (Ozempic) 1 mg/dose (4 mg/3 mL) pnij 1 mg by SubCUTAneous route every seven (7) days. 1 Each 1    ergocalciferol (ERGOCALCIFEROL) 1,250 mcg (50,000 unit) capsule TAKE 1 CAPSULE BY MOUTH EVERY 7 DAYS FOR 90 DAYS. 12 Capsule 0    DILT- mg capsule TAKE 1 CAPSULE EVERY DAY , NEED MD APPOINTMENT FOR REFILLS 90 Capsule 3    acetaminophen (TYLENOL) 500 mg tablet Take 1,000 mg by mouth every eight (8) hours as needed for Pain. Symbicort 160-4.5 mcg/actuation HFAA Take 2 Puffs by inhalation two (2) times a day. metoprolol succinate (TOPROL-XL) 50 mg XL tablet TAKE 1 TABLET EVERY DAY 90 Tablet 2    Insulin Needles, Disposable, (Taya Pen Needle) 32 gauge x 5/32\" ndle Use daily as directed 100 Pen Needle 1    melatonin 10 mg capsule Take  by mouth as needed.  Takes one to two caps      HYDROcodone-acetaminophen (NORCO) 5-325 mg per tablet TAKE 1 TABLET BY MOUTH TWICE DAILY AS NEEDED FOR PAIN      diazePAM (VALIUM) 10 mg tablet       hydroCHLOROthiazide (HYDRODIURIL) 12.5 mg tablet TAKE 1 TABLET BY MOUTH EVERY DAY IN THE MORNING      omeprazole (PRILOSEC) 20 mg capsule Take 20 mg by mouth as needed. traZODone (DESYREL) 50 mg tablet Take 100 mg by mouth as needed. rosuvastatin (CRESTOR) 40 mg tablet Take 1 Tab by mouth nightly. 90 Tab 3    pregabalin (LYRICA) 150 mg capsule Take 150 mg by mouth two (2) times a day. DULoxetine (CYMBALTA) 60 mg capsule Take 60 mg by mouth daily. ezetimibe (ZETIA) 10 mg tablet Take  by mouth.      metFORMIN (GLUMETZA ER) 500 mg TG24 24 hour tablet Take 500 mg by mouth two (2) times a day. Aspirin, Buffered 81 mg tab Take 1 Tablet by mouth daily. Participation   Did you attend clinic and class last week? yes    Review of Systems  Since your last visit, have you experienced any complications? no  If yes, please list:       Are you taking an appetite suppressant? yes  If so, is there any Chest Pain, Palpitations or Dizziness? HUNGER CONTROL: good    BP Readings from Last 3 Encounters:   02/01/23 130/78   01/20/23 105/67   12/16/22 108/72       SLEEP:8    Have you received any other medical care this week? no  If yes, where and for what? Have you discontinued or changed any medicine or dose of your medicine since your last visit with Dr Radha Gonsalez? no  If yes, where and for what? Diet  How many ounces of calorie-free liquids did you consume each day? 70-80 oz    How many meal replacements did you take each day? 1    Did you have any problems adhering to the program?  no If yes, please explain:      Exercise  Aerobic exercise: just walking to see her  at the hospital min  Resistance exercise: 0 workouts / week  Any discomfort? If yes, where?       Objective  Visit Vitals  /78 (BP 1 Location: Left upper arm, BP Patient Position: Sitting, BP Cuff Size: Large adult)   Pulse 65   Temp 98.2 °F (36.8 °C) (Oral)   Resp 18   Ht 5' 7\" (1.702 m)   Wt 189 lb 6.4 oz (85.9 kg)   SpO2 96%   BMI 29.66 kg/m² No LMP recorded. (Menstrual status: Menopause). Physical Exam  Appearance: well,   Mental:A&O x 3, NAD  H:NC/AT,  EENT:   EOMI, PERRL, No scleral icterus  Neck: no bruit or JVD  Lung: clear, No W/R  ABD: soft, active, nontender  Ext:  no Edema  Neuro: nonfocal  Assessment / Plan    Encounter Diagnoses   Name Primary? Overweight (BMI 25.0-29. 9) Yes    Type 2 diabetes mellitus without complication, without long-term current use of insulin (HCC)     Vitamin B12 deficiency      Diagnoses and all orders for this visit:    1. Overweight (BMI 25.0-29. 9)  No longer in obesity zone  Still on weight loss process  Should consider maintenance soon. Will discuss more on next visit    2. Type 2 diabetes mellitus without complication, without long-term current use of insulin (HCC)  -     semaglutide 2 mg/dose (8 mg/3 mL) pnij; 2 mg by SubCUTAneous route every seven (7) days. 3. Vitamin B12 deficiency  -     cyanocobalamin (VITAMIN B12) 1,000 mcg/mL injection; 1 mL by IntraMUSCular route every thirty (30) days. 1.  Weight management improved   Progress was reviewed with patient    2. Labs    Latest results reviewed with patient       face to face time with Mirza Saba consisted of counseling & coordinating and/or discussing treatment plans in reference to her obesity The primary encounter diagnosis was Overweight (BMI 25.0-29.9). Diagnoses of Type 2 diabetes mellitus without complication, without long-term current use of insulin (HCC) and Vitamin B12 deficiency were also pertinent to this visit.

## 2023-02-01 NOTE — PROGRESS NOTES
[id number]        Progress Note: Weekly Education Class in the [facility]         Patient is on Very Low Calorie Diet [] (4 meal replacements per day, 800 kcal/day)      Low Calorie Diet [x] (2-3 meal replacements per day, 8485-3925 kcal/day)    1) Did patient have any new symptoms or physical problems? Yes [x]    No []    If yes, check & comment: weakness [], fatigue [], lightheadedness [], headache [], cramps [], cold intolerance [], hair loss [], diarrhea [], constipation [],  NA [] other: groin/shoulder/knee pain                                2) Has patient had any medical attention from other providers, urgent care or the emergency room this week? Yes [x]  No []       NA [], If yes, why: Orthopedics for hip injection/ shoulder and knee pain                                      3) Any other sugar sweetened beverages consumed this week? Yes []  No [x]    4) Did patient have any problems adhering to the diet? Yes []  No [] NA [x]    If yes, Vacation [], Celebrations [], Conferences [], Family Reunions [] other:                                                5) How many hours of sleep this week? 56.5    (range)  NA []    Number of meal replacements consumed daily? 7 (range)  NA []    Average ounces of water patient consumed daily this week (not including shakes)? 77     (divide the weekly total by 7)    Did you eat any food outside of the program? Yes [x] No []    Physical Activity Over the Past Week:    Cardio exercise: 45 min  Strength exercise: 10 workouts / week  Number of steps walked per day: 2351-9868    How has patient mood overall been this week? Sad [], Happy [], Stressed [], Tired [], Content [x], NA [], other  was in hospital now in rehab facility            Medications reconciled by nurse Yes [x]  No[]    Patient was given therapeutic recommendations for any noted side effects of their dietary approach based upon [facility] patient manual per providers recommendation. Identified pt with two pt identifiers (name and ). Reviewed chart in preparation for visit and have obtained necessary documentation. Marcella Summers is a 74 y.o. female  Chief Complaint   Patient presents with    Weight Management     1 month follow up     Visit Vitals  /78 (BP 1 Location: Left upper arm, BP Patient Position: Sitting, BP Cuff Size: Large adult)   Pulse 65   Temp 98.2 °F (36.8 °C) (Oral)   Resp 18   Ht 5' 7\" (1.702 m)   Wt 189 lb 6.4 oz (85.9 kg)   SpO2 96%   BMI 29.66 kg/m²       1. Have you been to the ER, urgent care clinic since your last visit? Hospitalized since your last visit? No    2. Have you seen or consulted any other health care providers outside of the [address] [place] [address] since your last visit? Include any pap smears or colon screening.  Yes Orthopedics for joint injections

## 2023-02-01 NOTE — PROGRESS NOTES
Week 2        Progress Note: Weekly Education Class in the [facility]         Patient is on Very Low Calorie Diet [] (4 meal replacements per day, 800 kcal/day)      Low Calorie Diet [x] (2-3 meal replacements per day, 1814-3155 kcal/day)    1) Did patient have any new symptoms or physical problems? Yes []    No [x]    If yes, check & comment: weakness [], fatigue [], lightheadedness [], headache [], cramps [], cold intolerance [], hair loss [], diarrhea [], constipation [],  NA [x] other:                                 2) Has patient had any medical attention from other providers, urgent care or the emergency room this week? Yes [x]  No []       NA [], If yes, why: Dr. Mark Parmar cardiologist check up visit                                      3) Any other sugar sweetened beverages consumed this week? Yes [x]  No []    4) Did patient have any problems adhering to the diet? Yes [x]  No [] NA []    If yes, Vacation [], Celebrations [], Conferences [], Family Reunions [] other:   admitted to hospital                                               5) How many hours of sleep this week? 59    (range)  NA []    Number of meal replacements consumed daily? 5 (range)  NA []    Average ounces of water patient consumed daily this week (not including shakes)? 51     (divide the weekly total by 7)    Did you eat any food outside of the program? Yes [x] No []    Physical Activity Over the Past Week:    Cardio exercise: 15 min  Strength exercise: 0 workouts / week  Number of steps walked per day: 1229-1303    How has patient mood overall been this week? Sad [], Happy [], Stressed [], Tired [], Content [], NA [], other ok per patient           Medications reconciled by nurse Yes [x]  No[]    Patient was given therapeutic recommendations for any noted side effects of their dietary approach based upon [facility] patient manual per providers recommendation.

## 2023-02-08 ENCOUNTER — TRANSCRIBE ORDER (OUTPATIENT)
Dept: REGISTRATION | Age: 73
End: 2023-02-08

## 2023-02-08 ENCOUNTER — HOSPITAL ENCOUNTER (OUTPATIENT)
Dept: GENERAL RADIOLOGY | Age: 73
Discharge: HOME | End: 2023-02-08
Payer: MEDICARE

## 2023-02-08 DIAGNOSIS — M16.11 PRIMARY OSTEOARTHRITIS OF RIGHT HIP: ICD-10-CM

## 2023-02-08 DIAGNOSIS — M16.12 PRIMARY OSTEOARTHRITIS OF LEFT HIP: ICD-10-CM

## 2023-02-08 DIAGNOSIS — M16.12 PRIMARY OSTEOARTHRITIS OF LEFT HIP: Primary | ICD-10-CM

## 2023-02-08 PROCEDURE — 73521 X-RAY EXAM HIPS BI 2 VIEWS: CPT

## 2023-02-15 ENCOUNTER — CLINICAL SUPPORT (OUTPATIENT)
Dept: SURGERY | Age: 73
End: 2023-02-15

## 2023-02-15 VITALS
WEIGHT: 193.4 LBS | RESPIRATION RATE: 18 BRPM | SYSTOLIC BLOOD PRESSURE: 121 MMHG | DIASTOLIC BLOOD PRESSURE: 74 MMHG | TEMPERATURE: 97.7 F | BODY MASS INDEX: 30.35 KG/M2 | HEART RATE: 77 BPM | OXYGEN SATURATION: 96 % | HEIGHT: 67 IN

## 2023-02-15 DIAGNOSIS — E11.9 TYPE 2 DIABETES MELLITUS WITHOUT COMPLICATION, WITHOUT LONG-TERM CURRENT USE OF INSULIN (HCC): ICD-10-CM

## 2023-02-15 DIAGNOSIS — E78.2 MIXED HYPERLIPIDEMIA: ICD-10-CM

## 2023-02-15 DIAGNOSIS — E66.9 OBESITY (BMI 30.0-34.9): Primary | ICD-10-CM

## 2023-02-15 RX ORDER — CHOLECALCIFEROL (VITAMIN D3) 125 MCG
5 CAPSULE ORAL
COMMUNITY

## 2023-02-15 NOTE — PROGRESS NOTES
2/1/2023    Progress Note: Weekly Education Class in the [facility]         Patient is on Very Low Calorie Diet [] (4 meal replacements per day, 800 kcal/day)      Low Calorie Diet [x] (2-3 meal replacements per day, 3250-5457 kcal/day)    1) Did patient have any new symptoms or physical problems? Yes []    No []    If yes, check & comment: weakness [], fatigue [], lightheadedness [], headache [], cramps [], cold intolerance [], hair loss [], diarrhea [], constipation [],  NA [] other: NOT ANSWERED                                  2) Has patient had any medical attention from other providers, urgent care or the emergency room this week? Yes []  No []       NA [], If yes, why: NOT ANSWERED                                        3) Any other sugar sweetened beverages consumed this week? Yes []  No [x]    4) Did patient have any problems adhering to the diet? Yes [x]  No [] NA []    If yes, Vacation [], Celebrations [], Conferences [], Family Reunions [] other: family dinners                                               5) How many hours of sleep this week? 8-9    (range)  NA []    Number of meal replacements consumed daily? 1  (range)  NA []    Average ounces of water patient consumed daily this week (not including shakes)? 73     (divide the weekly total by 7)    Did you eat any food outside of the program? Yes [x] No []    Physical Activity Over the Past Week:    Cardio exercise: 60 min  Strength exercise: 0 workouts / week  Number of steps walked per day: 500-4000    How has patient mood overall been this week? Sad [], Happy [], Stressed [], Tired [], Content [], NA [], other ok           Medications reconciled by nurse Yes [x]  No[]    Patient was given therapeutic recommendations for any noted side effects of their dietary approach based upon [facility] patient manual per providers recommendation.      2/8/2023    Progress Note: Weekly Education Class in the [facility]         Patient is on Very Low Calorie Diet [] (4 meal replacements per day, 800 kcal/day)      Low Calorie Diet [x] (2-3 meal replacements per day, 3672-0873 kcal/day)    1) Did patient have any new symptoms or physical problems? Yes [x]    No []    If yes, check & comment: weakness [], fatigue [], lightheadedness [], headache [], cramps [], cold intolerance [], hair loss [], diarrhea [], constipation [],  NA [] other: hip pain                                2) Has patient had any medical attention from other providers, urgent care or the emergency room this week? Yes []  No [x]       NA [], If yes, why:                                       3) Any other sugar sweetened beverages consumed this week? Yes [x]  No []    4) Did patient have any problems adhering to the diet? Yes [x]  No [] NA []    If yes, Vacation [x], Celebrations [], Conferences [], Family Reunions [] other:                                                5) How many hours of sleep this week? 8-10    (range)  NA []    Number of meal replacements consumed daily? 1  (range)  NA []    Average ounces of water patient consumed daily this week (not including shakes)? 74     (divide the weekly total by 7)    Did you eat any food outside of the program? Yes [x] No []    Physical Activity Over the Past Week:    Cardio exercise: 80 min  Strength exercise: 1 workouts / week  Number of steps walked per day: 500-1500    How has patient mood overall been this week? Sad [], Happy [], Stressed [], Tired [], Content [], NA [], other ok; good           Medications reconciled by nurse Yes [x]  No[]    Patient was given therapeutic recommendations for any noted side effects of their dietary approach based upon [facility] patient manual per providers recommendation.

## 2023-02-15 NOTE — PROGRESS NOTES
Weight Management. 1. Have you been to the ER, urgent care clinic since your last visit? Hospitalized since your last visit? No    2. Have you seen or consulted any other health care providers outside of the [address] [place] [address] since your last visit? Include any pap smears or colon screening. Yes, Dr. Dandre Aguayo for hip injection.

## 2023-03-01 NOTE — PROGRESS NOTES
Nurse note from patient's weekly / LCD / Maintenance class was reviewed. Pertinent medical concerns were:   reviewed     BP Readings from Last 3 Encounters:   02/15/23 121/74   02/01/23 130/78   01/20/23 105/67       Weight Metrics 2/15/2023 2/1/2023 2/1/2023 1/20/2023 12/16/2022 12/2/2022 11/8/2022   Weight 193 lb 6.4 oz - 189 lb 6.4 oz 198 lb 11.2 oz 205 lb 3.2 oz 205 lb 11.2 oz -   Neck Circ (inches) - 13.5 - - - - 14.5   Waist Measure Inches - 39.5 - - - - 43.5   Body Fat % - 40.1 - - - - 43.1   BMI 30.29 kg/m2 - 29.66 kg/m2 31.12 kg/m2 32.14 kg/m2 32.22 kg/m2 -       Current Outpatient Medications   Medication Sig Dispense Refill    melatonin 5 mg tablet Take 5 mg by mouth nightly as needed. semaglutide 2 mg/dose (8 mg/3 mL) pnij 2 mg by SubCUTAneous route every seven (7) days. 4 Each 2    cyanocobalamin (VITAMIN B12) 1,000 mcg/mL injection 1 mL by IntraMUSCular route every thirty (30) days. 3 mL 1    ergocalciferol (ERGOCALCIFEROL) 1,250 mcg (50,000 unit) capsule TAKE 1 CAPSULE BY MOUTH EVERY 7 DAYS FOR 90 DAYS. 12 Capsule 0    DILT- mg capsule TAKE 1 CAPSULE EVERY DAY , NEED MD APPOINTMENT FOR REFILLS (Patient taking differently: Take 120 mg by mouth daily.) 90 Capsule 3    acetaminophen (TYLENOL) 500 mg tablet Take 1,000 mg by mouth every eight (8) hours as needed for Pain. Symbicort 160-4.5 mcg/actuation HFAA Take 2 Puffs by inhalation two (2) times a day. metoprolol succinate (TOPROL-XL) 50 mg XL tablet TAKE 1 TABLET EVERY DAY 90 Tablet 2    Insulin Needles, Disposable, (Taya Pen Needle) 32 gauge x 5/32\" ndle Use daily as directed 100 Pen Needle 1    HYDROcodone-acetaminophen (NORCO) 5-325 mg per tablet TAKE 1 TABLET BY MOUTH TWICE DAILY AS NEEDED FOR PAIN      cyclobenzaprine (FLEXERIL) 10 mg tablet Take  by mouth three (3) times daily as needed for Muscle Spasm(s). diazePAM (VALIUM) 10 mg tablet Take 5 mg by mouth as needed.       hydroCHLOROthiazide (HYDRODIURIL) 12.5 mg tablet Take 12.5 mg by mouth daily. omeprazole (PRILOSEC) 20 mg capsule Take 20 mg by mouth as needed. pantoprazole (PROTONIX) 40 mg tablet Take 40 mg by mouth as needed. Alternates with omeprazole      traZODone (DESYREL) 50 mg tablet Take 100 mg by mouth nightly. rosuvastatin (CRESTOR) 40 mg tablet Take 1 Tab by mouth nightly. 90 Tab 3    pregabalin (LYRICA) 150 mg capsule Take 150 mg by mouth two (2) times a day. DULoxetine (CYMBALTA) 60 mg capsule Take 60 mg by mouth daily. ezetimibe (ZETIA) 10 mg tablet Take 10 mg by mouth daily. metFORMIN (GLUMETZA ER) 500 mg TG24 24 hour tablet Take 500 mg by mouth two (2) times a day. Aspirin, Buffered 81 mg tab Take 1 Tablet by mouth daily.

## 2023-03-23 ENCOUNTER — OFFICE VISIT (OUTPATIENT)
Dept: SURGERY | Age: 73
End: 2023-03-23

## 2023-03-23 VITALS
SYSTOLIC BLOOD PRESSURE: 100 MMHG | HEART RATE: 71 BPM | DIASTOLIC BLOOD PRESSURE: 65 MMHG | BODY MASS INDEX: 30.42 KG/M2 | OXYGEN SATURATION: 95 % | HEIGHT: 67 IN | WEIGHT: 193.8 LBS | TEMPERATURE: 98.1 F | RESPIRATION RATE: 18 BRPM

## 2023-03-23 DIAGNOSIS — G47.33 OSA ON CPAP: ICD-10-CM

## 2023-03-23 DIAGNOSIS — E66.9 OBESITY (BMI 30.0-34.9): Primary | ICD-10-CM

## 2023-03-23 DIAGNOSIS — E11.9 TYPE 2 DIABETES MELLITUS WITHOUT COMPLICATION, WITHOUT LONG-TERM CURRENT USE OF INSULIN (HCC): ICD-10-CM

## 2023-03-23 DIAGNOSIS — E78.2 MIXED HYPERLIPIDEMIA: ICD-10-CM

## 2023-03-23 DIAGNOSIS — R94.4 KIDNEY FUNCTION TEST ABNORMAL: ICD-10-CM

## 2023-03-23 DIAGNOSIS — Z99.89 OSA ON CPAP: ICD-10-CM

## 2023-03-23 NOTE — PROGRESS NOTES
Weight Management. 1 month follow up. 1. Have you been to the ER, urgent care clinic since your last visit? Hospitalized since your last visit? No    2. Have you seen or consulted any other health care providers outside of the [address] [place] [address] since your last visit? Include any pap smears or colon screening.  No    BMI - 30.1

## 2023-03-23 NOTE — PROGRESS NOTES
[facility] Progress Note:   F/up Physician Visit    CC: Weight Management      Margaret Riley is a 74 y.o. female who is here for her  f/up physician visit for the  LCD Program.  Her  passed away recently and has not been complaint since then  Her bmi is back up to 30    Weight Metrics 3/23/2023 3/23/2023 2/15/2023 2/1/2023 2/1/2023 1/20/2023 12/16/2022   Weight - 193 lb 12.8 oz 193 lb 6.4 oz - 189 lb 6.4 oz 198 lb 11.2 oz 205 lb 3.2 oz   Neck Circ (inches) 14.5 - - 13.5 - - -   Waist Measure Inches 41 - - 39.5 - - -   Body Fat % 40.7 - - 40.1 - - -   BMI - 30.35 kg/m2 30.29 kg/m2 - 29.66 kg/m2 31.12 kg/m2 32.14 kg/m2         Outpatient Medications Marked as Taking for the 3/23/23 encounter (Office Visit) with Negrita Carrington MD   Medication Sig Dispense Refill    melatonin 5 mg tablet Take 5 mg by mouth nightly as needed. semaglutide 2 mg/dose (8 mg/3 mL) pnij 2 mg by SubCUTAneous route every seven (7) days. 4 Each 2    cyanocobalamin (VITAMIN B12) 1,000 mcg/mL injection 1 mL by IntraMUSCular route every thirty (30) days. 3 mL 1    ergocalciferol (ERGOCALCIFEROL) 1,250 mcg (50,000 unit) capsule TAKE 1 CAPSULE BY MOUTH EVERY 7 DAYS FOR 90 DAYS. 12 Capsule 0    DILT- mg capsule TAKE 1 CAPSULE EVERY DAY , NEED MD APPOINTMENT FOR REFILLS (Patient taking differently: Take 120 mg by mouth daily.) 90 Capsule 3    acetaminophen (TYLENOL) 500 mg tablet Take 1,000 mg by mouth every eight (8) hours as needed for Pain. Symbicort 160-4.5 mcg/actuation HFAA Take 2 Puffs by inhalation two (2) times a day.       metoprolol succinate (TOPROL-XL) 50 mg XL tablet TAKE 1 TABLET EVERY DAY 90 Tablet 2    Insulin Needles, Disposable, (Taya Pen Needle) 32 gauge x 5/32\" ndle Use daily as directed 100 Pen Needle 1    HYDROcodone-acetaminophen (NORCO) 5-325 mg per tablet TAKE 1 TABLET BY MOUTH TWICE DAILY AS NEEDED FOR PAIN      cyclobenzaprine (FLEXERIL) 10 mg tablet Take  by mouth three (3) times daily as needed for Muscle Spasm(s). diazePAM (VALIUM) 10 mg tablet Take 5 mg by mouth as needed. hydroCHLOROthiazide (HYDRODIURIL) 12.5 mg tablet Take 12.5 mg by mouth daily. omeprazole (PRILOSEC) 20 mg capsule Take 20 mg by mouth as needed. pantoprazole (PROTONIX) 40 mg tablet Take 40 mg by mouth as needed. Alternates with omeprazole      traZODone (DESYREL) 50 mg tablet Take 100 mg by mouth nightly. rosuvastatin (CRESTOR) 40 mg tablet Take 1 Tab by mouth nightly. 90 Tab 3    pregabalin (LYRICA) 150 mg capsule Take 150 mg by mouth two (2) times a day. DULoxetine (CYMBALTA) 60 mg capsule Take 60 mg by mouth daily. ezetimibe (ZETIA) 10 mg tablet Take 10 mg by mouth daily. metFORMIN (GLUMETZA ER) 500 mg TG24 24 hour tablet Take 500 mg by mouth two (2) times a day. Aspirin, Buffered 81 mg tab Take 1 Tablet by mouth daily. Participation   Did you attend clinic and class last week? no    Review of Systems  Since your last visit, have you experienced any complications? no  If yes, please list:       Are you taking an appetite suppressant? yes  If so, is there any Chest Pain, Palpitations or Dizziness? HUNGER CONTROL: fair    Taking ozempic which helps with appetite  BP Readings from Last 3 Encounters:   03/23/23 100/65   02/15/23 121/74   02/01/23 130/78       SLEEP:8-10    Have you received any other medical care this week? no  If yes, where and for what? Have you discontinued or changed any medicine or dose of your medicine since your last visit with Dr Lindsey Barrett? no  If yes, where and for what? Diet  How many ounces of calorie-free liquids did you consume each day?  80 oz    How many meal replacements did you take each day? 1 and 2 meals    Did you have any problems adhering to the program?  yes If yes, please explain:      Exercise  Aerobic exercise: 0 min  Resistance exercise: 0 workouts / week  Any discomfort?        If yes, where?      Objective  Visit Vitals  /65 (BP 1 Location: Right arm, BP Patient Position: Sitting, BP Cuff Size: Large adult)   Pulse 71   Temp 98.1 °F (36.7 °C) (Oral)   Resp 18   Ht 5' 7\" (1.702 m)   Wt 193 lb 12.8 oz (87.9 kg)   SpO2 95%   BMI 30.35 kg/m²     No LMP recorded. (Menstrual status: Menopause). Physical Exam  Appearance: well,   Mental:A&O x 3, NAD  H:NC/AT,  EENT:   EOMI, PERRL, No scleral icterus  Neck: no bruit or JVD  Lung: clear, No W/R  ABD: soft, active, nontender  Ext:  no Edema  Neuro: nonfocal  Assessment / Plan    Encounter Diagnoses   Name Primary? Obesity (BMI 30.0-34. 9) Yes    Type 2 diabetes mellitus without complication, without long-term current use of insulin ([facility])     Mixed hyperlipidemia     BILL on CPAP     Kidney function test abnormal      Diagnoses and all orders for this visit:    1. Obesity (BMI 30.0-34. 9)  Grief has affected her compliance  She is ready to get back on track  I suggest a senior exercise class at the [facility] or other place, aiming for 150 mins a week      2. Type 2 diabetes mellitus without complication, without long-term current use of insulin (HCC)  Taking ozempic 2 mg per week  Metformin   mg bid  3. Mixed hyperlipidemia  Crestor 40 mg a day  Zetia 10 mg a day  4. BILL on CPAP  Not using it  Wants to discuss aspire  5. Kidney function test abnormal  -     METABOLIC PANEL, COMPREHENSIVE; Future  Keep bp and blood sugar well controlled    1. Weight management needs improvement   Progress was reviewed with patient    2. Labs    Latest results reviewed with patient       face to face time with Rickie Funez consisted of counseling & coordinating and/or discussing treatment plans in reference to her obesity The primary encounter diagnosis was Obesity (BMI 30.0-34.9).  Diagnoses of Type 2 diabetes mellitus without complication, without long-term current use of insulin ([address] Mixed hyperlipidemia, BILL on CPAP, and Kidney function test abnormal were also pertinent to this visit.

## 2023-04-04 LAB
ALBUMIN SERPL-MCNC: 3.9 G/DL (ref 3.7–4.7)
ALBUMIN/GLOB SERPL: 2.2 {RATIO} (ref 1.2–2.2)
ALP SERPL-CCNC: 45 IU/L (ref 44–121)
ALT SERPL-CCNC: 19 IU/L (ref 0–32)
AST SERPL-CCNC: 18 IU/L (ref 0–40)
BILIRUB SERPL-MCNC: 0.2 MG/DL (ref 0–1.2)
BUN SERPL-MCNC: 16 MG/DL (ref 8–27)
BUN/CREAT SERPL: 17 (ref 12–28)
CALCIUM SERPL-MCNC: 9.5 MG/DL (ref 8.7–10.3)
CHLORIDE SERPL-SCNC: 103 MMOL/L (ref 96–106)
CO2 SERPL-SCNC: 25 MMOL/L (ref 20–29)
CREAT SERPL-MCNC: 0.92 MG/DL (ref 0.57–1)
EGFRCR SERPLBLD CKD-EPI 2021: 65 ML/MIN/1.73
GLOBULIN SER CALC-MCNC: 1.8 G/DL (ref 1.5–4.5)
GLUCOSE SERPL-MCNC: 96 MG/DL (ref 70–99)
POTASSIUM SERPL-SCNC: 4.6 MMOL/L (ref 3.5–5.2)
PROT SERPL-MCNC: 5.7 G/DL (ref 6–8.5)
SODIUM SERPL-SCNC: 140 MMOL/L (ref 134–144)

## 2023-04-12 ENCOUNTER — CLINICAL SUPPORT (OUTPATIENT)
Dept: SURGERY | Age: 73
End: 2023-04-12

## 2023-04-12 VITALS
HEIGHT: 67 IN | TEMPERATURE: 97.9 F | SYSTOLIC BLOOD PRESSURE: 109 MMHG | OXYGEN SATURATION: 95 % | DIASTOLIC BLOOD PRESSURE: 69 MMHG | WEIGHT: 189 LBS | HEART RATE: 66 BPM | BODY MASS INDEX: 29.66 KG/M2 | RESPIRATION RATE: 8 BRPM

## 2023-04-12 DIAGNOSIS — E11.9 TYPE 2 DIABETES MELLITUS WITHOUT COMPLICATION, WITHOUT LONG-TERM CURRENT USE OF INSULIN (HCC): ICD-10-CM

## 2023-04-12 DIAGNOSIS — E66.9 OBESITY (BMI 30.0-34.9): Primary | ICD-10-CM

## 2023-04-12 DIAGNOSIS — E78.2 MIXED HYPERLIPIDEMIA: ICD-10-CM

## 2023-04-21 ENCOUNTER — TELEPHONE (OUTPATIENT)
Dept: SURGERY | Age: 73
End: 2023-04-21

## 2023-04-21 ENCOUNTER — VIRTUAL VISIT (OUTPATIENT)
Dept: SURGERY | Age: 73
End: 2023-04-21
Payer: MEDICARE

## 2023-04-21 VITALS — WEIGHT: 183.2 LBS | HEIGHT: 67 IN | BODY MASS INDEX: 28.75 KG/M2

## 2023-04-21 DIAGNOSIS — G47.33 OSA ON CPAP: ICD-10-CM

## 2023-04-21 DIAGNOSIS — E11.9 TYPE 2 DIABETES MELLITUS WITHOUT COMPLICATION, WITHOUT LONG-TERM CURRENT USE OF INSULIN (HCC): ICD-10-CM

## 2023-04-21 DIAGNOSIS — Z99.89 OSA ON CPAP: ICD-10-CM

## 2023-04-21 DIAGNOSIS — E53.8 VITAMIN B12 DEFICIENCY: ICD-10-CM

## 2023-04-21 DIAGNOSIS — E66.3 OVERWEIGHT (BMI 25.0-29.9): Primary | ICD-10-CM

## 2023-04-21 PROCEDURE — 1090F PRES/ABSN URINE INCON ASSESS: CPT | Performed by: FAMILY MEDICINE

## 2023-04-21 PROCEDURE — 3017F COLORECTAL CA SCREEN DOC REV: CPT | Performed by: FAMILY MEDICINE

## 2023-04-21 PROCEDURE — G8427 DOCREV CUR MEDS BY ELIG CLIN: HCPCS | Performed by: FAMILY MEDICINE

## 2023-04-21 PROCEDURE — G8400 PT W/DXA NO RESULTS DOC: HCPCS | Performed by: FAMILY MEDICINE

## 2023-04-21 PROCEDURE — 2022F DILAT RTA XM EVC RTNOPTHY: CPT | Performed by: FAMILY MEDICINE

## 2023-04-21 PROCEDURE — 99214 OFFICE O/P EST MOD 30 MIN: CPT | Performed by: FAMILY MEDICINE

## 2023-04-21 PROCEDURE — 1101F PT FALLS ASSESS-DOCD LE1/YR: CPT | Performed by: FAMILY MEDICINE

## 2023-04-21 PROCEDURE — G8536 NO DOC ELDER MAL SCRN: HCPCS | Performed by: FAMILY MEDICINE

## 2023-04-21 PROCEDURE — G9717 DOC PT DX DEP/BP F/U NT REQ: HCPCS | Performed by: FAMILY MEDICINE

## 2023-04-21 PROCEDURE — G8417 CALC BMI ABV UP PARAM F/U: HCPCS | Performed by: FAMILY MEDICINE

## 2023-04-21 PROCEDURE — 3044F HG A1C LEVEL LT 7.0%: CPT | Performed by: FAMILY MEDICINE

## 2023-04-21 PROCEDURE — 1123F ACP DISCUSS/DSCN MKR DOCD: CPT | Performed by: FAMILY MEDICINE

## 2023-04-21 RX ORDER — CYANOCOBALAMIN 1000 UG/ML
1000 INJECTION, SOLUTION INTRAMUSCULAR; SUBCUTANEOUS
Qty: 3 ML | Refills: 1 | Status: SHIPPED | OUTPATIENT
Start: 2023-04-21

## 2023-04-21 NOTE — TELEPHONE ENCOUNTER
Called patient in attempt to check in for virtual visit. No answer, left voicemail advising to return call.

## 2023-04-22 ENCOUNTER — TRANSCRIBE ORDERS (OUTPATIENT)
Facility: HOSPITAL | Age: 73
End: 2023-04-22

## 2023-04-22 DIAGNOSIS — R91.8 PULMONARY NODULES: Primary | ICD-10-CM

## 2023-04-23 DIAGNOSIS — R91.8 PULMONARY NODULES: Primary | ICD-10-CM

## 2023-05-03 ENCOUNTER — CLINICAL SUPPORT (OUTPATIENT)
Dept: SURGERY | Age: 73
End: 2023-05-03

## 2023-05-03 VITALS
WEIGHT: 183.1 LBS | OXYGEN SATURATION: 96 % | SYSTOLIC BLOOD PRESSURE: 90 MMHG | HEIGHT: 67 IN | TEMPERATURE: 97.8 F | BODY MASS INDEX: 28.74 KG/M2 | RESPIRATION RATE: 18 BRPM | DIASTOLIC BLOOD PRESSURE: 56 MMHG | HEART RATE: 61 BPM

## 2023-05-03 DIAGNOSIS — Z99.89 OSA ON CPAP: ICD-10-CM

## 2023-05-03 DIAGNOSIS — G47.33 OSA ON CPAP: ICD-10-CM

## 2023-05-03 DIAGNOSIS — E66.3 OVERWEIGHT (BMI 25.0-29.9): Primary | ICD-10-CM

## 2023-05-03 DIAGNOSIS — E78.2 MIXED HYPERLIPIDEMIA: ICD-10-CM

## 2023-05-03 DIAGNOSIS — E11.9 TYPE 2 DIABETES MELLITUS WITHOUT COMPLICATION, WITHOUT LONG-TERM CURRENT USE OF INSULIN (HCC): ICD-10-CM

## 2023-05-24 ENCOUNTER — TELEMEDICINE (OUTPATIENT)
Age: 73
End: 2023-05-24
Payer: MEDICARE

## 2023-05-24 VITALS
HEIGHT: 67 IN | HEART RATE: 60 BPM | WEIGHT: 177.5 LBS | DIASTOLIC BLOOD PRESSURE: 64 MMHG | SYSTOLIC BLOOD PRESSURE: 125 MMHG | BODY MASS INDEX: 27.86 KG/M2

## 2023-05-24 DIAGNOSIS — I10 ESSENTIAL HYPERTENSION: ICD-10-CM

## 2023-05-24 DIAGNOSIS — G47.33 OBSTRUCTIVE SLEEP APNEA (ADULT) (PEDIATRIC): ICD-10-CM

## 2023-05-24 DIAGNOSIS — E11.9 TYPE 2 DIABETES MELLITUS WITHOUT COMPLICATION, WITHOUT LONG-TERM CURRENT USE OF INSULIN (HCC): ICD-10-CM

## 2023-05-24 DIAGNOSIS — E66.3 OVERWEIGHT: Primary | ICD-10-CM

## 2023-05-24 PROCEDURE — 1090F PRES/ABSN URINE INCON ASSESS: CPT | Performed by: FAMILY MEDICINE

## 2023-05-24 PROCEDURE — G8419 CALC BMI OUT NRM PARAM NOF/U: HCPCS | Performed by: FAMILY MEDICINE

## 2023-05-24 PROCEDURE — 3017F COLORECTAL CA SCREEN DOC REV: CPT | Performed by: FAMILY MEDICINE

## 2023-05-24 PROCEDURE — G8400 PT W/DXA NO RESULTS DOC: HCPCS | Performed by: FAMILY MEDICINE

## 2023-05-24 PROCEDURE — 3074F SYST BP LT 130 MM HG: CPT | Performed by: FAMILY MEDICINE

## 2023-05-24 PROCEDURE — 99214 OFFICE O/P EST MOD 30 MIN: CPT | Performed by: FAMILY MEDICINE

## 2023-05-24 PROCEDURE — 3044F HG A1C LEVEL LT 7.0%: CPT | Performed by: FAMILY MEDICINE

## 2023-05-24 PROCEDURE — 4004F PT TOBACCO SCREEN RCVD TLK: CPT | Performed by: FAMILY MEDICINE

## 2023-05-24 PROCEDURE — 2022F DILAT RTA XM EVC RTNOPTHY: CPT | Performed by: FAMILY MEDICINE

## 2023-05-24 PROCEDURE — 3078F DIAST BP <80 MM HG: CPT | Performed by: FAMILY MEDICINE

## 2023-05-24 PROCEDURE — G8427 DOCREV CUR MEDS BY ELIG CLIN: HCPCS | Performed by: FAMILY MEDICINE

## 2023-05-24 PROCEDURE — 1123F ACP DISCUSS/DSCN MKR DOCD: CPT | Performed by: FAMILY MEDICINE

## 2023-05-24 RX ORDER — ASPIRIN 81 MG/1
81 TABLET, CHEWABLE ORAL DAILY
COMMUNITY

## 2023-05-24 RX ORDER — ASPIRIN 81 MG/1
1 TABLET ORAL DAILY
COMMUNITY
End: 2023-05-24

## 2023-05-24 ASSESSMENT — PATIENT HEALTH QUESTIONNAIRE - PHQ9
SUM OF ALL RESPONSES TO PHQ QUESTIONS 1-9: 0
7. TROUBLE CONCENTRATING ON THINGS, SUCH AS READING THE NEWSPAPER OR WATCHING TELEVISION: 0
5. POOR APPETITE OR OVEREATING: 0
SUM OF ALL RESPONSES TO PHQ9 QUESTIONS 1 & 2: 0
1. LITTLE INTEREST OR PLEASURE IN DOING THINGS: 0
6. FEELING BAD ABOUT YOURSELF - OR THAT YOU ARE A FAILURE OR HAVE LET YOURSELF OR YOUR FAMILY DOWN: 0
8. MOVING OR SPEAKING SO SLOWLY THAT OTHER PEOPLE COULD HAVE NOTICED. OR THE OPPOSITE, BEING SO FIGETY OR RESTLESS THAT YOU HAVE BEEN MOVING AROUND A LOT MORE THAN USUAL: 0
2. FEELING DOWN, DEPRESSED OR HOPELESS: 0
9. THOUGHTS THAT YOU WOULD BE BETTER OFF DEAD, OR OF HURTING YOURSELF: 0
4. FEELING TIRED OR HAVING LITTLE ENERGY: 0
SUM OF ALL RESPONSES TO PHQ QUESTIONS 1-9: 0
SUM OF ALL RESPONSES TO PHQ QUESTIONS 1-9: 0
3. TROUBLE FALLING OR STAYING ASLEEP: 0
SUM OF ALL RESPONSES TO PHQ QUESTIONS 1-9: 0

## 2023-05-24 NOTE — PROGRESS NOTES
[facility] Progress Note:   F/up Physician Visit    Santiago Pham is a 74 y.o. female who was seen by synchronous (real-time) audio-video technology on 5/24/2023. Consent:  She and/or her healthcare decision maker is aware that this patient-initiated Telehealth encounter is a billable service, with coverage as determined by her insurance carrier. She is aware that she may receive a bill and has provided verbal consent to proceed: Yes    I was at home while conducting this encounter. 712  Subjective:   Santiago Pham was seen for Weight Management (LCD)    f/up physician visit for the / LCD Program.  Now 177.5  April 183    She has not been walking as much        Prior to Admission medications    Medication Sig Start Date End Date Taking? Authorizing Provider   aspirin 81 MG chewable tablet Take 1 tablet by mouth daily   Yes Historical Provider, MD   acetaminophen (TYLENOL) 500 MG tablet Take 2 tablets by mouth every 8 hours as needed   Yes Ar Automatic Reconciliation   budesonide-formoterol (SYMBICORT) 160-4.5 MCG/ACT AERO Inhale 2 puffs into the lungs 2 times daily 8/22/22  Yes Ar Automatic Reconciliation   cyanocobalamin 1000 MCG/ML injection Inject 1 mL into the muscle every 30 days 2/1/23  Yes Ar Automatic Reconciliation   cyclobenzaprine (FLEXERIL) 10 MG tablet Take by mouth 3 times daily as needed   Yes Ar Automatic Reconciliation   diazePAM (VALIUM) 10 MG tablet Take 0.5 tablets by mouth as needed.  5/3/21  Yes Ar Automatic Reconciliation   dilTIAZem (DILACOR XR) 120 MG extended release capsule TAKE 1 CAPSULE EVERY DAY , NEED MD APPOINTMENT FOR REFILLS 12/11/22  Yes Ar Automatic Reconciliation   DULoxetine (CYMBALTA) 60 MG extended release capsule Take 1 capsule by mouth daily   Yes Ar Automatic Reconciliation   ezetimibe (ZETIA) 10 MG tablet Take 1 tablet by mouth daily   Yes Ar Automatic Reconciliation   hydroCHLOROthiazide (HYDRODIURIL) 12.5 MG tablet Take 1 tablet by mouth

## 2023-05-24 NOTE — PROGRESS NOTES
Identified pt with two pt identifiers (name and ). Reviewed chart in preparation for visit and have obtained necessary documentation. Noris Barrow is a 74 y.o. female  Chief Complaint   Patient presents with    Weight Management     LCD     /64   Pulse 60   Ht 5' 7\" (1.702 m)   Wt 177 lb 8 oz (80.5 kg)   BMI 27.80 kg/m²     1. Have you been to the ER, urgent care clinic since your last visit? Hospitalized since your last visit? No    2. Have you seen or consulted any other health care providers outside of the [address] [place] [address] since your last visit? Include any pap smears or colon screening.  No

## 2023-08-01 ENCOUNTER — OFFICE VISIT (OUTPATIENT)
Age: 73
End: 2023-08-01
Payer: MEDICARE

## 2023-08-01 VITALS
DIASTOLIC BLOOD PRESSURE: 67 MMHG | WEIGHT: 178.5 LBS | BODY MASS INDEX: 28.02 KG/M2 | HEART RATE: 59 BPM | TEMPERATURE: 98.1 F | OXYGEN SATURATION: 96 % | RESPIRATION RATE: 16 BRPM | SYSTOLIC BLOOD PRESSURE: 111 MMHG | HEIGHT: 67 IN

## 2023-08-01 DIAGNOSIS — E53.8 VITAMIN B12 DEFICIENCY: ICD-10-CM

## 2023-08-01 DIAGNOSIS — E11.9 TYPE 2 DIABETES MELLITUS WITHOUT COMPLICATION, WITHOUT LONG-TERM CURRENT USE OF INSULIN (HCC): ICD-10-CM

## 2023-08-01 DIAGNOSIS — E66.3 OVERWEIGHT (BMI 25.0-29.9): Primary | ICD-10-CM

## 2023-08-01 DIAGNOSIS — I25.10 ARTERIOSCLEROTIC HEART DISEASE (ASHD): ICD-10-CM

## 2023-08-01 DIAGNOSIS — E55.9 VITAMIN D DEFICIENCY: ICD-10-CM

## 2023-08-01 DIAGNOSIS — G47.33 OSA ON CPAP: ICD-10-CM

## 2023-08-01 DIAGNOSIS — E66.3 OVERWEIGHT (BMI 25.0-29.9): ICD-10-CM

## 2023-08-01 DIAGNOSIS — Z99.89 OSA ON CPAP: ICD-10-CM

## 2023-08-01 PROCEDURE — 3044F HG A1C LEVEL LT 7.0%: CPT | Performed by: FAMILY MEDICINE

## 2023-08-01 PROCEDURE — 2022F DILAT RTA XM EVC RTNOPTHY: CPT | Performed by: FAMILY MEDICINE

## 2023-08-01 PROCEDURE — 1123F ACP DISCUSS/DSCN MKR DOCD: CPT | Performed by: FAMILY MEDICINE

## 2023-08-01 PROCEDURE — 99214 OFFICE O/P EST MOD 30 MIN: CPT | Performed by: FAMILY MEDICINE

## 2023-08-01 PROCEDURE — G8427 DOCREV CUR MEDS BY ELIG CLIN: HCPCS | Performed by: FAMILY MEDICINE

## 2023-08-01 PROCEDURE — G8419 CALC BMI OUT NRM PARAM NOF/U: HCPCS | Performed by: FAMILY MEDICINE

## 2023-08-01 PROCEDURE — 1036F TOBACCO NON-USER: CPT | Performed by: FAMILY MEDICINE

## 2023-08-01 PROCEDURE — G8400 PT W/DXA NO RESULTS DOC: HCPCS | Performed by: FAMILY MEDICINE

## 2023-08-01 PROCEDURE — 3017F COLORECTAL CA SCREEN DOC REV: CPT | Performed by: FAMILY MEDICINE

## 2023-08-01 PROCEDURE — 1090F PRES/ABSN URINE INCON ASSESS: CPT | Performed by: FAMILY MEDICINE

## 2023-08-01 RX ORDER — TRAZODONE HYDROCHLORIDE 100 MG/1
100 TABLET ORAL NIGHTLY
COMMUNITY
Start: 2023-07-26

## 2023-08-01 RX ORDER — BUPROPION HYDROCHLORIDE 150 MG/1
150 TABLET, EXTENDED RELEASE ORAL 2 TIMES DAILY
COMMUNITY
Start: 2023-06-16

## 2023-08-01 ASSESSMENT — PATIENT HEALTH QUESTIONNAIRE - PHQ9
1. LITTLE INTEREST OR PLEASURE IN DOING THINGS: 1
SUM OF ALL RESPONSES TO PHQ QUESTIONS 1-9: 2
SUM OF ALL RESPONSES TO PHQ9 QUESTIONS 1 & 2: 2
2. FEELING DOWN, DEPRESSED OR HOPELESS: 1

## 2023-08-02 LAB
25(OH)D3+25(OH)D2 SERPL-MCNC: 61.8 NG/ML (ref 30–100)
ALBUMIN SERPL-MCNC: 4 G/DL (ref 3.8–4.8)
ALBUMIN/GLOB SERPL: 2.4 {RATIO} (ref 1.2–2.2)
ALP SERPL-CCNC: 42 IU/L (ref 44–121)
ALT SERPL-CCNC: 80 IU/L (ref 0–32)
AST SERPL-CCNC: 45 IU/L (ref 0–40)
BILIRUB SERPL-MCNC: 0.2 MG/DL (ref 0–1.2)
BUN SERPL-MCNC: 17 MG/DL (ref 8–27)
BUN/CREAT SERPL: 25 (ref 12–28)
CALCIUM SERPL-MCNC: 9.2 MG/DL (ref 8.7–10.3)
CHLORIDE SERPL-SCNC: 105 MMOL/L (ref 96–106)
CO2 SERPL-SCNC: 28 MMOL/L (ref 20–29)
CREAT SERPL-MCNC: 0.69 MG/DL (ref 0.57–1)
EGFRCR SERPLBLD CKD-EPI 2021: 90 ML/MIN/1.73
GLOBULIN SER CALC-MCNC: 1.7 G/DL (ref 1.5–4.5)
GLUCOSE SERPL-MCNC: 86 MG/DL (ref 70–99)
HBA1C MFR BLD: 5.6 % (ref 4.8–5.6)
POTASSIUM SERPL-SCNC: 4.8 MMOL/L (ref 3.5–5.2)
PROT SERPL-MCNC: 5.7 G/DL (ref 6–8.5)
SODIUM SERPL-SCNC: 144 MMOL/L (ref 134–144)
VIT B12 SERPL-MCNC: 313 PG/ML (ref 232–1245)

## 2023-08-09 ENCOUNTER — HOSPITAL ENCOUNTER (OUTPATIENT)
Facility: HOSPITAL | Age: 73
Discharge: HOME | End: 2023-08-12
Payer: MEDICARE

## 2023-08-09 DIAGNOSIS — M19.012 ARTHRITIS OF LEFT SHOULDER REGION: ICD-10-CM

## 2023-08-09 PROCEDURE — 73030 X-RAY EXAM OF SHOULDER: CPT

## 2023-08-17 ENCOUNTER — TELEPHONE (OUTPATIENT)
Age: 73
End: 2023-08-17

## 2023-08-17 NOTE — TELEPHONE ENCOUNTER
Pt is calling because she needs cardiac clearance for her left hip surgery on 9/18/23. Mable Valladares with [facility]. Pt would like to know if she needs to see the cardiologist in order to get clearance. Pt needs clearance sent over one week before the surgery.     [phone] office Addiy surgery nurse coordinator    [phone] fax    [phone] patient   [phone] cell

## 2023-08-18 NOTE — TELEPHONE ENCOUNTER
-Pre-Procedure Cardiac Clearance -Request:    Facility: [facility]     Procedure Date: 09-18-23    Procedure: Left hip replacement    Surgeon: Benita Estes MD    Anesthesia: general    Request for Interruption of Anticoagulants:     Aspirin  May stop anticoagulant how many days prior and when to resume    Is patient cleared from a cardiac standpoint to proceed with upcoming procedure. Please advise.

## 2023-08-23 ENCOUNTER — NURSE ONLY (OUTPATIENT)
Age: 73
End: 2023-08-23

## 2023-08-23 VITALS
TEMPERATURE: 98 F | RESPIRATION RATE: 18 BRPM | SYSTOLIC BLOOD PRESSURE: 102 MMHG | WEIGHT: 183.8 LBS | HEIGHT: 67 IN | HEART RATE: 57 BPM | OXYGEN SATURATION: 95 % | BODY MASS INDEX: 28.85 KG/M2 | DIASTOLIC BLOOD PRESSURE: 62 MMHG

## 2023-08-23 DIAGNOSIS — G47.33 OSA ON CPAP: ICD-10-CM

## 2023-08-23 DIAGNOSIS — E66.3 OVERWEIGHT (BMI 25.0-29.9): Primary | ICD-10-CM

## 2023-08-23 DIAGNOSIS — Z99.89 OSA ON CPAP: ICD-10-CM

## 2023-08-23 DIAGNOSIS — E11.9 TYPE 2 DIABETES MELLITUS WITHOUT COMPLICATION, WITHOUT LONG-TERM CURRENT USE OF INSULIN (HCC): ICD-10-CM

## 2023-08-23 RX ORDER — AMOXICILLIN 500 MG/1
500 CAPSULE ORAL 4 TIMES DAILY
COMMUNITY
Start: 2023-08-13 | End: 2023-09-13

## 2023-08-23 ASSESSMENT — PATIENT HEALTH QUESTIONNAIRE - PHQ9
SUM OF ALL RESPONSES TO PHQ QUESTIONS 1-9: 2
2. FEELING DOWN, DEPRESSED OR HOPELESS: 1
SUM OF ALL RESPONSES TO PHQ QUESTIONS 1-9: 2
SUM OF ALL RESPONSES TO PHQ9 QUESTIONS 1 & 2: 2
SUM OF ALL RESPONSES TO PHQ QUESTIONS 1-9: 2
1. LITTLE INTEREST OR PLEASURE IN DOING THINGS: 1
SUM OF ALL RESPONSES TO PHQ QUESTIONS 1-9: 2

## 2023-08-23 NOTE — PROGRESS NOTES
8/2/2023    Progress Note: Weekly Education Class in the [facility]         Patient is on Very Low Calorie Diet [] (4 meal replacements per day, 800 kcal/day)      Modified Low Calorie Diet [x] (2-3 meal replacements per day, 0775-9345 kcal/day)    1) Did patient have any new symptoms or physical problems? Yes []    No []    If yes, check & comment: weakness [], fatigue [], lightheadedness [], headache [], cramps [], cold intolerance [], hair loss [], diarrhea [], constipation [],  NA [] other: NOT ANSWERED                                  2) Has patient had any medical attention from other providers, urgent care or the emergency room this week? Yes []  No []       NA [], If yes, why: NOT ANSWERED                                        3) Any other sugar sweetened beverages consumed this week? Yes []  No []NOT ANSWERED      4) Did patient have any problems adhering to the diet? Yes []  No [] NA []    If yes, Vacation [], Celebrations [], Conferences [], Family Reunions [] other: NOT ANSWERED                                                 5) How many hours of sleep this week? 8-9    (range)  NA []    Number of meal replacements consumed daily? 1 (range)  NA []    Average ounces of water patient consumed daily this week (not including shakes)? 41     (divide the weekly total by 7)    Did you eat any food outside of the program? Yes [x] No []    Physical Activity Over the Past Week:    Cardio exercise: 0 min  Strength exercise: 1 workouts / week  Number of steps walked per day: 1.5 mile    How has patient mood overall been this week? Sad [], Happy [], Stressed [], Tired [], Content [], NA [], other OK           Medications reconciled by nurse Yes [x]  No[]    Patient was given therapeutic recommendations for any noted side effects of their dietary approach based upon [facility] patient manual per providers recommendation.      8/9/2023    Progress Note: Weekly Education Class in the New

## 2023-08-28 NOTE — TELEPHONE ENCOUNTER
If the patient has no new chest pain or shortness of breath or other concerning cardiac symptoms, the patient is at low cardiac risk to proceed with the procedure, and can interrupt blood thinners if necessary, for the following length of time individualized for each blood thinner, with the shorter and or longer end of the duration depending on surgical bleeding risk as per the proceduralist/surgeon:    Aspirin/Plavix/Effient: 5 to 7 days      If the surgeon believes the procedure is low bleeding risk, recommend continuation of aspirin 81 mg if possible, as even cardiac surgery is performed on this dose of aspirin. Resume blood thinners as per the surgeon/proceduralist when felt to be safe from a surgical/procedural standpoint.

## 2023-08-29 NOTE — TELEPHONE ENCOUNTER
Clearance note, recent progress note and EKG faxed to [address] [place] [address] at [phone].

## 2023-08-31 NOTE — PROGRESS NOTES
Nurse note from patient's weekly  / LCD / Maintenance class was reviewed. Pertinent medical concerns were:   reviewed     BP Readings from Last 3 Encounters:   08/23/23 102/62   08/01/23 111/67   05/24/23 125/64       No flowsheet data found. Current Outpatient Medications   Medication Sig Dispense Refill    amoxicillin (AMOXIL) 500 MG capsule Take 1 capsule by mouth in the morning, at noon, in the evening, and at bedtime      buPROPion (WELLBUTRIN SR) 150 MG extended release tablet Take 1 tablet by mouth 2 times daily      traZODone (DESYREL) 100 MG tablet Take 1 tablet by mouth nightly      Semaglutide, 2 MG/DOSE, 8 MG/3ML SOPN Inject 2 mg into the skin every 7 days 3 mL 2    aspirin 81 MG chewable tablet Take 1 tablet by mouth daily      acetaminophen (TYLENOL) 500 MG tablet Take 2 tablets by mouth every 8 hours as needed      budesonide-formoterol (SYMBICORT) 160-4.5 MCG/ACT AERO Inhale 2 puffs into the lungs 2 times daily      cyanocobalamin 1000 MCG/ML injection Inject 1 mL into the muscle every 30 days      cyclobenzaprine (FLEXERIL) 10 MG tablet Take by mouth 3 times daily as needed      diazePAM (VALIUM) 10 MG tablet Take 0.5 tablets by mouth as needed. dilTIAZem (DILACOR XR) 120 MG extended release capsule TAKE 1 CAPSULE EVERY DAY , NEED MD APPOINTMENT FOR REFILLS      DULoxetine (CYMBALTA) 60 MG extended release capsule Take 1 capsule by mouth daily      ezetimibe (ZETIA) 10 MG tablet Take 1 tablet by mouth daily      hydroCHLOROthiazide (HYDRODIURIL) 12.5 MG tablet Take 1 tablet by mouth daily      HYDROcodone-acetaminophen (NORCO) 5-325 MG per tablet Take 1 tablet by mouth 2 times daily as needed.       melatonin 5 MG TABS tablet Take 1 tablet by mouth nightly as needed      metFORMIN, MOD, (GLUMETZA) 500 MG extended release tablet Take 1 tablet by mouth 2 times daily      metoprolol succinate (TOPROL XL) 50 MG extended release tablet Take 1 tablet by mouth daily      omeprazole (PRILOSEC) 20 MG

## 2023-09-07 ENCOUNTER — TELEPHONE (OUTPATIENT)
Age: 73
End: 2023-09-07

## 2023-09-07 RX ORDER — METOPROLOL SUCCINATE 50 MG/1
TABLET, EXTENDED RELEASE ORAL
Qty: 90 TABLET | OUTPATIENT
Start: 2023-09-07

## 2023-09-11 ENCOUNTER — HOSPITAL ENCOUNTER (OUTPATIENT)
Facility: HOSPITAL | Age: 73
Discharge: HOME | End: 2023-09-14
Payer: MEDICARE

## 2023-09-11 DIAGNOSIS — R91.8 PULMONARY NODULES: ICD-10-CM

## 2023-09-11 PROCEDURE — 71250 CT THORAX DX C-: CPT

## 2023-09-13 ENCOUNTER — TELEMEDICINE (OUTPATIENT)
Age: 73
End: 2023-09-13

## 2023-09-13 VITALS
DIASTOLIC BLOOD PRESSURE: 77 MMHG | HEIGHT: 67 IN | BODY MASS INDEX: 28.25 KG/M2 | WEIGHT: 180 LBS | SYSTOLIC BLOOD PRESSURE: 140 MMHG

## 2023-09-13 DIAGNOSIS — Z99.89 OSA ON CPAP: ICD-10-CM

## 2023-09-13 DIAGNOSIS — E11.9 TYPE 2 DIABETES MELLITUS WITHOUT COMPLICATION, WITHOUT LONG-TERM CURRENT USE OF INSULIN (HCC): ICD-10-CM

## 2023-09-13 DIAGNOSIS — E66.3 OVERWEIGHT (BMI 25.0-29.9): Primary | ICD-10-CM

## 2023-09-13 DIAGNOSIS — G47.33 OSA ON CPAP: ICD-10-CM

## 2023-09-13 DIAGNOSIS — E78.2 MIXED HYPERLIPIDEMIA: ICD-10-CM

## 2023-09-13 ASSESSMENT — PATIENT HEALTH QUESTIONNAIRE - PHQ9
SUM OF ALL RESPONSES TO PHQ QUESTIONS 1-9: 0
1. LITTLE INTEREST OR PLEASURE IN DOING THINGS: 0
3. TROUBLE FALLING OR STAYING ASLEEP: 0
2. FEELING DOWN, DEPRESSED OR HOPELESS: 0
SUM OF ALL RESPONSES TO PHQ QUESTIONS 1-9: 0
4. FEELING TIRED OR HAVING LITTLE ENERGY: 0
SUM OF ALL RESPONSES TO PHQ9 QUESTIONS 1 & 2: 0
7. TROUBLE CONCENTRATING ON THINGS, SUCH AS READING THE NEWSPAPER OR WATCHING TELEVISION: 0
6. FEELING BAD ABOUT YOURSELF - OR THAT YOU ARE A FAILURE OR HAVE LET YOURSELF OR YOUR FAMILY DOWN: 0
9. THOUGHTS THAT YOU WOULD BE BETTER OFF DEAD, OR OF HURTING YOURSELF: 0
SUM OF ALL RESPONSES TO PHQ QUESTIONS 1-9: 0
8. MOVING OR SPEAKING SO SLOWLY THAT OTHER PEOPLE COULD HAVE NOTICED. OR THE OPPOSITE, BEING SO FIGETY OR RESTLESS THAT YOU HAVE BEEN MOVING AROUND A LOT MORE THAN USUAL: 0
5. POOR APPETITE OR OVEREATING: 0
SUM OF ALL RESPONSES TO PHQ QUESTIONS 1-9: 0

## 2023-09-27 ENCOUNTER — TELEPHONE (OUTPATIENT)
Age: 73
End: 2023-09-27

## 2023-09-27 NOTE — TELEPHONE ENCOUNTER
Contacted patient regarding missed appointment on 9/27/23 with Eric Majano.  No answer; left voicemail with contact information for patient to return call

## 2023-09-27 NOTE — TELEPHONE ENCOUNTER
Called patient to let her know I am logged into our virtual nutrition consult scheduled today 9/27/23 at 11:00am.  Left a voicemail letting patient know I would remain logged in another few minutes but to call the office to cancel/reschedule appointment. [phone].

## 2023-09-28 NOTE — TELEPHONE ENCOUNTER
Pt called back to rs her missed appt. Called pt back to rs her appt. No answer, left vm to return call.

## 2023-10-04 ENCOUNTER — OFFICE VISIT (OUTPATIENT)
Age: 73
End: 2023-10-04

## 2023-10-04 DIAGNOSIS — E66.3 OVERWEIGHT (BMI 25.0-29.9): Primary | ICD-10-CM

## 2023-10-04 NOTE — PROGRESS NOTES
eating habits; take small bites, chew thoroughly, avoid distractions, utilize hunger/fullness scale. Reviewed attendance policy of attending weekly nutrition classes. Metabolic support group recommended, and increase physical activity (approved per MD) for long term weight maintenance. NUTRITION MONITORING AND EVALUATION: Pt restarting program after taking a break with 's passing and having hip replacement surgery. Reviewed LCD guidelines, best tips, and safe use. Pt is motivated, adherence likely. Specific tips and techniques to facilitate compliance with above recommendations were provided and discussed. If further details are desired please contact me at [phone]. This phone number was also provided to the patient for any further questions or concerns.           Srinivas Balderas, MS, RD, LDN

## 2023-10-18 ENCOUNTER — TELEMEDICINE (OUTPATIENT)
Age: 73
End: 2023-10-18
Payer: MEDICARE

## 2023-10-18 VITALS
WEIGHT: 180 LBS | SYSTOLIC BLOOD PRESSURE: 142 MMHG | HEIGHT: 67 IN | DIASTOLIC BLOOD PRESSURE: 72 MMHG | BODY MASS INDEX: 28.25 KG/M2

## 2023-10-18 DIAGNOSIS — E66.3 OVERWEIGHT (BMI 25.0-29.9): Primary | ICD-10-CM

## 2023-10-18 DIAGNOSIS — E11.9 TYPE 2 DIABETES MELLITUS WITHOUT COMPLICATION, WITHOUT LONG-TERM CURRENT USE OF INSULIN (HCC): ICD-10-CM

## 2023-10-18 DIAGNOSIS — G47.33 OSA ON CPAP: ICD-10-CM

## 2023-10-18 DIAGNOSIS — I25.10 CORONARY ARTERY DISEASE INVOLVING NATIVE HEART, UNSPECIFIED VESSEL OR LESION TYPE, UNSPECIFIED WHETHER ANGINA PRESENT: ICD-10-CM

## 2023-10-18 PROCEDURE — 3017F COLORECTAL CA SCREEN DOC REV: CPT | Performed by: FAMILY MEDICINE

## 2023-10-18 PROCEDURE — 2022F DILAT RTA XM EVC RTNOPTHY: CPT | Performed by: FAMILY MEDICINE

## 2023-10-18 PROCEDURE — G8419 CALC BMI OUT NRM PARAM NOF/U: HCPCS | Performed by: FAMILY MEDICINE

## 2023-10-18 PROCEDURE — G8484 FLU IMMUNIZE NO ADMIN: HCPCS | Performed by: FAMILY MEDICINE

## 2023-10-18 PROCEDURE — G8427 DOCREV CUR MEDS BY ELIG CLIN: HCPCS | Performed by: FAMILY MEDICINE

## 2023-10-18 PROCEDURE — 1090F PRES/ABSN URINE INCON ASSESS: CPT | Performed by: FAMILY MEDICINE

## 2023-10-18 PROCEDURE — 99214 OFFICE O/P EST MOD 30 MIN: CPT | Performed by: FAMILY MEDICINE

## 2023-10-18 PROCEDURE — 1036F TOBACCO NON-USER: CPT | Performed by: FAMILY MEDICINE

## 2023-10-18 PROCEDURE — 3044F HG A1C LEVEL LT 7.0%: CPT | Performed by: FAMILY MEDICINE

## 2023-10-18 PROCEDURE — G8400 PT W/DXA NO RESULTS DOC: HCPCS | Performed by: FAMILY MEDICINE

## 2023-10-18 PROCEDURE — 1123F ACP DISCUSS/DSCN MKR DOCD: CPT | Performed by: FAMILY MEDICINE

## 2023-10-18 RX ORDER — IBUPROFEN 200 MG
200 TABLET ORAL EVERY 6 HOURS PRN
COMMUNITY

## 2023-10-18 ASSESSMENT — PATIENT HEALTH QUESTIONNAIRE - PHQ9
SUM OF ALL RESPONSES TO PHQ QUESTIONS 1-9: 0
5. POOR APPETITE OR OVEREATING: 0
2. FEELING DOWN, DEPRESSED OR HOPELESS: 0
3. TROUBLE FALLING OR STAYING ASLEEP: 0
9. THOUGHTS THAT YOU WOULD BE BETTER OFF DEAD, OR OF HURTING YOURSELF: 0
6. FEELING BAD ABOUT YOURSELF - OR THAT YOU ARE A FAILURE OR HAVE LET YOURSELF OR YOUR FAMILY DOWN: 0
1. LITTLE INTEREST OR PLEASURE IN DOING THINGS: 0
SUM OF ALL RESPONSES TO PHQ9 QUESTIONS 1 & 2: 0
4. FEELING TIRED OR HAVING LITTLE ENERGY: 0
SUM OF ALL RESPONSES TO PHQ QUESTIONS 1-9: 0
7. TROUBLE CONCENTRATING ON THINGS, SUCH AS READING THE NEWSPAPER OR WATCHING TELEVISION: 0
SUM OF ALL RESPONSES TO PHQ QUESTIONS 1-9: 0
SUM OF ALL RESPONSES TO PHQ QUESTIONS 1-9: 0
8. MOVING OR SPEAKING SO SLOWLY THAT OTHER PEOPLE COULD HAVE NOTICED. OR THE OPPOSITE, BEING SO FIGETY OR RESTLESS THAT YOU HAVE BEEN MOVING AROUND A LOT MORE THAN USUAL: 0

## 2023-11-21 ENCOUNTER — OFFICE VISIT (OUTPATIENT)
Age: 73
End: 2023-11-21
Payer: MEDICARE

## 2023-11-21 VITALS
HEIGHT: 67 IN | WEIGHT: 180.6 LBS | OXYGEN SATURATION: 94 % | BODY MASS INDEX: 28.35 KG/M2 | RESPIRATION RATE: 18 BRPM | SYSTOLIC BLOOD PRESSURE: 128 MMHG | HEART RATE: 58 BPM | DIASTOLIC BLOOD PRESSURE: 64 MMHG | TEMPERATURE: 97.6 F

## 2023-11-21 VITALS
OXYGEN SATURATION: 95 % | HEART RATE: 61 BPM | DIASTOLIC BLOOD PRESSURE: 62 MMHG | WEIGHT: 181.4 LBS | BODY MASS INDEX: 28.47 KG/M2 | SYSTOLIC BLOOD PRESSURE: 122 MMHG | HEIGHT: 67 IN

## 2023-11-21 DIAGNOSIS — G47.33 OSA ON CPAP: ICD-10-CM

## 2023-11-21 DIAGNOSIS — E11.9 TYPE 2 DIABETES MELLITUS WITHOUT COMPLICATION, WITHOUT LONG-TERM CURRENT USE OF INSULIN (HCC): ICD-10-CM

## 2023-11-21 DIAGNOSIS — G47.33 OBSTRUCTIVE SLEEP APNEA (ADULT) (PEDIATRIC): ICD-10-CM

## 2023-11-21 DIAGNOSIS — I10 ESSENTIAL (PRIMARY) HYPERTENSION: ICD-10-CM

## 2023-11-21 DIAGNOSIS — E78.2 MIXED HYPERLIPIDEMIA: ICD-10-CM

## 2023-11-21 DIAGNOSIS — R06.09 DOE (DYSPNEA ON EXERTION): Primary | ICD-10-CM

## 2023-11-21 DIAGNOSIS — R93.1 ABNORMAL FINDINGS ON DIAGNOSTIC IMAGING OF HEART AND CORONARY CIRCULATION: ICD-10-CM

## 2023-11-21 DIAGNOSIS — E66.3 OVERWEIGHT (BMI 25.0-29.9): Primary | ICD-10-CM

## 2023-11-21 DIAGNOSIS — R00.2 PALPITATIONS: ICD-10-CM

## 2023-11-21 PROCEDURE — 2022F DILAT RTA XM EVC RTNOPTHY: CPT | Performed by: FAMILY MEDICINE

## 2023-11-21 PROCEDURE — 3074F SYST BP LT 130 MM HG: CPT | Performed by: INTERNAL MEDICINE

## 2023-11-21 PROCEDURE — 3078F DIAST BP <80 MM HG: CPT | Performed by: INTERNAL MEDICINE

## 2023-11-21 PROCEDURE — G8419 CALC BMI OUT NRM PARAM NOF/U: HCPCS | Performed by: FAMILY MEDICINE

## 2023-11-21 PROCEDURE — G8419 CALC BMI OUT NRM PARAM NOF/U: HCPCS | Performed by: INTERNAL MEDICINE

## 2023-11-21 PROCEDURE — G8428 CUR MEDS NOT DOCUMENT: HCPCS | Performed by: FAMILY MEDICINE

## 2023-11-21 PROCEDURE — G8484 FLU IMMUNIZE NO ADMIN: HCPCS | Performed by: INTERNAL MEDICINE

## 2023-11-21 PROCEDURE — 1036F TOBACCO NON-USER: CPT | Performed by: INTERNAL MEDICINE

## 2023-11-21 PROCEDURE — 1036F TOBACCO NON-USER: CPT | Performed by: FAMILY MEDICINE

## 2023-11-21 PROCEDURE — G8484 FLU IMMUNIZE NO ADMIN: HCPCS | Performed by: FAMILY MEDICINE

## 2023-11-21 PROCEDURE — G8427 DOCREV CUR MEDS BY ELIG CLIN: HCPCS | Performed by: INTERNAL MEDICINE

## 2023-11-21 PROCEDURE — 99213 OFFICE O/P EST LOW 20 MIN: CPT | Performed by: FAMILY MEDICINE

## 2023-11-21 PROCEDURE — 3044F HG A1C LEVEL LT 7.0%: CPT | Performed by: FAMILY MEDICINE

## 2023-11-21 PROCEDURE — 1123F ACP DISCUSS/DSCN MKR DOCD: CPT | Performed by: INTERNAL MEDICINE

## 2023-11-21 PROCEDURE — 93005 ELECTROCARDIOGRAM TRACING: CPT | Performed by: INTERNAL MEDICINE

## 2023-11-21 PROCEDURE — G8400 PT W/DXA NO RESULTS DOC: HCPCS | Performed by: INTERNAL MEDICINE

## 2023-11-21 PROCEDURE — 93010 ELECTROCARDIOGRAM REPORT: CPT | Performed by: INTERNAL MEDICINE

## 2023-11-21 PROCEDURE — 1123F ACP DISCUSS/DSCN MKR DOCD: CPT | Performed by: FAMILY MEDICINE

## 2023-11-21 PROCEDURE — 1090F PRES/ABSN URINE INCON ASSESS: CPT | Performed by: FAMILY MEDICINE

## 2023-11-21 PROCEDURE — 99214 OFFICE O/P EST MOD 30 MIN: CPT | Performed by: INTERNAL MEDICINE

## 2023-11-21 PROCEDURE — 3017F COLORECTAL CA SCREEN DOC REV: CPT | Performed by: FAMILY MEDICINE

## 2023-11-21 PROCEDURE — 3017F COLORECTAL CA SCREEN DOC REV: CPT | Performed by: INTERNAL MEDICINE

## 2023-11-21 PROCEDURE — 1090F PRES/ABSN URINE INCON ASSESS: CPT | Performed by: INTERNAL MEDICINE

## 2023-11-21 PROCEDURE — G8400 PT W/DXA NO RESULTS DOC: HCPCS | Performed by: FAMILY MEDICINE

## 2023-11-21 RX ORDER — HYDROMORPHONE HYDROCHLORIDE 2 MG/1
TABLET ORAL
COMMUNITY
Start: 2023-10-31

## 2023-11-21 RX ORDER — LIRAGLUTIDE 6 MG/ML
INJECTION SUBCUTANEOUS
Qty: 2 ADJUSTABLE DOSE PRE-FILLED PEN SYRINGE | Refills: 3 | Status: SHIPPED | OUTPATIENT
Start: 2023-11-21

## 2023-11-21 ASSESSMENT — PATIENT HEALTH QUESTIONNAIRE - PHQ9
SUM OF ALL RESPONSES TO PHQ QUESTIONS 1-9: 0
1. LITTLE INTEREST OR PLEASURE IN DOING THINGS: 0
SUM OF ALL RESPONSES TO PHQ9 QUESTIONS 1 & 2: 0
2. FEELING DOWN, DEPRESSED OR HOPELESS: 0
SUM OF ALL RESPONSES TO PHQ QUESTIONS 1-9: 0

## 2023-11-21 NOTE — PROGRESS NOTES
Identified pt with two pt identifiers (name and ). Reviewed chart in preparation for visit and have obtained necessary documentation. Jed Villareal is a 74 y.o. female  Chief Complaint   Patient presents with    Weight Management     1 month follow up     There were no vitals taken for this visit. 1. Have you been to the ER, urgent care clinic since your last visit? Hospitalized since your last visit?no    2. Have you seen or consulted any other health care providers outside of the [address] [place] [address] Avenue since your last visit? Include any pap smears or colon screening. No    BMI - 28.0    Patient and provider made aware of elevated BP x2. Patient asymptomatic. Patient reminded to monitor BP, continue to take BP medications if prescribed, and follow up with PCP/Cardiologist.  Patient expressed understanding and agreement.
tablet Take 0.5 tablets by mouth as needed. dilTIAZem (DILACOR XR) 120 MG extended release capsule TAKE 1 CAPSULE EVERY DAY , NEED MD APPOINTMENT FOR REFILLS      DULoxetine (CYMBALTA) 60 MG extended release capsule Take 1 capsule by mouth daily      ezetimibe (ZETIA) 10 MG tablet Take 1 tablet by mouth daily      hydroCHLOROthiazide (HYDRODIURIL) 12.5 MG tablet Take 1 tablet by mouth daily      metFORMIN, MOD, (GLUMETZA) 500 MG extended release tablet Take 1 tablet by mouth 2 times daily      metoprolol succinate (TOPROL XL) 50 MG extended release tablet Take 1 tablet by mouth daily      omeprazole (PRILOSEC) 20 MG delayed release capsule Take 1 capsule by mouth as needed      pregabalin (LYRICA) 150 MG capsule Take 1 capsule by mouth 2 times daily. rosuvastatin (CRESTOR) 40 MG tablet Take 1 tablet by mouth      Semaglutide, 2 MG/DOSE, 8 MG/3ML SOPN Inject 2 mg into the skin every 7 days (Patient not taking: Reported on 11/21/2023) 3 mL 2    HYDROcodone-acetaminophen (NORCO) 5-325 MG per tablet Take 1 tablet by mouth 2 times daily as needed. (Patient not taking: Reported on 11/21/2023)      pantoprazole (PROTONIX) 40 MG tablet Take 1 tablet by mouth as needed (Patient not taking: Reported on 9/13/2023)       No current facility-administered medications for this visit. Participation   Did you attend clinic and class last week? no    Review of Systems  Since your last visit, have you experienced any complications? no  If yes, please list:       Are you taking an appetite suppressant? no  If so, is there any Chest Pain, Palpitations or Dizziness? HUNGER CONTROL: fair    BP Readings from Last 3 Encounters:   11/21/23 128/64   11/21/23 122/62   10/18/23 (!) 142/72       SLEEP:7-8    Have you received any other medical care this week? no  If yes, where and for what?        Have you discontinued or changed any medicine or dose of your medicine since your last visit with Dr Tiana Carter? no  If yes, where

## 2023-12-14 RX ORDER — DILTIAZEM HYDROCHLORIDE 120 MG/1
120 CAPSULE, EXTENDED RELEASE ORAL DAILY
Qty: 90 CAPSULE | Refills: 3 | Status: SHIPPED | OUTPATIENT
Start: 2023-12-14

## 2023-12-14 NOTE — TELEPHONE ENCOUNTER
Refill Request Received for the Following Medication     Requested Prescriptions     Pending Prescriptions Disp Refills    dilTIAZem (DILACOR XR) 120 MG extended release capsule 90 capsule 3     Sig: Take 1 capsule by mouth daily       Last Prescribed: 12-    Last Appointment With Me:  11/21/2023     Future Appointments:  Future Appointments   Date Time Provider [facility] [address]   11/22/2024  1:00 PM Natalia Claude, MD BSC[facility] BS [facility]

## 2024-01-08 ENCOUNTER — TELEPHONE (OUTPATIENT)
Age: 74
End: 2024-01-08

## 2024-01-08 NOTE — TELEPHONE ENCOUNTER
Pre-Procedure Cardiac Clearance Request:    Facility: [facility]    Procedure Date:01-    Procedure: Right ankle ORIF    Surgeon: unknown    Anesthesia : general     Request for Interruption of Anticoagulants: Aspirin 81 mg    May stop anticoagulant how many days prior and when to resume    Is patient cleared from a cardiac standpoint to proceed with upcoming procedure. Please advise.      Fax back to Halle Castle NP at [phone]

## 2024-01-08 NOTE — TELEPHONE ENCOUNTER
Clearance note, recent progress note and EKG faxed to [facility], Attn: Halle Castle at [phone].

## 2024-01-19 ENCOUNTER — TELEMEDICINE (OUTPATIENT)
Age: 74
End: 2024-01-19
Payer: MEDICARE

## 2024-01-19 VITALS — HEIGHT: 67 IN | BODY MASS INDEX: 28.25 KG/M2 | WEIGHT: 180 LBS

## 2024-01-19 DIAGNOSIS — G47.33 OSA ON CPAP: ICD-10-CM

## 2024-01-19 DIAGNOSIS — E66.3 OVERWEIGHT (BMI 25.0-29.9): Primary | ICD-10-CM

## 2024-01-19 DIAGNOSIS — E11.9 TYPE 2 DIABETES MELLITUS WITHOUT COMPLICATION, WITHOUT LONG-TERM CURRENT USE OF INSULIN (HCC): ICD-10-CM

## 2024-01-19 DIAGNOSIS — E66.3 OVERWEIGHT (BMI 25.0-29.9): ICD-10-CM

## 2024-01-19 DIAGNOSIS — I10 PRIMARY HYPERTENSION: ICD-10-CM

## 2024-01-19 PROCEDURE — 1090F PRES/ABSN URINE INCON ASSESS: CPT | Performed by: FAMILY MEDICINE

## 2024-01-19 PROCEDURE — 3046F HEMOGLOBIN A1C LEVEL >9.0%: CPT | Performed by: FAMILY MEDICINE

## 2024-01-19 PROCEDURE — G8419 CALC BMI OUT NRM PARAM NOF/U: HCPCS | Performed by: FAMILY MEDICINE

## 2024-01-19 PROCEDURE — 1123F ACP DISCUSS/DSCN MKR DOCD: CPT | Performed by: FAMILY MEDICINE

## 2024-01-19 PROCEDURE — G8484 FLU IMMUNIZE NO ADMIN: HCPCS | Performed by: FAMILY MEDICINE

## 2024-01-19 PROCEDURE — 2022F DILAT RTA XM EVC RTNOPTHY: CPT | Performed by: FAMILY MEDICINE

## 2024-01-19 PROCEDURE — G8400 PT W/DXA NO RESULTS DOC: HCPCS | Performed by: FAMILY MEDICINE

## 2024-01-19 PROCEDURE — 99214 OFFICE O/P EST MOD 30 MIN: CPT | Performed by: FAMILY MEDICINE

## 2024-01-19 PROCEDURE — 1036F TOBACCO NON-USER: CPT | Performed by: FAMILY MEDICINE

## 2024-01-19 PROCEDURE — G8427 DOCREV CUR MEDS BY ELIG CLIN: HCPCS | Performed by: FAMILY MEDICINE

## 2024-01-19 PROCEDURE — 3017F COLORECTAL CA SCREEN DOC REV: CPT | Performed by: FAMILY MEDICINE

## 2024-01-19 RX ORDER — BUPROPION HYDROCHLORIDE 150 MG/1
150 TABLET, EXTENDED RELEASE ORAL 2 TIMES DAILY
Qty: 60 TABLET | Refills: 1 | Status: SHIPPED | OUTPATIENT
Start: 2024-01-19

## 2024-01-19 NOTE — PROGRESS NOTES
Identified pt with two pt identifiers (name and ). Reviewed chart in preparation for visit and have obtained necessary documentation.    Carlita Rosenbaum is a 73 y.o. female  Chief Complaint   Patient presents with    Weight Management     [facility] Modified Diet    Medication Management     Ht 1.702 m (5' 7\")   Wt 81.6 kg (180 lb)   BMI 28.19 kg/m²   No other vitals obtained.  1. Have you been to the ER, urgent care clinic since your last visit?  Hospitalized since your last visit?Yes factured ankle    2. Have you seen or consulted any other health care providers outside of the [facility] since your last visit?  Include any pap smears or colon screening. yes - Yes [facility]    
anticipated. She expressed understanding with the diagnosis(es) and plan.     Pursuant to the emergency declaration under the [place] Act and the National Emergencies Act, 1135 waiver authority and the Coronavirus Preparedness and Response Supplemental Appropriations Act, this Virtual  Visit was conducted, with patient's consent, to reduce the patient's risk of exposure to COVID-19 and provide continuity of care for an established patient.     Services were provided through a video synchronous discussion virtually to substitute for in-person clinic visit.    Kenya Winter MD

## 2024-02-06 NOTE — PROGRESS NOTES
2/6/2024         RE: Ilan Deleon  [address]  [facility] [address]        Dear Colleague,    Thank you for referring your patient, Ilan Deleon, to the [facility]. Please see a copy of my visit note below.    Ilan Deleon was seen in the Allergy Clinic at [facility].    Ilan Deleon is a 10 month old  male being seen today at the request of Dr. Pantoja in consultation for food allergies. Accompanied today by his mother.    Around Thanksgiving he was eating shrimp. Broke out in hives within 5 minutes. Had a rash everywhere it came in contact with his skin. No vomiting or difficulty breathing His mother gave him diphenhydramine and he took a nap. When he woke up his symptoms had resolved. Hasn't been in contact with shrimp since this reaction.      PAST MEDICAL HISTORY:  Eczema    FAMILY HISTORY:  Mother - seasonal allergies    History reviewed. No pertinent surgical history.    ENVIRONMENTAL HISTORY:   Ilan [place] in a [facility] home [place] The home is heated with a forced air. They do have central air conditioning. The patient's bedroom is furnished with carpeting in bedroom and fabric window coverings.  Pets inside the house include None. There is no history of cockroach or mice infestation. Do you smoke cigarettes or other recreational drugs? No Do you vape or use an e-cigarette? No. There is/are 0 smokers living in the house. There is/are 0 who smoke ecigarettes/vape living in the house. The house does not have a basement.     SOCIAL HISTORY:   Ilan is not in . He lives with his mother.        Current Outpatient Medications:      AQUEOUS VITAMIN D 10 MCG/ML LIQD liquid, TAKE 1 ML BY MOUTH  ONCE DAILY, Disp: 50 mL, Rfl: 0     hydrocortisone 2.5 % cream, Apply topically 2 times daily for up to 2 weeks' duration, Disp: 30 g, Rfl: 0     zinc oxide (DESITIN) 40 % external ointment, Apply topically as needed for  dry skin, irritation or skin protection (Use with every diaper change until rash improves), Disp: 56 g, Rfl: 1     EPINEPHrine (EPIPEN JR) 0.15 MG/0.3ML injection 2-pack, Inject 0.3 mLs (0.15 mg) into the muscle as needed for anaphylaxis May repeat one time in 5-15 minutes if response to initial dose is inadequate. (Patient not taking: Reported on 2/6/2024), Disp: 2 each, Rfl: 0  Immunization History   Administered Date(s) Administered     DTAP,IPV,HIB,HEPB (VAXELIS) 2023, 2023, 2023     Hepatitis B, Peds 2023     Pneumo Conj 13-V (2010&after) 2023, 2023, 2023     Rotavirus, Pentavalent 2023, 2023, 2023     Allergies   Allergen Reactions     Shrimp Hives         EXAM:   Pulse 134   SpO2 98%   Physical Exam  Vitals and nursing note reviewed.   Constitutional:       General: He is active.   HENT:      Head: Normocephalic and atraumatic.      Right Ear: External ear normal.      Left Ear: External ear normal.      Nose: No rhinorrhea.   Skin:     General: Skin is warm and dry.      Findings: No rash.   Neurological:      General: No focal deficit present.      Mental Status: He is alert.           WORKUP: Skin testing    FOOD ALLERGEN PERCUTANEOUS SKIN TESTING      2/6/2024     4:00 PM   [employer]    Consent Y   Ordering Physician Dr. Cabrera   Interpreting Physician Dr. Cabrera   Testing Technician Gail MOLINA RN   Location Back   Time start: 15:57   Time End: 16:12   Positive Control: Histatrol*ALK 1 mg/ml 4/15   Negative Control: 50% Glycerin**Coral Cadence 0   Shrimp 1:20 (W/F in millimeters) 3/15   Lobster 1:20 (W/F in millimeters) 0   Crab 1:20 (W/F in millimeters) 0   Clam 1:20 (W/F in millimeters) 0   Oyster 1:20 (W/F in millimeters) 0   Scallops 1:20 (W/F in millimeters) 0      Appropriate response to controls, positive to shrimp, all others negative    ASSESSMENT/PLAN:  Ilan Snauyen is a 10 month old male here for evaluation of food  History:   Procedure Laterality Date    BARIATRIC SURGERY  01/2008    LAP GASTRIC BANDING. Dr. Orly Vaca. CARPAL TUNNEL RELEASE Bilateral     CHOLECYSTECTOMY  1984    COLONOSCOPY N/A 08/14/2020    Dr. Kerwin Bernal. w colon polypectomy. due q 3 yrs. [address] FLX W/RMVL OF TUMOR POLYP LESION SNARE TQ  12/22/2010         EGD TRANSORAL BIOPSY SINGLE/MULTIPLE  12/22/2010    Dr. Hua Standard    GI  Jan 2008    gastric lap band    IR ENDOVENOUS Leane Plump Right 2021    Dr. Donna Pacheco. [place]. due to Venous Insufficiency. KNEE SURGERY Left     LITHOTRIPSY Left     due to kidney stone. Dr. Heredia Art  ? about 2002    meniscus left knee    UROLOGICAL SURGERY Right     due to [address] Dr. Howard Townsend. Allergies   Allergen Reactions    Codeine Itching    Meloxicam Anxiety     Jittery      Family History   Problem Relation Age of Onset    Psychiatric Disorder Paternal Aunt     Coronary Art Dis Mother     Prostate Cancer Father     Cancer Father         skin cancer    Parkinson's Disease Mother     Heart Attack Mother 48    Heart Surgery Mother     Other Sister         gastric bypass    Heart Disease Sister         pacemaker    Obesity Paternal Grandmother     Obesity Paternal Aunt     Elevated Lipids Sister     Elevated Lipids Mother         familial hypercholesterolemia    Heart Disease Mother         3 bypass surgeries and 2 carotid artery surgeries    Psychiatric Disorder Mother     Diabetes Sister     Heart Disease Sister     Other Sister         DJD    Hypertension Father       Social History     Socioeconomic History    Marital status:       Spouse name: Not on file    Number of children: Not on file    Years of education: Not on file    Highest education level: Not on file   Occupational History    Not on file   Tobacco Use    Smoking status: Former     Packs/day: 0.50     Years: 10.00     Additional pack years: 0.00     Total pack years: 5.00     Types: allergies.    1. 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 Skin testing was positive for sensitization to shrimp. Counseled regarding avoidance and potentially cross-reactive foods.    - recommend avoidance of all foods containing shellfish  - anaphylaxis action plan reviewed and provided to the family  - use epinephrine auto-injector as directed for severe allergic reactions  - cetirizine (ZYRTEC) 1 MG/ML solution; Take 2.5 mLs (2.5 mg) by mouth daily as needed  Dispense: 75 mL; Refill: 3  - ALLERGY SKIN TESTS,ALLERGENS  - Allergen clam IgE; Future  - Allergen crab IgE; Future  - Allergen lobster IgE; Future  - Allergen oyster IgE; Future  - Allergen scallop IgE; Future  - Allergen shrimp IgE; Future      Follow-up in 1 year, sooner if needed      Thank you for allowing me to participate in the care of Ilan Deleon.      Cesar Cabrera MD, [facility]  Allergy/Immunology  [facility]      Chart documentation done in part with Dragon Voice Recognition Software. Although reviewed after completion, some word and grammatical errors may remain.      Again, thank you for allowing me to participate in the care of your patient.        Sincerely,        Cesar Cabrera MD

## 2024-03-11 NOTE — TELEPHONE ENCOUNTER
Reviewed the last office visit and Dr. Winter's note states to continue with Wellbutrin 100 mg one a day BUT the refill request is for Wellbutrin 150 mg bid. Pt was called to verify which dose she was taking but no answer went to RPASHANTH LAMB to call the office back to clarify.

## 2024-03-12 NOTE — TELEPHONE ENCOUNTER
Attempted another call to patient to verify which dose of Wellbutrin that she was taking but went to Dano LAMB to call the office back.

## 2024-03-13 RX ORDER — BUPROPION HYDROCHLORIDE 150 MG/1
150 TABLET, EXTENDED RELEASE ORAL 2 TIMES DAILY
Qty: 120 TABLET | Refills: 1 | Status: SHIPPED | OUTPATIENT
Start: 2024-03-13

## 2024-03-14 NOTE — TELEPHONE ENCOUNTER
Returned call to patient and verified using 2 patient identifiers. She was returning my call regarding a medication question.    Pt was made aware that I was calling her to verify the dose of Wellbutrin that she was taking and how often she was taking it. Pt states that she was taking the Welbutrin 150 mg but only once a day but her bottle says Twice a day. Informed her that is what the refill request was for and that is what Dr. Winter has refilled for her. Pt states that she must have been reading the directions wrong and will start taking it as prescribed on the bottle.    Pt also mentioned that she needs to schedule an appointment again with Dr. Winter. Recommended that she call the office back to schedule an appointment. Pt voiced understandings.

## 2024-04-05 ENCOUNTER — TELEMEDICINE (OUTPATIENT)
Age: 74
End: 2024-04-05
Payer: MEDICARE

## 2024-04-05 VITALS — BODY MASS INDEX: 27.15 KG/M2 | WEIGHT: 173 LBS | HEIGHT: 67 IN

## 2024-04-05 DIAGNOSIS — I25.10 CORONARY ARTERY DISEASE INVOLVING NATIVE HEART, UNSPECIFIED VESSEL OR LESION TYPE, UNSPECIFIED WHETHER ANGINA PRESENT: ICD-10-CM

## 2024-04-05 DIAGNOSIS — I10 PRIMARY HYPERTENSION: ICD-10-CM

## 2024-04-05 DIAGNOSIS — E66.3 OVERWEIGHT (BMI 25.0-29.9): Primary | ICD-10-CM

## 2024-04-05 DIAGNOSIS — G47.33 OSA ON CPAP: ICD-10-CM

## 2024-04-05 DIAGNOSIS — E78.2 MIXED HYPERLIPIDEMIA: ICD-10-CM

## 2024-04-05 DIAGNOSIS — E11.9 TYPE 2 DIABETES MELLITUS WITHOUT COMPLICATION, WITHOUT LONG-TERM CURRENT USE OF INSULIN (HCC): ICD-10-CM

## 2024-04-05 PROCEDURE — 1123F ACP DISCUSS/DSCN MKR DOCD: CPT | Performed by: FAMILY MEDICINE

## 2024-04-05 PROCEDURE — 3017F COLORECTAL CA SCREEN DOC REV: CPT | Performed by: FAMILY MEDICINE

## 2024-04-05 PROCEDURE — 1036F TOBACCO NON-USER: CPT | Performed by: FAMILY MEDICINE

## 2024-04-05 PROCEDURE — G8400 PT W/DXA NO RESULTS DOC: HCPCS | Performed by: FAMILY MEDICINE

## 2024-04-05 PROCEDURE — 2022F DILAT RTA XM EVC RTNOPTHY: CPT | Performed by: FAMILY MEDICINE

## 2024-04-05 PROCEDURE — 99214 OFFICE O/P EST MOD 30 MIN: CPT | Performed by: FAMILY MEDICINE

## 2024-04-05 PROCEDURE — 1090F PRES/ABSN URINE INCON ASSESS: CPT | Performed by: FAMILY MEDICINE

## 2024-04-05 PROCEDURE — G8419 CALC BMI OUT NRM PARAM NOF/U: HCPCS | Performed by: FAMILY MEDICINE

## 2024-04-05 PROCEDURE — 3046F HEMOGLOBIN A1C LEVEL >9.0%: CPT | Performed by: FAMILY MEDICINE

## 2024-04-05 PROCEDURE — G8427 DOCREV CUR MEDS BY ELIG CLIN: HCPCS | Performed by: FAMILY MEDICINE

## 2024-04-05 ASSESSMENT — PATIENT HEALTH QUESTIONNAIRE - PHQ9
SUM OF ALL RESPONSES TO PHQ QUESTIONS 1-9: 2
1. LITTLE INTEREST OR PLEASURE IN DOING THINGS: SEVERAL DAYS
SUM OF ALL RESPONSES TO PHQ QUESTIONS 1-9: 2
SUM OF ALL RESPONSES TO PHQ9 QUESTIONS 1 & 2: 2
SUM OF ALL RESPONSES TO PHQ QUESTIONS 1-9: 2
2. FEELING DOWN, DEPRESSED OR HOPELESS: SEVERAL DAYS
SUM OF ALL RESPONSES TO PHQ QUESTIONS 1-9: 2

## 2024-04-05 NOTE — PROGRESS NOTES
[facility] Progress Note:   F/up Physician Visit    Carlita Rosenbaum is a 73 y.o. female who was seen by synchronous (real-time) audio-video technology on 4/5/2024.      Consent:  She and/or her healthcare decision maker is aware that this patient-initiated Telehealth encounter is a billable service, with coverage as determined by her insurance carrier. She is aware that she may receive a bill and has provided verbal consent to proceed: Yes    I was at home while conducting this encounter.      712  Subjective:   Carlita Rosenbaum was seen for Weight Management (Moy/1 Month)    f/up physician visit for the / LCD Program.        Jan [birth year]  Now 89  Not able to exercise due to a boot on the foot  DM  She was able to switch back to  2 mg ozempic since it was available in the pharmacy    Prior to Admission medications    Medication Sig Start Date End Date Taking? Authorizing Provider   buPROPion (WELLBUTRIN SR) 150 MG extended release tablet TAKE 1 TABLET TWICE DAILY 3/13/24  Yes Kenya Winter MD   Cholecalciferol (VITAMIN D3) 125 MCG (5000 UT) CAPS Take 1 capsule by mouth daily   Yes ProviderCelina MD   dilTIAZem (DILACOR XR) 120 MG extended release capsule Take 1 capsule by mouth daily 12/14/23  Yes Kutris Erwin MD   Insulin Pen Needle 32G X 5 MM MISC 1 each by Does not apply route daily Use daily with liraglutide 11/21/23  Yes Kenya Winter MD   ibuprofen (ADVIL;MOTRIN) 200 MG tablet Take 1 tablet by mouth every 6 hours as needed for Pain   Yes Provider, MD Celina   traZODone (DESYREL) 100 MG tablet Take 1 tablet by mouth nightly 7/26/23  Yes ProviderCelina MD   aspirin 81 MG chewable tablet Take 1 tablet by mouth daily Reports taking BID for 30 days   Yes Celina Auguste MD   acetaminophen (TYLENOL) 500 MG tablet Take 2 tablets by mouth every 8 hours as needed   Yes Automatic Reconciliation, Ar   budesonide-formoterol (SYMBICORT) 160-4.5 MCG/ACT AERO Inhale 2

## 2024-04-05 NOTE — PROGRESS NOTES
Patient was called to begin virtual visit check in.  No answer/was told patient wasn't available. Voicemail message left asking the patient to give the office a call back.

## 2024-04-05 NOTE — PROGRESS NOTES
Identified patient with two patient identifiers (name and ). Reviewed chart in preparation for visit and have obtained necessary documentation.    Carlita Rosenbaum is a 73 y.o. female  Chief Complaint   Patient presents with    Weight Management     Moy/1 Month     Ht 1.702 m (5' 7\")   Wt 78.5 kg (173 lb)   BMI 27.10 kg/m²     1. Have you been to the ER, urgent care clinic since your last visit?  Hospitalized since your last visit?no    2. Have you seen or consulted any other health care providers outside of the [facility] since your last visit?  Include any pap smears or colon screening. yes - Ortho Surgeon -- [facility]

## 2024-04-19 RX ORDER — SEMAGLUTIDE 2.68 MG/ML
INJECTION, SOLUTION SUBCUTANEOUS
Qty: 3 ML | Refills: 0 | OUTPATIENT
Start: 2024-04-19

## 2024-04-24 NOTE — TELEPHONE ENCOUNTER
Patient left VM stating Victoza is out of stock however, pharmacy has Ozempic. Please contact patient at [phone]

## 2024-04-26 NOTE — TELEPHONE ENCOUNTER
Pt returned the call to the office and verified using 2 patient identifiers.  Pt states that she was on Victoza at one point but was switched back to the Ozempic because it was out of stock. Pt states that she has been on the Ozempic 2 mg for the past 8 weeks and would like a refill sent to [facility].  She states that she took her last pen last Friday 04/19.     Informed will forward this information to Dr. Winter and pt was made aware that I may not have a response until next week due to Dr. Winter was not in the office. Pt voiced understandings.

## 2024-04-26 NOTE — TELEPHONE ENCOUNTER
Attempted to reach the patient regarding this request but no response. Called placed to [facility] and spoke with the Pharmacy Rani Pierre.  Pt was verified using 2 patient identifiers. She states that the Victoza is currently on back order and not sure when it will be available but may be able to get Ozempic.     Informed her that the patient may not meet the criteria approval for Ozempic coverage. Will make the Provider aware of this call.

## 2024-05-24 DIAGNOSIS — E11.9 TYPE 2 DIABETES MELLITUS WITHOUT COMPLICATION, WITHOUT LONG-TERM CURRENT USE OF INSULIN (HCC): ICD-10-CM

## 2024-06-06 RX ORDER — SEMAGLUTIDE 2.68 MG/ML
INJECTION, SOLUTION SUBCUTANEOUS
Qty: 3 ML | Refills: 0 | OUTPATIENT
Start: 2024-06-06

## 2024-06-06 RX ORDER — LIRAGLUTIDE 6 MG/ML
INJECTION SUBCUTANEOUS
Qty: 6 ML | Refills: 3 | Status: SHIPPED | OUTPATIENT
Start: 2024-06-06

## 2024-06-06 RX ORDER — BUPROPION HYDROCHLORIDE 150 MG/1
150 TABLET, EXTENDED RELEASE ORAL 2 TIMES DAILY
Qty: 180 TABLET | Refills: 1 | Status: SHIPPED | OUTPATIENT
Start: 2024-06-06

## 2024-07-18 ENCOUNTER — OFFICE VISIT (OUTPATIENT)
Age: 74
End: 2024-07-18
Payer: MEDICARE

## 2024-07-18 VITALS
WEIGHT: 185.5 LBS | BODY MASS INDEX: 29.11 KG/M2 | OXYGEN SATURATION: 95 % | DIASTOLIC BLOOD PRESSURE: 71 MMHG | HEART RATE: 82 BPM | TEMPERATURE: 98.2 F | RESPIRATION RATE: 20 BRPM | HEIGHT: 67 IN | SYSTOLIC BLOOD PRESSURE: 112 MMHG

## 2024-07-18 DIAGNOSIS — E66.3 OVERWEIGHT (BMI 25.0-29.9): Primary | ICD-10-CM

## 2024-07-18 DIAGNOSIS — E78.2 MIXED HYPERLIPIDEMIA: ICD-10-CM

## 2024-07-18 DIAGNOSIS — I10 PRIMARY HYPERTENSION: ICD-10-CM

## 2024-07-18 DIAGNOSIS — G47.33 OSA ON CPAP: ICD-10-CM

## 2024-07-18 DIAGNOSIS — I25.10 CORONARY ARTERY DISEASE INVOLVING NATIVE HEART, UNSPECIFIED VESSEL OR LESION TYPE, UNSPECIFIED WHETHER ANGINA PRESENT: ICD-10-CM

## 2024-07-18 DIAGNOSIS — E11.9 TYPE 2 DIABETES MELLITUS WITHOUT COMPLICATION, WITHOUT LONG-TERM CURRENT USE OF INSULIN (HCC): ICD-10-CM

## 2024-07-18 DIAGNOSIS — I25.10 ARTERIOSCLEROTIC HEART DISEASE (ASHD): ICD-10-CM

## 2024-07-18 PROCEDURE — 3074F SYST BP LT 130 MM HG: CPT | Performed by: FAMILY MEDICINE

## 2024-07-18 PROCEDURE — 3017F COLORECTAL CA SCREEN DOC REV: CPT | Performed by: FAMILY MEDICINE

## 2024-07-18 PROCEDURE — 2022F DILAT RTA XM EVC RTNOPTHY: CPT | Performed by: FAMILY MEDICINE

## 2024-07-18 PROCEDURE — G8428 CUR MEDS NOT DOCUMENT: HCPCS | Performed by: FAMILY MEDICINE

## 2024-07-18 PROCEDURE — 1036F TOBACCO NON-USER: CPT | Performed by: FAMILY MEDICINE

## 2024-07-18 PROCEDURE — G8400 PT W/DXA NO RESULTS DOC: HCPCS | Performed by: FAMILY MEDICINE

## 2024-07-18 PROCEDURE — 3078F DIAST BP <80 MM HG: CPT | Performed by: FAMILY MEDICINE

## 2024-07-18 PROCEDURE — 99214 OFFICE O/P EST MOD 30 MIN: CPT | Performed by: FAMILY MEDICINE

## 2024-07-18 PROCEDURE — 3046F HEMOGLOBIN A1C LEVEL >9.0%: CPT | Performed by: FAMILY MEDICINE

## 2024-07-18 PROCEDURE — 1090F PRES/ABSN URINE INCON ASSESS: CPT | Performed by: FAMILY MEDICINE

## 2024-07-18 PROCEDURE — G8419 CALC BMI OUT NRM PARAM NOF/U: HCPCS | Performed by: FAMILY MEDICINE

## 2024-07-18 PROCEDURE — 1123F ACP DISCUSS/DSCN MKR DOCD: CPT | Performed by: FAMILY MEDICINE

## 2024-07-18 RX ORDER — SEMAGLUTIDE 1 MG/.5ML
1 INJECTION, SOLUTION SUBCUTANEOUS
Qty: 2 ML | Refills: 0 | Status: SHIPPED | OUTPATIENT
Start: 2024-07-18

## 2024-07-18 NOTE — PROGRESS NOTES
Identified pt with two pt identifiers (name and ). Reviewed chart in preparation for visit and have obtained necessary documentation.    Carlita Rosenbaum is a 73 y.o. female  Chief Complaint   Patient presents with    Weight Management     Restart program - last seen  (VV)     /71 (Site: Right Upper Arm, Position: Sitting, Cuff Size: Large Adult)   Pulse 82   Temp 98.2 °F (36.8 °C) (Oral)   Resp 20   Ht 1.702 m (5' 7\")   Wt 84.1 kg (185 lb 8 oz)   SpO2 95%   BMI 29.05 kg/m²     1. Have you been to the ER, urgent care clinic since your last visit?  Hospitalized since your last visit?no    2. Have you seen or consulted any other health care providers outside of the [facility] since your last visit?  Include any pap smears or colon screening. No    BMI - 28.8  
consume each day?  64 oz    How many meal replacements did you take each day? 0 lateley    Did you have any problems adhering to the program?  no If yes, please explain:      Exercise  Aerobic exercise: 0 min has not attended the [facility] exercise program, in PT 2 times a week  Resistance exercise: 0 workouts / week  Any discomfort?  no     If yes, where?    LABS:   reprinted jonnie orders  Last A1c was 5.3 on ozempic then was not able to finde the med. Now on victoza and she says it is not working as well      Objective  /71 (Site: Right Upper Arm, Position: Sitting, Cuff Size: Large Adult)   Pulse 82   Temp 98.2 °F (36.8 °C) (Oral)   Resp 20   Ht 1.702 m (5' 7\")   Wt 84.1 kg (185 lb 8 oz)   SpO2 95%   BMI 29.05 kg/m²   No LMP recorded. Patient is postmenopausal.      Physical Exam  Appearance: well,   Mental:A&O x 3, NAD  H:NC/AT,  EENT:   EOMI, PERRL, No scleral icterus  Neck: no bruit or JVD  CV: RRR no M/R/G  Lung: clear, No W/R  ABD: soft, active, nontender  Ext:  trace Edema bilaterally  Neuro: nonfocal          Assessment / Plan    Carlita Rosenbaum was seen today for Weight Management (Restart program - last seen 4/5 (VV))       1. Overweight (BMI 25.0-29.9)  Regaining weight  Movement: increase to 4 days exercise. Try to get to pool exercise at [facility] 2 days a week  2. Type 2 diabetes mellitus without complication, without long-term current use of insulin (HCC)  Right now continue trulicity   Considering trying to get ozempic covered again  If wegovy is covered that will solve the problem  3. Coronary artery disease involving native heart, unspecified vessel or lesion type, unspecified whether angina present  T  -     Semaglutide-Weight Management (WEGOVY) 1 MG/0.5ML SOAJ SC injection  She has coronary artery disease so she fits the heart disease basis for treatment to prevent CV death    Wegovy has been approved to use to reduce the risk of major adverse cardiovascular events

## 2024-07-19 RX ORDER — SEMAGLUTIDE 2.68 MG/ML
INJECTION, SOLUTION SUBCUTANEOUS
Qty: 9 ML | Refills: 1 | Status: SHIPPED | OUTPATIENT
Start: 2024-07-19

## 2024-07-22 ENCOUNTER — TELEPHONE (OUTPATIENT)
Age: 74
End: 2024-07-22

## 2024-07-22 NOTE — TELEPHONE ENCOUNTER
Returned patient's call.  She stated that she is going on vacation.  She is going to  the Ozempic.  She will wait to hear about the Wegovy.  She has Medicare, but has a \"heart condition\" and Dr. Winter told her she should be able to get it.    I advised the patient that Dr. Winter has not closed her note so I cannot do the PA yet anyway.  The patient verbalized understanding and thanked me for calling her back so quickly.

## 2024-07-22 NOTE — TELEPHONE ENCOUNTER
Patient came to office regarding labs that were supposed to be ordered on 7/18/24. The orders on file are from 1/20/24. Please fax order to [phone]. Please provide patient with an update

## 2024-07-29 ENCOUNTER — CLINICAL DOCUMENTATION (OUTPATIENT)
Age: 74
End: 2024-07-29

## 2024-07-29 NOTE — PROGRESS NOTES
Prior Authorization for Wegovy 1 mg/0.5 ml sent to [employer] via Cover [employer].    Key # [id number]    Awaiting determination.

## 2024-07-30 ENCOUNTER — CLINICAL DOCUMENTATION (OUTPATIENT)
Age: 74
End: 2024-07-30

## 2024-07-30 NOTE — PROGRESS NOTES
Prior Authorization request, office notes (Dr. Winter and Dr. Erwin-Cardiology), and Bilateral Carotid Duplex report for Wegovy 0.25 mg/0.5 ml faxed to [facility] Medicare.    Awaiting determination.    ID # [id number]

## 2024-08-29 ENCOUNTER — OFFICE VISIT (OUTPATIENT)
Age: 74
End: 2024-08-29
Payer: MEDICARE

## 2024-08-29 VITALS
OXYGEN SATURATION: 95 % | BODY MASS INDEX: 28.12 KG/M2 | WEIGHT: 179.2 LBS | RESPIRATION RATE: 18 BRPM | HEART RATE: 67 BPM | DIASTOLIC BLOOD PRESSURE: 65 MMHG | HEIGHT: 67 IN | SYSTOLIC BLOOD PRESSURE: 98 MMHG | TEMPERATURE: 98.1 F

## 2024-08-29 DIAGNOSIS — E78.2 MIXED HYPERLIPIDEMIA: ICD-10-CM

## 2024-08-29 DIAGNOSIS — E11.9 TYPE 2 DIABETES MELLITUS WITHOUT COMPLICATION, WITHOUT LONG-TERM CURRENT USE OF INSULIN (HCC): ICD-10-CM

## 2024-08-29 DIAGNOSIS — I10 PRIMARY HYPERTENSION: ICD-10-CM

## 2024-08-29 DIAGNOSIS — I25.10 CORONARY ARTERY DISEASE INVOLVING NATIVE HEART, UNSPECIFIED VESSEL OR LESION TYPE, UNSPECIFIED WHETHER ANGINA PRESENT: ICD-10-CM

## 2024-08-29 DIAGNOSIS — E66.3 OVERWEIGHT (BMI 25.0-29.9): Primary | ICD-10-CM

## 2024-08-29 DIAGNOSIS — G47.33 OSA ON CPAP: ICD-10-CM

## 2024-08-29 DIAGNOSIS — E66.3 OVERWEIGHT (BMI 25.0-29.9): ICD-10-CM

## 2024-08-29 PROCEDURE — G8419 CALC BMI OUT NRM PARAM NOF/U: HCPCS | Performed by: FAMILY MEDICINE

## 2024-08-29 PROCEDURE — 3078F DIAST BP <80 MM HG: CPT | Performed by: FAMILY MEDICINE

## 2024-08-29 PROCEDURE — 1123F ACP DISCUSS/DSCN MKR DOCD: CPT | Performed by: FAMILY MEDICINE

## 2024-08-29 PROCEDURE — 1090F PRES/ABSN URINE INCON ASSESS: CPT | Performed by: FAMILY MEDICINE

## 2024-08-29 PROCEDURE — G8400 PT W/DXA NO RESULTS DOC: HCPCS | Performed by: FAMILY MEDICINE

## 2024-08-29 PROCEDURE — G8427 DOCREV CUR MEDS BY ELIG CLIN: HCPCS | Performed by: FAMILY MEDICINE

## 2024-08-29 PROCEDURE — 1036F TOBACCO NON-USER: CPT | Performed by: FAMILY MEDICINE

## 2024-08-29 PROCEDURE — 3074F SYST BP LT 130 MM HG: CPT | Performed by: FAMILY MEDICINE

## 2024-08-29 PROCEDURE — 99214 OFFICE O/P EST MOD 30 MIN: CPT | Performed by: FAMILY MEDICINE

## 2024-08-29 ASSESSMENT — PATIENT HEALTH QUESTIONNAIRE - PHQ9
SUM OF ALL RESPONSES TO PHQ9 QUESTIONS 1 & 2: 0
SUM OF ALL RESPONSES TO PHQ QUESTIONS 1-9: 0
SUM OF ALL RESPONSES TO PHQ QUESTIONS 1-9: 0
1. LITTLE INTEREST OR PLEASURE IN DOING THINGS: NOT AT ALL
SUM OF ALL RESPONSES TO PHQ QUESTIONS 1-9: 0
SUM OF ALL RESPONSES TO PHQ QUESTIONS 1-9: 0
2. FEELING DOWN, DEPRESSED OR HOPELESS: NOT AT ALL

## 2024-08-29 NOTE — PROGRESS NOTES
Identified pt with two pt identifiers (name and ). Reviewed chart in preparation for visit and have obtained necessary documentation.    Carlita Rosenbaum is a 74 y.o. female  Chief Complaint   Patient presents with    Weight Management     1 month follow up     BP 98/65 (Site: Right Upper Arm, Position: Sitting, Cuff Size: Large Adult)   Pulse 67   Temp 98.1 °F (36.7 °C) (Oral)   Resp 18   Ht 1.702 m (5' 7\")   Wt 81.3 kg (179 lb 3.2 oz)   SpO2 95%   BMI 28.07 kg/m²     1. Have you been to the ER, urgent care clinic since your last visit?  Hospitalized since your last visit?no    2. Have you seen or consulted any other health care providers outside of the [facility] since your last visit?  Include any pap smears or colon screening. No    BMI - 27.8  
score of 112 from a CT in 2019     4. Primary hypertension  Hctz  12.5 mg a day  Diltiazem  120 mg a day  Metoprolol xl 50 mg a day  Adjust bp meds as the weight falls with weight loss        5. Mixed hyperlipidemia  Cont crestor and the lifestyle changes will also help lower the cholesterol         6. MELISSA on CPAP   Use cpap nightly     1.  Weight management improved   Progress was reviewed with patient    2.  Labs    Latest results reviewed with patient       face to face time with Carlita consisted of counseling & coordinating and/or discussing treatment plans in reference to her obesity The primary encounter diagnosis was Overweight (BMI 25.0-29.9). Diagnoses of Type 2 diabetes mellitus without complication, without long-term current use of insulin (HCC), Coronary artery disease involving native heart, unspecified vessel or lesion type, unspecified whether angina present, Primary hypertension, Mixed hyperlipidemia, and MELISSA on CPAP were also pertinent to this visit.

## 2024-08-29 NOTE — PATIENT INSTRUCTIONS
Monday  Kicking it w/ Schuyler 8am  Kick off your week with a Cardio Kickboxing high-energy workout that is challenging for all levels. This group fitness class combines martial arts techniques with fast-paced cardio. You’ll build stamina, improve coordination & flexibility, as well as burn calories and build lean muscle with this fun and challenging workout.  On Zoom: [place] to receive the link  [phone]  [employer]    Monday Motivation w/ Nelly 9am  Come join me to start off your week with a motivating aerobic and strength building workout. We’ll get your heart pumping and muscles working with various workout routines to fit your fitness level.  [facility]  [phone]  [email or web address]     Lisa with Rosa 6pm  A calorie-burning, dance fitness party combining all elements of fitness- cardio, muscle conditioning, balance and flexibility!  On Zoom: [place] to receive the link  [phone]  [email or web address]     Wednesday  Sit and Stay Fit w/ Ty 11am  Join us for a 45-minute fitness class tailored for seasoned adults. Our program prioritizes strength, balance, flexibility, and cardiovascular fitness in a safe and supportive environment. Discover a welcoming space where you can enhance your overall well-being with expert guidance and a community committed to your health journey.  [facility] ([facility]  [phone]  [email or web address]    Thursday  Morning Motivation w/ Nelly 9am  Come join me to finish off your week with a motivating aerobic and strength building workout. We’ll get your heart pumping and muscles working with various workout routines to fit your fitness level.  [facility]  [phone]  [email or web address]  Friday  Power Half-Hour with Dania 8am  Start your day off right with this high intensity workout is sure to get your body moving while challenging you to reach new levels of fitness!  On Zoom: Webster to

## 2024-09-10 LAB
ALBUMIN SERPL-MCNC: 4.1 G/DL (ref 3.8–4.8)
ALP SERPL-CCNC: 56 IU/L (ref 44–121)
ALT SERPL-CCNC: 38 IU/L (ref 0–32)
AST SERPL-CCNC: 44 IU/L (ref 0–40)
BILIRUB SERPL-MCNC: 0.4 MG/DL (ref 0–1.2)
BUN SERPL-MCNC: 11 MG/DL (ref 8–27)
BUN/CREAT SERPL: 14 (ref 12–28)
CALCIUM SERPL-MCNC: 9.5 MG/DL (ref 8.7–10.3)
CHLORIDE SERPL-SCNC: 101 MMOL/L (ref 96–106)
CO2 SERPL-SCNC: 25 MMOL/L (ref 20–29)
CREAT SERPL-MCNC: 0.77 MG/DL (ref 0.57–1)
EGFRCR SERPLBLD CKD-EPI 2021: 80 ML/MIN/1.73
GLOBULIN SER CALC-MCNC: 1.9 G/DL (ref 1.5–4.5)
GLUCOSE SERPL-MCNC: 115 MG/DL (ref 70–99)
HBA1C MFR BLD: 5.9 % (ref 4.8–5.6)
POTASSIUM SERPL-SCNC: 4 MMOL/L (ref 3.5–5.2)
PROT SERPL-MCNC: 6 G/DL (ref 6–8.5)
SODIUM SERPL-SCNC: 139 MMOL/L (ref 134–144)

## 2024-09-12 ENCOUNTER — HOSPITAL ENCOUNTER (OUTPATIENT)
Facility: HOSPITAL | Age: 74
Discharge: HOME | End: 2024-09-15
Attending: INTERNAL MEDICINE
Payer: MEDICARE

## 2024-09-12 DIAGNOSIS — R91.8 PULMONARY NODULES: ICD-10-CM

## 2024-09-12 PROCEDURE — 71250 CT THORAX DX C-: CPT

## 2024-09-26 ENCOUNTER — TELEPHONE (OUTPATIENT)
Age: 74
End: 2024-09-26

## 2024-09-27 ENCOUNTER — TELEMEDICINE (OUTPATIENT)
Age: 74
End: 2024-09-27
Payer: MEDICARE

## 2024-09-27 ENCOUNTER — TELEPHONE (OUTPATIENT)
Age: 74
End: 2024-09-27

## 2024-09-27 VITALS
DIASTOLIC BLOOD PRESSURE: 57 MMHG | WEIGHT: 177 LBS | HEIGHT: 67 IN | SYSTOLIC BLOOD PRESSURE: 115 MMHG | BODY MASS INDEX: 27.78 KG/M2

## 2024-09-27 DIAGNOSIS — E78.2 MIXED HYPERLIPIDEMIA: ICD-10-CM

## 2024-09-27 DIAGNOSIS — E11.9 TYPE 2 DIABETES MELLITUS WITHOUT COMPLICATION, WITHOUT LONG-TERM CURRENT USE OF INSULIN (HCC): ICD-10-CM

## 2024-09-27 DIAGNOSIS — I10 PRIMARY HYPERTENSION: ICD-10-CM

## 2024-09-27 DIAGNOSIS — E66.3 OVERWEIGHT (BMI 25.0-29.9): Primary | ICD-10-CM

## 2024-09-27 DIAGNOSIS — I25.10 CORONARY ARTERY DISEASE INVOLVING NATIVE HEART, UNSPECIFIED VESSEL OR LESION TYPE, UNSPECIFIED WHETHER ANGINA PRESENT: ICD-10-CM

## 2024-09-27 DIAGNOSIS — G47.33 OSA ON CPAP: ICD-10-CM

## 2024-09-27 PROCEDURE — 1123F ACP DISCUSS/DSCN MKR DOCD: CPT | Performed by: FAMILY MEDICINE

## 2024-09-27 PROCEDURE — 1036F TOBACCO NON-USER: CPT | Performed by: FAMILY MEDICINE

## 2024-09-27 PROCEDURE — 99214 OFFICE O/P EST MOD 30 MIN: CPT | Performed by: FAMILY MEDICINE

## 2024-09-27 PROCEDURE — 1090F PRES/ABSN URINE INCON ASSESS: CPT | Performed by: FAMILY MEDICINE

## 2024-09-27 PROCEDURE — G8419 CALC BMI OUT NRM PARAM NOF/U: HCPCS | Performed by: FAMILY MEDICINE

## 2024-09-27 PROCEDURE — 3074F SYST BP LT 130 MM HG: CPT | Performed by: FAMILY MEDICINE

## 2024-09-27 PROCEDURE — 3078F DIAST BP <80 MM HG: CPT | Performed by: FAMILY MEDICINE

## 2024-09-27 PROCEDURE — 3044F HG A1C LEVEL LT 7.0%: CPT | Performed by: FAMILY MEDICINE

## 2024-09-27 PROCEDURE — G8427 DOCREV CUR MEDS BY ELIG CLIN: HCPCS | Performed by: FAMILY MEDICINE

## 2024-09-27 PROCEDURE — G8400 PT W/DXA NO RESULTS DOC: HCPCS | Performed by: FAMILY MEDICINE

## 2024-09-27 RX ORDER — GABAPENTIN 300 MG/1
300 CAPSULE ORAL 3 TIMES DAILY
COMMUNITY

## 2024-09-27 ASSESSMENT — PATIENT HEALTH QUESTIONNAIRE - PHQ9
9. THOUGHTS THAT YOU WOULD BE BETTER OFF DEAD, OR OF HURTING YOURSELF: NOT AT ALL
SUM OF ALL RESPONSES TO PHQ QUESTIONS 1-9: 0
SUM OF ALL RESPONSES TO PHQ QUESTIONS 1-9: 0
3. TROUBLE FALLING OR STAYING ASLEEP: NOT AT ALL
5. POOR APPETITE OR OVEREATING: NOT AT ALL
1. LITTLE INTEREST OR PLEASURE IN DOING THINGS: NOT AT ALL
2. FEELING DOWN, DEPRESSED OR HOPELESS: NOT AT ALL
SUM OF ALL RESPONSES TO PHQ QUESTIONS 1-9: 0
SUM OF ALL RESPONSES TO PHQ9 QUESTIONS 1 & 2: 0
6. FEELING BAD ABOUT YOURSELF - OR THAT YOU ARE A FAILURE OR HAVE LET YOURSELF OR YOUR FAMILY DOWN: NOT AT ALL
4. FEELING TIRED OR HAVING LITTLE ENERGY: NOT AT ALL
7. TROUBLE CONCENTRATING ON THINGS, SUCH AS READING THE NEWSPAPER OR WATCHING TELEVISION: NOT AT ALL
SUM OF ALL RESPONSES TO PHQ QUESTIONS 1-9: 0
8. MOVING OR SPEAKING SO SLOWLY THAT OTHER PEOPLE COULD HAVE NOTICED. OR THE OPPOSITE, BEING SO FIGETY OR RESTLESS THAT YOU HAVE BEEN MOVING AROUND A LOT MORE THAN USUAL: NOT AT ALL

## 2024-10-07 RX ORDER — DILTIAZEM HYDROCHLORIDE 120 MG/1
120 CAPSULE, EXTENDED RELEASE ORAL DAILY
Qty: 90 CAPSULE | Refills: 3 | OUTPATIENT
Start: 2024-10-07

## 2024-10-07 NOTE — TELEPHONE ENCOUNTER
Refill for a year of 12-. Patient okay until December 2024, appointment with dr. Neal on November.

## 2024-10-28 ENCOUNTER — TRANSCRIBE ORDERS (OUTPATIENT)
Age: 74
End: 2024-10-28

## 2024-10-28 ENCOUNTER — HOSPITAL ENCOUNTER (OUTPATIENT)
Age: 74
Discharge: HOME | End: 2024-10-31
Payer: MEDICARE

## 2024-10-28 DIAGNOSIS — R07.9 CHEST PAIN, UNSPECIFIED TYPE: ICD-10-CM

## 2024-10-28 DIAGNOSIS — R07.9 CHEST PAIN, UNSPECIFIED TYPE: Primary | ICD-10-CM

## 2024-10-28 PROCEDURE — 71046 X-RAY EXAM CHEST 2 VIEWS: CPT

## 2024-11-01 ENCOUNTER — TELEMEDICINE (OUTPATIENT)
Age: 74
End: 2024-11-01
Payer: MEDICARE

## 2024-11-01 VITALS
BODY MASS INDEX: 27.31 KG/M2 | SYSTOLIC BLOOD PRESSURE: 116 MMHG | WEIGHT: 174 LBS | HEIGHT: 67 IN | HEART RATE: 87 BPM | DIASTOLIC BLOOD PRESSURE: 66 MMHG

## 2024-11-01 DIAGNOSIS — E66.3 OVERWEIGHT (BMI 25.0-29.9): Primary | ICD-10-CM

## 2024-11-01 DIAGNOSIS — E78.2 MIXED HYPERLIPIDEMIA: ICD-10-CM

## 2024-11-01 DIAGNOSIS — I25.10 CORONARY ARTERY DISEASE INVOLVING NATIVE HEART, UNSPECIFIED VESSEL OR LESION TYPE, UNSPECIFIED WHETHER ANGINA PRESENT: ICD-10-CM

## 2024-11-01 DIAGNOSIS — E11.9 TYPE 2 DIABETES MELLITUS WITHOUT COMPLICATION, WITHOUT LONG-TERM CURRENT USE OF INSULIN (HCC): ICD-10-CM

## 2024-11-01 DIAGNOSIS — G47.33 OSA ON CPAP: ICD-10-CM

## 2024-11-01 DIAGNOSIS — I10 PRIMARY HYPERTENSION: ICD-10-CM

## 2024-11-01 PROCEDURE — 1123F ACP DISCUSS/DSCN MKR DOCD: CPT | Performed by: FAMILY MEDICINE

## 2024-11-01 PROCEDURE — G8419 CALC BMI OUT NRM PARAM NOF/U: HCPCS | Performed by: FAMILY MEDICINE

## 2024-11-01 PROCEDURE — 3078F DIAST BP <80 MM HG: CPT | Performed by: FAMILY MEDICINE

## 2024-11-01 PROCEDURE — G8400 PT W/DXA NO RESULTS DOC: HCPCS | Performed by: FAMILY MEDICINE

## 2024-11-01 PROCEDURE — G8427 DOCREV CUR MEDS BY ELIG CLIN: HCPCS | Performed by: FAMILY MEDICINE

## 2024-11-01 PROCEDURE — 3044F HG A1C LEVEL LT 7.0%: CPT | Performed by: FAMILY MEDICINE

## 2024-11-01 PROCEDURE — 99214 OFFICE O/P EST MOD 30 MIN: CPT | Performed by: FAMILY MEDICINE

## 2024-11-01 PROCEDURE — 1036F TOBACCO NON-USER: CPT | Performed by: FAMILY MEDICINE

## 2024-11-01 PROCEDURE — G8484 FLU IMMUNIZE NO ADMIN: HCPCS | Performed by: FAMILY MEDICINE

## 2024-11-01 PROCEDURE — 3074F SYST BP LT 130 MM HG: CPT | Performed by: FAMILY MEDICINE

## 2024-11-01 PROCEDURE — 1159F MED LIST DOCD IN RCRD: CPT | Performed by: FAMILY MEDICINE

## 2024-11-01 PROCEDURE — 1090F PRES/ABSN URINE INCON ASSESS: CPT | Performed by: FAMILY MEDICINE

## 2024-11-01 RX ORDER — VARENICLINE TARTRATE 1 MG/1
1 TABLET, FILM COATED ORAL DAILY
COMMUNITY
Start: 2024-10-02

## 2024-11-01 ASSESSMENT — PATIENT HEALTH QUESTIONNAIRE - PHQ9
8. MOVING OR SPEAKING SO SLOWLY THAT OTHER PEOPLE COULD HAVE NOTICED. OR THE OPPOSITE, BEING SO FIGETY OR RESTLESS THAT YOU HAVE BEEN MOVING AROUND A LOT MORE THAN USUAL: NOT AT ALL
5. POOR APPETITE OR OVEREATING: NOT AT ALL
SUM OF ALL RESPONSES TO PHQ QUESTIONS 1-9: 0
SUM OF ALL RESPONSES TO PHQ QUESTIONS 1-9: 0
SUM OF ALL RESPONSES TO PHQ9 QUESTIONS 1 & 2: 0
6. FEELING BAD ABOUT YOURSELF - OR THAT YOU ARE A FAILURE OR HAVE LET YOURSELF OR YOUR FAMILY DOWN: NOT AT ALL
2. FEELING DOWN, DEPRESSED OR HOPELESS: NOT AT ALL
1. LITTLE INTEREST OR PLEASURE IN DOING THINGS: NOT AT ALL
9. THOUGHTS THAT YOU WOULD BE BETTER OFF DEAD, OR OF HURTING YOURSELF: NOT AT ALL
3. TROUBLE FALLING OR STAYING ASLEEP: NOT AT ALL
SUM OF ALL RESPONSES TO PHQ QUESTIONS 1-9: 0
7. TROUBLE CONCENTRATING ON THINGS, SUCH AS READING THE NEWSPAPER OR WATCHING TELEVISION: NOT AT ALL
4. FEELING TIRED OR HAVING LITTLE ENERGY: NOT AT ALL
SUM OF ALL RESPONSES TO PHQ QUESTIONS 1-9: 0

## 2024-11-01 NOTE — PROGRESS NOTES
Identified pt with two pt identifiers (name and ). Reviewed chart in preparation for visit and have obtained necessary documentation.    Carlita Rosenbaum is a 74 y.o. female  Chief Complaint   Patient presents with    Medication Management    Weight Management     [facility]     BP (!) 166/92   Ht 1.702 m (5' 7\")   Wt 78.9 kg (174 lb)   BMI 27.25 kg/m²     1. Have you been to the ER, urgent care clinic since your last visit?  Hospitalized since your last visit?no    2. Have you seen or consulted any other health care providers outside of the [facility] since your last visit?  Include any pap smears or colon screening. yes - [facility]    Patient and provider made aware of elevated BP x2. Patient asymptomatic. Patient reminded to monitor BP, continue to take BP medications if prescribed, and follow up with PCP/Cardiologist.  Patient expressed understanding and agreement.        
function) (limited exam due to video visit)          [x] No gaze palsy        [] Abnormal -          Skin:        [x] No significant exanthematous lesions or discoloration noted on facial skin         [] Abnormal -            Psychiatric:       [x] Normal Affect [] Abnormal -        [x] No Hallucinations    Other pertinent observable physical exam findings:-      Assessment / Plan    Carlita Rosenbaum was seen today for Medication Management and Weight Management (WLC)       1. Overweight (BMI 25.0-29.9)  Movement: cont the plan she has to advance exercise in pool as tolerated     Meds  no longer taking the  wellbutrin  mg bid     Eating plan  Continue the low corie eating plan using 2 robard meal replacements and one low carb meal        Sleep :   at goal     2. Type 2 diabetes mellitus without complication, without long-term current use of insulin (HCC)  Ozempic 2 mg a week  and taking metformin  the medication is working     3. Coronary artery disease involving native heart, unspecified vessel or lesion type, unspecified whether angina present  The semaglutide for diabetes will also help prevent MACE     4. Primary hypertension  Good control of bp  Diltiazem  mg a day  Hctz 12.5 mg a day        5. Mixed hyperlipidemia  Zetia 10 mg a day and lifestyle changes     6. MELISSA on CPAP   Using cpap         1.  Weight management improved   Progress was reviewed with patient    2.  Labs    Latest results reviewed with patient       The  face to face time with Carlita consisted of counseling & coordinating and/or discussing treatment plans in reference to her overweight The primary encounter diagnosis was Overweight (BMI 25.0-29.9). Diagnoses of Type 2 diabetes mellitus without complication, without long-term current use of insulin (HCC), Coronary artery disease involving native heart, unspecified vessel or lesion type, unspecified whether angina present, Primary hypertension, Mixed hyperlipidemia, and MELISSA on CPAP were

## 2024-11-26 ENCOUNTER — TELEPHONE (OUTPATIENT)
Age: 74
End: 2024-11-26

## 2024-11-26 NOTE — TELEPHONE ENCOUNTER
Pre-Procedure Cardiac Clearance Request:    Facility: cosmetic facial surgery    Procedure Date: tbd    Procedure:    Surgeon: dr. Ricardo Alejandra III    Anesthesia: general    Request for Interruption of Anticoagulants:   ASA  May stop anticoagulant how many days prior and when to resume    Is patient cleared from a cardiac standpoint to proceed with upcoming procedure. Please advise.

## 2024-11-29 ENCOUNTER — TELEPHONE (OUTPATIENT)
Age: 74
End: 2024-11-29

## 2024-11-29 NOTE — TELEPHONE ENCOUNTER
The patient is at low cardiac risk to proceed with the procedure, and can interrupt blood thinners if necessary, for the following length of time individualized for each blood thinner, with the shorter and or longer end of the duration depending on surgical bleeding risk as per the proceduralist/surgeon:    Aspirin/Plavix/Effient/ Brilinta: 5 to 7 days    Resume blood thinners as per the surgeon/proceduralist when felt to be safe from a surgical/procedural standpoint.

## 2024-11-29 NOTE — TELEPHONE ENCOUNTER
Per dr. Erwin    patient is at low cardiac risk to proceed with the procedure, and can interrupt blood thinners if necessary, for the following length of time individualized for each blood thinner, with the shorter and or longer end of the duration depending on surgical bleeding risk as per the proceduralist/surgeon:     Aspirin/Plavix/Effient/ Brilinta: 5 to 7 days     Resume blood thinners as per the surgeon/proceduralist when felt to be safe from a surgical/procedural standpoint.     Clearance note, recent progress note and EKG faxed to dr. Ricardo Alejandra III at [phone].

## 2025-01-06 ENCOUNTER — TELEPHONE (OUTPATIENT)
Age: 75
End: 2025-01-06

## 2025-01-15 ENCOUNTER — TELEPHONE (OUTPATIENT)
Age: 75
End: 2025-01-15

## 2025-02-04 ENCOUNTER — TELEPHONE (OUTPATIENT)
Age: 75
End: 2025-02-04

## 2025-02-04 NOTE — TELEPHONE ENCOUNTER
Identified patient with two patient identifiers (name and ). Reviewed chart in preparation for encounter and have obtained necessary documentation.    Patient called regarding to discuss her medications and would like a nurse to return her call.

## 2025-02-04 NOTE — TELEPHONE ENCOUNTER
2 pt identifiers used. Pt called to request refill for ozempic prescribed by Dr. Winter, stated she wasn't sure if a refill was already available. Reviewed medication in pt's chart, informed pt that ozempic last ordered in 7/9/24 was sent w/ 1 refill, but is due for another refill. Informed pt needs to contact Dr. Winter's office for refill. Pt verbalized understanding and thanked for the call.

## 2025-02-04 NOTE — TELEPHONE ENCOUNTER
Patient stated that she ran out of her Ozempic b/c she had knee surgery and was unable to come in to see Dr. Winter.      She has an appointment next Thursday, 2/13.    She stated that she does not want to gain weight and wants a refill for this week and next before she sees Dr. Winter.    I advised the patient that Dr. Winter may have her wait until her appointment next week, but I will definitely ask her.    The patient verbalized understanding.    Send Rx to her [facility] on file

## 2025-02-05 DIAGNOSIS — E11.9 TYPE 2 DIABETES MELLITUS WITHOUT COMPLICATION, WITHOUT LONG-TERM CURRENT USE OF INSULIN (HCC): Primary | ICD-10-CM

## 2025-02-05 RX ORDER — SEMAGLUTIDE 2.68 MG/ML
2 INJECTION, SOLUTION SUBCUTANEOUS
Qty: 9 ML | Refills: 0 | Status: SHIPPED | OUTPATIENT
Start: 2025-02-05

## 2025-02-13 ENCOUNTER — OFFICE VISIT (OUTPATIENT)
Age: 75
End: 2025-02-13
Payer: MEDICARE

## 2025-02-13 VITALS
OXYGEN SATURATION: 92 % | SYSTOLIC BLOOD PRESSURE: 97 MMHG | WEIGHT: 165.3 LBS | RESPIRATION RATE: 20 BRPM | TEMPERATURE: 97.5 F | BODY MASS INDEX: 25.94 KG/M2 | HEIGHT: 67 IN | HEART RATE: 105 BPM | DIASTOLIC BLOOD PRESSURE: 63 MMHG

## 2025-02-13 DIAGNOSIS — I25.10 CORONARY ARTERY DISEASE INVOLVING NATIVE HEART, UNSPECIFIED VESSEL OR LESION TYPE, UNSPECIFIED WHETHER ANGINA PRESENT: ICD-10-CM

## 2025-02-13 DIAGNOSIS — E66.3 OVERWEIGHT (BMI 25.0-29.9): Primary | ICD-10-CM

## 2025-02-13 DIAGNOSIS — I25.10 ARTERIOSCLEROTIC HEART DISEASE (ASHD): ICD-10-CM

## 2025-02-13 DIAGNOSIS — G47.33 OSA ON CPAP: ICD-10-CM

## 2025-02-13 DIAGNOSIS — E11.9 TYPE 2 DIABETES MELLITUS WITHOUT COMPLICATION, WITHOUT LONG-TERM CURRENT USE OF INSULIN (HCC): ICD-10-CM

## 2025-02-13 DIAGNOSIS — E78.2 MIXED HYPERLIPIDEMIA: ICD-10-CM

## 2025-02-13 DIAGNOSIS — I10 PRIMARY HYPERTENSION: ICD-10-CM

## 2025-02-13 PROCEDURE — G8419 CALC BMI OUT NRM PARAM NOF/U: HCPCS | Performed by: FAMILY MEDICINE

## 2025-02-13 PROCEDURE — 3078F DIAST BP <80 MM HG: CPT | Performed by: FAMILY MEDICINE

## 2025-02-13 PROCEDURE — 1123F ACP DISCUSS/DSCN MKR DOCD: CPT | Performed by: FAMILY MEDICINE

## 2025-02-13 PROCEDURE — 1125F AMNT PAIN NOTED PAIN PRSNT: CPT | Performed by: FAMILY MEDICINE

## 2025-02-13 PROCEDURE — 4004F PT TOBACCO SCREEN RCVD TLK: CPT | Performed by: FAMILY MEDICINE

## 2025-02-13 PROCEDURE — 99214 OFFICE O/P EST MOD 30 MIN: CPT | Performed by: FAMILY MEDICINE

## 2025-02-13 PROCEDURE — 3074F SYST BP LT 130 MM HG: CPT | Performed by: FAMILY MEDICINE

## 2025-02-13 PROCEDURE — 1090F PRES/ABSN URINE INCON ASSESS: CPT | Performed by: FAMILY MEDICINE

## 2025-02-13 PROCEDURE — G8400 PT W/DXA NO RESULTS DOC: HCPCS | Performed by: FAMILY MEDICINE

## 2025-02-13 PROCEDURE — G8428 CUR MEDS NOT DOCUMENT: HCPCS | Performed by: FAMILY MEDICINE

## 2025-02-13 RX ORDER — BENZONATATE 200 MG/1
200 CAPSULE ORAL 3 TIMES DAILY PRN
COMMUNITY
Start: 2025-02-08

## 2025-02-13 RX ORDER — CYCLOBENZAPRINE HCL 5 MG
5 TABLET ORAL
COMMUNITY
Start: 2025-01-05

## 2025-02-13 RX ORDER — OXYCODONE HYDROCHLORIDE 5 MG/1
5 TABLET ORAL AS NEEDED
COMMUNITY
Start: 2025-01-23

## 2025-02-13 ASSESSMENT — PATIENT HEALTH QUESTIONNAIRE - PHQ9
1. LITTLE INTEREST OR PLEASURE IN DOING THINGS: NOT AT ALL
SUM OF ALL RESPONSES TO PHQ QUESTIONS 1-9: 0
SUM OF ALL RESPONSES TO PHQ QUESTIONS 1-9: 0
SUM OF ALL RESPONSES TO PHQ9 QUESTIONS 1 & 2: 0
SUM OF ALL RESPONSES TO PHQ QUESTIONS 1-9: 0
SUM OF ALL RESPONSES TO PHQ QUESTIONS 1-9: 0
2. FEELING DOWN, DEPRESSED OR HOPELESS: NOT AT ALL

## 2025-02-13 NOTE — PROGRESS NOTES
Identified pt with two pt identifiers (name and ). Reviewed chart in preparation for visit and have obtained necessary documentation.    Carlita Rosenbaum is a 74 y.o. female  Chief Complaint   Patient presents with    Weight Management     3 month follow up     BP 97/63 (Site: Right Upper Arm, Position: Sitting, Cuff Size: Large Adult)   Pulse (!) 105   Temp 97.5 °F (36.4 °C) (Oral)   Resp 20   Ht 1.702 m (5' 7\")   Wt 75 kg (165 lb 4.8 oz)   SpO2 92%   BMI 25.89 kg/m²     1. Have you been to the ER, urgent care clinic since your last visit?  Hospitalized since your last visit?no    2. Have you seen or consulted any other health care providers outside of the [facility] since your last visit?  Include any pap smears or colon screening. No    BMI -  25.6     Patients heart rate is 105, elevated above normal range.  2nd recheck shows patient is 98, within normal range.  Provider made aware.   
coordinating and/or discussing treatment plans in reference to her overweight The primary encounter diagnosis was Overweight (BMI 25.0-29.9). Diagnoses of Type 2 diabetes mellitus without complication, without long-term current use of insulin (HCC), Coronary artery disease involving native heart, unspecified vessel or lesion type, unspecified whether angina present, Primary hypertension, Mixed hyperlipidemia, MELISSA on CPAP, and Arteriosclerotic heart disease (ASHD) were also pertinent to this visit.

## 2025-03-27 ENCOUNTER — TELEPHONE (OUTPATIENT)
Age: 75
End: 2025-03-27

## 2025-03-28 ENCOUNTER — TELEMEDICINE (OUTPATIENT)
Age: 75
End: 2025-03-28

## 2025-03-28 VITALS
HEIGHT: 67 IN | SYSTOLIC BLOOD PRESSURE: 131 MMHG | WEIGHT: 165 LBS | TEMPERATURE: 97 F | HEART RATE: 91 BPM | BODY MASS INDEX: 25.9 KG/M2 | DIASTOLIC BLOOD PRESSURE: 64 MMHG

## 2025-03-28 DIAGNOSIS — I25.10 ARTERIOSCLEROTIC HEART DISEASE (ASHD): ICD-10-CM

## 2025-03-28 DIAGNOSIS — I10 PRIMARY HYPERTENSION: ICD-10-CM

## 2025-03-28 DIAGNOSIS — E11.9 TYPE 2 DIABETES MELLITUS WITHOUT COMPLICATION, WITHOUT LONG-TERM CURRENT USE OF INSULIN: ICD-10-CM

## 2025-03-28 DIAGNOSIS — G47.33 OSA ON CPAP: ICD-10-CM

## 2025-03-28 DIAGNOSIS — E78.2 MIXED HYPERLIPIDEMIA: ICD-10-CM

## 2025-03-28 DIAGNOSIS — R94.5 ABNORMAL LIVER FUNCTION: ICD-10-CM

## 2025-03-28 DIAGNOSIS — E66.3 OVERWEIGHT (BMI 25.0-29.9): Primary | ICD-10-CM

## 2025-03-28 DIAGNOSIS — I25.10 CORONARY ARTERY DISEASE INVOLVING NATIVE HEART, UNSPECIFIED VESSEL OR LESION TYPE, UNSPECIFIED WHETHER ANGINA PRESENT: ICD-10-CM

## 2025-03-28 RX ORDER — METFORMIN HYDROCHLORIDE 500 MG/1
500 TABLET, EXTENDED RELEASE ORAL
COMMUNITY
Start: 2025-03-03

## 2025-03-28 RX ORDER — MELOXICAM 7.5 MG/1
7.5 TABLET ORAL DAILY
COMMUNITY
Start: 2025-02-25

## 2025-03-28 ASSESSMENT — PATIENT HEALTH QUESTIONNAIRE - PHQ9
SUM OF ALL RESPONSES TO PHQ QUESTIONS 1-9: 0
3. TROUBLE FALLING OR STAYING ASLEEP: NOT AT ALL
4. FEELING TIRED OR HAVING LITTLE ENERGY: NOT AT ALL
2. FEELING DOWN, DEPRESSED OR HOPELESS: NOT AT ALL
8. MOVING OR SPEAKING SO SLOWLY THAT OTHER PEOPLE COULD HAVE NOTICED. OR THE OPPOSITE, BEING SO FIGETY OR RESTLESS THAT YOU HAVE BEEN MOVING AROUND A LOT MORE THAN USUAL: NOT AT ALL
SUM OF ALL RESPONSES TO PHQ QUESTIONS 1-9: 0
SUM OF ALL RESPONSES TO PHQ QUESTIONS 1-9: 0
5. POOR APPETITE OR OVEREATING: NOT AT ALL
1. LITTLE INTEREST OR PLEASURE IN DOING THINGS: NOT AT ALL
9. THOUGHTS THAT YOU WOULD BE BETTER OFF DEAD, OR OF HURTING YOURSELF: NOT AT ALL
7. TROUBLE CONCENTRATING ON THINGS, SUCH AS READING THE NEWSPAPER OR WATCHING TELEVISION: NOT AT ALL
6. FEELING BAD ABOUT YOURSELF - OR THAT YOU ARE A FAILURE OR HAVE LET YOURSELF OR YOUR FAMILY DOWN: NOT AT ALL
SUM OF ALL RESPONSES TO PHQ QUESTIONS 1-9: 0

## 2025-03-28 NOTE — PROGRESS NOTES
[facility] Progress Note:   F/up Physician Visit    Carlita Rosenbaum is a 74 y.o. female who was seen by synchronous (real-time) audio-video technology on 3/28/2025.      Consent:  She and/or her healthcare decision maker is aware that this patient-initiated Telehealth encounter is a billable service, with coverage as determined by her insurance carrier. She is aware that she may receive a bill and has provided verbal consent to proceed: Yes    Carlita Rosenbaum, was evaluated through a synchronous (real-time) audio-video encounter. The patient (or guardian if applicable) is aware that this is a billable service, which includes applicable co-pays. This Virtual Visit was conducted with patient's (and/or legal guardian's) consent. Patient identification was verified, and a caregiver was present when appropriate.   The patient was located at Home: [address] [place] [phone]  Provider was located at Home (Appt Dept State): [place]  Confirm you are appropriately licensed, registered, or certified to deliver care in the state where the patient is located as indicated above. If you are not or unsure, please re-schedule the visit:          --Kenya Winter MD on 3/29/2025 at 10:12 PM           [phone]  Subjective:   Carlita Rosenbaum was seen for Weight Management (1 month )    f/up physician visit for the / LCD Program.  Feb 165  Now 165  She is post knee replacement  Doing well, now can walk almost a mile on the new knee    She is vaping instead of tobacco  She is beginning to see that the goal of 155 may be something she will not reach  I feel she should transition to maintenance now  DM  Taking ozempic 2 mg a week, last A1c was 5.3          Prior to Admission medications    Medication Sig Start Date End Date Taking? Authorizing Provider   meloxicam (MOBIC) 7.5 MG tablet Take 1 tablet by mouth daily 2/25/25  Yes Provider, MD Celina   metFORMIN (GLUCOPHAGE-XR) 500 MG extended release tablet

## 2025-03-28 NOTE — PROGRESS NOTES
Identified pt with two pt identifiers (name and ). Reviewed chart in preparation for visit and have obtained necessary documentation.    Carlita Rosenbaum is a 74 y.o. female  Chief Complaint   Patient presents with    Weight Management     1 month      /64   Pulse 91   Temp 97 °F (36.1 °C)   Ht 1.702 m (5' 7\")   Wt 74.8 kg (165 lb)   BMI 25.84 kg/m²     1. Have you been to the ER, urgent care clinic since your last visit?  Hospitalized since your last visit?no    2. Have you seen or consulted any other health care providers outside of the [facility] since your last visit?  Include any pap smears or colon screening. No    Patient attended triage but did not bring homework form. Patient instructed to email or fax completed homework form to us. Patient informed that not bringing the homework form can result in not being seen next time.

## 2025-04-11 ENCOUNTER — TELEMEDICINE (OUTPATIENT)
Age: 75
End: 2025-04-11

## 2025-04-11 ENCOUNTER — TELEPHONE (OUTPATIENT)
Age: 75
End: 2025-04-11

## 2025-04-11 DIAGNOSIS — E66.3 OVERWEIGHT (BMI 25.0-29.9): Primary | ICD-10-CM

## 2025-04-11 NOTE — PROGRESS NOTES
[facility] [place]  Metabolic Program Follow-up Nutrition Consult    Date: 2025  Physician: YOEL Winter  Name: Carlita Rosenbaum  :  1950    Type of Plan: maintenance    Consent:  Patient and/or their healthcare decision maker is aware that this patient-initiated Telehealth or audio encounter is a complementary visit as part of the comprehensive VLCD/LCD meal replacement program offered at [facility].  Patient is aware if they discontinue the meal replacement program, all future RD visits with this provider will become a billable service, with coverage as determined by their insurance carrier.  Patient has provided verbal understanding and consent to proceed: yes , confirmed    Carlita Rosenbaum was evaluated through a synchronous (real-time) audio encounter. Patient identification was verified at the start of the visit. This visit was conducted with the patient's (and/or legal guardian's) verbal consent.  The patient was located at Home: [address] [place] [id number].  The provider was located at Home (not Appt Dept State): [place]  Confirm you are appropriately licensed, registered, or certified to deliver care in the state where the patient is located as indicated above. If you are not or unsure, please re-schedule the visit: Yes, I confirm.       GENE CARL, MELVA      ASSESSMENT:    Medications/Supplements:   Prior to Admission medications    Medication Sig Start Date End Date Taking? Authorizing Provider   meloxicam (MOBIC) 7.5 MG tablet Take 1 tablet by mouth daily 25   Celina Auguste MD   metFORMIN (GLUCOPHAGE-XR) 500 MG extended release tablet Take 1 tablet by mouth daily (with breakfast) 3/3/25   Celina Auguste MD   oxyCODONE (ROXICODONE) 5 MG immediate release tablet Take 1 tablet by mouth as needed.  Patient not taking: Reported on 3/28/2025 1/23/25   Celina Auguste MD   benzonatate (TESSALON) 200 MG capsule Take 1

## 2025-04-11 NOTE — TELEPHONE ENCOUNTER
Contacted patient to collect copay for completed dietitian appointment with Magdalena; no answer; left VM

## 2025-04-22 DIAGNOSIS — R94.5 ABNORMAL LIVER FUNCTION: ICD-10-CM

## 2025-04-22 DIAGNOSIS — E78.2 MIXED HYPERLIPIDEMIA: ICD-10-CM

## 2025-04-24 LAB
ALBUMIN SERPL-MCNC: 3.6 G/DL (ref 3.5–5)
ALBUMIN/GLOB SERPL: 1.4 (ref 1.1–2.2)
ALP SERPL-CCNC: 54 U/L (ref 45–117)
ALT SERPL-CCNC: 37 U/L (ref 12–78)
ANION GAP SERPL CALC-SCNC: 3 MMOL/L (ref 2–12)
AST SERPL-CCNC: 34 U/L (ref 15–37)
BILIRUB SERPL-MCNC: 0.3 MG/DL (ref 0.2–1)
BUN SERPL-MCNC: 13 MG/DL (ref 6–20)
BUN/CREAT SERPL: 21 (ref 12–20)
CALCIUM SERPL-MCNC: 9.3 MG/DL (ref 8.5–10.1)
CHLORIDE SERPL-SCNC: 105 MMOL/L (ref 97–108)
CHOLEST SERPL-MCNC: 117 MG/DL
CO2 SERPL-SCNC: 30 MMOL/L (ref 21–32)
CREAT SERPL-MCNC: 0.63 MG/DL (ref 0.55–1.02)
GLOBULIN SER CALC-MCNC: 2.6 G/DL (ref 2–4)
GLUCOSE SERPL-MCNC: 81 MG/DL (ref 65–100)
HDLC SERPL-MCNC: 60 MG/DL
HDLC SERPL: 2 (ref 0–5)
LDLC SERPL CALC-MCNC: 39.6 MG/DL (ref 0–100)
POTASSIUM SERPL-SCNC: 4.2 MMOL/L (ref 3.5–5.1)
PROT SERPL-MCNC: 6.2 G/DL (ref 6.4–8.2)
SODIUM SERPL-SCNC: 138 MMOL/L (ref 136–145)
TRIGL SERPL-MCNC: 87 MG/DL
VLDLC SERPL CALC-MCNC: 17.4 MG/DL

## 2025-05-14 ENCOUNTER — TELEPHONE (OUTPATIENT)
Age: 75
End: 2025-05-14

## 2025-05-15 ENCOUNTER — TELEPHONE (OUTPATIENT)
Age: 75
End: 2025-05-15

## 2025-05-15 ENCOUNTER — TELEMEDICINE (OUTPATIENT)
Age: 75
End: 2025-05-15

## 2025-05-15 DIAGNOSIS — Z96.659 STATUS POST KNEE REPLACEMENT, UNSPECIFIED LATERALITY: ICD-10-CM

## 2025-05-15 DIAGNOSIS — I10 PRIMARY HYPERTENSION: ICD-10-CM

## 2025-05-15 DIAGNOSIS — E78.2 MIXED HYPERLIPIDEMIA: ICD-10-CM

## 2025-05-15 DIAGNOSIS — I25.10 CORONARY ARTERY DISEASE INVOLVING NATIVE HEART, UNSPECIFIED VESSEL OR LESION TYPE, UNSPECIFIED WHETHER ANGINA PRESENT: ICD-10-CM

## 2025-05-15 DIAGNOSIS — E66.3 OVERWEIGHT (BMI 25.0-29.9): Primary | ICD-10-CM

## 2025-05-15 DIAGNOSIS — I25.10 ARTERIOSCLEROTIC HEART DISEASE (ASHD): ICD-10-CM

## 2025-05-15 DIAGNOSIS — Z87.891 HISTORY OF TOBACCO ABUSE: ICD-10-CM

## 2025-05-15 DIAGNOSIS — G47.33 OSA ON CPAP: ICD-10-CM

## 2025-05-15 DIAGNOSIS — E11.9 TYPE 2 DIABETES MELLITUS WITHOUT COMPLICATION, WITHOUT LONG-TERM CURRENT USE OF INSULIN (HCC): ICD-10-CM

## 2025-05-15 DIAGNOSIS — F51.04 CHRONIC INSOMNIA: ICD-10-CM

## 2025-05-15 RX ORDER — AMOXICILLIN 250 MG
2 CAPSULE ORAL 2 TIMES DAILY
COMMUNITY
Start: 2025-05-12

## 2025-05-15 RX ORDER — SEMAGLUTIDE 2.68 MG/ML
2 INJECTION, SOLUTION SUBCUTANEOUS
Qty: 9 ML | Refills: 0 | Status: SHIPPED | OUTPATIENT
Start: 2025-05-15

## 2025-05-15 ASSESSMENT — PATIENT HEALTH QUESTIONNAIRE - PHQ9
2. FEELING DOWN, DEPRESSED OR HOPELESS: NOT AT ALL
1. LITTLE INTEREST OR PLEASURE IN DOING THINGS: NOT AT ALL
SUM OF ALL RESPONSES TO PHQ QUESTIONS 1-9: 0

## 2025-05-15 ASSESSMENT — PAIN SCALES - GENERAL: PAINLEVEL_OUTOF10: 8

## 2025-05-15 ASSESSMENT — PAIN DESCRIPTION - LOCATION: LOCATION: KNEE

## 2025-05-15 NOTE — PROGRESS NOTES
Identified pt with two pt identifiers (name and ). Reviewed chart in preparation for visit and have obtained necessary documentation.    Carlita Rosenbaum is a 74 y.o. female  Chief Complaint   Patient presents with    Weight Management     2 month follow up     There were no vitals taken for this visit.    1. Have you been to the ER, urgent care clinic since your last visit?  Hospitalized since your last visit?no    2. Have you seen or consulted any other health care providers outside of the [facility] since your last visit?  Include any pap smears or colon screening. yes - Yes. Knee surgery on     Patient had Knee Surgery on Monday - no vital signs - also cannot stand to get her weight  
labs        Objective  There were no vitals taken for this visit.  No LMP recorded. Patient is postmenopausal.      Physical Exam  Appearance: well,   Mental:A&O x 3, NAD  H:NC/AT,  EENT:   EOMI, PERRL, No scleral icterus  Neck: no bruit or JVD  CV: RRR no M/R/G  Lung: clear, No W/R  ABD: soft, active, nontender  Ext:  no Edema  Neuro: nonfocal          Assessment / Plan    Carlita Rosenbaum was seen today for Weight Management (2 month follow up)            1. Overweight (BMI 25.0-29.9)  Movement: cont the plan she has to advance exercise in pool as tolerated by recent knee surgery     Meds none for appetite right now.     Eating plan  Continue the low corie eating plan using 2 robard meal replacements and one low carb meal        Sleep :   at goal  I counseled her for more than 50% of this more than 20 min visit on the ways to meet goals for moving right, eating right an sleeping right to get weight loss results     2. Type 2 diabetes mellitus without complication, without long-term current use of insulin (HCC)  Taking 2 mg ozempic a week and metformin    -     semaglutide, 2 MG/DOSE, (OZEMPIC, 2 MG/DOSE,) 8 MG/3ML SOPN sc injection; Inject 2 mg into the skin every 7 days, Disp-9 mL, R-0Normal  3. Coronary artery disease involving native heart, unspecified vessel or lesion type, unspecified whether angina present  Stable  Taking zetia and aspirin     4. Primary hypertension  Bp a little low, Good control  Taking hctz and diltiazem  She needs to talk to cardiologist about stopping the hctz or lower the dose of diltiazem  Her pulse is fast so I will not change that     5. Mixed hyperlipidemia  -     Lipid Panel; Future     Evelia zetia and lifestyle change is the treatment plan  6. MELISSA on CPAP     Using cpap  7. Arteriosclerotic heart disease (ASHD)  Post knee replacement  Healing a little slower than the other knee    1.  Weight management improved   Progress was reviewed with patient    2.  Labs    Latest results

## 2025-05-15 NOTE — TELEPHONE ENCOUNTER
Contacted patient to explain Dr Winter is running behind for appointment on 5/15/25; no answer; LVM

## 2025-06-24 ENCOUNTER — TRANSCRIBE ORDERS (OUTPATIENT)
Facility: HOSPITAL | Age: 75
End: 2025-06-24

## 2025-06-24 DIAGNOSIS — R91.8 PULMONARY NODULES: Primary | ICD-10-CM

## 2025-06-25 ENCOUNTER — CLINICAL SUPPORT (OUTPATIENT)
Age: 75
End: 2025-06-25

## 2025-06-25 VITALS
WEIGHT: 160.3 LBS | TEMPERATURE: 98.2 F | OXYGEN SATURATION: 94 % | RESPIRATION RATE: 16 BRPM | BODY MASS INDEX: 25.16 KG/M2 | HEART RATE: 82 BPM | HEIGHT: 67 IN | DIASTOLIC BLOOD PRESSURE: 75 MMHG | SYSTOLIC BLOOD PRESSURE: 118 MMHG

## 2025-06-25 DIAGNOSIS — E66.3 OVERWEIGHT (BMI 25.0-29.9): Primary | ICD-10-CM

## 2025-06-25 DIAGNOSIS — G47.33 OSA ON CPAP: ICD-10-CM

## 2025-06-25 DIAGNOSIS — I25.10 ARTERIOSCLEROTIC HEART DISEASE (ASHD): ICD-10-CM

## 2025-06-25 DIAGNOSIS — I25.10 CORONARY ARTERY DISEASE INVOLVING NATIVE HEART, UNSPECIFIED VESSEL OR LESION TYPE, UNSPECIFIED WHETHER ANGINA PRESENT: ICD-10-CM

## 2025-06-25 DIAGNOSIS — I10 PRIMARY HYPERTENSION: ICD-10-CM

## 2025-06-25 DIAGNOSIS — E78.2 MIXED HYPERLIPIDEMIA: ICD-10-CM

## 2025-06-25 DIAGNOSIS — E11.9 TYPE 2 DIABETES MELLITUS WITHOUT COMPLICATION, WITHOUT LONG-TERM CURRENT USE OF INSULIN (HCC): ICD-10-CM

## 2025-06-25 RX ORDER — VARENICLINE TARTRATE 0.5 (11)-1
KIT ORAL
COMMUNITY
Start: 2025-06-25

## 2025-06-25 RX ORDER — HYDROCODONE BITARTRATE AND ACETAMINOPHEN 5; 325 MG/1; MG/1
1-2 TABLET ORAL EVERY 4 HOURS PRN
COMMUNITY
Start: 2025-06-24

## 2025-06-25 RX ORDER — ALBUTEROL SULFATE 90 UG/1
2 INHALANT RESPIRATORY (INHALATION) AS NEEDED
COMMUNITY
Start: 2025-06-23

## 2025-06-25 ASSESSMENT — PATIENT HEALTH QUESTIONNAIRE - PHQ9
SUM OF ALL RESPONSES TO PHQ QUESTIONS 1-9: 0
SUM OF ALL RESPONSES TO PHQ QUESTIONS 1-9: 0
1. LITTLE INTEREST OR PLEASURE IN DOING THINGS: NOT AT ALL
2. FEELING DOWN, DEPRESSED OR HOPELESS: NOT AT ALL
SUM OF ALL RESPONSES TO PHQ QUESTIONS 1-9: 0
SUM OF ALL RESPONSES TO PHQ QUESTIONS 1-9: 0

## 2025-06-25 NOTE — PROGRESS NOTES
Identified pt with two pt identifiers (name and ). Reviewed chart in preparation for visit and have obtained necessary documentation.    Carlita Rosenbaum is a 74 y.o. female  Chief Complaint   Patient presents with    Weight Management     /75 (BP Site: Right Upper Arm, Patient Position: Sitting, BP Cuff Size: Small Adult)   Pulse 82   Temp 98.2 °F (36.8 °C) (Oral)   Resp 16   Ht 1.702 m (5' 7\")   Wt 72.7 kg (160 lb 4.8 oz)   SpO2 94%   BMI 25.11 kg/m²     1. Have you been to the ER, urgent care clinic since your last visit?  Hospitalized since your last visit?no    2. Have you seen or consulted any other health care providers outside of the [facility] since your last visit?  Include any pap smears or colon screening. no

## 2025-06-25 NOTE — PROGRESS NOTES
6/16/2025    Progress Note: Weekly Education Class in the [facility] Loss Program         Patient is on Very Low Calorie Diet [] (4 meal replacements per day, 800 kcal/day)      Modified Low Calorie Diet [x] (2-3 meal replacements per day, 9363-9749 kcal/day)    1) Did patient have any new symptoms or physical problems?   Yes []    No [x]    If yes, check & comment: weakness [], fatigue [], lightheadedness [], headache [], cramps [], cold intolerance [], hair loss [], diarrhea [], constipation [],  NA [] other:                                 2) Has patient had any medical attention from other providers, urgent care or the emergency room this week?  Yes [x]  No []       NA [], If yes, why: Dr. Murphy - checkup                                      3) Any other sugar sweetened beverages consumed this week?   Yes [x]  No []    4) Did patient have any problems adhering to the diet? Yes [x]  No [] NA []    If yes, Vacation [], Celebrations [], Conferences [], Family Reunions [] other:                                                5) How many hours of sleep this week? 5-12    (range)  NA []    Number of meal replacements consumed daily? 1 (range)  NA []    Average ounces of water patient consumed daily this week (not including shakes)? 55     (divide the weekly total by 7)    Did you eat any food outside of the program? Yes [x] No []    Physical Activity Over the Past Week:    Cardio exercise: 0 min  Strength exercise: 0 workouts / week  Number of steps walked per day: 0    How has patient mood overall been this week? Sad [], Happy [], Stressed [], Tired [], Content [], NA [], other Several different moods             Medications reconciled by nurse Yes [x]  No[]    Patient was given therapeutic recommendations for any noted side effects of their dietary approach based upon [facility] patient manual per providers recommendation.     6/23/2025    Progress Note: Weekly Education Class in the [facility]

## 2025-06-28 ENCOUNTER — RESULTS FOLLOW-UP (OUTPATIENT)
Age: 75
End: 2025-06-28

## 2025-07-01 NOTE — PROGRESS NOTES
Nurse note from patient's weekly VLCD / LCD / Maintenance class was reviewed.  Pertinent medical concerns were:   reviewed     BP Readings from Last 3 Encounters:   06/25/25 118/75   03/28/25 131/64   02/13/25 97/63       Failed to redirect to the [facility] the [facility].    Current Outpatient Medications   Medication Sig Dispense Refill    albuterol sulfate HFA (PROVENTIL;VENTOLIN;PROAIR) 108 (90 Base) MCG/ACT inhaler Inhale 2 puffs into the lungs as needed      HYDROcodone-acetaminophen (NORCO) 5-325 MG per tablet Take 1-2 tablets by mouth every 4 hours as needed.      Varenicline Tartrate, Starter, 0.5 MG X 11 & 1 MG X 42 TBPK       semaglutide, 2 MG/DOSE, (OZEMPIC, 2 MG/DOSE,) 8 MG/3ML SOPN sc injection Inject 2 mg into the skin every 7 days 9 mL 0    metFORMIN (GLUCOPHAGE-XR) 500 MG extended release tablet Take 1 tablet by mouth with breakfast and with evening meal      cyclobenzaprine (FLEXERIL) 5 MG tablet Take 1 tablet by mouth nightly      dilTIAZem (DILACOR XR) 120 MG extended release capsule Take 1 capsule by mouth daily 90 capsule 4    Cholecalciferol (VITAMIN D3) 125 MCG (5000 UT) CAPS Take 1 capsule by mouth daily      traZODone (DESYREL) 100 MG tablet Take 0.5 tablets by mouth nightly      aspirin 81 MG chewable tablet Take 1 tablet by mouth daily      acetaminophen (TYLENOL) 500 MG tablet Take 2 tablets by mouth every 8 hours as needed      cyanocobalamin 1000 MCG/ML injection Inject 1 mL into the muscle every 14 days      diazePAM (VALIUM) 10 MG tablet Take 0.5 tablets by mouth as needed.      DULoxetine (CYMBALTA) 60 MG extended release capsule Take 1 capsule by mouth daily      ezetimibe (ZETIA) 10 MG tablet Take 1 tablet by mouth daily      hydroCHLOROthiazide (HYDRODIURIL) 12.5 MG tablet Take 1 tablet by mouth daily      omeprazole (PRILOSEC) 20 MG delayed release capsule Take 1 capsule by mouth as needed      pregabalin (LYRICA) 150 MG capsule Take 1 capsule by mouth 2 times

## 2025-07-12 DIAGNOSIS — E11.9 TYPE 2 DIABETES MELLITUS WITHOUT COMPLICATION, WITHOUT LONG-TERM CURRENT USE OF INSULIN (HCC): ICD-10-CM

## 2025-07-14 NOTE — TELEPHONE ENCOUNTER
Last Rx on 5/15 for 9 ml with 0 RF    Last seen by Dr. Winter on 5/15    Next appointment TOMORROW, 7/15

## 2025-07-15 ENCOUNTER — OFFICE VISIT (OUTPATIENT)
Age: 75
End: 2025-07-15
Payer: MEDICARE

## 2025-07-15 VITALS
WEIGHT: 159.4 LBS | TEMPERATURE: 98.2 F | OXYGEN SATURATION: 94 % | DIASTOLIC BLOOD PRESSURE: 67 MMHG | BODY MASS INDEX: 25.02 KG/M2 | SYSTOLIC BLOOD PRESSURE: 104 MMHG | HEIGHT: 67 IN | RESPIRATION RATE: 18 BRPM | HEART RATE: 73 BPM

## 2025-07-15 DIAGNOSIS — I25.10 ARTERIOSCLEROTIC HEART DISEASE (ASHD): ICD-10-CM

## 2025-07-15 DIAGNOSIS — E11.9 TYPE 2 DIABETES MELLITUS WITHOUT COMPLICATION, WITHOUT LONG-TERM CURRENT USE OF INSULIN (HCC): ICD-10-CM

## 2025-07-15 DIAGNOSIS — I25.10 CORONARY ARTERY DISEASE INVOLVING NATIVE HEART, UNSPECIFIED VESSEL OR LESION TYPE, UNSPECIFIED WHETHER ANGINA PRESENT: ICD-10-CM

## 2025-07-15 DIAGNOSIS — Z76.89 ENCOUNTER FOR WEIGHT MANAGEMENT: Primary | ICD-10-CM

## 2025-07-15 DIAGNOSIS — E78.2 MIXED HYPERLIPIDEMIA: ICD-10-CM

## 2025-07-15 DIAGNOSIS — G47.33 OSA ON CPAP: ICD-10-CM

## 2025-07-15 DIAGNOSIS — I10 PRIMARY HYPERTENSION: ICD-10-CM

## 2025-07-15 PROCEDURE — G8427 DOCREV CUR MEDS BY ELIG CLIN: HCPCS | Performed by: FAMILY MEDICINE

## 2025-07-15 PROCEDURE — 1123F ACP DISCUSS/DSCN MKR DOCD: CPT | Performed by: FAMILY MEDICINE

## 2025-07-15 PROCEDURE — 1125F AMNT PAIN NOTED PAIN PRSNT: CPT | Performed by: FAMILY MEDICINE

## 2025-07-15 PROCEDURE — 1159F MED LIST DOCD IN RCRD: CPT | Performed by: FAMILY MEDICINE

## 2025-07-15 PROCEDURE — 99213 OFFICE O/P EST LOW 20 MIN: CPT | Performed by: FAMILY MEDICINE

## 2025-07-15 PROCEDURE — 1090F PRES/ABSN URINE INCON ASSESS: CPT | Performed by: FAMILY MEDICINE

## 2025-07-15 PROCEDURE — G8420 CALC BMI NORM PARAMETERS: HCPCS | Performed by: FAMILY MEDICINE

## 2025-07-15 PROCEDURE — G8400 PT W/DXA NO RESULTS DOC: HCPCS | Performed by: FAMILY MEDICINE

## 2025-07-15 PROCEDURE — 3074F SYST BP LT 130 MM HG: CPT | Performed by: FAMILY MEDICINE

## 2025-07-15 PROCEDURE — 4004F PT TOBACCO SCREEN RCVD TLK: CPT | Performed by: FAMILY MEDICINE

## 2025-07-15 PROCEDURE — 3078F DIAST BP <80 MM HG: CPT | Performed by: FAMILY MEDICINE

## 2025-07-15 RX ORDER — SEMAGLUTIDE 2.68 MG/ML
INJECTION, SOLUTION SUBCUTANEOUS
Qty: 9 ML | Refills: 0 | Status: SHIPPED | OUTPATIENT
Start: 2025-07-15

## 2025-07-15 ASSESSMENT — PATIENT HEALTH QUESTIONNAIRE - PHQ9
SUM OF ALL RESPONSES TO PHQ QUESTIONS 1-9: 0
2. FEELING DOWN, DEPRESSED OR HOPELESS: NOT AT ALL
1. LITTLE INTEREST OR PLEASURE IN DOING THINGS: NOT AT ALL
SUM OF ALL RESPONSES TO PHQ QUESTIONS 1-9: 0

## 2025-07-15 NOTE — PROGRESS NOTES
[facility] Progress Note:   F/up Physician Visit    CC: Weight Management      Carlita Rosenbaum is a 74 y.o. female who is here for her  f/up physician visit for the VLCD  Program.    May [birth year]  Now 89  She is still having trouble with the right knee healing post TKR    She needs to transition back to maintenance  Taking ozempic          7/15/2025     1:27 PM 7/15/2025     1:00 PM 6/25/2025    11:11 AM 3/28/2025    11:00 AM 2/13/2025     2:18 PM 2/13/2025     2:00 PM 11/22/2024     1:02 PM   Weight Metrics   Weight 159 lb 6.4 oz  160 lb 4.8 oz 165 lb 165 lb 4.8 oz  178 lb   Neck (Inches)  12.5 in    13 in    Waist Measure Inches  36.25 in    38 in    Body Fat %  35.1 %    37.2 %    BMI (Calculated) 25 kg/m2  25.2 kg/m2 25.9 kg/m2 25.9 kg/m2  27.9 kg/m2          No data to display                   Current Outpatient Medications   Medication Sig Dispense Refill    albuterol sulfate HFA (PROVENTIL;VENTOLIN;PROAIR) 108 (90 Base) MCG/ACT inhaler Inhale 2 puffs into the lungs as needed      HYDROcodone-acetaminophen (NORCO) 5-325 MG per tablet Take 1-2 tablets by mouth every 4 hours as needed.      semaglutide, 2 MG/DOSE, (OZEMPIC, 2 MG/DOSE,) 8 MG/3ML SOPN sc injection Inject 2 mg into the skin every 7 days 9 mL 0    metFORMIN (GLUCOPHAGE-XR) 500 MG extended release tablet Take 1 tablet by mouth with breakfast and with evening meal      cyclobenzaprine (FLEXERIL) 5 MG tablet Take 1 tablet by mouth nightly      dilTIAZem (DILACOR XR) 120 MG extended release capsule Take 1 capsule by mouth daily 90 capsule 4    Cholecalciferol (VITAMIN D3) 125 MCG (5000 UT) CAPS Take 1 capsule by mouth daily      traZODone (DESYREL) 100 MG tablet Take 0.5 tablets by mouth nightly      aspirin 81 MG chewable tablet Take 1 tablet by mouth daily      acetaminophen (TYLENOL) 500 MG tablet Take 2 tablets by mouth every 8 hours as needed      cyanocobalamin 1000 MCG/ML injection Inject 1 mL into the muscle every 14 days

## 2025-07-15 NOTE — PROGRESS NOTES
Identified pt with two pt identifiers (name and ). Reviewed chart in preparation for visit and have obtained necessary documentation.    Carlita Rosenbaum is a 74 y.o. female  Chief Complaint   Patient presents with    Weight Management     2 month follow up     /67 (BP Site: Right Upper Arm, Patient Position: Sitting, BP Cuff Size: Small Adult)   Pulse 73   Temp 98.2 °F (36.8 °C) (Oral)   Resp 18   Ht 1.702 m (5' 7\")   Wt 72.3 kg (159 lb 6.4 oz)   SpO2 94%   BMI 24.97 kg/m²     1. Have you been to the ER, urgent care clinic since your last visit?  Hospitalized since your last visit?no    2. Have you seen or consulted any other health care providers outside of the [facility] since your last visit?  Include any pap smears or colon screening. yes - PCP Dr. Murphy    BMI - 24.7

## 2025-09-05 LAB
ALBUMIN SERPL-MCNC: 4.1 G/DL (ref 3.8–4.8)
ALP SERPL-CCNC: 59 IU/L (ref 44–121)
ALT SERPL-CCNC: 14 IU/L (ref 0–32)
AST SERPL-CCNC: 27 IU/L (ref 0–40)
BILIRUB SERPL-MCNC: 0.4 MG/DL (ref 0–1.2)
BUN SERPL-MCNC: 16 MG/DL (ref 8–27)
BUN/CREAT SERPL: 22 (ref 12–28)
CALCIUM SERPL-MCNC: 9.6 MG/DL (ref 8.7–10.3)
CHLORIDE SERPL-SCNC: 99 MMOL/L (ref 96–106)
CO2 SERPL-SCNC: 25 MMOL/L (ref 20–29)
CREAT SERPL-MCNC: 0.72 MG/DL (ref 0.57–1)
EGFRCR SERPLBLD CKD-EPI 2021: 86 ML/MIN/1.73
GLOBULIN SER CALC-MCNC: 2 G/DL (ref 1.5–4.5)
GLUCOSE SERPL-MCNC: 85 MG/DL (ref 70–99)
POTASSIUM SERPL-SCNC: 4.2 MMOL/L (ref 3.5–5.2)
PROT SERPL-MCNC: 6.1 G/DL (ref 6–8.5)
SODIUM SERPL-SCNC: 138 MMOL/L (ref 134–144)

## (undated) DEVICE — NON-REM POLYHESIVE PATIENT RETURN ELECTRODE: Brand: VALLEYLAB

## (undated) DEVICE — ELECTRODE,RADIOTRANSLUCENT,FOAM,5PK: Brand: MEDLINE

## (undated) DEVICE — CONTAINER SPEC 20 ML LID NEUT BUFF FORMALIN 10 % POLYPR STS

## (undated) DEVICE — NEONATAL-ADULT SPO2 SENSOR: Brand: NELLCOR

## (undated) DEVICE — FORCEPS BX L160CM DIA8MM GRSP DISECT CUP TIP NONLOCKING ROT

## (undated) DEVICE — SOLIDIFIER MEDC 1200ML -- CONVERT TO 356117

## (undated) DEVICE — SYR 3ML LL TIP 1/10ML GRAD --

## (undated) DEVICE — BAG SPEC BIOHZRD 10 X 10 IN --

## (undated) DEVICE — Device

## (undated) DEVICE — Z DISCONTINUED PER MEDLINE LINE GAS SAMPLING O2/CO2 LNG AD 13 FT NSL W/ TBNG FILTERLINE

## (undated) DEVICE — STRAINER URIN CALC RNL MSH -- CONVERT TO ITEM 357634

## (undated) DEVICE — SNARE ENDOSCP M L240CM W27MM SHTH DIA2.4MM CHN 2.8MM OVL

## (undated) DEVICE — YANKAUER,TAPERED BULBOUS TIP,W/O VENT: Brand: MEDLINE

## (undated) DEVICE — CATH IV AUTOGRD BC PNK 20GA 25 -- INSYTE

## (undated) DEVICE — SYR 10ML LUER LOK 1/5ML GRAD --

## (undated) DEVICE — BLOCK BITE ENDOSCP AD 21 MM W/ DIL BLU LF DISP

## (undated) DEVICE — TOWEL 4 PLY TISS 19X30 SUE WHT

## (undated) DEVICE — SET ADMIN 16ML TBNG L100IN 2 Y INJ SITE IV PIGGY BK DISP

## (undated) DEVICE — BASIN EMSIS 16OZ GRAPHITE PLAS KID SHP MOLD GRAD FOR ORAL

## (undated) DEVICE — 1200 GUARD II KIT W/5MM TUBE W/O VAC TUBE: Brand: GUARDIAN

## (undated) DEVICE — NEEDLE HYPO 18GA L1.5IN PNK S STL HUB POLYPR SHLD REG BVL

## (undated) DEVICE — TRAP,MUCUS SPECIMEN, 80CC: Brand: MEDLINE